# Patient Record
Sex: FEMALE | Race: WHITE | URBAN - NONMETROPOLITAN AREA
[De-identification: names, ages, dates, MRNs, and addresses within clinical notes are randomized per-mention and may not be internally consistent; named-entity substitution may affect disease eponyms.]

---

## 2022-07-11 ENCOUNTER — OFFICE VISIT (OUTPATIENT)
Dept: NEPHROLOGY | Age: 47
End: 2022-07-11

## 2022-07-11 VITALS
HEIGHT: 64 IN | OXYGEN SATURATION: 96 % | BODY MASS INDEX: 36.37 KG/M2 | DIASTOLIC BLOOD PRESSURE: 92 MMHG | HEART RATE: 96 BPM | SYSTOLIC BLOOD PRESSURE: 178 MMHG | WEIGHT: 213 LBS

## 2022-07-11 DIAGNOSIS — E11.21 DIABETIC NEPHROPATHY WITH PROTEINURIA (HCC): ICD-10-CM

## 2022-07-11 DIAGNOSIS — I10 HTN (HYPERTENSION), BENIGN: ICD-10-CM

## 2022-07-11 DIAGNOSIS — N18.2 CKD (CHRONIC KIDNEY DISEASE) STAGE 2, GFR 60-89 ML/MIN: Primary | ICD-10-CM

## 2022-07-11 PROBLEM — R53.1 WEAKNESS: Status: ACTIVE | Noted: 2019-06-26

## 2022-07-11 PROBLEM — E11.51 TYPE 2 DIABETES MELLITUS WITH PERIPHERAL ANGIOPATHY (HCC): Status: ACTIVE | Noted: 2018-01-24

## 2022-07-11 PROBLEM — F17.200 NICOTINE DEPENDENCE: Status: ACTIVE | Noted: 2019-08-06

## 2022-07-11 PROBLEM — M75.00 FROZEN SHOULDER SYNDROME: Status: ACTIVE | Noted: 2018-02-01

## 2022-07-11 PROBLEM — I82.409 DVT (DEEP VENOUS THROMBOSIS) (HCC): Status: ACTIVE | Noted: 2019-08-06

## 2022-07-11 PROBLEM — G57.82 NEUROPATHY OF LEFT SURAL NERVE: Status: ACTIVE | Noted: 2020-08-18

## 2022-07-11 PROBLEM — I73.9 CLAUDICATION IN PERIPHERAL VASCULAR DISEASE (HCC): Status: ACTIVE | Noted: 2020-08-18

## 2022-07-11 PROBLEM — I73.9 PAD (PERIPHERAL ARTERY DISEASE) (HCC): Status: ACTIVE | Noted: 2019-08-06

## 2022-07-11 PROBLEM — E88.09 HYPOALBUMINEMIA: Status: ACTIVE | Noted: 2019-06-26

## 2022-07-11 PROBLEM — M25.511 RIGHT SHOULDER PAIN: Status: ACTIVE | Noted: 2018-02-01

## 2022-07-11 PROCEDURE — 99205 OFFICE O/P NEW HI 60 MIN: CPT | Performed by: INTERNAL MEDICINE

## 2022-07-11 RX ORDER — POTASSIUM CHLORIDE 1.5 G/1.77G
20 POWDER, FOR SOLUTION ORAL DAILY
COMMUNITY

## 2022-07-11 RX ORDER — CILOSTAZOL 100 MG/1
100 TABLET ORAL 2 TIMES DAILY
COMMUNITY

## 2022-07-11 RX ORDER — FUROSEMIDE 20 MG/1
20 TABLET ORAL 2 TIMES DAILY
COMMUNITY

## 2022-07-11 RX ORDER — ASPIRIN 81 MG/1
81 TABLET ORAL DAILY
COMMUNITY

## 2022-07-11 RX ORDER — ORAL SEMAGLUTIDE 14 MG/1
TABLET ORAL
COMMUNITY
End: 2022-07-27

## 2022-07-11 RX ORDER — NICOTINE 21 MG/24HR
PATCH, TRANSDERMAL 24 HOURS TRANSDERMAL
COMMUNITY
Start: 2022-06-28

## 2022-07-11 RX ORDER — METFORMIN HYDROCHLORIDE 500 MG/1
1000 TABLET, EXTENDED RELEASE ORAL 2 TIMES DAILY
COMMUNITY
Start: 2022-04-05

## 2022-07-11 RX ORDER — ATORVASTATIN CALCIUM 40 MG/1
40 TABLET, FILM COATED ORAL DAILY
COMMUNITY

## 2022-07-11 RX ORDER — VALSARTAN 80 MG/1
80 TABLET ORAL DAILY
COMMUNITY
End: 2022-07-27

## 2022-07-11 ASSESSMENT — ENCOUNTER SYMPTOMS
ABDOMINAL PAIN: 0
EYE PAIN: 0
VOMITING: 0
DIARRHEA: 0
COUGH: 0
NAUSEA: 0
BACK PAIN: 0
EYES NEGATIVE: 1
SHORTNESS OF BREATH: 0

## 2022-07-11 NOTE — PROGRESS NOTES
[facility]         Outpatient Initial consult note         2022 3:32 PM    [address] [address]  Dept: [phone]  Loc: [phone]      Patient Name:   Brandi Martinez  YOB: 1975  Primary Care Physician:  Guerline Gayle MD     Chief Complaint : Evaluation of  proteinuria     History of presenting illness :Brandi Martinez is a 55 y.o.   female with Past Medical History as stated below was sent / referred by Guerline Gayle MD forevaluation of the above problem. Patient has a history of hypertension for 15 years. Patient has history of diabetes mellitus, with neuropathy and for 25 years. The patient denies history of renal stones anddenies NSAID use. Patient has a history of proteinuria. Patient Never had a kidney biopsy done. Patient does not use Herbal remedies/vitamin supplements. Patient denies frequency, hematuria, hesitancy. Patient has no family history of kidney disease.     Patient has been having swelling, bilateral lower extremities moderate severity all the way up to the thighs and also on the face, no associated symptoms no specific modifying factors as well she was started on Lasix by PCP without much improvement     Past History :     Past Medical History:   Diagnosis Date    Hyperlipidemia     Neuropathy     Type II or unspecified type diabetes mellitus without mention of complication, not stated as uncontrolled      Past Surgical History:   Procedure Laterality Date     SECTION       Social History     Socioeconomic History    Marital status:      Spouse name: Not on file    Number of children: Not on file    Years of education: Not on file    Highest education level: Not on file   Occupational History    Not on file   Tobacco Use    Smoking status: Current Every Day Smoker     Packs/day: 1.00     Years: 18.00     Pack years: 18.00    Smokeless tobacco: Never Used   Substance and Sexual Activity    Alcohol use: Yes     Comment: Socially/Seldom    Drug use: No    Sexual activity: Not on file   Other Topics Concern    Not on file   Social History Narrative    Not on file     Social Determinants of Health     Financial Resource Strain:     Difficulty of Paying Living Expenses: Not on file   Food Insecurity:     Worried About Running Out of Food in the Last Year: Not on file    Davin of Food in the Last Year: Not on file   Transportation Needs:     Lack of Transportation (Medical): Not on file    Lack of Transportation (Non-Medical): Not on file   Physical Activity:     Days of Exercise per Week: Not on file    Minutes of Exercise per Session: Not on file   Stress:     Feeling of Stress : Not on file   Social Connections:     Frequency of Communication with Friends and Family: Not on file    Frequency of Social Gatherings with Friends and Family: Not on file    Attends [employer] Services: Not on file    Active Member of [address] [place] [address] or Organizations: Not on file    Attends Club or Organization Meetings: Not on file    Marital Status: Not on file   Intimate Partner Violence:     Fear of Current or Ex-Partner: Not on file    Emotionally Abused: Not on file    Physically Abused: Not on file    Sexually Abused: Not on file   Housing Stability:     Unable to Pay for Housing in the Last Year: Not on file    Number of [place] in the Last Year: Not on file    Unstable Housing in the Last Year: Not on file     Family History   Problem Relation Age of Onset    Diabetes Mother     High Blood Pressure Mother      Medications & Allergies :      Prior to Admission medications    Medication Sig Start Date End Date Taking?  Authorizing Provider   metFORMIN (GLUCOPHAGE-XR) 500 MG extended release tablet 500 mg in the morning and at bedtime 4/5/22  Yes Historical Provider, MD   nicotine (Kate Mixon) 21 MG/24HR APPLY 1 PATCH TO THE SKIN ONCE A DAY 6/28/22  Yes Historical Provider, MD   furosemide (LASIX) 20 MG tablet Take 20 mg by mouth 2 times daily   Yes Historical Provider, MD   atorvastatin (LIPITOR) 40 MG tablet Take 40 mg by mouth daily   Yes Historical Provider, MD   potassium chloride (KLOR-CON) 20 MEQ packet Take 20 mEq by mouth daily   Yes Historical Provider, MD   aspirin 81 MG EC tablet Take 81 mg by mouth daily   Yes Historical Provider, MD   valsartan (DIOVAN) 80 MG tablet Take 80 mg by mouth daily   Yes Historical Provider, MD   cilostazol (PLETAL) 100 MG tablet Take 100 mg by mouth 2 times daily   Yes Historical Provider, MD   Citalopram Hydrobromide (CELEXA PO) Take 20 mg by mouth daily   Yes Historical Provider, MD   rivaroxaban (XARELTO) 20 MG TABS tablet Take 20 mg by mouth Daily with supper   Yes Historical Provider, MD   Semaglutide (RYBELSUS) 14 MG TABS Take by mouth   Yes Historical Provider, MD   gabapentin (NEURONTIN) 300 MG capsule Take 300 mg by mouth 3 times daily. Indications: Nerve Disease   Yes Historical Provider, MD     Allergies: Insulins and Lisinopril    Review of Systems Physical Exam   Review of Systems   Constitutional: Positive for fatigue. Negative for chills and fever. HENT: Negative. Eyes: Negative. Negative for pain. Respiratory: Negative for cough and shortness of breath. Cardiovascular: Positive for leg swelling. Negative for chest pain. Gastrointestinal: Negative for abdominal pain, diarrhea, nausea and vomiting. Genitourinary: Negative for dysuria, frequency and hematuria. Musculoskeletal: Negative for back pain and neck pain. Skin: Negative for rash and wound. Neurological: Negative for dizziness, light-headedness and headaches. Psychiatric/Behavioral: Negative for agitation and confusion. Physical Exam  Vitals reviewed. Constitutional:       General: She is not in acute distress. Appearance: She is obese. She is not diaphoretic. HENT:      Head: Normocephalic and atraumatic. Right Ear: External ear normal.      Left Ear: External ear normal.      Nose: Nose normal.      Mouth/Throat:      Mouth: Mucous membranes are moist.   Eyes:      General: No scleral icterus. Right eye: No discharge. Left eye: No discharge. Conjunctiva/sclera: Conjunctivae normal.   Neck:      Thyroid: No thyromegaly. Vascular: No JVD. Cardiovascular:      Rate and Rhythm: Normal rate and regular rhythm. Heart sounds: Normal heart sounds. No murmur heard. Pulmonary:      Effort: Pulmonary effort is normal. No respiratory distress. Breath sounds: Normal breath sounds. No stridor. Chest:      Chest wall: No tenderness. Abdominal:      General: Bowel sounds are normal. There is distension. Palpations: Abdomen is soft. Tenderness: There is no abdominal tenderness. Musculoskeletal:         General: Swelling present. No tenderness. Cervical back: Normal range of motion and neck supple. Left lower leg: Edema present. Skin:     General: Skin is warm and dry. Findings: No erythema or rash. Neurological:      General: No focal deficit present. Mental Status: She is alert and oriented to person, place, and time.    Psychiatric:         Mood and Affect: Mood normal.         Behavior: Behavior normal.          Vitals   Vitals:    07/11/22 1509   BP: (!) 178/92   Site: Left Upper Arm   Position: Sitting   Cuff Size: Small Adult   Pulse: 96   SpO2: 96%   Weight: 213 lb (96.6 kg)   Height: 5' 4\" (1.626 m)        Labs, Radiology and Tests :    Labs -    5/22           potassium 3.7           BUN 17           Creatinine 0.82           eGFR 86                       UPCR            UMCR [id number]                         Renal Ultrasound Scan -- will order       Other : old  lab data have been reviewed and noted that the patient A1c has been 12+ for almost 6 years    Echo - 11/21 - EF 60%  Assessment 1. Renal -patient has chronic kidney disease stage II mostly due to the presence of proteinuria this is due to mostly diabetic nephropathy  -Patient however does complain that the swelling has acutely gotten worse within the last 4 weeks and gained 20 pounds and she also noticed some facial swelling-concern for any glomerular pathology  -We will recheck labs, quantify protein again and send some serologic work-up  -If any abnormalities may need to do a renal biopsy as well  2. Electrolytes within normal limits  3. Essential hypertension running well continue current medications  4. Hx of diabetes mellitus for almost 25 years with now diabetic nephropathy on ARB  5. Leg swelling-patient drinks at least a gallon of water a day and takes Lasix as well advised to restrict fluid intake to 60 ounces per day and also rule out nephrotic syndrome. 6. Hx of peripheral vascular disease status post stent placement in January 22  7. Meds reviewed discussed with the patient  8. Follow-up in 2 weeks  Plan   No orders of the defined types were placed in this encounter. Leonela Lezama M.D  [facility].

## 2022-07-12 ENCOUNTER — TELEPHONE (OUTPATIENT)
Dept: NEPHROLOGY | Age: 47
End: 2022-07-12

## 2022-07-12 NOTE — TELEPHONE ENCOUNTER
Received call from [facility] at lab stating pt had a glucose of 576. They will fax the results. Left msg for pt to call in regards to her glucose.

## 2022-07-14 LAB
BILIRUBIN, URINE: NEGATIVE
BLOOD, URINE: POSITIVE
CLARITY: CLEAR
COLOR: YELLOW
CREATININE, URINE: 43.8
GLUCOSE URINE: >=500
KETONES, URINE: NEGATIVE
LEUKOCYTE ESTERASE, URINE: NEGATIVE
MICROALBUMIN/CREAT 24H UR: 291.1 MG/G{CREAT}
MICROALBUMIN/CREAT UR-RTO: 6646
NITRITE, URINE: NEGATIVE
PH UA: 6 (ref 4.5–8)
PROTEIN UA: POSITIVE
SPECIFIC GRAVITY, URINE: 1.02
UROBILINOGEN, URINE: ABNORMAL

## 2022-07-25 DIAGNOSIS — N18.2 CKD (CHRONIC KIDNEY DISEASE) STAGE 2, GFR 60-89 ML/MIN: ICD-10-CM

## 2022-07-27 ENCOUNTER — OFFICE VISIT (OUTPATIENT)
Dept: NEPHROLOGY | Age: 47
End: 2022-07-27

## 2022-07-27 VITALS
BODY MASS INDEX: 36.22 KG/M2 | DIASTOLIC BLOOD PRESSURE: 90 MMHG | HEART RATE: 97 BPM | WEIGHT: 211 LBS | SYSTOLIC BLOOD PRESSURE: 156 MMHG | OXYGEN SATURATION: 99 %

## 2022-07-27 DIAGNOSIS — N18.2 CKD (CHRONIC KIDNEY DISEASE) STAGE 2, GFR 60-89 ML/MIN: Primary | ICD-10-CM

## 2022-07-27 DIAGNOSIS — I10 HTN (HYPERTENSION), BENIGN: ICD-10-CM

## 2022-07-27 DIAGNOSIS — E11.21 DIABETIC NEPHROPATHY WITH PROTEINURIA (HCC): ICD-10-CM

## 2022-07-27 PROCEDURE — 99214 OFFICE O/P EST MOD 30 MIN: CPT | Performed by: INTERNAL MEDICINE

## 2022-07-27 RX ORDER — VALSARTAN 160 MG/1
160 TABLET ORAL DAILY
Qty: 90 TABLET | Refills: 3 | Status: SHIPPED | OUTPATIENT
Start: 2022-07-27

## 2022-07-27 NOTE — PROGRESS NOTES
[facility]        Outpatient Follow-Up note         2022 12:11 PM    Patient Name:   Bria Umana  YOB: 1975  Primary Care Physician:  Gladys Robertson MD   [address] [place] [place] [address]  Dept: [phone]  Loc: [phone]     Chief Complaint / Reason for follow-up : Follow Up of CKD and proteinuria     Interval History :  Patient seen and examined by me. Feels ok. Had a eye scrath by the dog     Past History :  Past Medical History:   Diagnosis Date    Hyperlipidemia     Neuropathy     Type II or unspecified type diabetes mellitus without mention of complication, not stated as uncontrolled      Past Surgical History:   Procedure Laterality Date     SECTION          Medications :     Outpatient Medications Marked as Taking for the 22 encounter (Office Visit) with Zoran Reyes MD   Medication Sig Dispense Refill    metFORMIN (GLUCOPHAGE-XR) 500 MG extended release tablet 1,000 mg in the morning and at bedtime      nicotine (NICODERM CQ) 21 MG/24HR APPLY 1 PATCH TO THE SKIN ONCE A DAY      furosemide (LASIX) 20 MG tablet Take 20 mg by mouth 2 times daily      atorvastatin (LIPITOR) 40 MG tablet Take 40 mg by mouth daily      potassium chloride (KLOR-CON) 20 MEQ packet Take 20 mEq by mouth daily      aspirin 81 MG EC tablet Take 81 mg by mouth daily      valsartan (DIOVAN) 80 MG tablet Take 80 mg by mouth daily      cilostazol (PLETAL) 100 MG tablet Take 100 mg by mouth 2 times daily      Citalopram Hydrobromide (CELEXA PO) Take 20 mg by mouth daily      rivaroxaban (XARELTO) 20 MG TABS tablet Take 20 mg by mouth Daily with supper      gabapentin (NEURONTIN) 300 MG capsule Take 300 mg by mouth 3 times daily.     Indications: Nerve Disease         Vitals     BP (!) 156/90   Pulse 97   Wt 211 lb (95.7 kg)   SpO2 99%   BMI 36.22 kg/m²  Wt

## 2023-03-22 ENCOUNTER — OFFICE VISIT (OUTPATIENT)
Dept: NEPHROLOGY | Age: 48
End: 2023-03-22
Payer: COMMERCIAL

## 2023-03-22 ENCOUNTER — TELEPHONE (OUTPATIENT)
Dept: NEPHROLOGY | Age: 48
End: 2023-03-22

## 2023-03-22 VITALS
SYSTOLIC BLOOD PRESSURE: 164 MMHG | BODY MASS INDEX: 39.65 KG/M2 | HEART RATE: 103 BPM | WEIGHT: 231 LBS | DIASTOLIC BLOOD PRESSURE: 90 MMHG | OXYGEN SATURATION: 100 %

## 2023-03-22 DIAGNOSIS — N18.2 CKD (CHRONIC KIDNEY DISEASE) STAGE 2, GFR 60-89 ML/MIN: Primary | ICD-10-CM

## 2023-03-22 DIAGNOSIS — E11.21 DIABETIC NEPHROPATHY WITH PROTEINURIA (HCC): Primary | ICD-10-CM

## 2023-03-22 DIAGNOSIS — I10 HTN (HYPERTENSION), BENIGN: ICD-10-CM

## 2023-03-22 DIAGNOSIS — E11.21 DIABETIC NEPHROPATHY WITH PROTEINURIA (HCC): ICD-10-CM

## 2023-03-22 PROCEDURE — 4004F PT TOBACCO SCREEN RCVD TLK: CPT | Performed by: INTERNAL MEDICINE

## 2023-03-22 PROCEDURE — G8484 FLU IMMUNIZE NO ADMIN: HCPCS | Performed by: INTERNAL MEDICINE

## 2023-03-22 PROCEDURE — 3077F SYST BP >= 140 MM HG: CPT | Performed by: INTERNAL MEDICINE

## 2023-03-22 PROCEDURE — 3080F DIAST BP >= 90 MM HG: CPT | Performed by: INTERNAL MEDICINE

## 2023-03-22 PROCEDURE — 2022F DILAT RTA XM EVC RTNOPTHY: CPT | Performed by: INTERNAL MEDICINE

## 2023-03-22 PROCEDURE — 99215 OFFICE O/P EST HI 40 MIN: CPT | Performed by: INTERNAL MEDICINE

## 2023-03-22 PROCEDURE — 3046F HEMOGLOBIN A1C LEVEL >9.0%: CPT | Performed by: INTERNAL MEDICINE

## 2023-03-22 PROCEDURE — G8427 DOCREV CUR MEDS BY ELIG CLIN: HCPCS | Performed by: INTERNAL MEDICINE

## 2023-03-22 PROCEDURE — G8417 CALC BMI ABV UP PARAM F/U: HCPCS | Performed by: INTERNAL MEDICINE

## 2023-03-22 RX ORDER — POTASSIUM CHLORIDE 1.5 G/1.77G
20 POWDER, FOR SOLUTION ORAL 2 TIMES DAILY
Qty: 60 EACH | Refills: 2 | Status: SHIPPED | OUTPATIENT
Start: 2023-03-22

## 2023-03-22 RX ORDER — LIRAGLUTIDE 6 MG/ML
0.6 INJECTION SUBCUTANEOUS DAILY
COMMUNITY

## 2023-03-22 RX ORDER — BUMETANIDE 2 MG/1
1 TABLET ORAL 2 TIMES DAILY
Qty: 60 TABLET | Refills: 1 | Status: SHIPPED | OUTPATIENT
Start: 2023-03-22

## 2023-03-22 NOTE — PROGRESS NOTES
1500 mm fluid restriction and increase potassium to 1 tablet twice a day  Hx of peripheral vascular disease status post stent placement in January 22  Meds reviewed discussed with the patient  Follow-up in 6 months      Tests and orders placed this Encounter     No orders of the defined types were placed in this encounter. Armani Jacob M.D  [facility].

## 2023-03-22 NOTE — TELEPHONE ENCOUNTER
Patient needing kidney biopsy. Patient is currently on xarelto and ASA. [facility] get [facility] for patient to hold both medication 5 days prior to procedure and 3 days after procedure. Efra Cantrell was given if biopsy is really needed. Biopsy has been ordered. Scheduling will call patient to schedule. Left message for patient to return my call to advise of above.

## 2023-03-23 NOTE — TELEPHONE ENCOUNTER
Called patient and advised of holding medication. Advised also that cardiologist was wanting her to remain on ASA, however radiology would not do biopsy. Cardio then stated okay to be off ASA if biopsy is necessary. This was explained to patient and she voiced understanding.  Will await IR to schedule biopsy

## 2023-04-07 ENCOUNTER — HOSPITAL ENCOUNTER (OUTPATIENT)
Dept: CT IMAGING | Age: 48
Discharge: HOME | End: 2023-04-07
Payer: COMMERCIAL

## 2023-04-07 VITALS
OXYGEN SATURATION: 98 % | TEMPERATURE: 96.8 F | DIASTOLIC BLOOD PRESSURE: 94 MMHG | SYSTOLIC BLOOD PRESSURE: 140 MMHG | HEART RATE: 100 BPM | RESPIRATION RATE: 18 BRPM

## 2023-04-07 DIAGNOSIS — E11.21 DIABETIC NEPHROPATHY WITH PROTEINURIA (HCC): ICD-10-CM

## 2023-04-07 LAB
APTT PPP: 28.1 SECONDS (ref 22–38)
CREAT SERPL-MCNC: 1 MG/DL (ref 0.4–1.2)
DEPRECATED RDW RBC AUTO: 39.5 FL (ref 35–45)
ERYTHROCYTE [DISTWIDTH] IN BLOOD BY AUTOMATED COUNT: 12.7 % (ref 11.5–14.5)
GFR SERPL CREATININE-BSD FRML MDRD: > 60 ML/MIN/1.73M2
HCT VFR BLD AUTO: 41.8 % (ref 37–47)
HGB BLD-MCNC: 13.4 GM/DL (ref 12–16)
INR PPP: 0.8 (ref 0.85–1.13)
MCH RBC QN AUTO: 27.6 PG (ref 26–33)
MCHC RBC AUTO-ENTMCNC: 32.1 GM/DL (ref 32.2–35.5)
MCV RBC AUTO: 86 FL (ref 81–99)
PLATELET # BLD AUTO: 343 THOU/MM3 (ref 130–400)
PMV BLD AUTO: 9.9 FL (ref 9.4–12.4)
RBC # BLD AUTO: 4.86 MILL/MM3 (ref 4.2–5.4)
WBC # BLD AUTO: 8.4 THOU/MM3 (ref 4.8–10.8)

## 2023-04-07 PROCEDURE — 2580000003 HC RX 258: Performed by: RADIOLOGY

## 2023-04-07 PROCEDURE — 77012 CT SCAN FOR NEEDLE BIOPSY: CPT

## 2023-04-07 PROCEDURE — 6360000002 HC RX W HCPCS: Performed by: RADIOLOGY

## 2023-04-07 PROCEDURE — 50200 RENAL BIOPSY PERQ: CPT

## 2023-04-07 RX ORDER — FENTANYL CITRATE 50 UG/ML
INJECTION, SOLUTION INTRAMUSCULAR; INTRAVENOUS PRN
Status: COMPLETED | OUTPATIENT
Start: 2023-04-07 | End: 2023-04-07

## 2023-04-07 RX ORDER — MIDAZOLAM HYDROCHLORIDE 1 MG/ML
INJECTION INTRAMUSCULAR; INTRAVENOUS PRN
Status: COMPLETED | OUTPATIENT
Start: 2023-04-07 | End: 2023-04-07

## 2023-04-07 RX ORDER — SODIUM CHLORIDE 450 MG/100ML
INJECTION, SOLUTION INTRAVENOUS CONTINUOUS
Status: DISCONTINUED | OUTPATIENT
Start: 2023-04-07 | End: 2023-04-08 | Stop reason: HOSPADM

## 2023-04-07 RX ADMIN — SODIUM CHLORIDE: 4.5 INJECTION, SOLUTION INTRAVENOUS at 09:16

## 2023-04-07 RX ADMIN — MIDAZOLAM 1 MG: 1 INJECTION INTRAMUSCULAR; INTRAVENOUS at 10:17

## 2023-04-07 RX ADMIN — FENTANYL CITRATE 50 MCG: 50 INJECTION, SOLUTION INTRAMUSCULAR; INTRAVENOUS at 10:17

## 2023-04-07 RX ADMIN — MIDAZOLAM 1 MG: 1 INJECTION INTRAMUSCULAR; INTRAVENOUS at 10:22

## 2023-04-07 RX ADMIN — FENTANYL CITRATE 50 MCG: 50 INJECTION, SOLUTION INTRAMUSCULAR; INTRAVENOUS at 10:22

## 2023-04-07 NOTE — DISCHARGE INSTRUCTIONS
POST BIOPSY DISCHARGE INSTRUCTION SHEET    DIET:  As tolerated    ACTIVITY:  Rest at home on sofa, bed or recliner today. Bathroom privileges only today. Limit any exertion (pushing or pulling) today. No lifting for 3 days. No driving today. Check biopsy site frequently today. Resume any blood thinners in 24 hours. Keep band aid clean & dry - replace as needed, may remove in 48 hours. RETURN TO NEAREST EMERGENCY ROOM IF YOU HAVE ANY OF THE FOLLOWING:  Sign of bleeding, swelling, drainage from biopsy site or severe pain (slight discomfort to be expected) around biopsy site. Repeated nausea/vomiting/abdominal pain. Elevated temperature above 101 degrees. Shortness of breath. Chest pain. Keep scheduled appointment with your physician. If sedation given, follow post sedation instruction sheet. SEDATION/ANALGESIA INFORMATION HOME GOING ADVICE    Review the following information with the patient prior to the procedure. Sedation/agalgesia is used during short medical procedures under controlled supervision. The medication will produce a strong relaxation. You will be able to hear, speak and follow instructions, but you memory and alertness will be decreased. You will be able to swallow and breathe on your own. During sedation/analgesia you blood pressure, hear and breathing will be watched closely. After the procedure, you may not remember what was said or done. Procedure: Renal Biopsy      Date: 4/7/23  You may have the following effects from the medication. Drowsiness, dizziness, sleepiness or confusion. Difficulty remembering or delayed reaction times. Loss of fine muscle control or difficulty with your balance especially while walking. Difficulty focusing or blurred vision. You may not be aware of slight changes in your behavior and/or your reaction time because of the medication used during the procedure. Therefore you should follow these instructions.   Have someone responsible

## 2023-04-07 NOTE — PROGRESS NOTES
1380- patient arrived to [facility] ambulatory with  for renal biopsy. Patient states her Xarelto and 81mg Aspirin has been stopped since 4/1. Patient has been NPO. Patient has  post procedure. Vitals stable. IV inserted and lab work obtained per order. Call light placed within reach. PT RIGHTS AND RESPONSIBILITIES OFFERED TO PT.     7172- Normal saline started per [facility]     1100- Patient back from CT post biopsy. Vitals stable. Band-aid clean, dry, intact. Denies pain. Patient offered lunch and drink. Denies further needs. 1115- Vitals stable. Band-aid clean, dry, intact. Denies pain. Patient resting. 1130- Vitals stable. Denies pain. Band-aid clean, dry, intact. 1145- patient sitting up. Denies pain. Vitals stable. Band-aid clean, dry, intact. 1200- patient up tor restroom. Denies pain. Vitals stable. Band-aid clean, dry, intact. 1230- Vitals stable. Patient sitting up. Denies pain. Band-aid clean, dry, intact. 1300- Vitals stable. Band-aid clean, dry, intact. Denies pain. 1400- Vitals stable. Band-aid clean, dry, intact. Denies pain. 1500- Vitals stable. Band-aid clean, dry, intact. Denies pain. IV removed. Patient able to get dressed without issue. 12- Discharge instructions reviewed with patient and . Verbalized understanding with no further questions. AVS provided. Discharged via wheelchair to lobby in stable condition.              __M__ Safety:       (Environmental)  Diana to environment  Ensure ID band is correct and in place/ allergy band as needed  Assess for fall risk  Initiate fall precautions as applicable (fall band, side rails, etc.)  Call light within reach  Bed in low position/ wheels locked    __M__ Pain:       Assess pain level and characteristics  Administer analgesics as ordered  Assess effectiveness of pain management and report to MD as needed    __M__ Knowledge Deficit:  Assess baseline knowledge  Provide teaching at level of

## 2023-04-07 NOTE — PROGRESS NOTES
46 Pt in specials radiology for CT guided renal biopsy. Discussed procedure with pt and pt verbalizes understanding. 200 Dr Clint Bailey to speak to pt.  1005 Pt attached to monitor and positioned prone on table. 80 Dr Clint Bailey to start procedure. 1029 Three core biopsy samples obtained. Pt tolerated well. 1031 Explained to pt waiting on call from lab. Pt verbalizes understanding. Offers no complaints. 1047 Call received from lab. Specimen adequate. 1053 Needle removed and post images taken. 1054 Triple antibiotic ointment applied to site with band-aid. Site without redness, swelling or hematoma. 1055 Pt positioned on scanner for comfort. Denies any pain. Transferred to [facility] per cart and [facility]

## 2023-04-19 ENCOUNTER — TELEPHONE (OUTPATIENT)
Dept: NEPHROLOGY | Age: 48
End: 2023-04-19

## 2023-04-26 ENCOUNTER — OFFICE VISIT (OUTPATIENT)
Dept: NEPHROLOGY | Age: 48
End: 2023-04-26
Payer: COMMERCIAL

## 2023-04-26 VITALS — SYSTOLIC BLOOD PRESSURE: 142 MMHG | OXYGEN SATURATION: 100 % | HEART RATE: 98 BPM | DIASTOLIC BLOOD PRESSURE: 84 MMHG

## 2023-04-26 DIAGNOSIS — I10 HTN (HYPERTENSION), BENIGN: ICD-10-CM

## 2023-04-26 DIAGNOSIS — E11.21 DIABETIC NEPHROPATHY WITH PROTEINURIA (HCC): ICD-10-CM

## 2023-04-26 DIAGNOSIS — N18.2 CKD (CHRONIC KIDNEY DISEASE) STAGE 2, GFR 60-89 ML/MIN: Primary | ICD-10-CM

## 2023-04-26 DIAGNOSIS — N05.1 FSGS (FOCAL SEGMENTAL GLOMERULOSCLEROSIS): ICD-10-CM

## 2023-04-26 PROCEDURE — 4004F PT TOBACCO SCREEN RCVD TLK: CPT | Performed by: INTERNAL MEDICINE

## 2023-04-26 PROCEDURE — 3078F DIAST BP <80 MM HG: CPT | Performed by: INTERNAL MEDICINE

## 2023-04-26 PROCEDURE — 3074F SYST BP LT 130 MM HG: CPT | Performed by: INTERNAL MEDICINE

## 2023-04-26 PROCEDURE — 99215 OFFICE O/P EST HI 40 MIN: CPT | Performed by: INTERNAL MEDICINE

## 2023-04-26 PROCEDURE — G8417 CALC BMI ABV UP PARAM F/U: HCPCS | Performed by: INTERNAL MEDICINE

## 2023-04-26 PROCEDURE — 2022F DILAT RTA XM EVC RTNOPTHY: CPT | Performed by: INTERNAL MEDICINE

## 2023-04-26 PROCEDURE — G8427 DOCREV CUR MEDS BY ELIG CLIN: HCPCS | Performed by: INTERNAL MEDICINE

## 2023-04-26 PROCEDURE — 3046F HEMOGLOBIN A1C LEVEL >9.0%: CPT | Performed by: INTERNAL MEDICINE

## 2023-04-26 RX ORDER — TACROLIMUS 1 MG/1
2 CAPSULE ORAL 2 TIMES DAILY
Qty: 60 CAPSULE | Refills: 1 | Status: SHIPPED | OUTPATIENT
Start: 2023-04-26

## 2023-04-26 NOTE — PROGRESS NOTES
and follow in the clinic    Electrolytes within normal limits  Essential hypertension running well continue current medications  Hx of diabetes mellitus for almost 25 years with now diabetic nephropathy on ARB  Leg swelling -May be multifactorial from peripheral vascular disease/any glomerular pathology . 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 Hx of peripheral vascular disease status post stent placement in January 22  Meds reviewed discussed with the patient  Follow-up in 1 months close monitoring of Prograf levels      Tests and orders placed this Encounter     No orders of the defined types were placed in this encounter. Linda Eddy M.D  [facility].

## 2023-05-03 ENCOUNTER — TELEPHONE (OUTPATIENT)
Dept: NEPHROLOGY | Age: 48
End: 2023-05-03

## 2023-05-03 DIAGNOSIS — N18.2 CKD (CHRONIC KIDNEY DISEASE) STAGE 2, GFR 60-89 ML/MIN: Primary | ICD-10-CM

## 2023-05-18 RX ORDER — TACROLIMUS 1 MG/1
4 CAPSULE ORAL 2 TIMES DAILY
Qty: 240 CAPSULE | Refills: 3 | Status: SHIPPED | OUTPATIENT
Start: 2023-05-18

## 2023-05-22 RX ORDER — TACROLIMUS 1 MG/1
CAPSULE ORAL
Qty: 60 CAPSULE | Refills: 1 | OUTPATIENT
Start: 2023-05-22

## 2023-05-24 ENCOUNTER — OFFICE VISIT (OUTPATIENT)
Dept: NEPHROLOGY | Age: 48
End: 2023-05-24
Payer: COMMERCIAL

## 2023-05-24 VITALS
HEART RATE: 101 BPM | DIASTOLIC BLOOD PRESSURE: 107 MMHG | WEIGHT: 197 LBS | BODY MASS INDEX: 33.81 KG/M2 | OXYGEN SATURATION: 98 % | SYSTOLIC BLOOD PRESSURE: 194 MMHG

## 2023-05-24 DIAGNOSIS — N05.1 FSGS (FOCAL SEGMENTAL GLOMERULOSCLEROSIS): ICD-10-CM

## 2023-05-24 DIAGNOSIS — E11.21 DIABETIC NEPHROPATHY WITH PROTEINURIA (HCC): ICD-10-CM

## 2023-05-24 DIAGNOSIS — N18.2 CKD (CHRONIC KIDNEY DISEASE) STAGE 2, GFR 60-89 ML/MIN: Primary | ICD-10-CM

## 2023-05-24 DIAGNOSIS — I10 HTN (HYPERTENSION), BENIGN: ICD-10-CM

## 2023-05-24 PROCEDURE — 99215 OFFICE O/P EST HI 40 MIN: CPT | Performed by: INTERNAL MEDICINE

## 2023-05-24 PROCEDURE — G8417 CALC BMI ABV UP PARAM F/U: HCPCS | Performed by: INTERNAL MEDICINE

## 2023-05-24 PROCEDURE — 3077F SYST BP >= 140 MM HG: CPT | Performed by: INTERNAL MEDICINE

## 2023-05-24 PROCEDURE — 3046F HEMOGLOBIN A1C LEVEL >9.0%: CPT | Performed by: INTERNAL MEDICINE

## 2023-05-24 PROCEDURE — G8428 CUR MEDS NOT DOCUMENT: HCPCS | Performed by: INTERNAL MEDICINE

## 2023-05-24 PROCEDURE — 3080F DIAST BP >= 90 MM HG: CPT | Performed by: INTERNAL MEDICINE

## 2023-05-24 PROCEDURE — 2022F DILAT RTA XM EVC RTNOPTHY: CPT | Performed by: INTERNAL MEDICINE

## 2023-05-24 PROCEDURE — 4004F PT TOBACCO SCREEN RCVD TLK: CPT | Performed by: INTERNAL MEDICINE

## 2023-05-24 RX ORDER — VALSARTAN 320 MG/1
320 TABLET ORAL DAILY
Qty: 90 TABLET | Refills: 1 | Status: SHIPPED | OUTPATIENT
Start: 2023-05-24

## 2023-05-24 RX ORDER — TACROLIMUS 1 MG/1
4 CAPSULE ORAL 2 TIMES DAILY
Qty: 240 CAPSULE | Refills: 1 | Status: SHIPPED | OUTPATIENT
Start: 2023-05-24

## 2023-05-24 NOTE — PROGRESS NOTES
[facility]        Outpatient Follow-Up note         2023 9:19 AM    Patient Name:   Ricardo Ventura  YOB: 1975  Primary Care Physician:  Marcel Hall MD   [address] [address]  Dept: [phone]  Loc: [phone]     Chief Complaint / Reason for follow-up : Follow Up of CKD and proteinuria     Interval History :  Patient seen and examined by me. Feels ok.   No hospitalizations, no medication changes  Patient is tolerating the prograf well however she been out of the medication and has not been taking it for the last week  Sharyon Magy was started by PCP     Past History :  Past Medical History:   Diagnosis Date    Hyperlipidemia     Neuropathy     Type II or unspecified type diabetes mellitus without mention of complication, not stated as uncontrolled      Past Surgical History:   Procedure Laterality Date     SECTION  2002    CHOLECYSTECTOMY      CT BIOPSY RENAL  2023    CT BIOPSY RENAL 2023 [facility] CT SCAN        Medications :     Outpatient Medications Marked as Taking for the 23 encounter (Office Visit) with Loyd Musa MD   Medication Sig Dispense Refill    dapagliflozin (FARXIGA) 10 MG tablet Take 1 tablet by mouth every morning      tacrolimus (PROGRAF) 1 MG capsule Take 4 capsules by mouth 2 times daily 240 capsule 3    Liraglutide (VICTOZA) 18 MG/3ML SOPN SC injection Inject 0.6 mg into the skin daily      bumetanide (BUMEX) 2 MG tablet Take 0.5 tablets by mouth 2 times daily 60 tablet 1    potassium chloride (KLOR-CON) 20 MEQ packet Take 20 mEq by mouth 2 times daily 60 each 2    metFORMIN (GLUCOPHAGE-XR) 500 MG extended release tablet 2 tablets in the morning and at bedtime      atorvastatin (LIPITOR) 40 MG tablet Take 1 tablet by mouth daily      aspirin 81 MG EC tablet Take 1 tablet by mouth daily      rivaroxaban

## 2023-06-27 ENCOUNTER — TELEPHONE (OUTPATIENT)
Dept: NEPHROLOGY | Age: 48
End: 2023-06-27

## 2023-06-28 ENCOUNTER — OFFICE VISIT (OUTPATIENT)
Dept: NEPHROLOGY | Age: 48
End: 2023-06-28
Payer: COMMERCIAL

## 2023-06-28 VITALS
BODY MASS INDEX: 35.87 KG/M2 | SYSTOLIC BLOOD PRESSURE: 178 MMHG | WEIGHT: 209 LBS | OXYGEN SATURATION: 98 % | DIASTOLIC BLOOD PRESSURE: 90 MMHG | HEART RATE: 90 BPM

## 2023-06-28 DIAGNOSIS — N18.2 CKD (CHRONIC KIDNEY DISEASE) STAGE 2, GFR 60-89 ML/MIN: Primary | ICD-10-CM

## 2023-06-28 DIAGNOSIS — N05.1 FSGS (FOCAL SEGMENTAL GLOMERULOSCLEROSIS): ICD-10-CM

## 2023-06-28 DIAGNOSIS — I10 HTN (HYPERTENSION), BENIGN: ICD-10-CM

## 2023-06-28 DIAGNOSIS — E11.21 DIABETIC NEPHROPATHY WITH PROTEINURIA (HCC): ICD-10-CM

## 2023-06-28 PROCEDURE — 3046F HEMOGLOBIN A1C LEVEL >9.0%: CPT | Performed by: INTERNAL MEDICINE

## 2023-06-28 PROCEDURE — 3077F SYST BP >= 140 MM HG: CPT | Performed by: INTERNAL MEDICINE

## 2023-06-28 PROCEDURE — G8417 CALC BMI ABV UP PARAM F/U: HCPCS | Performed by: INTERNAL MEDICINE

## 2023-06-28 PROCEDURE — 99214 OFFICE O/P EST MOD 30 MIN: CPT | Performed by: INTERNAL MEDICINE

## 2023-06-28 PROCEDURE — 3080F DIAST BP >= 90 MM HG: CPT | Performed by: INTERNAL MEDICINE

## 2023-06-28 PROCEDURE — 2022F DILAT RTA XM EVC RTNOPTHY: CPT | Performed by: INTERNAL MEDICINE

## 2023-06-28 PROCEDURE — 4004F PT TOBACCO SCREEN RCVD TLK: CPT | Performed by: INTERNAL MEDICINE

## 2023-06-28 PROCEDURE — G8427 DOCREV CUR MEDS BY ELIG CLIN: HCPCS | Performed by: INTERNAL MEDICINE

## 2023-06-28 RX ORDER — DOXAZOSIN MESYLATE 4 MG/1
4 TABLET ORAL NIGHTLY
Qty: 30 TABLET | Refills: 3 | Status: SHIPPED | OUTPATIENT
Start: 2023-06-28

## 2023-07-13 RX ORDER — BUMETANIDE 2 MG/1
TABLET ORAL
Qty: 30 TABLET | Refills: 1 | Status: SHIPPED | OUTPATIENT
Start: 2023-07-13

## 2023-07-31 RX ORDER — VALSARTAN 160 MG/1
TABLET ORAL
Qty: 90 TABLET | Refills: 3 | OUTPATIENT
Start: 2023-07-31

## 2023-09-06 ENCOUNTER — OFFICE VISIT (OUTPATIENT)
Dept: NEPHROLOGY | Age: 48
End: 2023-09-06
Payer: COMMERCIAL

## 2023-09-06 VITALS
WEIGHT: 210 LBS | OXYGEN SATURATION: 97 % | HEART RATE: 102 BPM | SYSTOLIC BLOOD PRESSURE: 138 MMHG | DIASTOLIC BLOOD PRESSURE: 82 MMHG | BODY MASS INDEX: 36.05 KG/M2

## 2023-09-06 DIAGNOSIS — N05.1 FSGS (FOCAL SEGMENTAL GLOMERULOSCLEROSIS): ICD-10-CM

## 2023-09-06 DIAGNOSIS — N18.2 CKD (CHRONIC KIDNEY DISEASE) STAGE 2, GFR 60-89 ML/MIN: Primary | ICD-10-CM

## 2023-09-06 DIAGNOSIS — I10 HTN (HYPERTENSION), BENIGN: ICD-10-CM

## 2023-09-06 DIAGNOSIS — E11.21 DIABETIC NEPHROPATHY WITH PROTEINURIA (HCC): ICD-10-CM

## 2023-09-06 PROCEDURE — 4004F PT TOBACCO SCREEN RCVD TLK: CPT | Performed by: INTERNAL MEDICINE

## 2023-09-06 PROCEDURE — 99214 OFFICE O/P EST MOD 30 MIN: CPT | Performed by: INTERNAL MEDICINE

## 2023-09-06 PROCEDURE — 3046F HEMOGLOBIN A1C LEVEL >9.0%: CPT | Performed by: INTERNAL MEDICINE

## 2023-09-06 PROCEDURE — G8417 CALC BMI ABV UP PARAM F/U: HCPCS | Performed by: INTERNAL MEDICINE

## 2023-09-06 PROCEDURE — G8427 DOCREV CUR MEDS BY ELIG CLIN: HCPCS | Performed by: INTERNAL MEDICINE

## 2023-09-06 PROCEDURE — 3075F SYST BP GE 130 - 139MM HG: CPT | Performed by: INTERNAL MEDICINE

## 2023-09-06 PROCEDURE — 3079F DIAST BP 80-89 MM HG: CPT | Performed by: INTERNAL MEDICINE

## 2023-09-06 PROCEDURE — 2022F DILAT RTA XM EVC RTNOPTHY: CPT | Performed by: INTERNAL MEDICINE

## 2023-09-06 RX ORDER — INSULIN GLARGINE 100 [IU]/ML
INJECTION, SOLUTION SUBCUTANEOUS
COMMUNITY
Start: 2023-08-17

## 2023-09-06 RX ORDER — POTASSIUM CHLORIDE 750 MG/1
20 TABLET, EXTENDED RELEASE ORAL 2 TIMES DAILY
Qty: 360 TABLET | Refills: 1 | Status: SHIPPED | OUTPATIENT
Start: 2023-09-06

## 2023-09-25 RX ORDER — BUMETANIDE 2 MG/1
TABLET ORAL
Qty: 30 TABLET | Refills: 3 | Status: SHIPPED | OUTPATIENT
Start: 2023-09-25

## 2023-11-03 RX ORDER — VALSARTAN 320 MG/1
320 TABLET ORAL DAILY
Qty: 90 TABLET | Refills: 1 | Status: SHIPPED | OUTPATIENT
Start: 2023-11-03

## 2023-11-06 RX ORDER — DOXAZOSIN MESYLATE 4 MG/1
4 TABLET ORAL
Qty: 30 TABLET | Refills: 3 | Status: SHIPPED | OUTPATIENT
Start: 2023-11-06

## 2023-12-05 LAB
ALBUMIN SERPL-MCNC: 1.5 G/DL
BUN / CREAT RATIO: 16
BUN BLDV-MCNC: 27 MG/DL
CALCIUM SERPL-MCNC: 7.7 MG/DL
CHLORIDE BLD-SCNC: 109 MMOL/L
CO2: 24 MMOL/L
CREAT SERPL-MCNC: 1.69 MG/DL
GFR SERPL CREATININE-BSD FRML MDRD: 37 ML/MIN/{1.73_M2}
GLUCOSE: 399
PHOSPHORUS: 4 MG/DL
PHOSPHORUS: 4 MG/DL
POTASSIUM SERPL-SCNC: 4.7 MMOL/L
SODIUM BLD-SCNC: 137 MMOL/L

## 2023-12-06 ENCOUNTER — OFFICE VISIT (OUTPATIENT)
Dept: NEPHROLOGY | Age: 48
End: 2023-12-06
Payer: COMMERCIAL

## 2023-12-06 VITALS — HEART RATE: 92 BPM | DIASTOLIC BLOOD PRESSURE: 90 MMHG | OXYGEN SATURATION: 99 % | SYSTOLIC BLOOD PRESSURE: 178 MMHG

## 2023-12-06 DIAGNOSIS — N17.8 ACUTE RENAL FAILURE WITH OTHER SPECIFIED PATHOLOGICAL LESION IN KIDNEY (HCC): ICD-10-CM

## 2023-12-06 DIAGNOSIS — I10 HTN (HYPERTENSION), BENIGN: Primary | ICD-10-CM

## 2023-12-06 DIAGNOSIS — N05.1 FSGS (FOCAL SEGMENTAL GLOMERULOSCLEROSIS): ICD-10-CM

## 2023-12-06 DIAGNOSIS — N18.2 CKD (CHRONIC KIDNEY DISEASE) STAGE 2, GFR 60-89 ML/MIN: ICD-10-CM

## 2023-12-06 DIAGNOSIS — E11.21 DIABETIC NEPHROPATHY WITH PROTEINURIA (HCC): ICD-10-CM

## 2023-12-06 PROCEDURE — 4004F PT TOBACCO SCREEN RCVD TLK: CPT | Performed by: INTERNAL MEDICINE

## 2023-12-06 PROCEDURE — 3080F DIAST BP >= 90 MM HG: CPT | Performed by: INTERNAL MEDICINE

## 2023-12-06 PROCEDURE — 99214 OFFICE O/P EST MOD 30 MIN: CPT | Performed by: INTERNAL MEDICINE

## 2023-12-06 PROCEDURE — G8427 DOCREV CUR MEDS BY ELIG CLIN: HCPCS | Performed by: INTERNAL MEDICINE

## 2023-12-06 PROCEDURE — 3077F SYST BP >= 140 MM HG: CPT | Performed by: INTERNAL MEDICINE

## 2023-12-06 PROCEDURE — G8417 CALC BMI ABV UP PARAM F/U: HCPCS | Performed by: INTERNAL MEDICINE

## 2023-12-06 PROCEDURE — 2022F DILAT RTA XM EVC RTNOPTHY: CPT | Performed by: INTERNAL MEDICINE

## 2023-12-06 PROCEDURE — G8484 FLU IMMUNIZE NO ADMIN: HCPCS | Performed by: INTERNAL MEDICINE

## 2023-12-06 PROCEDURE — 3046F HEMOGLOBIN A1C LEVEL >9.0%: CPT | Performed by: INTERNAL MEDICINE

## 2023-12-06 NOTE — PROGRESS NOTES
somewhat worsened we will recheck labs in another couple of weeks advised fluid restriction of 50 ounces  -If creatinine is still getting worse or not much benefit I will discontinue the Prograf    Electrolytes within normal limits  Essential hypertension running high, did not take the medications today  Hx of diabetes mellitus for almost 25 years with now diabetic nephropathy on ARB  Leg swelling -May be multifactorial from peripheral vascular disease/any glomerular pathology . 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 Non complaince albumin is only 1.5  Hx of peripheral vascular disease status post stent placement in January 22  Meds reviewed discussed with the patient  Follow-up in 3 months with Prograf levels      Tests and orders placed this Encounter     Orders Placed This Encounter   Procedures    Basic Metabolic Panel    Comprehensive Metabolic Panel    Urinalysis    Microalbumin / Creatinine Urine Ratio    Protein / creatinine ratio, urine           Alicia Guevara M.D  [facility].

## 2023-12-07 RX ORDER — TACROLIMUS 1 MG/1
4 CAPSULE ORAL 2 TIMES DAILY
Qty: 240 CAPSULE | Refills: 1 | Status: ON HOLD | OUTPATIENT
Start: 2023-12-07

## 2024-01-02 ENCOUNTER — TELEPHONE (OUTPATIENT)
Dept: NEPHROLOGY | Age: 49
End: 2024-01-02

## 2024-01-02 NOTE — TELEPHONE ENCOUNTER
Patient called stating that she is trying to get approved for disability and they are telling her that they are waiting for progress notes from 12/6/23 from our office.   I did not see anything on [place] desk or in  for a request for notes.   Printed notes and faxed to [phone]  Case # [id number]

## 2024-01-09 ENCOUNTER — TELEPHONE (OUTPATIENT)
Dept: NEPHROLOGY | Age: 49
End: 2024-01-09

## 2024-01-09 NOTE — TELEPHONE ENCOUNTER
Called patient and she was not fasting along with having shingles and being on medication and just got off steroid. Patient feels fine.

## 2024-01-09 NOTE — TELEPHONE ENCOUNTER
Shelia called from [facility] lab with a critical value, stating Glucose result was 656.  Results in EPIC.  Next appt 3/6/24.  Please advise.

## 2024-02-05 ENCOUNTER — TELEPHONE (OUTPATIENT)
Dept: NEPHROLOGY | Age: 49
End: 2024-02-05

## 2024-02-05 NOTE — TELEPHONE ENCOUNTER
Creat was 1.6 in Dec and if it is now 3.43 then I agree she would benefit with further evaluation and treatment of kidney dysfunction at [facility] if [facility] wants her transferred.

## 2024-02-05 NOTE — TELEPHONE ENCOUNTER
Tari Ybarra CNP from [facility] called regarding Debbie. She is currently hospitalized at [facility]. Their main present concern and reason for her hospitalization is her kidney function.   Her creatinine is 3.43 and her EGFR is 16.   Tari recommended that she be transferred to [facility] so her nephrologist can see her. Pt is reluctant to do this but asked Tari to reach out to Dr. Neville for his recommendation. Debbie is requesting to follow up out patient.   Tari says that they have tried treatment to help her kidney function but it is not responding to the treatment.   Paula contact number is [phone] and can be reached today until 3pm.  Please advise.

## 2024-02-05 NOTE — TELEPHONE ENCOUNTER
Spoke with Tari Ybarra again. Explained that Dr. Garza agrees with her recommendation that it is best to transfer to [facility]. She will initiate the transfer

## 2024-02-06 ENCOUNTER — APPOINTMENT (OUTPATIENT)
Dept: ULTRASOUND IMAGING | Age: 49
DRG: 383 | End: 2024-02-06
Attending: INTERNAL MEDICINE
Payer: COMMERCIAL

## 2024-02-06 ENCOUNTER — HOSPITAL ENCOUNTER (INPATIENT)
Age: 49
LOS: 8 days | Discharge: HOME | DRG: 383 | End: 2024-02-14
Attending: INTERNAL MEDICINE | Admitting: FAMILY MEDICINE
Payer: COMMERCIAL

## 2024-02-06 DIAGNOSIS — N18.4 CKD (CHRONIC KIDNEY DISEASE) STAGE 4, GFR 15-29 ML/MIN (HCC): ICD-10-CM

## 2024-02-06 DIAGNOSIS — L02.212 BACK ABSCESS: ICD-10-CM

## 2024-02-06 DIAGNOSIS — E11.51 TYPE 2 DIABETES MELLITUS WITH PERIPHERAL ANGIOPATHY (HCC): ICD-10-CM

## 2024-02-06 DIAGNOSIS — Z79.4 TYPE 2 DIABETES MELLITUS WITH HYPERGLYCEMIA, WITH LONG-TERM CURRENT USE OF INSULIN (HCC): ICD-10-CM

## 2024-02-06 DIAGNOSIS — E87.70 HYPERVOLEMIA, UNSPECIFIED HYPERVOLEMIA TYPE: ICD-10-CM

## 2024-02-06 DIAGNOSIS — E87.1 HYPONATREMIA: ICD-10-CM

## 2024-02-06 DIAGNOSIS — E11.65 TYPE 2 DIABETES MELLITUS WITH HYPERGLYCEMIA, WITH LONG-TERM CURRENT USE OF INSULIN (HCC): ICD-10-CM

## 2024-02-06 DIAGNOSIS — D64.9 ACUTE ANEMIA: ICD-10-CM

## 2024-02-06 DIAGNOSIS — N17.9 AKI (ACUTE KIDNEY INJURY) (HCC): Primary | ICD-10-CM

## 2024-02-06 LAB
ALBUMIN SERPL BCG-MCNC: 2.1 G/DL (ref 3.5–5.1)
ALP SERPL-CCNC: 86 U/L (ref 38–126)
ALT SERPL W/O P-5'-P-CCNC: 13 U/L (ref 11–66)
ANION GAP SERPL CALC-SCNC: 13 MEQ/L (ref 8–16)
AST SERPL-CCNC: 8 U/L (ref 5–40)
BACTERIA: ABNORMAL
BASOPHILS ABSOLUTE: 0 THOU/MM3 (ref 0–0.1)
BASOPHILS NFR BLD AUTO: 0.3 %
BILIRUB SERPL-MCNC: < 0.2 MG/DL (ref 0.3–1.2)
BILIRUB UR QL STRIP: NEGATIVE
BUN SERPL-MCNC: 56 MG/DL (ref 7–22)
CALCIUM SERPL-MCNC: 8.1 MG/DL (ref 8.5–10.5)
CASTS #/AREA URNS LPF: ABNORMAL /LPF
CASTS #/AREA URNS LPF: ABNORMAL /LPF
CHARACTER UR: CLEAR
CHARCOAL URNS QL MICRO: ABNORMAL
CHLORIDE SERPL-SCNC: 101 MEQ/L (ref 98–111)
CO2 SERPL-SCNC: 17 MEQ/L (ref 23–33)
COLOR UR: YELLOW
CREAT SERPL-MCNC: 3.4 MG/DL (ref 0.4–1.2)
CREAT UR-MCNC: 20.3 MG/DL
CRYSTALS URNS QL MICRO: ABNORMAL
DEPRECATED RDW RBC AUTO: 46.8 FL (ref 35–45)
EOSINOPHIL NFR BLD AUTO: 3.8 %
EOSINOPHILS ABSOLUTE: 0.4 THOU/MM3 (ref 0–0.4)
EPITHELIAL CELLS, UA: ABNORMAL /HPF
ERYTHROCYTE [DISTWIDTH] IN BLOOD BY AUTOMATED COUNT: 14.6 % (ref 11.5–14.5)
GFR SERPL CREATININE-BSD FRML MDRD: 16 ML/MIN/1.73M2
GLUCOSE BLD STRIP.AUTO-MCNC: 263 MG/DL (ref 70–108)
GLUCOSE BLD STRIP.AUTO-MCNC: 290 MG/DL (ref 70–108)
GLUCOSE BLD STRIP.AUTO-MCNC: 299 MG/DL (ref 70–108)
GLUCOSE BLD STRIP.AUTO-MCNC: 301 MG/DL (ref 70–108)
GLUCOSE SERPL-MCNC: 333 MG/DL (ref 70–108)
GLUCOSE UR QL STRIP.AUTO: 500 MG/DL
HCT VFR BLD AUTO: 26.9 % (ref 37–47)
HGB BLD-MCNC: 8.2 GM/DL (ref 12–16)
HGB UR QL STRIP.AUTO: ABNORMAL
IMM GRANULOCYTES # BLD AUTO: 0.26 THOU/MM3 (ref 0–0.07)
IMM GRANULOCYTES NFR BLD AUTO: 2.5 %
KETONES UR QL STRIP.AUTO: NEGATIVE
LEUKOCYTE ESTERASE UR QL STRIP.AUTO: NEGATIVE
LYMPHOCYTES ABSOLUTE: 1.2 THOU/MM3 (ref 1–4.8)
LYMPHOCYTES NFR BLD AUTO: 12 %
MCH RBC QN AUTO: 26.3 PG (ref 26–33)
MCHC RBC AUTO-ENTMCNC: 30.5 GM/DL (ref 32.2–35.5)
MCV RBC AUTO: 86.2 FL (ref 81–99)
MONOCYTES ABSOLUTE: 0.7 THOU/MM3 (ref 0.4–1.3)
MONOCYTES NFR BLD AUTO: 7.1 %
NEUTROPHILS NFR BLD AUTO: 74.3 %
NITRITE UR QL STRIP.AUTO: NEGATIVE
NRBC BLD AUTO-RTO: 0 /100 WBC
PH UR STRIP.AUTO: 6 [PH] (ref 5–9)
PLATELET # BLD AUTO: 292 THOU/MM3 (ref 130–400)
PMV BLD AUTO: 9.5 FL (ref 9.4–12.4)
POTASSIUM SERPL-SCNC: 5.3 MEQ/L (ref 3.5–5.2)
PROT SERPL-MCNC: 5.5 G/DL (ref 6.1–8)
PROT UR STRIP.AUTO-MCNC: 300 MG/DL
PROT UR-MCNC: 232.4 MG/DL
PROT/CREAT 24H UR: 11.45 MG/G{CREAT}
RBC # BLD AUTO: 3.12 MILL/MM3 (ref 4.2–5.4)
RBC #/AREA URNS HPF: ABNORMAL /HPF
RENAL EPI CELLS #/AREA URNS HPF: ABNORMAL /[HPF]
SEGMENTED NEUTROPHILS ABSOLUTE COUNT: 7.7 THOU/MM3 (ref 1.8–7.7)
SODIUM SERPL-SCNC: 131 MEQ/L (ref 135–145)
SPECIFIC GRAVITY UA: 1.01 (ref 1–1.03)
TROPONIN, HIGH SENSITIVITY: 51 NG/L (ref 0–12)
UROBILINOGEN, URINE: 0.2 EU/DL (ref 0–1)
WBC # BLD AUTO: 10.4 THOU/MM3 (ref 4.8–10.8)
WBC #/AREA URNS HPF: ABNORMAL /HPF
YEAST LIKE FUNGI URNS QL MICRO: ABNORMAL

## 2024-02-06 PROCEDURE — 6370000000 HC RX 637 (ALT 250 FOR IP): Performed by: PHYSICIAN ASSISTANT

## 2024-02-06 PROCEDURE — 82570 ASSAY OF URINE CREATININE: CPT

## 2024-02-06 PROCEDURE — P9047 ALBUMIN (HUMAN), 25%, 50ML: HCPCS | Performed by: INTERNAL MEDICINE

## 2024-02-06 PROCEDURE — 2580000003 HC RX 258: Performed by: INTERNAL MEDICINE

## 2024-02-06 PROCEDURE — 84156 ASSAY OF PROTEIN URINE: CPT

## 2024-02-06 PROCEDURE — 80053 COMPREHEN METABOLIC PANEL: CPT

## 2024-02-06 PROCEDURE — 84484 ASSAY OF TROPONIN QUANT: CPT

## 2024-02-06 PROCEDURE — 1200000000 HC SEMI PRIVATE

## 2024-02-06 PROCEDURE — 2500000003 HC RX 250 WO HCPCS: Performed by: PHYSICIAN ASSISTANT

## 2024-02-06 PROCEDURE — 99223 1ST HOSP IP/OBS HIGH 75: CPT | Performed by: PHYSICIAN ASSISTANT

## 2024-02-06 PROCEDURE — 76770 US EXAM ABDO BACK WALL COMP: CPT

## 2024-02-06 PROCEDURE — 81001 URINALYSIS AUTO W/SCOPE: CPT

## 2024-02-06 PROCEDURE — 6360000002 HC RX W HCPCS: Performed by: INTERNAL MEDICINE

## 2024-02-06 PROCEDURE — 82948 REAGENT STRIP/BLOOD GLUCOSE: CPT

## 2024-02-06 PROCEDURE — 6360000002 HC RX W HCPCS: Performed by: PHYSICIAN ASSISTANT

## 2024-02-06 PROCEDURE — 36415 COLL VENOUS BLD VENIPUNCTURE: CPT

## 2024-02-06 PROCEDURE — 1200000003 HC TELEMETRY R&B

## 2024-02-06 PROCEDURE — 85025 COMPLETE CBC W/AUTO DIFF WBC: CPT

## 2024-02-06 PROCEDURE — 99254 IP/OBS CNSLTJ NEW/EST MOD 60: CPT | Performed by: INTERNAL MEDICINE

## 2024-02-06 PROCEDURE — 2580000003 HC RX 258: Performed by: PHYSICIAN ASSISTANT

## 2024-02-06 RX ORDER — SODIUM CHLORIDE 0.9 % (FLUSH) 0.9 %
10 SYRINGE (ML) INJECTION EVERY 12 HOURS SCHEDULED
Status: DISCONTINUED | OUTPATIENT
Start: 2024-02-06 | End: 2024-02-14 | Stop reason: HOSPADM

## 2024-02-06 RX ORDER — SODIUM CHLORIDE 9 MG/ML
INJECTION, SOLUTION INTRAVENOUS PRN
Status: DISCONTINUED | OUTPATIENT
Start: 2024-02-06 | End: 2024-02-14 | Stop reason: HOSPADM

## 2024-02-06 RX ORDER — SODIUM CHLORIDE, SODIUM LACTATE, POTASSIUM CHLORIDE, AND CALCIUM CHLORIDE .6; .31; .03; .02 G/100ML; G/100ML; G/100ML; G/100ML
1000 INJECTION, SOLUTION INTRAVENOUS ONCE
Status: DISCONTINUED | OUTPATIENT
Start: 2024-02-06 | End: 2024-02-06

## 2024-02-06 RX ORDER — METOPROLOL TARTRATE 1 MG/ML
5 INJECTION, SOLUTION INTRAVENOUS EVERY 8 HOURS PRN
Status: DISCONTINUED | OUTPATIENT
Start: 2024-02-06 | End: 2024-02-14 | Stop reason: HOSPADM

## 2024-02-06 RX ORDER — ONDANSETRON 4 MG/1
4 TABLET, ORALLY DISINTEGRATING ORAL EVERY 8 HOURS PRN
Status: DISCONTINUED | OUTPATIENT
Start: 2024-02-06 | End: 2024-02-14 | Stop reason: HOSPADM

## 2024-02-06 RX ORDER — DOXAZOSIN MESYLATE 4 MG/1
4 TABLET ORAL NIGHTLY
Status: DISCONTINUED | OUTPATIENT
Start: 2024-02-06 | End: 2024-02-07

## 2024-02-06 RX ORDER — ATORVASTATIN CALCIUM 40 MG/1
40 TABLET, FILM COATED ORAL DAILY
Status: DISCONTINUED | OUTPATIENT
Start: 2024-02-06 | End: 2024-02-14 | Stop reason: HOSPADM

## 2024-02-06 RX ORDER — ASPIRIN 81 MG/1
81 TABLET ORAL DAILY
Status: DISCONTINUED | OUTPATIENT
Start: 2024-02-06 | End: 2024-02-09

## 2024-02-06 RX ORDER — LANOLIN ALCOHOL/MO/W.PET/CERES
3 CREAM (GRAM) TOPICAL NIGHTLY PRN
Status: DISCONTINUED | OUTPATIENT
Start: 2024-02-06 | End: 2024-02-14 | Stop reason: HOSPADM

## 2024-02-06 RX ORDER — ALBUMIN (HUMAN) 12.5 G/50ML
25 SOLUTION INTRAVENOUS EVERY 8 HOURS
Status: COMPLETED | OUTPATIENT
Start: 2024-02-06 | End: 2024-02-07

## 2024-02-06 RX ORDER — CYCLOBENZAPRINE HCL 10 MG
5 TABLET ORAL 3 TIMES DAILY PRN
Status: DISCONTINUED | OUTPATIENT
Start: 2024-02-06 | End: 2024-02-14 | Stop reason: HOSPADM

## 2024-02-06 RX ORDER — SODIUM CHLORIDE 0.9 % (FLUSH) 0.9 %
10 SYRINGE (ML) INJECTION PRN
Status: DISCONTINUED | OUTPATIENT
Start: 2024-02-06 | End: 2024-02-14 | Stop reason: HOSPADM

## 2024-02-06 RX ORDER — GABAPENTIN 300 MG/1
300 CAPSULE ORAL 2 TIMES DAILY
Status: DISCONTINUED | OUTPATIENT
Start: 2024-02-06 | End: 2024-02-14 | Stop reason: HOSPADM

## 2024-02-06 RX ORDER — ACETAMINOPHEN 325 MG/1
650 TABLET ORAL EVERY 6 HOURS PRN
Status: DISCONTINUED | OUTPATIENT
Start: 2024-02-06 | End: 2024-02-14 | Stop reason: HOSPADM

## 2024-02-06 RX ORDER — TACROLIMUS 1 MG/1
4 CAPSULE ORAL 2 TIMES DAILY
Status: DISCONTINUED | OUTPATIENT
Start: 2024-02-06 | End: 2024-02-14 | Stop reason: HOSPADM

## 2024-02-06 RX ORDER — BUMETANIDE 0.25 MG/ML
2 INJECTION INTRAMUSCULAR; INTRAVENOUS ONCE
Status: COMPLETED | OUTPATIENT
Start: 2024-02-06 | End: 2024-02-06

## 2024-02-06 RX ORDER — ONDANSETRON 2 MG/ML
4 INJECTION INTRAMUSCULAR; INTRAVENOUS EVERY 6 HOURS PRN
Status: DISCONTINUED | OUTPATIENT
Start: 2024-02-06 | End: 2024-02-14 | Stop reason: HOSPADM

## 2024-02-06 RX ORDER — INSULIN LISPRO 100 [IU]/ML
0-4 INJECTION, SOLUTION INTRAVENOUS; SUBCUTANEOUS NIGHTLY
Status: DISCONTINUED | OUTPATIENT
Start: 2024-02-06 | End: 2024-02-08

## 2024-02-06 RX ORDER — DEXTROSE MONOHYDRATE 100 MG/ML
INJECTION, SOLUTION INTRAVENOUS CONTINUOUS PRN
Status: DISCONTINUED | OUTPATIENT
Start: 2024-02-06 | End: 2024-02-14 | Stop reason: HOSPADM

## 2024-02-06 RX ORDER — INSULIN GLARGINE 100 [IU]/ML
20 INJECTION, SOLUTION SUBCUTANEOUS NIGHTLY
Status: DISCONTINUED | OUTPATIENT
Start: 2024-02-06 | End: 2024-02-08

## 2024-02-06 RX ORDER — INSULIN LISPRO 100 [IU]/ML
0-8 INJECTION, SOLUTION INTRAVENOUS; SUBCUTANEOUS
Status: DISCONTINUED | OUTPATIENT
Start: 2024-02-06 | End: 2024-02-08

## 2024-02-06 RX ORDER — HYDRALAZINE HYDROCHLORIDE 20 MG/ML
10 INJECTION INTRAMUSCULAR; INTRAVENOUS EVERY 6 HOURS PRN
Status: DISCONTINUED | OUTPATIENT
Start: 2024-02-06 | End: 2024-02-14 | Stop reason: HOSPADM

## 2024-02-06 RX ORDER — POLYETHYLENE GLYCOL 3350 17 G/17G
17 POWDER, FOR SOLUTION ORAL DAILY PRN
Status: DISCONTINUED | OUTPATIENT
Start: 2024-02-06 | End: 2024-02-14 | Stop reason: HOSPADM

## 2024-02-06 RX ORDER — IBUPROFEN 600 MG/1
1 TABLET ORAL PRN
Status: DISCONTINUED | OUTPATIENT
Start: 2024-02-06 | End: 2024-02-14 | Stop reason: HOSPADM

## 2024-02-06 RX ORDER — LINEZOLID 2 MG/ML
600 INJECTION, SOLUTION INTRAVENOUS EVERY 12 HOURS
Status: COMPLETED | OUTPATIENT
Start: 2024-02-06 | End: 2024-02-10

## 2024-02-06 RX ORDER — ACETAMINOPHEN 650 MG/1
650 SUPPOSITORY RECTAL EVERY 6 HOURS PRN
Status: DISCONTINUED | OUTPATIENT
Start: 2024-02-06 | End: 2024-02-14 | Stop reason: HOSPADM

## 2024-02-06 RX ADMIN — ASPIRIN 81 MG: 81 TABLET, COATED ORAL at 08:26

## 2024-02-06 RX ADMIN — DOXAZOSIN 4 MG: 4 TABLET ORAL at 19:50

## 2024-02-06 RX ADMIN — INSULIN LISPRO 4 UNITS: 100 INJECTION, SOLUTION INTRAVENOUS; SUBCUTANEOUS at 09:14

## 2024-02-06 RX ADMIN — BUMETANIDE 0.5 MG/HR: 0.25 INJECTION INTRAMUSCULAR; INTRAVENOUS at 12:34

## 2024-02-06 RX ADMIN — ALBUMIN (HUMAN) 25 G: 0.25 INJECTION, SOLUTION INTRAVENOUS at 20:02

## 2024-02-06 RX ADMIN — INSULIN LISPRO 4 UNITS: 100 INJECTION, SOLUTION INTRAVENOUS; SUBCUTANEOUS at 16:46

## 2024-02-06 RX ADMIN — INSULIN GLARGINE 20 UNITS: 100 INJECTION, SOLUTION SUBCUTANEOUS at 21:24

## 2024-02-06 RX ADMIN — HYDRALAZINE HYDROCHLORIDE 10 MG: 20 INJECTION INTRAMUSCULAR; INTRAVENOUS at 16:06

## 2024-02-06 RX ADMIN — ACETAMINOPHEN 650 MG: 325 TABLET ORAL at 15:55

## 2024-02-06 RX ADMIN — ACETAMINOPHEN 650 MG: 325 TABLET ORAL at 08:23

## 2024-02-06 RX ADMIN — LINEZOLID 600 MG: 600 INJECTION, SOLUTION INTRAVENOUS at 09:03

## 2024-02-06 RX ADMIN — METOPROLOL TARTRATE 5 MG: 5 INJECTION INTRAVENOUS at 19:50

## 2024-02-06 RX ADMIN — TACROLIMUS 4 MG: 1 CAPSULE ORAL at 08:23

## 2024-02-06 RX ADMIN — ALBUMIN (HUMAN) 25 G: 0.25 INJECTION, SOLUTION INTRAVENOUS at 11:35

## 2024-02-06 RX ADMIN — SODIUM CHLORIDE, PRESERVATIVE FREE 10 ML: 5 INJECTION INTRAVENOUS at 21:25

## 2024-02-06 RX ADMIN — ACETAMINOPHEN 650 MG: 325 TABLET ORAL at 22:47

## 2024-02-06 RX ADMIN — BUMETANIDE 2 MG: 0.25 INJECTION INTRAMUSCULAR; INTRAVENOUS at 11:26

## 2024-02-06 RX ADMIN — ATORVASTATIN CALCIUM 40 MG: 40 TABLET, FILM COATED ORAL at 08:23

## 2024-02-06 RX ADMIN — SODIUM CHLORIDE, PRESERVATIVE FREE 10 ML: 5 INJECTION INTRAVENOUS at 08:26

## 2024-02-06 RX ADMIN — CYCLOBENZAPRINE 5 MG: 10 TABLET, FILM COATED ORAL at 15:55

## 2024-02-06 RX ADMIN — INSULIN LISPRO 4 UNITS: 100 INJECTION, SOLUTION INTRAVENOUS; SUBCUTANEOUS at 21:24

## 2024-02-06 RX ADMIN — GABAPENTIN 300 MG: 300 CAPSULE ORAL at 08:23

## 2024-02-06 RX ADMIN — LINEZOLID 600 MG: 600 INJECTION, SOLUTION INTRAVENOUS at 21:19

## 2024-02-06 RX ADMIN — RIVAROXABAN 20 MG: 20 TABLET, FILM COATED ORAL at 16:46

## 2024-02-06 RX ADMIN — SODIUM CHLORIDE, PRESERVATIVE FREE 10 ML: 5 INJECTION INTRAVENOUS at 08:25

## 2024-02-06 RX ADMIN — HYDRALAZINE HYDROCHLORIDE 10 MG: 20 INJECTION INTRAMUSCULAR; INTRAVENOUS at 22:57

## 2024-02-06 RX ADMIN — INSULIN LISPRO 2 UNITS: 100 INJECTION, SOLUTION INTRAVENOUS; SUBCUTANEOUS at 12:40

## 2024-02-06 RX ADMIN — GABAPENTIN 300 MG: 300 CAPSULE ORAL at 19:50

## 2024-02-06 NOTE — CARE COORDINATION
Case Management Assessment  Initial Evaluation    Date/Time of Evaluation: 2/6/2024 12:48 PM  Assessment Completed by: Katarina Ivey RN    If patient is discharged prior to next notation, then this note serves as note for discharge by case management.    Patient Name: Debbie Locke                   YOB: 1975  Diagnosis: ROXANA (acute kidney injury) (HCC) [N17.9]                   Date / Time: 2/6/2024 12:14 AM  Location: [address] [place] [address]     Patient Admission Status: Inpatient   Readmission Risk Low 0-14, Mod 15-19), High > 20: Readmission Risk Score: 14.8    Current PCP: Kory Thomas MD  PCP verified by CM? Yes    Chart Reviewed: Yes      History Provided by: Patient  Patient Orientation: Alert and Oriented    Patient Cognition: Alert    Hospitalization in the last 30 days (Readmission):  No    If yes, Readmission Assessment in CM Navigator will be completed.    Advance Directives:      Code Status: Full Code   Patient's Primary Decision Maker is: Patient Declined (Legal Next of Kin Remains as Decision Maker)      Discharge Planning:    Patient lives with: Spouse/Significant Other Type of Home: House  Primary Care Giver: Self  Patient Support Systems include: Spouse/Significant Other, Children   Current Financial resources: Medicaid  Current community resources: None  Current services prior to admission: Durable Medical Equipment            Current DME: Glucometer            Type of Home Care services:  None    ADLS  Prior functional level: Independent in ADLs/IADLs  Current functional level: Independent in ADLs/IADLs    Family can provide assistance at DC: Yes  Would you like Case Management to discuss the discharge plan with any other family members/significant others, and if so, who? No  Plans to Return to Present Housing: Yes  Other Identified Issues/Barriers to RETURNING to current housing: none   Potential Assistance needed at discharge: N/A            Potential DME:    Patient expects to

## 2024-02-07 LAB
ANION GAP SERPL CALC-SCNC: 13 MEQ/L (ref 8–16)
BUN SERPL-MCNC: 54 MG/DL (ref 7–22)
CALCIUM SERPL-MCNC: 8.4 MG/DL (ref 8.5–10.5)
CHLORIDE SERPL-SCNC: 102 MEQ/L (ref 98–111)
CO2 SERPL-SCNC: 20 MEQ/L (ref 23–33)
CREAT SERPL-MCNC: 3.4 MG/DL (ref 0.4–1.2)
DEPRECATED RDW RBC AUTO: 45.2 FL (ref 35–45)
ERYTHROCYTE [DISTWIDTH] IN BLOOD BY AUTOMATED COUNT: 14.8 % (ref 11.5–14.5)
GFR SERPL CREATININE-BSD FRML MDRD: 16 ML/MIN/1.73M2
GLUCOSE BLD STRIP.AUTO-MCNC: 261 MG/DL (ref 70–108)
GLUCOSE BLD STRIP.AUTO-MCNC: 320 MG/DL (ref 70–108)
GLUCOSE BLD STRIP.AUTO-MCNC: 341 MG/DL (ref 70–108)
GLUCOSE BLD STRIP.AUTO-MCNC: 361 MG/DL (ref 70–108)
GLUCOSE SERPL-MCNC: 300 MG/DL (ref 70–108)
HCT VFR BLD AUTO: 24.6 % (ref 37–47)
HGB BLD-MCNC: 7.9 GM/DL (ref 12–16)
MCH RBC QN AUTO: 26.7 PG (ref 26–33)
MCHC RBC AUTO-ENTMCNC: 32.1 GM/DL (ref 32.2–35.5)
MCV RBC AUTO: 83.1 FL (ref 81–99)
PLATELET # BLD AUTO: 322 THOU/MM3 (ref 130–400)
PMV BLD AUTO: 9.2 FL (ref 9.4–12.4)
POTASSIUM SERPL-SCNC: 4.5 MEQ/L (ref 3.5–5.2)
RBC # BLD AUTO: 2.96 MILL/MM3 (ref 4.2–5.4)
SODIUM SERPL-SCNC: 135 MEQ/L (ref 135–145)
WBC # BLD AUTO: 9.3 THOU/MM3 (ref 4.8–10.8)

## 2024-02-07 PROCEDURE — 1200000003 HC TELEMETRY R&B

## 2024-02-07 PROCEDURE — 80048 BASIC METABOLIC PNL TOTAL CA: CPT

## 2024-02-07 PROCEDURE — 85027 COMPLETE CBC AUTOMATED: CPT

## 2024-02-07 PROCEDURE — 82948 REAGENT STRIP/BLOOD GLUCOSE: CPT

## 2024-02-07 PROCEDURE — 94761 N-INVAS EAR/PLS OXIMETRY MLT: CPT

## 2024-02-07 PROCEDURE — 6370000000 HC RX 637 (ALT 250 FOR IP): Performed by: PHYSICIAN ASSISTANT

## 2024-02-07 PROCEDURE — 99232 SBSQ HOSP IP/OBS MODERATE 35: CPT | Performed by: INTERNAL MEDICINE

## 2024-02-07 PROCEDURE — 2580000003 HC RX 258: Performed by: PHYSICIAN ASSISTANT

## 2024-02-07 PROCEDURE — 6360000002 HC RX W HCPCS: Performed by: INTERNAL MEDICINE

## 2024-02-07 PROCEDURE — 2580000003 HC RX 258: Performed by: INTERNAL MEDICINE

## 2024-02-07 PROCEDURE — 6360000002 HC RX W HCPCS: Performed by: PHYSICIAN ASSISTANT

## 2024-02-07 PROCEDURE — P9047 ALBUMIN (HUMAN), 25%, 50ML: HCPCS | Performed by: INTERNAL MEDICINE

## 2024-02-07 PROCEDURE — 36415 COLL VENOUS BLD VENIPUNCTURE: CPT

## 2024-02-07 PROCEDURE — 6370000000 HC RX 637 (ALT 250 FOR IP): Performed by: INTERNAL MEDICINE

## 2024-02-07 PROCEDURE — 99232 SBSQ HOSP IP/OBS MODERATE 35: CPT | Performed by: PHYSICIAN ASSISTANT

## 2024-02-07 PROCEDURE — 1200000000 HC SEMI PRIVATE

## 2024-02-07 RX ORDER — TRAMADOL HYDROCHLORIDE 50 MG/1
50 TABLET ORAL EVERY 8 HOURS PRN
Status: DISCONTINUED | OUTPATIENT
Start: 2024-02-07 | End: 2024-02-14 | Stop reason: HOSPADM

## 2024-02-07 RX ORDER — METOLAZONE 5 MG/1
5 TABLET ORAL ONCE
Status: COMPLETED | OUTPATIENT
Start: 2024-02-07 | End: 2024-02-07

## 2024-02-07 RX ORDER — DOXAZOSIN MESYLATE 4 MG/1
4 TABLET ORAL 2 TIMES DAILY
Status: DISCONTINUED | OUTPATIENT
Start: 2024-02-07 | End: 2024-02-14 | Stop reason: HOSPADM

## 2024-02-07 RX ADMIN — CYCLOBENZAPRINE 5 MG: 10 TABLET, FILM COATED ORAL at 17:01

## 2024-02-07 RX ADMIN — BUMETANIDE 1 MG/HR: 0.25 INJECTION INTRAMUSCULAR; INTRAVENOUS at 11:28

## 2024-02-07 RX ADMIN — GABAPENTIN 300 MG: 300 CAPSULE ORAL at 19:49

## 2024-02-07 RX ADMIN — ATORVASTATIN CALCIUM 40 MG: 40 TABLET, FILM COATED ORAL at 08:40

## 2024-02-07 RX ADMIN — INSULIN GLARGINE 20 UNITS: 100 INJECTION, SOLUTION SUBCUTANEOUS at 21:07

## 2024-02-07 RX ADMIN — BUMETANIDE 1 MG/HR: 0.25 INJECTION INTRAMUSCULAR; INTRAVENOUS at 23:54

## 2024-02-07 RX ADMIN — CYCLOBENZAPRINE 5 MG: 10 TABLET, FILM COATED ORAL at 08:40

## 2024-02-07 RX ADMIN — DOXAZOSIN 4 MG: 4 TABLET ORAL at 19:49

## 2024-02-07 RX ADMIN — TRAMADOL HYDROCHLORIDE 50 MG: 50 TABLET, COATED ORAL at 12:07

## 2024-02-07 RX ADMIN — METOLAZONE 5 MG: 5 TABLET ORAL at 09:45

## 2024-02-07 RX ADMIN — ALBUMIN (HUMAN) 25 G: 0.25 INJECTION, SOLUTION INTRAVENOUS at 04:35

## 2024-02-07 RX ADMIN — INSULIN LISPRO 4 UNITS: 100 INJECTION, SOLUTION INTRAVENOUS; SUBCUTANEOUS at 11:31

## 2024-02-07 RX ADMIN — ASPIRIN 81 MG: 81 TABLET, COATED ORAL at 08:40

## 2024-02-07 RX ADMIN — INSULIN LISPRO 4 UNITS: 100 INJECTION, SOLUTION INTRAVENOUS; SUBCUTANEOUS at 21:06

## 2024-02-07 RX ADMIN — LINEZOLID 600 MG: 600 INJECTION, SOLUTION INTRAVENOUS at 08:37

## 2024-02-07 RX ADMIN — CYCLOBENZAPRINE 5 MG: 10 TABLET, FILM COATED ORAL at 21:09

## 2024-02-07 RX ADMIN — INSULIN LISPRO 6 UNITS: 100 INJECTION, SOLUTION INTRAVENOUS; SUBCUTANEOUS at 08:40

## 2024-02-07 RX ADMIN — GABAPENTIN 300 MG: 300 CAPSULE ORAL at 08:40

## 2024-02-07 RX ADMIN — INSULIN LISPRO 8 UNITS: 100 INJECTION, SOLUTION INTRAVENOUS; SUBCUTANEOUS at 17:00

## 2024-02-07 RX ADMIN — HYDRALAZINE HYDROCHLORIDE 10 MG: 20 INJECTION INTRAMUSCULAR; INTRAVENOUS at 19:54

## 2024-02-07 RX ADMIN — RIVAROXABAN 20 MG: 20 TABLET, FILM COATED ORAL at 17:03

## 2024-02-07 RX ADMIN — LINEZOLID 600 MG: 600 INJECTION, SOLUTION INTRAVENOUS at 19:57

## 2024-02-07 RX ADMIN — ACETAMINOPHEN 650 MG: 325 TABLET ORAL at 12:06

## 2024-02-07 RX ADMIN — SODIUM CHLORIDE, PRESERVATIVE FREE 10 ML: 5 INJECTION INTRAVENOUS at 19:50

## 2024-02-08 LAB
ANION GAP SERPL CALC-SCNC: 14 MEQ/L (ref 8–16)
BASOPHILS ABSOLUTE: 0 THOU/MM3 (ref 0–0.1)
BASOPHILS NFR BLD AUTO: 0.3 %
BUN SERPL-MCNC: 53 MG/DL (ref 7–22)
CALCIUM SERPL-MCNC: 9.1 MG/DL (ref 8.5–10.5)
CHLORIDE SERPL-SCNC: 100 MEQ/L (ref 98–111)
CO2 SERPL-SCNC: 20 MEQ/L (ref 23–33)
CREAT SERPL-MCNC: 3.5 MG/DL (ref 0.4–1.2)
DEPRECATED RDW RBC AUTO: 44.6 FL (ref 35–45)
EOSINOPHIL NFR BLD AUTO: 3.7 %
EOSINOPHILS ABSOLUTE: 0.4 THOU/MM3 (ref 0–0.4)
ERYTHROCYTE [DISTWIDTH] IN BLOOD BY AUTOMATED COUNT: 14.6 % (ref 11.5–14.5)
GFR SERPL CREATININE-BSD FRML MDRD: 15 ML/MIN/1.73M2
GLUCOSE BLD STRIP.AUTO-MCNC: 303 MG/DL (ref 70–108)
GLUCOSE BLD STRIP.AUTO-MCNC: 319 MG/DL (ref 70–108)
GLUCOSE BLD STRIP.AUTO-MCNC: 338 MG/DL (ref 70–108)
GLUCOSE BLD STRIP.AUTO-MCNC: 359 MG/DL (ref 70–108)
GLUCOSE BLD STRIP.AUTO-MCNC: 359 MG/DL (ref 70–108)
GLUCOSE SERPL-MCNC: 332 MG/DL (ref 70–108)
HCT VFR BLD AUTO: 28 % (ref 37–47)
HGB BLD-MCNC: 8.7 GM/DL (ref 12–16)
IMM GRANULOCYTES # BLD AUTO: 0.27 THOU/MM3 (ref 0–0.07)
IMM GRANULOCYTES NFR BLD AUTO: 2.8 %
LYMPHOCYTES ABSOLUTE: 1 THOU/MM3 (ref 1–4.8)
LYMPHOCYTES NFR BLD AUTO: 10.8 %
MCH RBC QN AUTO: 26 PG (ref 26–33)
MCHC RBC AUTO-ENTMCNC: 31.1 GM/DL (ref 32.2–35.5)
MCV RBC AUTO: 83.6 FL (ref 81–99)
MONOCYTES ABSOLUTE: 0.5 THOU/MM3 (ref 0.4–1.3)
MONOCYTES NFR BLD AUTO: 5.2 %
NEUTROPHILS NFR BLD AUTO: 77.2 %
NRBC BLD AUTO-RTO: 0 /100 WBC
PLATELET # BLD AUTO: 375 THOU/MM3 (ref 130–400)
PMV BLD AUTO: 8.9 FL (ref 9.4–12.4)
POTASSIUM SERPL-SCNC: 4.3 MEQ/L (ref 3.5–5.2)
RBC # BLD AUTO: 3.35 MILL/MM3 (ref 4.2–5.4)
SEGMENTED NEUTROPHILS ABSOLUTE COUNT: 7.4 THOU/MM3 (ref 1.8–7.7)
SODIUM SERPL-SCNC: 134 MEQ/L (ref 135–145)
WBC # BLD AUTO: 9.6 THOU/MM3 (ref 4.8–10.8)

## 2024-02-08 PROCEDURE — 1200000000 HC SEMI PRIVATE

## 2024-02-08 PROCEDURE — 6360000002 HC RX W HCPCS: Performed by: PHYSICIAN ASSISTANT

## 2024-02-08 PROCEDURE — 6370000000 HC RX 637 (ALT 250 FOR IP): Performed by: PHYSICIAN ASSISTANT

## 2024-02-08 PROCEDURE — 99233 SBSQ HOSP IP/OBS HIGH 50: CPT | Performed by: PHYSICIAN ASSISTANT

## 2024-02-08 PROCEDURE — 36415 COLL VENOUS BLD VENIPUNCTURE: CPT

## 2024-02-08 PROCEDURE — 2580000003 HC RX 258: Performed by: PHYSICIAN ASSISTANT

## 2024-02-08 PROCEDURE — 82948 REAGENT STRIP/BLOOD GLUCOSE: CPT

## 2024-02-08 PROCEDURE — 80048 BASIC METABOLIC PNL TOTAL CA: CPT

## 2024-02-08 PROCEDURE — 2580000003 HC RX 258: Performed by: INTERNAL MEDICINE

## 2024-02-08 PROCEDURE — 6360000002 HC RX W HCPCS: Performed by: INTERNAL MEDICINE

## 2024-02-08 PROCEDURE — 6370000000 HC RX 637 (ALT 250 FOR IP)

## 2024-02-08 PROCEDURE — 85025 COMPLETE CBC W/AUTO DIFF WBC: CPT

## 2024-02-08 PROCEDURE — 6370000000 HC RX 637 (ALT 250 FOR IP): Performed by: INTERNAL MEDICINE

## 2024-02-08 PROCEDURE — 99232 SBSQ HOSP IP/OBS MODERATE 35: CPT | Performed by: INTERNAL MEDICINE

## 2024-02-08 RX ORDER — METOLAZONE 5 MG/1
5 TABLET ORAL ONCE
Status: COMPLETED | OUTPATIENT
Start: 2024-02-08 | End: 2024-02-08

## 2024-02-08 RX ORDER — INSULIN LISPRO 100 [IU]/ML
0-4 INJECTION, SOLUTION INTRAVENOUS; SUBCUTANEOUS NIGHTLY
Status: DISCONTINUED | OUTPATIENT
Start: 2024-02-08 | End: 2024-02-08 | Stop reason: SDUPTHER

## 2024-02-08 RX ORDER — INSULIN LISPRO 100 [IU]/ML
8 INJECTION, SOLUTION INTRAVENOUS; SUBCUTANEOUS ONCE
Status: COMPLETED | OUTPATIENT
Start: 2024-02-08 | End: 2024-02-08

## 2024-02-08 RX ORDER — INSULIN LISPRO 100 [IU]/ML
0-16 INJECTION, SOLUTION INTRAVENOUS; SUBCUTANEOUS
Status: DISCONTINUED | OUTPATIENT
Start: 2024-02-08 | End: 2024-02-10

## 2024-02-08 RX ORDER — INSULIN GLARGINE 100 [IU]/ML
30 INJECTION, SOLUTION SUBCUTANEOUS NIGHTLY
Status: DISCONTINUED | OUTPATIENT
Start: 2024-02-08 | End: 2024-02-09

## 2024-02-08 RX ORDER — INSULIN LISPRO 100 [IU]/ML
0-16 INJECTION, SOLUTION INTRAVENOUS; SUBCUTANEOUS
Status: DISCONTINUED | OUTPATIENT
Start: 2024-02-08 | End: 2024-02-08

## 2024-02-08 RX ORDER — AMLODIPINE BESYLATE 5 MG/1
5 TABLET ORAL DAILY
Status: DISCONTINUED | OUTPATIENT
Start: 2024-02-08 | End: 2024-02-09

## 2024-02-08 RX ORDER — INSULIN GLARGINE 100 [IU]/ML
25 INJECTION, SOLUTION SUBCUTANEOUS NIGHTLY
Status: DISCONTINUED | OUTPATIENT
Start: 2024-02-08 | End: 2024-02-08

## 2024-02-08 RX ORDER — INSULIN LISPRO 100 [IU]/ML
0-16 INJECTION, SOLUTION INTRAVENOUS; SUBCUTANEOUS EVERY 24 HOURS
Status: DISCONTINUED | OUTPATIENT
Start: 2024-02-09 | End: 2024-02-10

## 2024-02-08 RX ORDER — INSULIN LISPRO 100 [IU]/ML
10 INJECTION, SOLUTION INTRAVENOUS; SUBCUTANEOUS
Status: DISCONTINUED | OUTPATIENT
Start: 2024-02-08 | End: 2024-02-09

## 2024-02-08 RX ADMIN — AMLODIPINE BESYLATE 5 MG: 5 TABLET ORAL at 11:30

## 2024-02-08 RX ADMIN — INSULIN LISPRO 16 UNITS: 100 INJECTION, SOLUTION INTRAVENOUS; SUBCUTANEOUS at 21:38

## 2024-02-08 RX ADMIN — CYCLOBENZAPRINE 5 MG: 10 TABLET, FILM COATED ORAL at 21:45

## 2024-02-08 RX ADMIN — DOXAZOSIN 4 MG: 4 TABLET ORAL at 21:37

## 2024-02-08 RX ADMIN — GABAPENTIN 300 MG: 300 CAPSULE ORAL at 08:46

## 2024-02-08 RX ADMIN — DOXAZOSIN 4 MG: 4 TABLET ORAL at 08:45

## 2024-02-08 RX ADMIN — INSULIN LISPRO 8 UNITS: 100 INJECTION, SOLUTION INTRAVENOUS; SUBCUTANEOUS at 10:50

## 2024-02-08 RX ADMIN — METOLAZONE 5 MG: 5 TABLET ORAL at 10:50

## 2024-02-08 RX ADMIN — CYCLOBENZAPRINE 5 MG: 10 TABLET, FILM COATED ORAL at 08:10

## 2024-02-08 RX ADMIN — CYCLOBENZAPRINE 5 MG: 10 TABLET, FILM COATED ORAL at 12:24

## 2024-02-08 RX ADMIN — SODIUM CHLORIDE, PRESERVATIVE FREE 10 ML: 5 INJECTION INTRAVENOUS at 21:37

## 2024-02-08 RX ADMIN — INSULIN LISPRO 8 UNITS: 100 INJECTION, SOLUTION INTRAVENOUS; SUBCUTANEOUS at 08:10

## 2024-02-08 RX ADMIN — Medication 3 MG: at 21:45

## 2024-02-08 RX ADMIN — INSULIN LISPRO 12 UNITS: 100 INJECTION, SOLUTION INTRAVENOUS; SUBCUTANEOUS at 12:24

## 2024-02-08 RX ADMIN — INSULIN LISPRO 10 UNITS: 100 INJECTION, SOLUTION INTRAVENOUS; SUBCUTANEOUS at 16:18

## 2024-02-08 RX ADMIN — HYDRALAZINE HYDROCHLORIDE 10 MG: 20 INJECTION INTRAMUSCULAR; INTRAVENOUS at 21:46

## 2024-02-08 RX ADMIN — INSULIN LISPRO 10 UNITS: 100 INJECTION, SOLUTION INTRAVENOUS; SUBCUTANEOUS at 12:24

## 2024-02-08 RX ADMIN — BUMETANIDE 1 MG/HR: 0.25 INJECTION INTRAMUSCULAR; INTRAVENOUS at 12:17

## 2024-02-08 RX ADMIN — INSULIN GLARGINE 30 UNITS: 100 INJECTION, SOLUTION SUBCUTANEOUS at 21:37

## 2024-02-08 RX ADMIN — TRAMADOL HYDROCHLORIDE 50 MG: 50 TABLET, COATED ORAL at 21:46

## 2024-02-08 RX ADMIN — LINEZOLID 600 MG: 600 INJECTION, SOLUTION INTRAVENOUS at 16:19

## 2024-02-08 RX ADMIN — TRAMADOL HYDROCHLORIDE 50 MG: 50 TABLET, COATED ORAL at 03:28

## 2024-02-08 RX ADMIN — ATORVASTATIN CALCIUM 40 MG: 40 TABLET, FILM COATED ORAL at 08:46

## 2024-02-08 RX ADMIN — LINEZOLID 600 MG: 600 INJECTION, SOLUTION INTRAVENOUS at 08:40

## 2024-02-08 RX ADMIN — INSULIN LISPRO 12 UNITS: 100 INJECTION, SOLUTION INTRAVENOUS; SUBCUTANEOUS at 16:18

## 2024-02-08 RX ADMIN — GABAPENTIN 300 MG: 300 CAPSULE ORAL at 21:37

## 2024-02-08 NOTE — CARE COORDINATION
2/8/24, 2:00 PM EST    DISCHARGE ON GOING EVALUATION    Debbie Locke       [facility] day: 2  Location: -15/015-A Reason for admit: ROXANA (acute kidney injury) (HCC) [N17.9]   Procedure:   2/6 US Renal: No renal calculi or hydronephrosis.      Barriers to Discharge: Hospitalist, Nephrology, ID and GS following. Endocrinology consulted today for DM management. Glucose consistently in 300s. Creat 3.5. ASA on hold in case tunneled HD cath needed. Daily dressing changes to back. ID and GS collaborating on surgical plan (I&D vs Excision). Bumex gtt at 1mg/hr. Insulin increased. IV zyvox q12h.     PCP: Kory Thomas MD  Readmission Risk Score: 17.7%  Patient Goals/Plan/Treatment Preferences: Deepthi plans to return home w/ her . Denies needs for DME or HH services at this time. Monitor for OR plans.

## 2024-02-08 NOTE — FLOWSHEET NOTE
Head to toe assessment  Patient is alert and oriented to person, time, place and, situation. Speech is clear and appropriate. Facial movements are appropriate. Pupils are equal and round, consensually reactive to light. Pupil restriction from 3mm to 2mm. Upper extremities are warm, pink and, dry without tingling or numbness. Bilateral hand grasp is moderate. Arm drift negative. Skin turgor and capillary refill is less than 3 seconds. Apical is strong and regular. Respiratory rate is 18 with spontaneous pattern, depth is moderate with symmetrical chest rise. Breath sounds are clear throughout. Abdomen is swollen, tender and warm. Bowel sounds are active. Lower extremities are red, warm and, dry. Tingling and numbness present bilaterally in he feet. Edema +4, pedal push and pull is moderate and unable to assess pulse. Posterior is pink dry and warm. Dressing to right mid back dry and intact. Moves in bed without difficulty. Nurse aware of all abnormal findings.

## 2024-02-09 ENCOUNTER — ANESTHESIA EVENT (OUTPATIENT)
Dept: OPERATING ROOM | Age: 49
End: 2024-02-09
Payer: COMMERCIAL

## 2024-02-09 ENCOUNTER — ANESTHESIA (OUTPATIENT)
Dept: OPERATING ROOM | Age: 49
End: 2024-02-09
Payer: COMMERCIAL

## 2024-02-09 LAB
ANION GAP SERPL CALC-SCNC: 15 MEQ/L (ref 8–16)
BASOPHILS ABSOLUTE: 0 THOU/MM3 (ref 0–0.1)
BASOPHILS NFR BLD AUTO: 0.3 %
BUN SERPL-MCNC: 57 MG/DL (ref 7–22)
CALCIUM SERPL-MCNC: 9.1 MG/DL (ref 8.5–10.5)
CHLORIDE SERPL-SCNC: 99 MEQ/L (ref 98–111)
CO2 SERPL-SCNC: 22 MEQ/L (ref 23–33)
CREAT SERPL-MCNC: 3.7 MG/DL (ref 0.4–1.2)
DEPRECATED RDW RBC AUTO: 44.4 FL (ref 35–45)
EOSINOPHIL NFR BLD AUTO: 4.1 %
EOSINOPHILS ABSOLUTE: 0.4 THOU/MM3 (ref 0–0.4)
ERYTHROCYTE [DISTWIDTH] IN BLOOD BY AUTOMATED COUNT: 14.5 % (ref 11.5–14.5)
GFR SERPL CREATININE-BSD FRML MDRD: 14 ML/MIN/1.73M2
GLUCOSE BLD STRIP.AUTO-MCNC: 164 MG/DL (ref 70–108)
GLUCOSE BLD STRIP.AUTO-MCNC: 180 MG/DL (ref 70–108)
GLUCOSE BLD STRIP.AUTO-MCNC: 245 MG/DL (ref 70–108)
GLUCOSE BLD STRIP.AUTO-MCNC: 311 MG/DL (ref 70–108)
GLUCOSE BLD STRIP.AUTO-MCNC: 382 MG/DL (ref 70–108)
GLUCOSE SERPL-MCNC: 208 MG/DL (ref 70–108)
HCT VFR BLD AUTO: 29.4 % (ref 37–47)
HGB BLD-MCNC: 9.2 GM/DL (ref 12–16)
IMM GRANULOCYTES # BLD AUTO: 0.26 THOU/MM3 (ref 0–0.07)
IMM GRANULOCYTES NFR BLD AUTO: 2.6 %
LYMPHOCYTES ABSOLUTE: 1.3 THOU/MM3 (ref 1–4.8)
LYMPHOCYTES NFR BLD AUTO: 13.3 %
MCH RBC QN AUTO: 26.3 PG (ref 26–33)
MCHC RBC AUTO-ENTMCNC: 31.3 GM/DL (ref 32.2–35.5)
MCV RBC AUTO: 84 FL (ref 81–99)
MONOCYTES ABSOLUTE: 0.6 THOU/MM3 (ref 0.4–1.3)
MONOCYTES NFR BLD AUTO: 6.4 %
NEUTROPHILS NFR BLD AUTO: 73.3 %
NRBC BLD AUTO-RTO: 0 /100 WBC
PLATELET # BLD AUTO: 403 THOU/MM3 (ref 130–400)
PMV BLD AUTO: 8.9 FL (ref 9.4–12.4)
POTASSIUM SERPL-SCNC: 4.5 MEQ/L (ref 3.5–5.2)
RBC # BLD AUTO: 3.5 MILL/MM3 (ref 4.2–5.4)
SEGMENTED NEUTROPHILS ABSOLUTE COUNT: 7.4 THOU/MM3 (ref 1.8–7.7)
SODIUM SERPL-SCNC: 136 MEQ/L (ref 135–145)
WBC # BLD AUTO: 10.1 THOU/MM3 (ref 4.8–10.8)

## 2024-02-09 PROCEDURE — 6360000002 HC RX W HCPCS

## 2024-02-09 PROCEDURE — 6370000000 HC RX 637 (ALT 250 FOR IP)

## 2024-02-09 PROCEDURE — 2580000003 HC RX 258: Performed by: SURGERY

## 2024-02-09 PROCEDURE — 2580000003 HC RX 258: Performed by: PHYSICIAN ASSISTANT

## 2024-02-09 PROCEDURE — 80048 BASIC METABOLIC PNL TOTAL CA: CPT

## 2024-02-09 PROCEDURE — 2500000003 HC RX 250 WO HCPCS

## 2024-02-09 PROCEDURE — 6360000002 HC RX W HCPCS: Performed by: ANESTHESIOLOGY

## 2024-02-09 PROCEDURE — 6370000000 HC RX 637 (ALT 250 FOR IP): Performed by: SURGERY

## 2024-02-09 PROCEDURE — 88304 TISSUE EXAM BY PATHOLOGIST: CPT

## 2024-02-09 PROCEDURE — 7100000001 HC PACU RECOVERY - ADDTL 15 MIN: Performed by: SURGERY

## 2024-02-09 PROCEDURE — 6370000000 HC RX 637 (ALT 250 FOR IP): Performed by: PHYSICIAN ASSISTANT

## 2024-02-09 PROCEDURE — 6360000002 HC RX W HCPCS: Performed by: SURGERY

## 2024-02-09 PROCEDURE — 82948 REAGENT STRIP/BLOOD GLUCOSE: CPT

## 2024-02-09 PROCEDURE — 7100000000 HC PACU RECOVERY - FIRST 15 MIN: Performed by: SURGERY

## 2024-02-09 PROCEDURE — 2500000003 HC RX 250 WO HCPCS: Performed by: NURSE ANESTHETIST, CERTIFIED REGISTERED

## 2024-02-09 PROCEDURE — 2580000003 HC RX 258: Performed by: INTERNAL MEDICINE

## 2024-02-09 PROCEDURE — 99232 SBSQ HOSP IP/OBS MODERATE 35: CPT | Performed by: PHYSICIAN ASSISTANT

## 2024-02-09 PROCEDURE — 2709999900 HC NON-CHARGEABLE SUPPLY: Performed by: SURGERY

## 2024-02-09 PROCEDURE — 1200000000 HC SEMI PRIVATE

## 2024-02-09 PROCEDURE — 36415 COLL VENOUS BLD VENIPUNCTURE: CPT

## 2024-02-09 PROCEDURE — 99232 SBSQ HOSP IP/OBS MODERATE 35: CPT | Performed by: INTERNAL MEDICINE

## 2024-02-09 PROCEDURE — 0JB70ZZ EXCISION OF BACK SUBCUTANEOUS TISSUE AND FASCIA, OPEN APPROACH: ICD-10-PCS | Performed by: SURGERY

## 2024-02-09 PROCEDURE — 85025 COMPLETE CBC W/AUTO DIFF WBC: CPT

## 2024-02-09 PROCEDURE — 6360000002 HC RX W HCPCS: Performed by: INTERNAL MEDICINE

## 2024-02-09 PROCEDURE — 3600000012 HC SURGERY LEVEL 2 ADDTL 15MIN: Performed by: SURGERY

## 2024-02-09 PROCEDURE — 3700000000 HC ANESTHESIA ATTENDED CARE: Performed by: SURGERY

## 2024-02-09 PROCEDURE — 3600000002 HC SURGERY LEVEL 2 BASE: Performed by: SURGERY

## 2024-02-09 PROCEDURE — 6360000002 HC RX W HCPCS: Performed by: PHYSICIAN ASSISTANT

## 2024-02-09 PROCEDURE — 3700000001 HC ADD 15 MINUTES (ANESTHESIA): Performed by: SURGERY

## 2024-02-09 PROCEDURE — 6360000002 HC RX W HCPCS: Performed by: NURSE ANESTHETIST, CERTIFIED REGISTERED

## 2024-02-09 RX ORDER — PROPOFOL 10 MG/ML
INJECTION, EMULSION INTRAVENOUS PRN
Status: DISCONTINUED | OUTPATIENT
Start: 2024-02-09 | End: 2024-02-09 | Stop reason: SDUPTHER

## 2024-02-09 RX ORDER — DEXAMETHASONE SODIUM PHOSPHATE 10 MG/ML
INJECTION, EMULSION INTRAMUSCULAR; INTRAVENOUS PRN
Status: DISCONTINUED | OUTPATIENT
Start: 2024-02-09 | End: 2024-02-09 | Stop reason: SDUPTHER

## 2024-02-09 RX ORDER — ASPIRIN 81 MG/1
81 TABLET ORAL DAILY
Status: DISCONTINUED | OUTPATIENT
Start: 2024-02-10 | End: 2024-02-14 | Stop reason: HOSPADM

## 2024-02-09 RX ORDER — INSULIN GLARGINE 100 [IU]/ML
45 INJECTION, SOLUTION SUBCUTANEOUS NIGHTLY
Status: DISCONTINUED | OUTPATIENT
Start: 2024-02-09 | End: 2024-02-09

## 2024-02-09 RX ORDER — OXYCODONE HYDROCHLORIDE AND ACETAMINOPHEN 5; 325 MG/1; MG/1
1 TABLET ORAL EVERY 4 HOURS PRN
Status: DISCONTINUED | OUTPATIENT
Start: 2024-02-09 | End: 2024-02-14 | Stop reason: HOSPADM

## 2024-02-09 RX ORDER — INSULIN LISPRO 100 [IU]/ML
15 INJECTION, SOLUTION INTRAVENOUS; SUBCUTANEOUS
Status: DISCONTINUED | OUTPATIENT
Start: 2024-02-09 | End: 2024-02-10

## 2024-02-09 RX ORDER — ONDANSETRON 2 MG/ML
INJECTION INTRAMUSCULAR; INTRAVENOUS PRN
Status: DISCONTINUED | OUTPATIENT
Start: 2024-02-09 | End: 2024-02-09 | Stop reason: SDUPTHER

## 2024-02-09 RX ORDER — FENTANYL CITRATE 50 UG/ML
50 INJECTION, SOLUTION INTRAMUSCULAR; INTRAVENOUS EVERY 5 MIN PRN
Status: CANCELLED | OUTPATIENT
Start: 2024-02-09

## 2024-02-09 RX ORDER — FENTANYL CITRATE 50 UG/ML
INJECTION, SOLUTION INTRAMUSCULAR; INTRAVENOUS PRN
Status: DISCONTINUED | OUTPATIENT
Start: 2024-02-09 | End: 2024-02-09 | Stop reason: SDUPTHER

## 2024-02-09 RX ORDER — INSULIN GLARGINE 100 [IU]/ML
40 INJECTION, SOLUTION SUBCUTANEOUS NIGHTLY
Status: DISCONTINUED | OUTPATIENT
Start: 2024-02-09 | End: 2024-02-11

## 2024-02-09 RX ORDER — SODIUM CHLORIDE 9 MG/ML
INJECTION, SOLUTION INTRAVENOUS PRN
Status: CANCELLED | OUTPATIENT
Start: 2024-02-09

## 2024-02-09 RX ORDER — LIDOCAINE HCL/PF 100 MG/5ML
SYRINGE (ML) INJECTION PRN
Status: DISCONTINUED | OUTPATIENT
Start: 2024-02-09 | End: 2024-02-09 | Stop reason: SDUPTHER

## 2024-02-09 RX ORDER — METOLAZONE 5 MG/1
10 TABLET ORAL ONCE
Status: COMPLETED | OUTPATIENT
Start: 2024-02-09 | End: 2024-02-09

## 2024-02-09 RX ORDER — SODIUM CHLORIDE 0.9 % (FLUSH) 0.9 %
5-40 SYRINGE (ML) INJECTION EVERY 12 HOURS SCHEDULED
Status: CANCELLED | OUTPATIENT
Start: 2024-02-09

## 2024-02-09 RX ORDER — MIDAZOLAM HYDROCHLORIDE 1 MG/ML
INJECTION INTRAMUSCULAR; INTRAVENOUS PRN
Status: DISCONTINUED | OUTPATIENT
Start: 2024-02-09 | End: 2024-02-09 | Stop reason: SDUPTHER

## 2024-02-09 RX ORDER — SODIUM CHLORIDE 0.9 % (FLUSH) 0.9 %
5-40 SYRINGE (ML) INJECTION PRN
Status: CANCELLED | OUTPATIENT
Start: 2024-02-09

## 2024-02-09 RX ORDER — ROCURONIUM BROMIDE 10 MG/ML
INJECTION, SOLUTION INTRAVENOUS PRN
Status: DISCONTINUED | OUTPATIENT
Start: 2024-02-09 | End: 2024-02-09 | Stop reason: SDUPTHER

## 2024-02-09 RX ADMIN — FENTANYL CITRATE 50 MCG: 50 INJECTION, SOLUTION INTRAMUSCULAR; INTRAVENOUS at 17:19

## 2024-02-09 RX ADMIN — Medication 3 MG: at 21:00

## 2024-02-09 RX ADMIN — FENTANYL CITRATE 50 MCG: 50 INJECTION, SOLUTION INTRAMUSCULAR; INTRAVENOUS at 16:59

## 2024-02-09 RX ADMIN — CYCLOBENZAPRINE 5 MG: 10 TABLET, FILM COATED ORAL at 21:00

## 2024-02-09 RX ADMIN — INSULIN GLARGINE 40 UNITS: 100 INJECTION, SOLUTION SUBCUTANEOUS at 21:02

## 2024-02-09 RX ADMIN — INSULIN LISPRO 16 UNITS: 100 INJECTION, SOLUTION INTRAVENOUS; SUBCUTANEOUS at 21:00

## 2024-02-09 RX ADMIN — INSULIN LISPRO 16 UNITS: 100 INJECTION, SOLUTION INTRAVENOUS; SUBCUTANEOUS at 03:29

## 2024-02-09 RX ADMIN — BUMETANIDE 1 MG/HR: 0.25 INJECTION INTRAMUSCULAR; INTRAVENOUS at 01:05

## 2024-02-09 RX ADMIN — SUGAMMADEX 200 MG: 100 INJECTION, SOLUTION INTRAVENOUS at 17:38

## 2024-02-09 RX ADMIN — BUMETANIDE 1 MG/HR: 0.25 INJECTION INTRAMUSCULAR; INTRAVENOUS at 21:55

## 2024-02-09 RX ADMIN — GABAPENTIN 300 MG: 300 CAPSULE ORAL at 21:00

## 2024-02-09 RX ADMIN — Medication 60 MG: at 16:59

## 2024-02-09 RX ADMIN — HYDROMORPHONE HYDROCHLORIDE 0.5 MG: 1 INJECTION, SOLUTION INTRAMUSCULAR; INTRAVENOUS; SUBCUTANEOUS at 17:55

## 2024-02-09 RX ADMIN — TRAMADOL HYDROCHLORIDE 50 MG: 50 TABLET, COATED ORAL at 21:50

## 2024-02-09 RX ADMIN — BUMETANIDE 1 MG/HR: 0.25 INJECTION INTRAMUSCULAR; INTRAVENOUS at 15:29

## 2024-02-09 RX ADMIN — DOXAZOSIN 4 MG: 4 TABLET ORAL at 20:59

## 2024-02-09 RX ADMIN — METOLAZONE 10 MG: 5 TABLET ORAL at 21:50

## 2024-02-09 RX ADMIN — SODIUM CHLORIDE, PRESERVATIVE FREE 10 ML: 5 INJECTION INTRAVENOUS at 21:52

## 2024-02-09 RX ADMIN — PROPOFOL 100 MG: 10 INJECTION, EMULSION INTRAVENOUS at 16:59

## 2024-02-09 RX ADMIN — ROCURONIUM BROMIDE 50 MG: 10 INJECTION INTRAVENOUS at 16:59

## 2024-02-09 RX ADMIN — ONDANSETRON 4 MG: 2 INJECTION INTRAMUSCULAR; INTRAVENOUS at 17:38

## 2024-02-09 RX ADMIN — LINEZOLID 600 MG: 600 INJECTION, SOLUTION INTRAVENOUS at 05:50

## 2024-02-09 RX ADMIN — DEXAMETHASONE SODIUM PHOSPHATE 10 MG: 10 INJECTION, EMULSION INTRAMUSCULAR; INTRAVENOUS at 16:59

## 2024-02-09 RX ADMIN — TRAMADOL HYDROCHLORIDE 50 MG: 50 TABLET, COATED ORAL at 05:48

## 2024-02-09 RX ADMIN — LINEZOLID 600 MG: 600 INJECTION, SOLUTION INTRAVENOUS at 21:55

## 2024-02-09 RX ADMIN — CYCLOBENZAPRINE 5 MG: 10 TABLET, FILM COATED ORAL at 05:48

## 2024-02-09 RX ADMIN — SODIUM CHLORIDE: 9 INJECTION, SOLUTION INTRAVENOUS at 16:52

## 2024-02-09 RX ADMIN — INSULIN LISPRO 8 UNITS: 100 INJECTION, SOLUTION INTRAVENOUS; SUBCUTANEOUS at 08:57

## 2024-02-09 RX ADMIN — MIDAZOLAM 2 MG: 1 INJECTION INTRAMUSCULAR; INTRAVENOUS at 16:52

## 2024-02-09 RX ADMIN — Medication 10 ML: at 03:20

## 2024-02-09 RX ADMIN — HYDROMORPHONE HYDROCHLORIDE 0.5 MG: 1 INJECTION, SOLUTION INTRAMUSCULAR; INTRAVENOUS; SUBCUTANEOUS at 18:00

## 2024-02-09 ASSESSMENT — LIFESTYLE VARIABLES: SMOKING_STATUS: 1

## 2024-02-09 NOTE — ANESTHESIA PRE PROCEDURE
I73.9   • Right shoulder pain M25.511   • Type 2 diabetes mellitus with peripheral angiopathy (HCC) E11.51   • Weakness R53.1   • ROXANA (acute kidney injury) ([facility]) N17.9   • CKD (chronic kidney disease) stage 4, GFR 15-29 ml/min ([facility]) N18.4   • Hypervolemia E87.70       Past Medical History:        Diagnosis Date   • Chronic kidney disease    • Hyperlipidemia    • Neuropathy    • Type II or unspecified type diabetes mellitus without mention of complication, not stated as uncontrolled 2000       Past Surgical History:        Procedure Laterality Date   •  SECTION  2002   • CHOLECYSTECTOMY     • CT BIOPSY RENAL  2023    CT BIOPSY RENAL 2023 [facility] CT SCAN       Social History:    Social History     Tobacco Use   • Smoking status: Every Day     Current packs/day: 1.00     Average packs/day: 1 pack/day for 18.0 years (18.0 ttl pk-yrs)     Types: Cigarettes   • Smokeless tobacco: Never   Substance Use Topics   • Alcohol use: Yes     Comment: Socially/Seldom                                Ready to quit: Not Answered  Counseling given: Not Answered      Vital Signs (Current):   Vitals:    24 0319 24 0548 24 0845 24 1559   BP: 124/71  (!) 107/58 126/75   Pulse: (!) 107  95 (!) 107   Resp: 18 18 16 18   Temp: 98.4 °F (36.9 °C)  97.6 °F (36.4 °C) 97.7 °F (36.5 °C)   TempSrc: Oral  Oral Oral   SpO2: 98%  96% 99%   Weight:       Height:                                                  BP Readings from Last 3 Encounters:   24 126/75   23 (!) 178/90   23 138/82       NPO Status:                                                                                 BMI:   Wt Readings from Last 3 Encounters:   24 113.4 kg (249 lb 14.4 oz)   23 95.3 kg (210 lb)   23 94.8 kg (209 lb)     Body mass index is 42.9 kg/m².    CBC:   Lab Results   Component Value Date/Time    WBC 10.1 2024 06:00 AM    RBC 3.50 2024 06:00 AM    HGB 9.2 2024

## 2024-02-09 NOTE — CONSULTS
[place] [id number]                                  CONSULTATION    PATIENT NAME: SIS PENN                    :        1975  MED REC NO:   [id number]                           ROOM:       0015  ACCOUNT NO:   [id number]                           ADMIT DATE: 2024  PROVIDER:     Kyree Mercado MD    CONSULT DATE:  2024    CHIEF COMPLAINT:  Mid back abscess.    HISTORY OF PRESENT ILLNESS:  The patient is a 48-year-old female, obese  with diabetes who has apparently been diagnosed with shingles recently  and developed some blisters along her posterior back.  She denies being  bitten by anything, but nonetheless developed an abscess and was  admitted to the hospital.  It should be noted that the patient is on  Xarelto and has multiple medical issues including chronic renal disease,  diabetes as mentioned, hypertension and some recent elevated troponins.   The patient was seen by Dr. Gonzales, and surgical consultation has been  requested with Surgery for consideration of debridement.    PAST MEDICAL HISTORY:  As mentioned, positive for chronic renal disease,  hyperlipidemia, neuropathy, type 2 diabetes.    PAST SURGICAL HISTORY:  Includes a  section in the past.  She  has had a cholecystectomy and biopsies of the kidney.    MEDICATIONS AT HOME:  Include Prograf, Cardura, Diovan, Bumex, _____,  Glucophage, Lipitor, Xarelto, Neurontin.    ALLERGIES:  INSULINS AND LISINOPRIL.    SOCIAL HISTORY:  She is a nonsmoker, nondrinker.    FAMILY HISTORY:  Positive for diabetes and hypertension.    REVIEW OF SYSTEMS:  A 10-point review of systems is otherwise negative.    PHYSICAL EXAMINATION:  GENERAL:  The patient is a 48-year-old female, appears her age.  She is  resting in bed.  She is obese.  HEAD, EARS, EYES, NOSE AND THROAT:  Show no scleral icterus.  CARDIOVASCULAR:  S1, S2.  RESPIRATIONS:  
[facility]    [address] [place], [place] [id number]  [phone]     Hospital Consult  2024 4:52 PM    Pt Name:    Debbie Locke  MRN:     [id number]   [id number]  YOB: 1975  Admit Date:    2024 12:14 AM  Primary Care Physician:  Kory Thomas MD        Reason for Consult:  ROXANA/CKD, volume overload    History:   The patient is a 48 y.o. male seen in renal consult for ROXANA/CKD and volume overload. She follows with Dr. 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 She has been on Tacrolimus b/c it was felt the FSGS may have been primary instead of secondary.  She was transferred here for worsening renal function and volume overload. Recently had an abscess on her back and shingles. Sounds like she was getting IV antibiotics. Had labs drawn showing worsening renal function so shew as transferred here for further management.    Past Medical History:  Past Medical History:   Diagnosis Date    Chronic kidney disease     Hyperlipidemia     Neuropathy     Type II or unspecified type diabetes mellitus without mention of complication, not stated as uncontrolled 2000       Past Surgical History:  Past Surgical History:   Procedure Laterality Date     SECTION  2002    CHOLECYSTECTOMY      CT BIOPSY RENAL  2023    CT BIOPSY RENAL 2023 [facility] CT SCAN       Family History:  Family History   Problem Relation Age of Onset    Diabetes Mother     High Blood Pressure Mother        Social History:  Social History     Socioeconomic History    Marital status:      Spouse name: Not on file    Number of children: Not on file    Years of education: Not on file    Highest education level: Not on file   Occupational History    Not on file   Tobacco Use    Smoking status: Every Day     Current packs/day: 1.00     Average packs/day: 1 pack/day for 
EXAMINATION:  GENERAL:  She is obese, not in distress.  VITAL SIGNS:  Temperature 97.6, respirations 16, pulse 90, blood  pressure 184/86.  HEENT:  Slightly pale conjunctivae.  Anicteric sclerae.  She has dental  caries.  CHEST:  Bilateral air entry.  Diminished breath sounds on the lung  bases.  CARDIOVASCULAR SYSTEM:  Regular.  ABDOMEN:  Obese.  Edematous abdominal wall.  BACK:  Examination of her posterior lumbar area shows a soft tissue  abscess, 8 x 10 cm, indurated, with purulent drainage.  EXTREMITIES:  Bilateral leg edema, pitting.  CNS:  She is awake, oriented to person, place and time.    DIAGNOSTICS:  Sodium 131, potassium 5.3, chloride 101, bicarb 17, BUN  56, creatinine 3.4.  WBC 10.4, hemoglobin 8.2, hematuria 26.9, and  platelets of 292.    IMPRESSION:  She is a 48-year-old female patient, admitted with  worsening renal failure.  She has soft tissue infection with MSSA.  She  is currently on Zyvox due to her underlying chronic kidney disease.    RECOMMENDATIONS:  The area may need to be drainage incision and drainage  under local anesthesia by general surgeon.  We will consult on-call  surgeon who has informed Dr. Mercado his on-call will place a call  for incision and drainage once she is stable.        GONZALO WEBB M.D.    D: 02/06/2024 18:40:05       T: 02/06/2024 18:44:08     [email or web address]  Job#: [id number]     Doc#: [id number]    CC:  
lb 14.4 oz)   SpO2 96%   BMI 42.90 kg/m²   General appearance: No apparent distress, appears stated age and cooperative.  HEENT: Normal cephalic, atraumatic without obvious deformity. Pupils equal, round, and reactive to light.  Extra ocular muscles intact. Conjunctivae/corneas clear.  Neck: Supple, with full range of motion. Pt does have JVD. Trachea midline.  Respiratory:  Normal respiratory effort. Clear to auscultation  Cardiovascular: Regular rate and rhythm with normal S1/S2 without murmurs, rubs or gallops.  Abdomen: Soft, non-tender, non-distended with normal bowel sounds.  Musculoskeletal: Significant 3+ pitting edema over bilateral lower extremities that extends superiorly to the sacrum  Skin: Skin color, texture, turgor normal. Stasis dermatitis noted over bilateral lower extremities. Pt also has a superficial lumbar skin wound that is dressed.   Neurologic:  Significantly diminished lower extremity sensation bilaterally. Cranial nerves: II-XII intact, grossly non-focal.  Psychiatric: Alert and oriented, thought content appropriate, normal insight  Capillary Refill: Brisk,< 3 seconds   Peripheral Pulses: Unable to appreciate palpable pulses bilaterally due to significant swelling.     Labs:     Recent Labs     02/06/24  0146 02/07/24  0556 02/08/24  0944   WBC 10.4 9.3 9.6   HGB 8.2* 7.9* 8.7*   HCT 26.9* 24.6* 28.0*    322 375     Recent Labs     02/06/24  0146 02/07/24  0556 02/08/24  0604   * 135 134*   K 5.3* 4.5 4.3    102 100   CO2 17* 20* 20*   BUN 56* 54* 53*   CREATININE 3.4* 3.4* 3.5*   CALCIUM 8.1* 8.4* 9.1     Recent Labs     02/06/24  0146   AST 8   ALT 13   BILITOT <0.2*   ALKPHOS 86     No results for input(s): \"INR\" in the last 72 hours.  No results for input(s): \"CKTOTAL\", \"TROPONINI\" in the last 72 hours.    Urinalysis:    Lab Results   Component Value Date/Time    NITRU NEGATIVE 02/06/2024 05:10 PM    WBCUA 2-4 02/06/2024 05:10 PM    BACTERIA NONE SEEN 02/06/2024

## 2024-02-09 NOTE — BRIEF OP NOTE
Brief Postoperative Note      Patient: Debbie Locke  YOB: 1975  MRN: [id number]    Date of Procedure: 2/9/2024    Pre-Op Diagnosis Codes:     * Back abscess [L02.212]    Post-Op Diagnosis: same       Procedure(s):  Excision of Chronic Abscess on Back  8 x 5 1.5 cm    Surgeon(s):  Kyree Mercado MD    Assistant:      Anesthesia: General    Estimated Blood Loss (mL): 75 ml    Complications: none    Specimens:   ID Type Source Tests Collected by Time Destination   A : abcess back Tissue Back SURGICAL PATHOLOGY Kyree Mercado MD 2/9/2024 1733        Implants:        Drains:     Findings: see op note  This procedure was not performed to treat primary cutaneous melanoma through wide local excision      Electronically signed by Kyree Mercado MD on 2/9/2024 at 5:47 PM

## 2024-02-09 NOTE — CARE COORDINATION
2/9/24, 1:17 PM EST    DISCHARGE ON GOING EVALUATION    Debbie Locke       [facility] day: 3  Location: -15/015-A Reason for admit: ROXANA (acute kidney injury) (HCC) [N17.9]   Procedure:   2/6 US Renal: No renal calculi or hydronephrosis.   2/9 Plan for OR with Dr. Mercado     Barriers to Discharge: Hospitalist, Nephrology, ID, GS and Endocrinology following. IV bumex gtt @1mg/hr. IV zyvox q12h. DM regimen adjusted again. Creat 3.7. ASA on hold in case tunneled HD cath needed. Daily dressing changes to back. Plan for Exc Debridement of back abscess this evening.     PCP: Kory Thomas MD  Readmission Risk Score: 16.8%  Patient Goals/Plan/Treatment Preferences: Deepthi plans to return home w/ her . Denies needs for DME or HH services at this time. Monitoring for potential HD needs/post op needs. OR scheduled for this afternoon.

## 2024-02-10 LAB
ANION GAP SERPL CALC-SCNC: 16 MEQ/L (ref 8–16)
BASOPHILS ABSOLUTE: 0 THOU/MM3 (ref 0–0.1)
BASOPHILS NFR BLD AUTO: 0.2 %
BUN SERPL-MCNC: 62 MG/DL (ref 7–22)
CALCIUM SERPL-MCNC: 8.4 MG/DL (ref 8.5–10.5)
CHLORIDE SERPL-SCNC: 96 MEQ/L (ref 98–111)
CO2 SERPL-SCNC: 21 MEQ/L (ref 23–33)
CREAT SERPL-MCNC: 3.8 MG/DL (ref 0.4–1.2)
DEPRECATED RDW RBC AUTO: 44.4 FL (ref 35–45)
EOSINOPHIL NFR BLD AUTO: 0 %
EOSINOPHILS ABSOLUTE: 0 THOU/MM3 (ref 0–0.4)
ERYTHROCYTE [DISTWIDTH] IN BLOOD BY AUTOMATED COUNT: 14.5 % (ref 11.5–14.5)
GFR SERPL CREATININE-BSD FRML MDRD: 14 ML/MIN/1.73M2
GLUCOSE BLD STRIP.AUTO-MCNC: 282 MG/DL (ref 70–108)
GLUCOSE BLD STRIP.AUTO-MCNC: 310 MG/DL (ref 70–108)
GLUCOSE BLD STRIP.AUTO-MCNC: 329 MG/DL (ref 70–108)
GLUCOSE BLD STRIP.AUTO-MCNC: 342 MG/DL (ref 70–108)
GLUCOSE BLD STRIP.AUTO-MCNC: 361 MG/DL (ref 70–108)
GLUCOSE BLD STRIP.AUTO-MCNC: 388 MG/DL (ref 70–108)
GLUCOSE BLD STRIP.AUTO-MCNC: 425 MG/DL (ref 70–108)
GLUCOSE BLD STRIP.AUTO-MCNC: 461 MG/DL (ref 70–108)
GLUCOSE BLD STRIP.AUTO-MCNC: 509 MG/DL (ref 70–108)
GLUCOSE SERPL-MCNC: 411 MG/DL (ref 70–108)
HCT VFR BLD AUTO: 28.8 % (ref 37–47)
HGB BLD-MCNC: 8.9 GM/DL (ref 12–16)
IMM GRANULOCYTES # BLD AUTO: 0.13 THOU/MM3 (ref 0–0.07)
IMM GRANULOCYTES NFR BLD AUTO: 1.1 %
LYMPHOCYTES ABSOLUTE: 0.6 THOU/MM3 (ref 1–4.8)
LYMPHOCYTES NFR BLD AUTO: 5.1 %
MCH RBC QN AUTO: 25.9 PG (ref 26–33)
MCHC RBC AUTO-ENTMCNC: 30.9 GM/DL (ref 32.2–35.5)
MCV RBC AUTO: 84 FL (ref 81–99)
MONOCYTES ABSOLUTE: 0.2 THOU/MM3 (ref 0.4–1.3)
MONOCYTES NFR BLD AUTO: 1.4 %
NEUTROPHILS NFR BLD AUTO: 92.2 %
NRBC BLD AUTO-RTO: 0 /100 WBC
PLATELET # BLD AUTO: 401 THOU/MM3 (ref 130–400)
PMV BLD AUTO: 9.2 FL (ref 9.4–12.4)
POTASSIUM SERPL-SCNC: 4.8 MEQ/L (ref 3.5–5.2)
RBC # BLD AUTO: 3.43 MILL/MM3 (ref 4.2–5.4)
SEGMENTED NEUTROPHILS ABSOLUTE COUNT: 11.2 THOU/MM3 (ref 1.8–7.7)
SODIUM SERPL-SCNC: 133 MEQ/L (ref 135–145)
WBC # BLD AUTO: 12.1 THOU/MM3 (ref 4.8–10.8)

## 2024-02-10 PROCEDURE — 6370000000 HC RX 637 (ALT 250 FOR IP): Performed by: SURGERY

## 2024-02-10 PROCEDURE — 99232 SBSQ HOSP IP/OBS MODERATE 35: CPT | Performed by: INTERNAL MEDICINE

## 2024-02-10 PROCEDURE — 1200000000 HC SEMI PRIVATE

## 2024-02-10 PROCEDURE — 85025 COMPLETE CBC W/AUTO DIFF WBC: CPT

## 2024-02-10 PROCEDURE — 2580000003 HC RX 258: Performed by: SURGERY

## 2024-02-10 PROCEDURE — 6370000000 HC RX 637 (ALT 250 FOR IP): Performed by: PHYSICIAN ASSISTANT

## 2024-02-10 PROCEDURE — 82948 REAGENT STRIP/BLOOD GLUCOSE: CPT

## 2024-02-10 PROCEDURE — 6370000000 HC RX 637 (ALT 250 FOR IP)

## 2024-02-10 PROCEDURE — 6370000000 HC RX 637 (ALT 250 FOR IP): Performed by: INTERNAL MEDICINE

## 2024-02-10 PROCEDURE — 80048 BASIC METABOLIC PNL TOTAL CA: CPT

## 2024-02-10 PROCEDURE — 6360000002 HC RX W HCPCS: Performed by: SURGERY

## 2024-02-10 PROCEDURE — 36415 COLL VENOUS BLD VENIPUNCTURE: CPT

## 2024-02-10 RX ORDER — INSULIN LISPRO 100 [IU]/ML
12 INJECTION, SOLUTION INTRAVENOUS; SUBCUTANEOUS ONCE
Status: COMPLETED | OUTPATIENT
Start: 2024-02-10 | End: 2024-02-10

## 2024-02-10 RX ORDER — INSULIN LISPRO 100 [IU]/ML
18 INJECTION, SOLUTION INTRAVENOUS; SUBCUTANEOUS
Status: DISCONTINUED | OUTPATIENT
Start: 2024-02-11 | End: 2024-02-12

## 2024-02-10 RX ORDER — INSULIN LISPRO 100 [IU]/ML
0-30 INJECTION, SOLUTION INTRAVENOUS; SUBCUTANEOUS
Status: DISCONTINUED | OUTPATIENT
Start: 2024-02-11 | End: 2024-02-13

## 2024-02-10 RX ORDER — INSULIN LISPRO 100 [IU]/ML
0-30 INJECTION, SOLUTION INTRAVENOUS; SUBCUTANEOUS EVERY 24 HOURS
Status: DISCONTINUED | OUTPATIENT
Start: 2024-02-11 | End: 2024-02-13

## 2024-02-10 RX ORDER — INSULIN GLARGINE 100 [IU]/ML
8 INJECTION, SOLUTION SUBCUTANEOUS ONCE
Status: COMPLETED | OUTPATIENT
Start: 2024-02-10 | End: 2024-02-11

## 2024-02-10 RX ORDER — INSULIN LISPRO 100 [IU]/ML
10 INJECTION, SOLUTION INTRAVENOUS; SUBCUTANEOUS ONCE
Status: COMPLETED | OUTPATIENT
Start: 2024-02-10 | End: 2024-02-10

## 2024-02-10 RX ORDER — METOLAZONE 5 MG/1
10 TABLET ORAL ONCE
Status: COMPLETED | OUTPATIENT
Start: 2024-02-10 | End: 2024-02-10

## 2024-02-10 RX ADMIN — DOXAZOSIN 4 MG: 4 TABLET ORAL at 08:42

## 2024-02-10 RX ADMIN — GABAPENTIN 300 MG: 300 CAPSULE ORAL at 20:05

## 2024-02-10 RX ADMIN — CYCLOBENZAPRINE 5 MG: 10 TABLET, FILM COATED ORAL at 20:08

## 2024-02-10 RX ADMIN — INSULIN LISPRO 15 UNITS: 100 INJECTION, SOLUTION INTRAVENOUS; SUBCUTANEOUS at 08:43

## 2024-02-10 RX ADMIN — DOXAZOSIN 4 MG: 4 TABLET ORAL at 20:05

## 2024-02-10 RX ADMIN — METOLAZONE 10 MG: 5 TABLET ORAL at 12:39

## 2024-02-10 RX ADMIN — INSULIN LISPRO 15 UNITS: 100 INJECTION, SOLUTION INTRAVENOUS; SUBCUTANEOUS at 11:46

## 2024-02-10 RX ADMIN — INSULIN LISPRO 12 UNITS: 100 INJECTION, SOLUTION INTRAVENOUS; SUBCUTANEOUS at 16:35

## 2024-02-10 RX ADMIN — INSULIN LISPRO 8 UNITS: 100 INJECTION, SOLUTION INTRAVENOUS; SUBCUTANEOUS at 03:18

## 2024-02-10 RX ADMIN — BUMETANIDE 1 MG/HR: 0.25 INJECTION INTRAMUSCULAR; INTRAVENOUS at 22:39

## 2024-02-10 RX ADMIN — INSULIN LISPRO 15 UNITS: 100 INJECTION, SOLUTION INTRAVENOUS; SUBCUTANEOUS at 16:36

## 2024-02-10 RX ADMIN — LINEZOLID 600 MG: 600 INJECTION, SOLUTION INTRAVENOUS at 05:59

## 2024-02-10 RX ADMIN — CYCLOBENZAPRINE 5 MG: 10 TABLET, FILM COATED ORAL at 12:40

## 2024-02-10 RX ADMIN — INSULIN LISPRO 16 UNITS: 100 INJECTION, SOLUTION INTRAVENOUS; SUBCUTANEOUS at 03:19

## 2024-02-10 RX ADMIN — SODIUM CHLORIDE, PRESERVATIVE FREE 10 ML: 5 INJECTION INTRAVENOUS at 20:04

## 2024-02-10 RX ADMIN — INSULIN LISPRO 16 UNITS: 100 INJECTION, SOLUTION INTRAVENOUS; SUBCUTANEOUS at 20:08

## 2024-02-10 RX ADMIN — INSULIN LISPRO 12 UNITS: 100 INJECTION, SOLUTION INTRAVENOUS; SUBCUTANEOUS at 05:06

## 2024-02-10 RX ADMIN — INSULIN LISPRO 16 UNITS: 100 INJECTION, SOLUTION INTRAVENOUS; SUBCUTANEOUS at 08:43

## 2024-02-10 RX ADMIN — GABAPENTIN 300 MG: 300 CAPSULE ORAL at 08:43

## 2024-02-10 RX ADMIN — LINEZOLID 600 MG: 600 INJECTION, SOLUTION INTRAVENOUS at 17:33

## 2024-02-10 RX ADMIN — ATORVASTATIN CALCIUM 40 MG: 40 TABLET, FILM COATED ORAL at 08:42

## 2024-02-10 RX ADMIN — INSULIN LISPRO 10 UNITS: 100 INJECTION, SOLUTION INTRAVENOUS; SUBCUTANEOUS at 10:28

## 2024-02-10 RX ADMIN — INSULIN GLARGINE 40 UNITS: 100 INJECTION, SOLUTION SUBCUTANEOUS at 20:09

## 2024-02-10 RX ADMIN — INSULIN LISPRO 16 UNITS: 100 INJECTION, SOLUTION INTRAVENOUS; SUBCUTANEOUS at 11:46

## 2024-02-10 NOTE — OP NOTE
[place] [id number]                                OPERATIVE REPORT    PATIENT NAME: SIS PENN                    :        1975  MED REC NO:   [id number]                           ROOM:       0015  ACCOUNT NO:   [id number]                           ADMIT DATE: 2024  PROVIDER:     Kyree Mercado MD    DATE OF PROCEDURE:  2024    PREOPERATIVE DIAGNOSIS:  Abscess of the back.    POSTOPERATIVE DIAGNOSIS:  Abscess of the back.    OPERATION:  Excisional debridement of skin and subcutaneous tissue to  muscle using the electrocautery (Bovie), 8 cm x 5 cm x 1.5 cm depth.    SURGEON:  Kyree Mercado MD    ANESTHESIA:  General.    COMPLICATIONS:  None.    ESTIMATED BLOOD LOSS:  75 mL.    INDICATIONS FOR PROCEDURE:  The patient is a 48-year-old female who has  an abscess of the back.    FINDINGS:  The patient had significant infected tissue.  It went down to  a depth of 1.5 cm.  An 8 x 5 cm x 1.5 cm depth amount of tissue was  removed, the skin and subcutaneous tissue.  The excisional debridement  was done with electrocautery (Bovie).    DESCRIPTION OF THE PROCEDURE:  The patient was brought to the operating  suite, placed supine on the operating room table.  After adequate  inhalational anesthesia was administered, the patient was placed in the  right lateral decubitus position.  An elliptical incision was made  around the abscess.  It was taken down through the subcutaneous tissue.   The patient did a lot of bleeding.  Hemostasis was obtained with  electrocautery.  We then irrigated with Irrisept and then packed it with  a Dakin's soaked Kerlix.  Dry dressing was applied.  The patient  tolerated the procedure well.        KYREE MERCADO MD    D: 2024 18:05:46       T: 2024 18:09:17     [email or web address]  Job#: [id number]     Doc#: [id number]    CC:

## 2024-02-11 LAB
ANION GAP SERPL CALC-SCNC: 13 MEQ/L (ref 8–16)
BASOPHILS ABSOLUTE: 0 THOU/MM3 (ref 0–0.1)
BASOPHILS NFR BLD AUTO: 0.3 %
BUN SERPL-MCNC: 68 MG/DL (ref 7–22)
CALCIUM SERPL-MCNC: 8.7 MG/DL (ref 8.5–10.5)
CHLORIDE SERPL-SCNC: 96 MEQ/L (ref 98–111)
CO2 SERPL-SCNC: 25 MEQ/L (ref 23–33)
CREAT SERPL-MCNC: 4.1 MG/DL (ref 0.4–1.2)
DEPRECATED RDW RBC AUTO: 43.8 FL (ref 35–45)
EOSINOPHIL NFR BLD AUTO: 1.7 %
EOSINOPHILS ABSOLUTE: 0.2 THOU/MM3 (ref 0–0.4)
ERYTHROCYTE [DISTWIDTH] IN BLOOD BY AUTOMATED COUNT: 14.2 % (ref 11.5–14.5)
GFR SERPL CREATININE-BSD FRML MDRD: 13 ML/MIN/1.73M2
GLUCOSE BLD STRIP.AUTO-MCNC: 121 MG/DL (ref 70–108)
GLUCOSE BLD STRIP.AUTO-MCNC: 157 MG/DL (ref 70–108)
GLUCOSE BLD STRIP.AUTO-MCNC: 169 MG/DL (ref 70–108)
GLUCOSE BLD STRIP.AUTO-MCNC: 274 MG/DL (ref 70–108)
GLUCOSE BLD STRIP.AUTO-MCNC: 321 MG/DL (ref 70–108)
GLUCOSE BLD STRIP.AUTO-MCNC: 357 MG/DL (ref 70–108)
GLUCOSE SERPL-MCNC: 261 MG/DL (ref 70–108)
HCT VFR BLD AUTO: 26.9 % (ref 37–47)
HGB BLD-MCNC: 8.3 GM/DL (ref 12–16)
IMM GRANULOCYTES # BLD AUTO: 0.1 THOU/MM3 (ref 0–0.07)
IMM GRANULOCYTES NFR BLD AUTO: 0.9 %
LYMPHOCYTES ABSOLUTE: 1.9 THOU/MM3 (ref 1–4.8)
LYMPHOCYTES NFR BLD AUTO: 15.9 %
MCH RBC QN AUTO: 25.7 PG (ref 26–33)
MCHC RBC AUTO-ENTMCNC: 30.9 GM/DL (ref 32.2–35.5)
MCV RBC AUTO: 83.3 FL (ref 81–99)
MONOCYTES ABSOLUTE: 0.6 THOU/MM3 (ref 0.4–1.3)
MONOCYTES NFR BLD AUTO: 4.9 %
NEUTROPHILS NFR BLD AUTO: 76.3 %
NRBC BLD AUTO-RTO: 0 /100 WBC
PLATELET # BLD AUTO: 383 THOU/MM3 (ref 130–400)
PMV BLD AUTO: 8.8 FL (ref 9.4–12.4)
POTASSIUM SERPL-SCNC: 3.6 MEQ/L (ref 3.5–5.2)
RBC # BLD AUTO: 3.23 MILL/MM3 (ref 4.2–5.4)
SEGMENTED NEUTROPHILS ABSOLUTE COUNT: 8.9 THOU/MM3 (ref 1.8–7.7)
SODIUM SERPL-SCNC: 134 MEQ/L (ref 135–145)
WBC # BLD AUTO: 11.7 THOU/MM3 (ref 4.8–10.8)

## 2024-02-11 PROCEDURE — 80048 BASIC METABOLIC PNL TOTAL CA: CPT

## 2024-02-11 PROCEDURE — 2580000003 HC RX 258: Performed by: SURGERY

## 2024-02-11 PROCEDURE — 6370000000 HC RX 637 (ALT 250 FOR IP): Performed by: INTERNAL MEDICINE

## 2024-02-11 PROCEDURE — 6370000000 HC RX 637 (ALT 250 FOR IP): Performed by: SURGERY

## 2024-02-11 PROCEDURE — 99232 SBSQ HOSP IP/OBS MODERATE 35: CPT | Performed by: INTERNAL MEDICINE

## 2024-02-11 PROCEDURE — 36415 COLL VENOUS BLD VENIPUNCTURE: CPT

## 2024-02-11 PROCEDURE — 82948 REAGENT STRIP/BLOOD GLUCOSE: CPT

## 2024-02-11 PROCEDURE — 1200000000 HC SEMI PRIVATE

## 2024-02-11 PROCEDURE — 85025 COMPLETE CBC W/AUTO DIFF WBC: CPT

## 2024-02-11 PROCEDURE — 6360000002 HC RX W HCPCS: Performed by: SURGERY

## 2024-02-11 RX ORDER — POTASSIUM CHLORIDE 20 MEQ/1
20 TABLET, EXTENDED RELEASE ORAL ONCE
Status: COMPLETED | OUTPATIENT
Start: 2024-02-11 | End: 2024-02-11

## 2024-02-11 RX ORDER — INSULIN GLARGINE 100 [IU]/ML
48 INJECTION, SOLUTION SUBCUTANEOUS NIGHTLY
Status: DISCONTINUED | OUTPATIENT
Start: 2024-02-11 | End: 2024-02-12

## 2024-02-11 RX ORDER — METOLAZONE 5 MG/1
10 TABLET ORAL ONCE
Status: COMPLETED | OUTPATIENT
Start: 2024-02-11 | End: 2024-02-11

## 2024-02-11 RX ADMIN — INSULIN GLARGINE 48 UNITS: 100 INJECTION, SOLUTION SUBCUTANEOUS at 23:50

## 2024-02-11 RX ADMIN — GABAPENTIN 300 MG: 300 CAPSULE ORAL at 08:15

## 2024-02-11 RX ADMIN — POTASSIUM CHLORIDE 20 MEQ: 1500 TABLET, EXTENDED RELEASE ORAL at 11:53

## 2024-02-11 RX ADMIN — GABAPENTIN 300 MG: 300 CAPSULE ORAL at 23:50

## 2024-02-11 RX ADMIN — INSULIN LISPRO 10 UNITS: 100 INJECTION, SOLUTION INTRAVENOUS; SUBCUTANEOUS at 08:15

## 2024-02-11 RX ADMIN — INSULIN LISPRO 18 UNITS: 100 INJECTION, SOLUTION INTRAVENOUS; SUBCUTANEOUS at 08:16

## 2024-02-11 RX ADMIN — METOLAZONE 10 MG: 5 TABLET ORAL at 11:53

## 2024-02-11 RX ADMIN — DOXAZOSIN 4 MG: 4 TABLET ORAL at 08:15

## 2024-02-11 RX ADMIN — CYCLOBENZAPRINE 5 MG: 10 TABLET, FILM COATED ORAL at 08:51

## 2024-02-11 RX ADMIN — DOXAZOSIN 4 MG: 4 TABLET ORAL at 23:50

## 2024-02-11 RX ADMIN — INSULIN GLARGINE 8 UNITS: 100 INJECTION, SOLUTION SUBCUTANEOUS at 00:07

## 2024-02-11 RX ADMIN — ATORVASTATIN CALCIUM 40 MG: 40 TABLET, FILM COATED ORAL at 08:15

## 2024-02-11 RX ADMIN — INSULIN LISPRO 18 UNITS: 100 INJECTION, SOLUTION INTRAVENOUS; SUBCUTANEOUS at 11:53

## 2024-02-11 RX ADMIN — TRAMADOL HYDROCHLORIDE 50 MG: 50 TABLET, COATED ORAL at 12:02

## 2024-02-11 RX ADMIN — TRAMADOL HYDROCHLORIDE 50 MG: 50 TABLET, COATED ORAL at 01:29

## 2024-02-11 RX ADMIN — INSULIN LISPRO 18 UNITS: 100 INJECTION, SOLUTION INTRAVENOUS; SUBCUTANEOUS at 15:49

## 2024-02-11 RX ADMIN — BUMETANIDE 1 MG/HR: 0.25 INJECTION INTRAMUSCULAR; INTRAVENOUS at 11:58

## 2024-02-11 RX ADMIN — INSULIN LISPRO 20 UNITS: 100 INJECTION, SOLUTION INTRAVENOUS; SUBCUTANEOUS at 01:33

## 2024-02-12 PROBLEM — Z79.4 TYPE 2 DIABETES MELLITUS WITH DIABETIC POLYNEUROPATHY, WITH LONG-TERM CURRENT USE OF INSULIN (HCC): Status: ACTIVE | Noted: 2024-02-12

## 2024-02-12 PROBLEM — E11.65 TYPE 2 DIABETES MELLITUS WITH HYPERGLYCEMIA, WITH LONG-TERM CURRENT USE OF INSULIN (HCC): Status: ACTIVE | Noted: 2024-02-12

## 2024-02-12 PROBLEM — L02.91 ABSCESS: Status: ACTIVE | Noted: 2024-02-12

## 2024-02-12 PROBLEM — E11.42 TYPE 2 DIABETES MELLITUS WITH DIABETIC POLYNEUROPATHY, WITH LONG-TERM CURRENT USE OF INSULIN (HCC): Status: ACTIVE | Noted: 2024-02-12

## 2024-02-12 PROBLEM — L02.212 BACK ABSCESS: Status: ACTIVE | Noted: 2024-02-12

## 2024-02-12 PROBLEM — R60.1 ANASARCA: Status: ACTIVE | Noted: 2024-02-12

## 2024-02-12 PROBLEM — E87.79 OTHER FLUID OVERLOAD: Status: ACTIVE | Noted: 2024-02-06

## 2024-02-12 PROBLEM — N18.9 ACUTE KIDNEY INJURY SUPERIMPOSED ON CHRONIC KIDNEY DISEASE (HCC): Status: ACTIVE | Noted: 2024-02-06

## 2024-02-12 PROBLEM — E87.1 HYPONATREMIA: Status: ACTIVE | Noted: 2024-02-12

## 2024-02-12 LAB
ANION GAP SERPL CALC-SCNC: 18 MEQ/L (ref 8–16)
BASOPHILS ABSOLUTE: 0 THOU/MM3 (ref 0–0.1)
BASOPHILS NFR BLD AUTO: 0.3 %
BUN SERPL-MCNC: 76 MG/DL (ref 7–22)
CALCIUM SERPL-MCNC: 8.6 MG/DL (ref 8.5–10.5)
CHLORIDE SERPL-SCNC: 97 MEQ/L (ref 98–111)
CO2 SERPL-SCNC: 23 MEQ/L (ref 23–33)
CREAT SERPL-MCNC: 4 MG/DL (ref 0.4–1.2)
DEPRECATED RDW RBC AUTO: 43.8 FL (ref 35–45)
EOSINOPHIL NFR BLD AUTO: 3.9 %
EOSINOPHILS ABSOLUTE: 0.4 THOU/MM3 (ref 0–0.4)
ERYTHROCYTE [DISTWIDTH] IN BLOOD BY AUTOMATED COUNT: 14.3 % (ref 11.5–14.5)
GFR SERPL CREATININE-BSD FRML MDRD: 13 ML/MIN/1.73M2
GLUCOSE BLD STRIP.AUTO-MCNC: 130 MG/DL (ref 70–108)
GLUCOSE BLD STRIP.AUTO-MCNC: 168 MG/DL (ref 70–108)
GLUCOSE BLD STRIP.AUTO-MCNC: 202 MG/DL (ref 70–108)
GLUCOSE BLD STRIP.AUTO-MCNC: 296 MG/DL (ref 70–108)
GLUCOSE SERPL-MCNC: 190 MG/DL (ref 70–108)
HCT VFR BLD AUTO: 27.5 % (ref 37–47)
HGB BLD-MCNC: 8.7 GM/DL (ref 12–16)
IMM GRANULOCYTES # BLD AUTO: 0.07 THOU/MM3 (ref 0–0.07)
IMM GRANULOCYTES NFR BLD AUTO: 0.7 %
LYMPHOCYTES ABSOLUTE: 1.6 THOU/MM3 (ref 1–4.8)
LYMPHOCYTES NFR BLD AUTO: 16.7 %
MCH RBC QN AUTO: 26.5 PG (ref 26–33)
MCHC RBC AUTO-ENTMCNC: 31.6 GM/DL (ref 32.2–35.5)
MCV RBC AUTO: 83.8 FL (ref 81–99)
MONOCYTES ABSOLUTE: 0.5 THOU/MM3 (ref 0.4–1.3)
MONOCYTES NFR BLD AUTO: 5.6 %
NEUTROPHILS NFR BLD AUTO: 72.8 %
NRBC BLD AUTO-RTO: 0 /100 WBC
PLATELET # BLD AUTO: 358 THOU/MM3 (ref 130–400)
PMV BLD AUTO: 8.5 FL (ref 9.4–12.4)
POTASSIUM SERPL-SCNC: 3.8 MEQ/L (ref 3.5–5.2)
RBC # BLD AUTO: 3.28 MILL/MM3 (ref 4.2–5.4)
SEGMENTED NEUTROPHILS ABSOLUTE COUNT: 7 THOU/MM3 (ref 1.8–7.7)
SODIUM SERPL-SCNC: 138 MEQ/L (ref 135–145)
WBC # BLD AUTO: 9.6 THOU/MM3 (ref 4.8–10.8)

## 2024-02-12 PROCEDURE — 6370000000 HC RX 637 (ALT 250 FOR IP)

## 2024-02-12 PROCEDURE — 99233 SBSQ HOSP IP/OBS HIGH 50: CPT | Performed by: INTERNAL MEDICINE

## 2024-02-12 PROCEDURE — 6360000002 HC RX W HCPCS: Performed by: SURGERY

## 2024-02-12 PROCEDURE — 6370000000 HC RX 637 (ALT 250 FOR IP): Performed by: SURGERY

## 2024-02-12 PROCEDURE — 85025 COMPLETE CBC W/AUTO DIFF WBC: CPT

## 2024-02-12 PROCEDURE — 1200000000 HC SEMI PRIVATE

## 2024-02-12 PROCEDURE — 6370000000 HC RX 637 (ALT 250 FOR IP): Performed by: INTERNAL MEDICINE

## 2024-02-12 PROCEDURE — 2580000003 HC RX 258: Performed by: SURGERY

## 2024-02-12 PROCEDURE — 99233 SBSQ HOSP IP/OBS HIGH 50: CPT | Performed by: STUDENT IN AN ORGANIZED HEALTH CARE EDUCATION/TRAINING PROGRAM

## 2024-02-12 PROCEDURE — 99232 SBSQ HOSP IP/OBS MODERATE 35: CPT | Performed by: INTERNAL MEDICINE

## 2024-02-12 PROCEDURE — 80048 BASIC METABOLIC PNL TOTAL CA: CPT

## 2024-02-12 PROCEDURE — 82948 REAGENT STRIP/BLOOD GLUCOSE: CPT

## 2024-02-12 PROCEDURE — 36415 COLL VENOUS BLD VENIPUNCTURE: CPT

## 2024-02-12 PROCEDURE — 93010 ELECTROCARDIOGRAM REPORT: CPT | Performed by: INTERNAL MEDICINE

## 2024-02-12 PROCEDURE — 93005 ELECTROCARDIOGRAM TRACING: CPT | Performed by: STUDENT IN AN ORGANIZED HEALTH CARE EDUCATION/TRAINING PROGRAM

## 2024-02-12 RX ORDER — BUMETANIDE 1 MG/1
1 TABLET ORAL 2 TIMES DAILY
Status: DISCONTINUED | OUTPATIENT
Start: 2024-02-12 | End: 2024-02-13

## 2024-02-12 RX ORDER — VALSARTAN 320 MG/1
320 TABLET ORAL DAILY
Status: DISCONTINUED | OUTPATIENT
Start: 2024-02-12 | End: 2024-02-14 | Stop reason: HOSPADM

## 2024-02-12 RX ORDER — METOLAZONE 5 MG/1
5 TABLET ORAL ONCE
Status: COMPLETED | OUTPATIENT
Start: 2024-02-12 | End: 2024-02-12

## 2024-02-12 RX ORDER — INSULIN LISPRO 100 [IU]/ML
20 INJECTION, SOLUTION INTRAVENOUS; SUBCUTANEOUS
Status: DISCONTINUED | OUTPATIENT
Start: 2024-02-12 | End: 2024-02-12

## 2024-02-12 RX ORDER — INSULIN GLARGINE 100 [IU]/ML
50 INJECTION, SOLUTION SUBCUTANEOUS NIGHTLY
Status: DISCONTINUED | OUTPATIENT
Start: 2024-02-12 | End: 2024-02-14 | Stop reason: HOSPADM

## 2024-02-12 RX ORDER — INSULIN LISPRO 100 [IU]/ML
16 INJECTION, SOLUTION INTRAVENOUS; SUBCUTANEOUS
Status: DISCONTINUED | OUTPATIENT
Start: 2024-02-12 | End: 2024-02-14 | Stop reason: HOSPADM

## 2024-02-12 RX ADMIN — INSULIN LISPRO 10 UNITS: 100 INJECTION, SOLUTION INTRAVENOUS; SUBCUTANEOUS at 02:26

## 2024-02-12 RX ADMIN — INSULIN LISPRO 16 UNITS: 100 INJECTION, SOLUTION INTRAVENOUS; SUBCUTANEOUS at 13:02

## 2024-02-12 RX ADMIN — ATORVASTATIN CALCIUM 40 MG: 40 TABLET, FILM COATED ORAL at 08:09

## 2024-02-12 RX ADMIN — BUMETANIDE 1 MG/HR: 0.25 INJECTION INTRAMUSCULAR; INTRAVENOUS at 14:53

## 2024-02-12 RX ADMIN — DOXAZOSIN 4 MG: 4 TABLET ORAL at 08:09

## 2024-02-12 RX ADMIN — SODIUM CHLORIDE, PRESERVATIVE FREE 10 ML: 5 INJECTION INTRAVENOUS at 00:10

## 2024-02-12 RX ADMIN — HYDROMORPHONE HYDROCHLORIDE 0.5 MG: 1 INJECTION, SOLUTION INTRAMUSCULAR; INTRAVENOUS; SUBCUTANEOUS at 03:38

## 2024-02-12 RX ADMIN — OXYCODONE HYDROCHLORIDE AND ACETAMINOPHEN 1 TABLET: 5; 325 TABLET ORAL at 10:31

## 2024-02-12 RX ADMIN — INSULIN LISPRO 18 UNITS: 100 INJECTION, SOLUTION INTRAVENOUS; SUBCUTANEOUS at 08:09

## 2024-02-12 RX ADMIN — GABAPENTIN 300 MG: 300 CAPSULE ORAL at 21:03

## 2024-02-12 RX ADMIN — METOLAZONE 5 MG: 5 TABLET ORAL at 16:55

## 2024-02-12 RX ADMIN — OXYCODONE HYDROCHLORIDE AND ACETAMINOPHEN 1 TABLET: 5; 325 TABLET ORAL at 21:02

## 2024-02-12 RX ADMIN — ASPIRIN 81 MG: 81 TABLET, COATED ORAL at 08:09

## 2024-02-12 RX ADMIN — INSULIN GLARGINE 50 UNITS: 100 INJECTION, SOLUTION SUBCUTANEOUS at 21:09

## 2024-02-12 RX ADMIN — DOXAZOSIN 4 MG: 4 TABLET ORAL at 21:03

## 2024-02-12 RX ADMIN — INSULIN LISPRO 5 UNITS: 100 INJECTION, SOLUTION INTRAVENOUS; SUBCUTANEOUS at 13:01

## 2024-02-12 RX ADMIN — HYDROMORPHONE HYDROCHLORIDE 0.5 MG: 1 INJECTION, SOLUTION INTRAMUSCULAR; INTRAVENOUS; SUBCUTANEOUS at 23:36

## 2024-02-12 RX ADMIN — GABAPENTIN 300 MG: 300 CAPSULE ORAL at 08:09

## 2024-02-12 RX ADMIN — INSULIN LISPRO 16 UNITS: 100 INJECTION, SOLUTION INTRAVENOUS; SUBCUTANEOUS at 16:55

## 2024-02-12 NOTE — CARE COORDINATION
2/12/24, 1:46 PM EST    DISCHARGE ON GOING EVALUATION    Debbie Locke       [facility] day: 6  Location: -15/015-A Reason for admit: ROXANA (acute kidney injury) (HCC) [N17.9]   Procedure:   2/6 US Renal: No renal calculi or hydronephrosis.    2/9 OR with Dr. Mercado: Excision of Chronic Abscess on Back  8 x 5 1.5 cm     Barriers to Discharge: Hospitalist, Nephrology, ID, GS and Endocrinology following. POD#3. Packing wound with Dakins BID.  Remains on IV bumex gtt @1mg/hr. IV Dilaudid prn. Percocet prn. HR 100s-120s. Creat 4.0 (4.1) Anion gap 18.0 (13.0) Glucose improved. DM management. Asa was held for potential HD cath placement-no longer held on MAR. Primary RN gave this AM. Nephrology to reassess.     PCP: Kory Thomas MD  Readmission Risk Score: 17.5%  Patient Goals/Plan/Treatment Preferences: Deepthi plans to return home w/ her . Denies needs for DME or HH services at this time. Monitoring for potential HD needs/post op needs.

## 2024-02-13 PROBLEM — Z79.4 TYPE 2 DIABETES MELLITUS WITH DIABETIC POLYNEUROPATHY, WITH LONG-TERM CURRENT USE OF INSULIN (HCC): Status: ACTIVE | Noted: 2024-02-13

## 2024-02-13 PROBLEM — L02.91 ABSCESS: Status: ACTIVE | Noted: 2024-02-13

## 2024-02-13 PROBLEM — E11.42 TYPE 2 DIABETES MELLITUS WITH DIABETIC POLYNEUROPATHY, WITH LONG-TERM CURRENT USE OF INSULIN (HCC): Status: ACTIVE | Noted: 2024-02-13

## 2024-02-13 LAB
ANION GAP SERPL CALC-SCNC: 17 MEQ/L (ref 8–16)
BUN SERPL-MCNC: 81 MG/DL (ref 7–22)
CALCIUM SERPL-MCNC: 8.9 MG/DL (ref 8.5–10.5)
CHLORIDE SERPL-SCNC: 96 MEQ/L (ref 98–111)
CO2 SERPL-SCNC: 25 MEQ/L (ref 23–33)
CREAT SERPL-MCNC: 4.3 MG/DL (ref 0.4–1.2)
FERRITIN SERPL IA-MCNC: 95 NG/ML (ref 10–291)
FOLATE SERPL-MCNC: 6.6 NG/ML (ref 4.8–24.2)
GFR SERPL CREATININE-BSD FRML MDRD: 12 ML/MIN/1.73M2
GLUCOSE BLD STRIP.AUTO-MCNC: 137 MG/DL (ref 70–108)
GLUCOSE BLD STRIP.AUTO-MCNC: 148 MG/DL (ref 70–108)
GLUCOSE BLD STRIP.AUTO-MCNC: 167 MG/DL (ref 70–108)
GLUCOSE BLD STRIP.AUTO-MCNC: 220 MG/DL (ref 70–108)
GLUCOSE BLD STRIP.AUTO-MCNC: 221 MG/DL (ref 70–108)
GLUCOSE SERPL-MCNC: 154 MG/DL (ref 70–108)
HGB RETIC QN AUTO: 31.1 PG (ref 28.2–35.7)
IMM RETICS NFR: 13.5 % (ref 3–15.9)
IRON SATN MFR SERPL: 17 % (ref 20–50)
IRON SERPL-MCNC: 39 UG/DL (ref 50–170)
POTASSIUM SERPL-SCNC: 3.6 MEQ/L (ref 3.5–5.2)
POTASSIUM SERPL-SCNC: 3.6 MEQ/L (ref 3.5–5.2)
RETICS # AUTO: 25 THOU/MM3 (ref 20–115)
RETICS/RBC NFR AUTO: 0.8 % (ref 0.5–2)
SODIUM SERPL-SCNC: 138 MEQ/L (ref 135–145)
TIBC SERPL-MCNC: 226 UG/DL (ref 171–450)
VIT B12 SERPL-MCNC: 772 PG/ML (ref 211–911)

## 2024-02-13 PROCEDURE — 6370000000 HC RX 637 (ALT 250 FOR IP): Performed by: SURGERY

## 2024-02-13 PROCEDURE — 82728 ASSAY OF FERRITIN: CPT

## 2024-02-13 PROCEDURE — 6360000002 HC RX W HCPCS: Performed by: INTERNAL MEDICINE

## 2024-02-13 PROCEDURE — 6370000000 HC RX 637 (ALT 250 FOR IP): Performed by: INTERNAL MEDICINE

## 2024-02-13 PROCEDURE — 80048 BASIC METABOLIC PNL TOTAL CA: CPT

## 2024-02-13 PROCEDURE — 6370000000 HC RX 637 (ALT 250 FOR IP)

## 2024-02-13 PROCEDURE — 83540 ASSAY OF IRON: CPT

## 2024-02-13 PROCEDURE — 2580000003 HC RX 258: Performed by: SURGERY

## 2024-02-13 PROCEDURE — 82948 REAGENT STRIP/BLOOD GLUCOSE: CPT

## 2024-02-13 PROCEDURE — 6360000002 HC RX W HCPCS: Performed by: SURGERY

## 2024-02-13 PROCEDURE — 85046 RETICYTE/HGB CONCENTRATE: CPT

## 2024-02-13 PROCEDURE — 99232 SBSQ HOSP IP/OBS MODERATE 35: CPT | Performed by: STUDENT IN AN ORGANIZED HEALTH CARE EDUCATION/TRAINING PROGRAM

## 2024-02-13 PROCEDURE — 1200000000 HC SEMI PRIVATE

## 2024-02-13 PROCEDURE — 82607 VITAMIN B-12: CPT

## 2024-02-13 PROCEDURE — 83550 IRON BINDING TEST: CPT

## 2024-02-13 PROCEDURE — 99232 SBSQ HOSP IP/OBS MODERATE 35: CPT | Performed by: INTERNAL MEDICINE

## 2024-02-13 PROCEDURE — 82746 ASSAY OF FOLIC ACID SERUM: CPT

## 2024-02-13 PROCEDURE — 36415 COLL VENOUS BLD VENIPUNCTURE: CPT

## 2024-02-13 PROCEDURE — 6370000000 HC RX 637 (ALT 250 FOR IP): Performed by: STUDENT IN AN ORGANIZED HEALTH CARE EDUCATION/TRAINING PROGRAM

## 2024-02-13 PROCEDURE — 99233 SBSQ HOSP IP/OBS HIGH 50: CPT | Performed by: INTERNAL MEDICINE

## 2024-02-13 RX ORDER — INSULIN LISPRO 100 [IU]/ML
0-24 INJECTION, SOLUTION INTRAVENOUS; SUBCUTANEOUS EVERY 24 HOURS
Status: DISCONTINUED | OUTPATIENT
Start: 2024-02-14 | End: 2024-02-14 | Stop reason: HOSPADM

## 2024-02-13 RX ORDER — INSULIN LISPRO 100 [IU]/ML
0-24 INJECTION, SOLUTION INTRAVENOUS; SUBCUTANEOUS
Status: DISCONTINUED | OUTPATIENT
Start: 2024-02-14 | End: 2024-02-14 | Stop reason: HOSPADM

## 2024-02-13 RX ORDER — BUMETANIDE 1 MG/1
1 TABLET ORAL 2 TIMES DAILY
Status: DISCONTINUED | OUTPATIENT
Start: 2024-02-14 | End: 2024-02-14 | Stop reason: HOSPADM

## 2024-02-13 RX ADMIN — BUMETANIDE 1 MG/HR: 0.25 INJECTION INTRAMUSCULAR; INTRAVENOUS at 02:52

## 2024-02-13 RX ADMIN — INSULIN LISPRO 16 UNITS: 100 INJECTION, SOLUTION INTRAVENOUS; SUBCUTANEOUS at 17:48

## 2024-02-13 RX ADMIN — DOXAZOSIN 4 MG: 4 TABLET ORAL at 09:35

## 2024-02-13 RX ADMIN — INSULIN LISPRO 16 UNITS: 100 INJECTION, SOLUTION INTRAVENOUS; SUBCUTANEOUS at 11:46

## 2024-02-13 RX ADMIN — ATORVASTATIN CALCIUM 40 MG: 40 TABLET, FILM COATED ORAL at 09:35

## 2024-02-13 RX ADMIN — INSULIN GLARGINE 50 UNITS: 100 INJECTION, SOLUTION SUBCUTANEOUS at 20:13

## 2024-02-13 RX ADMIN — HYDROMORPHONE HYDROCHLORIDE 0.5 MG: 1 INJECTION, SOLUTION INTRAMUSCULAR; INTRAVENOUS; SUBCUTANEOUS at 20:25

## 2024-02-13 RX ADMIN — INSULIN LISPRO 5 UNITS: 100 INJECTION, SOLUTION INTRAVENOUS; SUBCUTANEOUS at 02:56

## 2024-02-13 RX ADMIN — GABAPENTIN 300 MG: 300 CAPSULE ORAL at 20:14

## 2024-02-13 RX ADMIN — DOXAZOSIN 4 MG: 4 TABLET ORAL at 20:14

## 2024-02-13 RX ADMIN — GABAPENTIN 300 MG: 300 CAPSULE ORAL at 09:36

## 2024-02-13 RX ADMIN — SODIUM CHLORIDE, PRESERVATIVE FREE 10 ML: 5 INJECTION INTRAVENOUS at 20:15

## 2024-02-13 RX ADMIN — METOPROLOL TARTRATE 12.5 MG: 25 TABLET, FILM COATED ORAL at 17:48

## 2024-02-13 RX ADMIN — OXYCODONE HYDROCHLORIDE AND ACETAMINOPHEN 1 TABLET: 5; 325 TABLET ORAL at 02:41

## 2024-02-13 RX ADMIN — CYCLOBENZAPRINE 5 MG: 10 TABLET, FILM COATED ORAL at 21:55

## 2024-02-13 RX ADMIN — OXYCODONE HYDROCHLORIDE AND ACETAMINOPHEN 1 TABLET: 5; 325 TABLET ORAL at 13:55

## 2024-02-13 RX ADMIN — EPOETIN ALFA-EPBX 6000 UNITS: 3000 INJECTION, SOLUTION INTRAVENOUS; SUBCUTANEOUS at 15:45

## 2024-02-13 RX ADMIN — INSULIN LISPRO 5 UNITS: 100 INJECTION, SOLUTION INTRAVENOUS; SUBCUTANEOUS at 20:17

## 2024-02-13 RX ADMIN — INSULIN LISPRO 16 UNITS: 100 INJECTION, SOLUTION INTRAVENOUS; SUBCUTANEOUS at 08:34

## 2024-02-13 RX ADMIN — OXYCODONE HYDROCHLORIDE AND ACETAMINOPHEN 1 TABLET: 5; 325 TABLET ORAL at 08:34

## 2024-02-13 NOTE — PLAN OF CARE
Problem: Chronic Conditions and Co-morbidities  Goal: Patient's chronic conditions and co-morbidity symptoms are monitored and maintained or improved  Outcome: Progressing  Flowsheets (Taken 2/12/2024 2042)  Care Plan - Patient's Chronic Conditions and Co-Morbidity Symptoms are Monitored and Maintained or Improved: Monitor and assess patient's chronic conditions and comorbid symptoms for stability, deterioration, or improvement     Problem: Pain  Goal: Verbalizes/displays adequate comfort level or baseline comfort level  Outcome: Progressing     Problem: Skin/Tissue Integrity - Adult  Goal: Incisions, wounds, or drain sites healing without S/S of infection  Outcome: Progressing     
  Problem: Discharge Planning  Goal: Discharge to home or other facility with appropriate resources  2/10/2024 0220 by Mariya Putnam RN  Outcome: Progressing  2/9/2024 1951 by Emmanuel Bertrand RN  Outcome: Progressing     Problem: Safety - Adult  Goal: Free from fall injury  2/10/2024 0220 by Mariya Putnam RN  Outcome: Progressing  2/9/2024 1951 by Emmanuel Bertrand RN  Outcome: Progressing     Problem: ABCDS Injury Assessment  Goal: Absence of physical injury  2/10/2024 0220 by Mariya Putnam RN  Outcome: Progressing  2/9/2024 1951 by Emmanuel Bertrand RN  Outcome: Progressing     Problem: Skin/Tissue Integrity  Goal: Absence of new skin breakdown  Description: 1.  Monitor for areas of redness and/or skin breakdown  2.  Assess vascular access sites hourly  3.  Every 4-6 hours minimum:  Change oxygen saturation probe site  4.  Every 4-6 hours:  If on nasal continuous positive airway pressure, respiratory therapy assess nares and determine need for appliance change or resting period.  2/10/2024 0220 by Mariya Putnam RN  Outcome: Progressing  2/9/2024 1951 by Emmanuel Bertrand RN  Outcome: Progressing     Problem: Chronic Conditions and Co-morbidities  Goal: Patient's chronic conditions and co-morbidity symptoms are monitored and maintained or improved  2/10/2024 0220 by Mariya Putnam RN  Outcome: Progressing  2/9/2024 1951 by Emmanuel Bertrand RN  Outcome: Progressing     Problem: Pain  Goal: Verbalizes/displays adequate comfort level or baseline comfort level  2/10/2024 0220 by Mariya Putnam RN  Outcome: Progressing  2/9/2024 1951 by Emmanuel Bertrand RN  Outcome: Progressing     Problem: Skin/Tissue Integrity - Adult  Goal: Incisions, wounds, or drain sites healing without S/S of infection  2/10/2024 0220 by Mariya Putnam RN  Outcome: Progressing  2/9/2024 1951 by Emmanuel Bertrand RN  Outcome: Progressing     
  Problem: Discharge Planning  Goal: Discharge to home or other facility with appropriate resources  2/10/2024 1406 by Elayne Maya RN  Outcome: Progressing  Flowsheets (Taken 2/10/2024 1406)  Discharge to home or other facility with appropriate resources:   Identify barriers to discharge with patient and caregiver   Arrange for needed discharge resources and transportation as appropriate   Identify discharge learning needs (meds, wound care, etc)  Note: Patient plans to discharge home with family support once medically stable.   2/10/2024 0225 by Mariya Putnam RN  Outcome: Progressing  2/10/2024 0220 by Mariya Putnam RN  Outcome: Progressing  Flowsheets (Taken 2/9/2024 2045)  Discharge to home or other facility with appropriate resources: Identify barriers to discharge with patient and caregiver     Problem: Safety - Adult  Goal: Free from fall injury  2/10/2024 1406 by Elayne Maya RN  Outcome: Progressing  Flowsheets (Taken 2/7/2024 2253 by Khurram Lane, RN)  Free From Fall Injury:   Instruct family/caregiver on patient safety   Based on caregiver fall risk screen, instruct family/caregiver to ask for assistance with transferring infant if caregiver noted to have fall risk factors  Note: No falls noted this shift. Continue falling star program. Bed alarm on, bed in low position. Call light and personal belongings in reach.  Patient uses call light appropriately.   2/10/2024 0225 by Mariya Putnam RN  Outcome: Progressing  2/10/2024 0220 by Mariya Putnam RN  Outcome: Progressing     Problem: ABCDS Injury Assessment  Goal: Absence of physical injury  2/10/2024 1406 by Elayne Maya RN  Outcome: Progressing  Flowsheets (Taken 2/7/2024 2253 by Khurram Lane, RN)  Absence of Physical Injury: Implement safety measures based on patient assessment  2/10/2024 0225 by Mariya Putnam RN  Outcome: Progressing  2/10/2024 0220 by Mariya Putnam RN  Outcome: Progressing     Problem: Skin/Tissue 
  Problem: Discharge Planning  Goal: Discharge to home or other facility with appropriate resources  2/13/2024 1212 by Joanne Walsh, RN  Outcome: Progressing  Flowsheets (Taken 2/13/2024 1212)  Discharge to home or other facility with appropriate resources:   Identify barriers to discharge with patient and caregiver   Identify discharge learning needs (meds, wound care, etc)   Arrange for needed discharge resources and transportation as appropriate   Arrange for interpreters to assist at discharge as needed   Refer to discharge planning if patient needs post-hospital services based on physician order or complex needs related to functional status, cognitive ability or social support system     Problem: Safety - Adult  Goal: Free from fall injury  2/13/2024 1212 by Joanne Walsh RN  Outcome: Progressing  Flowsheets (Taken 2/13/2024 1212)  Free From Fall Injury:   Instruct family/caregiver on patient safety   Based on caregiver fall risk screen, instruct family/caregiver to ask for assistance with transferring infant if caregiver noted to have fall risk factors     Problem: ABCDS Injury Assessment  Goal: Absence of physical injury  2/13/2024 1212 by Joanne Walsh RN  Outcome: Progressing  Flowsheets (Taken 2/13/2024 1212)  Absence of Physical Injury: Implement safety measures based on patient assessment     Problem: Skin/Tissue Integrity  Goal: Absence of new skin breakdown  Description: 1.  Monitor for areas of redness and/or skin breakdown  2.  Assess vascular access sites hourly  3.  Every 4-6 hours minimum:  Change oxygen saturation probe site  4.  Every 4-6 hours:  If on nasal continuous positive airway pressure, respiratory therapy assess nares and determine need for appliance change or resting period.  2/13/2024 1212 by Joanne Walsh, RN  Outcome: Progressing     Problem: Chronic Conditions and Co-morbidities  Goal: Patient's chronic conditions and co-morbidity symptoms are monitored and maintained or 
  Problem: Discharge Planning  Goal: Discharge to home or other facility with appropriate resources  Outcome: Progressing     Problem: Safety - Adult  Goal: Free from fall injury  Outcome: Progressing     Problem: ABCDS Injury Assessment  Goal: Absence of physical injury  Outcome: Progressing     Problem: Skin/Tissue Integrity  Goal: Absence of new skin breakdown  Description: 1.  Monitor for areas of redness and/or skin breakdown  2.  Assess vascular access sites hourly  3.  Every 4-6 hours minimum:  Change oxygen saturation probe site  4.  Every 4-6 hours:  If on nasal continuous positive airway pressure, respiratory therapy assess nares and determine need for appliance change or resting period.  Outcome: Progressing     Problem: Chronic Conditions and Co-morbidities  Goal: Patient's chronic conditions and co-morbidity symptoms are monitored and maintained or improved  Outcome: Progressing     Problem: Pain  Goal: Verbalizes/displays adequate comfort level or baseline comfort level  Outcome: Progressing     
  Problem: Discharge Planning  Goal: Discharge to home or other facility with appropriate resources  Outcome: Progressing     Problem: Safety - Adult  Goal: Free from fall injury  Outcome: Progressing     Problem: ABCDS Injury Assessment  Goal: Absence of physical injury  Outcome: Progressing     Problem: Skin/Tissue Integrity  Goal: Absence of new skin breakdown  Description: 1.  Monitor for areas of redness and/or skin breakdown  2.  Assess vascular access sites hourly  3.  Every 4-6 hours minimum:  Change oxygen saturation probe site  4.  Every 4-6 hours:  If on nasal continuous positive airway pressure, respiratory therapy assess nares and determine need for appliance change or resting period.  Outcome: Progressing     Problem: Chronic Conditions and Co-morbidities  Goal: Patient's chronic conditions and co-morbidity symptoms are monitored and maintained or improved  Outcome: Progressing     Problem: Pain  Goal: Verbalizes/displays adequate comfort level or baseline comfort level  Outcome: Progressing     Problem: Skin/Tissue Integrity - Adult  Goal: Incisions, wounds, or drain sites healing without S/S of infection  Outcome: Progressing   Care plan reviewed with patient.  Patient verbalizes understanding of the plan of care and contributes to goal setting.   Patient educated on use of daily CHG bath to prevent surgical site infection, s/s of infection, use of incentive spirometer and early ambulation. Patient verbalized understanding. CHG bath complete this shift.    
  Problem: Discharge Planning  Goal: Discharge to home or other facility with appropriate resources  Outcome: Progressing  Flowsheets (Taken 2/6/2024 0121)  Discharge to home or other facility with appropriate resources:   Identify barriers to discharge with patient and caregiver   Arrange for needed discharge resources and transportation as appropriate   Identify discharge learning needs (meds, wound care, etc)   Refer to discharge planning if patient needs post-hospital services based on physician order or complex needs related to functional status, cognitive ability or social support system     Problem: Safety - Adult  Goal: Free from fall injury  Outcome: Progressing  Flowsheets (Taken 2/6/2024 0121)  Free From Fall Injury: Instruct family/caregiver on patient safety     Problem: ABCDS Injury Assessment  Goal: Absence of physical injury  Outcome: Progressing  Flowsheets (Taken 2/6/2024 0121)  Absence of Physical Injury: Implement safety measures based on patient assessment     Problem: Skin/Tissue Integrity  Goal: Absence of new skin breakdown  Description: 1.  Monitor for areas of redness and/or skin breakdown  2.  Assess vascular access sites hourly  3.  Every 4-6 hours minimum:  Change oxygen saturation probe site  4.  Every 4-6 hours:  If on nasal continuous positive airway pressure, respiratory therapy assess nares and determine need for appliance change or resting period.  Outcome: Progressing  Note: No new skinbreak down noted     Problem: Chronic Conditions and Co-morbidities  Goal: Patient's chronic conditions and co-morbidity symptoms are monitored and maintained or improved  Outcome: Progressing  Flowsheets (Taken 2/6/2024 0121)  Care Plan - Patient's Chronic Conditions and Co-Morbidity Symptoms are Monitored and Maintained or Improved:   Monitor and assess patient's chronic conditions and comorbid symptoms for stability, deterioration, or improvement   Collaborate with multidisciplinary team to 
  Problem: Discharge Planning  Goal: Discharge to home or other facility with appropriate resources  Outcome: Progressing  Flowsheets (Taken 2/7/2024 2253)  Discharge to home or other facility with appropriate resources:   Identify barriers to discharge with patient and caregiver   Arrange for needed discharge resources and transportation as appropriate   Identify discharge learning needs (meds, wound care, etc)   Arrange for interpreters to assist at discharge as needed   Refer to discharge planning if patient needs post-hospital services based on physician order or complex needs related to functional status, cognitive ability or social support system     Problem: Safety - Adult  Goal: Free from fall injury  Outcome: Progressing  Flowsheets (Taken 2/7/2024 2253)  Free From Fall Injury:   Instruct family/caregiver on patient safety   Based on caregiver fall risk screen, instruct family/caregiver to ask for assistance with transferring infant if caregiver noted to have fall risk factors     Problem: ABCDS Injury Assessment  Goal: Absence of physical injury  Outcome: Progressing  Flowsheets (Taken 2/7/2024 2253)  Absence of Physical Injury: Implement safety measures based on patient assessment     Problem: Skin/Tissue Integrity  Goal: Absence of new skin breakdown  Description: 1.  Monitor for areas of redness and/or skin breakdown  2.  Assess vascular access sites hourly  3.  Every 4-6 hours minimum:  Change oxygen saturation probe site  4.  Every 4-6 hours:  If on nasal continuous positive airway pressure, respiratory therapy assess nares and determine need for appliance change or resting period.  Outcome: Progressing     Problem: Chronic Conditions and Co-morbidities  Goal: Patient's chronic conditions and co-morbidity symptoms are monitored and maintained or improved  Outcome: Progressing  Flowsheets (Taken 2/7/2024 2253)  Care Plan - Patient's Chronic Conditions and Co-Morbidity Symptoms are Monitored and 
Tari REIS RN  Outcome: Progressing  Flowsheets (Taken 2/7/2024 2253 by Khurram Lane RN)  Absence of Physical Injury: Implement safety measures based on patient assessment  2/10/2024 1406 by Elayne Maya RN  Outcome: Progressing  Flowsheets (Taken 2/7/2024 2253 by Khurram Lane RN)  Absence of Physical Injury: Implement safety measures based on patient assessment     Problem: Skin/Tissue Integrity  Goal: Absence of new skin breakdown  Description: 1.  Monitor for areas of redness and/or skin breakdown  2.  Assess vascular access sites hourly  3.  Every 4-6 hours minimum:  Change oxygen saturation probe site  4.  Every 4-6 hours:  If on nasal continuous positive airway pressure, respiratory therapy assess nares and determine need for appliance change or resting period.  2/10/2024 2141 by Tari Ovalles RN  Outcome: Progressing  2/10/2024 1406 by Elayne Maya RN  Outcome: Progressing  Note: No new skin break down noted at this time. Encouraged patient to reposition self in bed.      Problem: Chronic Conditions and Co-morbidities  Goal: Patient's chronic conditions and co-morbidity symptoms are monitored and maintained or improved  2/10/2024 2141 by Tari Ovalles RN  Outcome: Progressing  Flowsheets (Taken 2/9/2024 2045 by Mariya Putnam RN)  Care Plan - Patient's Chronic Conditions and Co-Morbidity Symptoms are Monitored and Maintained or Improved: Monitor and assess patient's chronic conditions and comorbid symptoms for stability, deterioration, or improvement  2/10/2024 1406 by Elayne Maya RN  Outcome: Progressing  Flowsheets (Taken 2/9/2024 2045 by Mariya Putnam RN)  Care Plan - Patient's Chronic Conditions and Co-Morbidity Symptoms are Monitored and Maintained or Improved: Monitor and assess patient's chronic conditions and comorbid symptoms for stability, deterioration, or improvement  Note: History of Diabetes. Chem AC/HS with Insulin coverage per sliding and standing scale.    
patient's chronic conditions and comorbid symptoms for stability, deterioration, or improvement  2/9/2024 1951 by Emmanuel Bertrand RN  Outcome: Progressing     Problem: Pain  Goal: Verbalizes/displays adequate comfort level or baseline comfort level  2/10/2024 0225 by Mariya Putnam RN  Outcome: Progressing  2/10/2024 0220 by Mariya Putnam RN  Outcome: Progressing  2/9/2024 1951 by Emmanuel Bertrand RN  Outcome: Progressing     Problem: Skin/Tissue Integrity - Adult  Goal: Incisions, wounds, or drain sites healing without S/S of infection  2/10/2024 0225 by Mariya Putnam RN  Outcome: Progressing  2/10/2024 0220 by Mariya Putnam RN  Outcome: Progressing  2/9/2024 1951 by Emmanuel Bertrand RN  Outcome: Progressing

## 2024-02-13 NOTE — CARE COORDINATION
2/13/24, 2:15 PM EST    DISCHARGE ON GOING EVALUATION    Debbie BHATT [facility] day: 7  Location: -15/015-A Reason for admit: ROXANA (acute kidney injury) (HCC) [N17.9]   Procedure: 2/9 Excision of Chronic Abscess on Back  8 x 5 1.5 cm    Barriers to Discharge: creat increased to 4.3. Sinus tachy, 118. Bumex gtt on hold per nephrology. Dressing changes with packing.   PCP: Kory Thomas MD  Readmission Risk Score: 17.6%  Patient Goals/Plan/Treatment Preferences: Home with .

## 2024-02-14 VITALS
RESPIRATION RATE: 16 BRPM | WEIGHT: 248.24 LBS | TEMPERATURE: 97.3 F | DIASTOLIC BLOOD PRESSURE: 66 MMHG | BODY MASS INDEX: 42.38 KG/M2 | OXYGEN SATURATION: 94 % | SYSTOLIC BLOOD PRESSURE: 136 MMHG | HEIGHT: 64 IN | HEART RATE: 78 BPM

## 2024-02-14 LAB
ANION GAP SERPL CALC-SCNC: 18 MEQ/L (ref 8–16)
BUN SERPL-MCNC: 79 MG/DL (ref 7–22)
CALCIUM SERPL-MCNC: 8.6 MG/DL (ref 8.5–10.5)
CHLORIDE SERPL-SCNC: 97 MEQ/L (ref 98–111)
CO2 SERPL-SCNC: 24 MEQ/L (ref 23–33)
CREAT SERPL-MCNC: 4.2 MG/DL (ref 0.4–1.2)
EKG ATRIAL RATE: 112 BPM
EKG P AXIS: 38 DEGREES
EKG P-R INTERVAL: 158 MS
EKG Q-T INTERVAL: 368 MS
EKG QRS DURATION: 96 MS
EKG QTC CALCULATION (BAZETT): 502 MS
EKG R AXIS: 50 DEGREES
EKG T AXIS: 61 DEGREES
EKG VENTRICULAR RATE: 112 BPM
GFR SERPL CREATININE-BSD FRML MDRD: 12 ML/MIN/1.73M2
GLUCOSE BLD STRIP.AUTO-MCNC: 107 MG/DL (ref 70–108)
GLUCOSE BLD STRIP.AUTO-MCNC: 151 MG/DL (ref 70–108)
GLUCOSE BLD STRIP.AUTO-MCNC: 162 MG/DL (ref 70–108)
GLUCOSE SERPL-MCNC: 141 MG/DL (ref 70–108)
POTASSIUM SERPL-SCNC: 3.5 MEQ/L (ref 3.5–5.2)
SODIUM SERPL-SCNC: 139 MEQ/L (ref 135–145)

## 2024-02-14 PROCEDURE — 80048 BASIC METABOLIC PNL TOTAL CA: CPT

## 2024-02-14 PROCEDURE — 36415 COLL VENOUS BLD VENIPUNCTURE: CPT

## 2024-02-14 PROCEDURE — 6370000000 HC RX 637 (ALT 250 FOR IP): Performed by: STUDENT IN AN ORGANIZED HEALTH CARE EDUCATION/TRAINING PROGRAM

## 2024-02-14 PROCEDURE — 97166 OT EVAL MOD COMPLEX 45 MIN: CPT

## 2024-02-14 PROCEDURE — 2580000003 HC RX 258: Performed by: SURGERY

## 2024-02-14 PROCEDURE — 6370000000 HC RX 637 (ALT 250 FOR IP): Performed by: SURGERY

## 2024-02-14 PROCEDURE — 97535 SELF CARE MNGMENT TRAINING: CPT

## 2024-02-14 PROCEDURE — 6370000000 HC RX 637 (ALT 250 FOR IP)

## 2024-02-14 PROCEDURE — 82948 REAGENT STRIP/BLOOD GLUCOSE: CPT

## 2024-02-14 PROCEDURE — 99232 SBSQ HOSP IP/OBS MODERATE 35: CPT | Performed by: INTERNAL MEDICINE

## 2024-02-14 RX ORDER — OXYCODONE HYDROCHLORIDE AND ACETAMINOPHEN 5; 325 MG/1; MG/1
1 TABLET ORAL EVERY 6 HOURS PRN
Qty: 20 TABLET | Refills: 0 | Status: SHIPPED | OUTPATIENT
Start: 2024-02-14 | End: 2024-02-19

## 2024-02-14 RX ORDER — IBUPROFEN 600 MG/1
1 TABLET ORAL PRN
Qty: 1 KIT | Refills: 0 | Status: SHIPPED | OUTPATIENT
Start: 2024-02-14

## 2024-02-14 RX ORDER — INSULIN LISPRO 100 [IU]/ML
16 INJECTION, SOLUTION INTRAVENOUS; SUBCUTANEOUS
Qty: 3 ADJUSTABLE DOSE PRE-FILLED PEN SYRINGE | Refills: 0 | Status: SHIPPED | OUTPATIENT
Start: 2024-02-14

## 2024-02-14 RX ORDER — GABAPENTIN 300 MG/1
300 CAPSULE ORAL 2 TIMES DAILY
Qty: 20 CAPSULE | Refills: 0 | Status: SHIPPED
Start: 2024-02-14 | End: 2024-02-24

## 2024-02-14 RX ORDER — BUMETANIDE 2 MG/1
2 TABLET ORAL 2 TIMES DAILY
Qty: 60 TABLET | Refills: 0 | Status: SHIPPED | OUTPATIENT
Start: 2024-02-16

## 2024-02-14 RX ORDER — ACETAMINOPHEN 325 MG/1
650 TABLET ORAL EVERY 6 HOURS PRN
Qty: 120 TABLET | Refills: 3 | COMMUNITY
Start: 2024-02-14

## 2024-02-14 RX ORDER — INSULIN GLARGINE 100 [IU]/ML
50 INJECTION, SOLUTION SUBCUTANEOUS NIGHTLY
Qty: 5 ADJUSTABLE DOSE PRE-FILLED PEN SYRINGE | Refills: 0 | Status: SHIPPED | OUTPATIENT
Start: 2024-02-14

## 2024-02-14 RX ORDER — METOLAZONE 10 MG/1
10 TABLET ORAL DAILY PRN
Qty: 30 TABLET | Refills: 0 | Status: SHIPPED | OUTPATIENT
Start: 2024-02-14

## 2024-02-14 RX ORDER — POTASSIUM CHLORIDE 750 MG/1
20 TABLET, EXTENDED RELEASE ORAL 2 TIMES DAILY
Qty: 360 TABLET | Refills: 1 | Status: SHIPPED
Start: 2024-02-16

## 2024-02-14 RX ORDER — DOXAZOSIN MESYLATE 4 MG/1
4 TABLET ORAL 2 TIMES DAILY
Qty: 60 TABLET | Refills: 0 | Status: SHIPPED | OUTPATIENT
Start: 2024-02-14

## 2024-02-14 RX ORDER — TACROLIMUS 1 MG/1
4 CAPSULE ORAL 2 TIMES DAILY
Qty: 240 CAPSULE | Refills: 1
Start: 2024-02-14

## 2024-02-14 RX ORDER — INSULIN LISPRO 100 [IU]/ML
INJECTION, SOLUTION INTRAVENOUS; SUBCUTANEOUS
Qty: 3 ADJUSTABLE DOSE PRE-FILLED PEN SYRINGE | Refills: 0 | Status: SHIPPED | OUTPATIENT
Start: 2024-02-14

## 2024-02-14 RX ORDER — CYCLOBENZAPRINE HCL 5 MG
5 TABLET ORAL 3 TIMES DAILY PRN
Qty: 90 TABLET | Refills: 0 | Status: SHIPPED | OUTPATIENT
Start: 2024-02-14 | End: 2024-03-15

## 2024-02-14 RX ADMIN — OXYCODONE HYDROCHLORIDE AND ACETAMINOPHEN 1 TABLET: 5; 325 TABLET ORAL at 08:13

## 2024-02-14 RX ADMIN — ASPIRIN 81 MG: 81 TABLET, COATED ORAL at 08:13

## 2024-02-14 RX ADMIN — METOPROLOL TARTRATE 12.5 MG: 25 TABLET, FILM COATED ORAL at 08:13

## 2024-02-14 RX ADMIN — ATORVASTATIN CALCIUM 40 MG: 40 TABLET, FILM COATED ORAL at 08:13

## 2024-02-14 RX ADMIN — INSULIN LISPRO 16 UNITS: 100 INJECTION, SOLUTION INTRAVENOUS; SUBCUTANEOUS at 08:13

## 2024-02-14 RX ADMIN — CYCLOBENZAPRINE 5 MG: 10 TABLET, FILM COATED ORAL at 08:13

## 2024-02-14 RX ADMIN — GABAPENTIN 300 MG: 300 CAPSULE ORAL at 08:13

## 2024-02-14 RX ADMIN — DOXAZOSIN 4 MG: 4 TABLET ORAL at 08:13

## 2024-02-14 RX ADMIN — SODIUM CHLORIDE, PRESERVATIVE FREE 10 ML: 5 INJECTION INTRAVENOUS at 08:14

## 2024-02-14 NOTE — PROGRESS NOTES
Progress note: Infectious diseases    Patient - Debbie Locke,  Age - 48 y.o.    - 1975      Room Number - 6K-15/015-A   MRN -  [id number]   [facility] # - [id number]  Date of Admission -  2024 12:14 AM    SUBJECTIVE:   Patient reports feeling well. Her dressing was changed by nurse earlier today with no significant abnormalities apart from mild serosanguineous drainage. She denies any fever, chills, back pain, chest pain, SOB, nausea, vomiting, headache, dizziness, or any other symptoms.  OBJECTIVE   VITALS    height is 1.626 m (5' 4\") and weight is 112.6 kg (248 lb 3.8 oz). Her oral temperature is 97.3 °F (36.3 °C). Her blood pressure is 136/66 and her pulse is 78. Her respiration is 16 and oxygen saturation is 94%.       Wt Readings from Last 3 Encounters:   24 112.6 kg (248 lb 3.8 oz)   23 95.3 kg (210 lb)   23 94.8 kg (209 lb)       I/O (24 Hours)    Intake/Output Summary (Last 24 hours) at 2024 0848  Last data filed at 2024 0333  Gross per 24 hour   Intake 680 ml   Output 2300 ml   Net -1620 ml       General Appearance  Awake, alert, oriented,  not  In acute distress  HEENT - normocephalic, atraumatic, pink conjunctiva,  anicteric sclera  Neck - Supple, no mass  Lungs -  Bilateral good air entry, no rhonchi, no wheeze  Cardiovascular - Heart sounds are normal.  Regular rate and rhythm without murmur, gallop or rub.  Abdomen - soft, not distended, nontender,   Neurologic - AAO x3. CN II-XII intact with no obvious FNDs noted.  Skin - No bruising or bleeding. R mid back/lumbar surgical incision area with dressing in place, no bleeding or significant drainage. Area not tender to palpation.  Extremities - 1-2+ pitting edema in bilateral LE's. No cyanosis, clubbing     MEDICATIONS:      epoetin fina-epbx  6,000 Units IntraVENous Once per day on     [Held by provider] bumetanide  1 
                                                   Hospitalist Progress Note      Patient:  Debbie Locke    Unit/Bed:6K-15/015-A  YOB: 1975  MRN: [id number]   Acct: [id number]   PCP: Kory Thomas MD  Date of Admission: 2/6/2024    Assessment/Plan:  Released female with uncontrolled diabetes related to noncompliance.  Admitted for skin abscess of the back s/p I&D 2/9/2024.  Noted to have anasarca with ROXANA on CKD, on diuretic drip.  Remains afebrile and hemodynamically stable.  ID, Gen Surg, Nephrology and Endocrinology teams following.  Anticipate discharge in ~1-2 days after transition to p.o diuretics.       ROXANA on CKD stage IV/ Anasarca/ Nephrotic syndrome: Hx FSGS.  Was previously on tacrolimus-discontinued due to worsening renal function.  Improving.  Net negative fluid balance ~28 L.  On Bumex drip. Will follow-up with nephrology for further plan to transition from Bumex drip.   Volume overload more related to renal dysfunction.  TTE 2/1/2024 at [facility] with EF 57%, moderate LV concentric hypertrophy, trace MR and TR.   Of note, home tacrolimus is held.     Uncontrolled IDDMII with associated complications of nephrotic syndrome/ peripheral neuropathy/intermittent visual disturbance: A1c 14.4 on 1/31/2024. Dx 01/2000. On Lantus 20 nightly, liraglutide 1.8 mg daily, and metformin 500 mg twice daily at home.  Currently on Lantus 50 units subcut nightly, Humalog 16 units subcutaneous 3 times daily with meals, and high-dose corrective SSI.  Endocrinology team following.  Monitor BG with inpatient goal 140-180.  Per Endo recs will discontinue metformin on discharge.  Will need CGMS.  Follow-up outpatient.      Primary hypertension: Fluctuating, mostly controlled.  On Cardura 4 mg twice daily, Diovan 320 mg daily, Bumex 1 mg twice daily.  Home Diovan and Bumex held at this time due to AK. Asymptomatic. Monitor.     Sinus tachycardia: EKG confirmed. Unclear etiology.  Associated 
                                                   Hospitalist Progress Note      Patient:  Debbie Locke    Unit/Bed:6K-15/015-A  YOB: 1975  MRN: [id number]   Acct: [id number]   PCP: Kory Thomas MD  Date of Admission: 2/6/2024    Assessment/Plan:  Released female with uncontrolled diabetes related to noncompliance.  Admitted for skin abscess of the back s/p I&D 2/9/2024.  Noted to have anasarca with ROXANA on CKD, on diuretic drip.  Remains afebrile and hemodynamically stable.  ID, Gen Surg, Nephrology and Endocrinology teams following.  Anticipate discharge in ~2 to 3 days after transition to p.o diuretics.       ROXANA on CKD stage IV/ Anasarca/ Nephrotic syndrome: Hx FSGS.  Was previously on tacrolimus-discontinued due to worsening renal function.  Improving.  Net negative fluid balance ~22 L.  On Bumex drip.  Additional dose of metolazone today per Dr. Neville.  Will follow-up with nephrology for further plan to transition from Bumex drip.   Volume overload more related to renal dysfunction.  TTE 2/1/2024 at [facility] with EF 57%, moderate LV concentric hypertrophy, trace MR and TR.     Uncontrolled IDDMII with associated complications of nephrotic syndrome/ peripheral neuropathy/intermittent visual disturbance: A1c 14.4 on 1/31/2024. Dx 01/2000. On Lantus 20 nightly, liraglutide 1.8 mg daily, and metformin 500 mg twice daily at home.  Currently on Lantus 15 units subcut nightly, Humalog 16 units subcutaneous 3 times daily with meals, and high-dose corrective SSI.  Endocrinology team following.  Monitor BG with inpatient goal 140-180.  Per Endo recs will discontinue metformin on discharge.  Will need CGMS.  Follow-up outpatient.      Primary hypertension: Fluctuating, mostly controlled.  On Cardura 4 mg twice daily, Diovan 320 mg daily, Bumex 1 mg twice daily.  Home Diovan and Bumex held at this time due to ROXANA and anasarca with IV diuretic therapy.  Asymptomatic. Monitor.     Sinus 
              Hospitalist Progress Note    Patient:  Debbie Locke      Unit/Bed:6K-15/015-A    YOB: 1975    MRN: [id number]       Acct: [id number]     PCP: Kory Thomas MD    Date of Admission: 2/6/2024    Assessment/Plan:    ROXANA on CKD stage IV with associated Anasarca from Nephrotic Syndrome  -Nephrology  following  -Renal diet  -Started on Bumex gtt by nephrology, continue at 1 mg/h.  Metolazone 10 mg  -Renal US per Nephrology  -Avoid Nephrotoxic agents as able  -Tramadol ordered for reported generalized pain              -Tacrolimus d/c'd 2/2 worsening renal function  -ASA on hold in case dailysis catheter needs placed-will reconsider this on Monday     Type II diabetes with neuropathy and hyperglycemia  Endocrinology consulted, managing insulin regimen, appreciate assistance  2/10 patient with abrupt increase in blood sugars overnight up to 509 at 2 AM.  Working on improving with frequent POCT checks and additional as needed insulin doses  Currently on 40 units Lantus nightly, high-dose SSI 4 times daily before meals and nightly and at 2 AM  Accu-cheks AC and HS and at 2 AM  Carb controlled diet  Hypoglycemia protocol  Continue Gabapentin     MRSA skin infection:              -Infectious disease consulted                -Status post lumbar I&D on 2/9 with Dr. Mercado              -Continues on Zyvox              -Wound Ostomy following     Elevated troponin:              -Likely 2/2 ROXANA on CKD              -Low suspicion for ACS     Primary hypertension:              -Continue home Cardura, dose increased by Nephrology, avoid ACEi/ARB's              -PRN hydralazine and Lopressor ordered 2/6/24.  Continue with Norvasc-BPs improved       Hx of DVT:  -Hold Xarleto for possible surgery and need for HD  -SCD's    Expected discharge date: Possibly middle next week    Disposition:    [x] Home       [] TCU       [] Rehab       [] Psych       [] SNF       [] Long Term Care Facility       [] 
          [facility]ious [facility]         Progress Note      Debbie Locke  MEDICAL RECORD NUMBER:  [id number]  AGE: 48 y.o.   GENDER: female  : 1975  EPISODE DATE:  2024    Subjective:     Reports no new complaints. Remains afebrile w/ down-trending white count. Evaluated by GenSurg; plan is to continue monitoring for now and may take for incision and drainage versus excision of the area on 2024 if there is no improvement. Denies nausea, vomiting, fever, chills, SOB, chest pain.         PAST MEDICAL HISTORY        Diagnosis Date    Chronic kidney disease     Hyperlipidemia     Neuropathy     Type II or unspecified type diabetes mellitus without mention of complication, not stated as uncontrolled 2000       PAST SURGICAL HISTORY    Past Surgical History:   Procedure Laterality Date     SECTION  2002    CHOLECYSTECTOMY      CT BIOPSY RENAL  2023    CT BIOPSY RENAL 2023 [facility] CT SCAN       FAMILY HISTORY    Family History   Problem Relation Age of Onset    Diabetes Mother     High Blood Pressure Mother        SOCIAL HISTORY    Social History     Tobacco Use    Smoking status: Every Day     Current packs/day: 1.00     Average packs/day: 1 pack/day for 18.0 years (18.0 ttl pk-yrs)     Types: Cigarettes    Smokeless tobacco: Never   Substance Use Topics    Alcohol use: Yes     Comment: Socially/Seldom    Drug use: No       ALLERGIES    Allergies   Allergen Reactions    Insulins Other (See Comments)     Pt stated that she cant take insulin it makes her feet curl inward   Put as alg. now    Lisinopril      Other reaction(s): Cough       MEDICATIONS    No current facility-administered medications on file prior to encounter.     Current Outpatient Medications on File Prior to Encounter   Medication Sig Dispense Refill    tacrolimus (PROGRAF) 1 MG capsule TAKE 4 CAPSULES BY MOUTH 2 TIMES DAILY. 240 capsule 1    doxazosin (CARDURA) 4 MG tablet TAKE 1 TABLET BY MOUTH EVERY 
          [facility] Infectious Disease         Progress Note      Debbie Locke  MEDICAL RECORD NUMBER:  [id number]  AGE: 48 y.o.   GENDER: female  : 1975  EPISODE DATE:  2024    Subjective:   No new issues      Diagnosis Date    Chronic kidney disease     Hyperlipidemia     Neuropathy     Type II or unspecified type diabetes mellitus without mention of complication, not stated as uncontrolled 2000       PAST SURGICAL HISTORY    Past Surgical History:   Procedure Laterality Date    BACK SURGERY N/A 2024    Excision of Chronic Abscess on Back performed by Kyree Mercado MD at [facility] OR     SECTION  2002    CHOLECYSTECTOMY      CT BIOPSY RENAL  2023    CT BIOPSY RENAL 2023 [facility] CT SCAN       FAMILY HISTORY    Family History   Problem Relation Age of Onset    Diabetes Mother     High Blood Pressure Mother        SOCIAL HISTORY    Social History     Tobacco Use    Smoking status: Every Day     Current packs/day: 1.00     Average packs/day: 1 pack/day for 18.0 years (18.0 ttl pk-yrs)     Types: Cigarettes    Smokeless tobacco: Never   Substance Use Topics    Alcohol use: Yes     Comment: Socially/Seldom    Drug use: No       ALLERGIES    Allergies   Allergen Reactions    Insulins Other (See Comments)     Pt stated that she cant take insulin it makes her feet curl inward   Put as alg. now    Lisinopril      Other reaction(s): Cough       MEDICATIONS    No current facility-administered medications on file prior to encounter.     Current Outpatient Medications on File Prior to Encounter   Medication Sig Dispense Refill    tacrolimus (PROGRAF) 1 MG capsule TAKE 4 CAPSULES BY MOUTH 2 TIMES DAILY. 240 capsule 1    doxazosin (CARDURA) 4 MG tablet TAKE 1 TABLET BY MOUTH EVERY DAY AT NIGHT 30 tablet 3    valsartan (DIOVAN) 320 MG tablet TAKE 1 TABLET BY MOUTH EVERY DAY 90 tablet 1    bumetanide (BUMEX) 2 MG tablet TAKE 1/2 TABLET TWICE A DAY BY MOUTH 30 tablet 3    LANTUS 
          [facility] Infectious Disease         Progress Note      Debbie Locke  MEDICAL RECORD NUMBER:  [id number]  AGE: 48 y.o.   GENDER: female  : 1975  EPISODE DATE:  2024    Subjective:   No new complaints      Diagnosis Date    Chronic kidney disease     Hyperlipidemia     Neuropathy     Type II or unspecified type diabetes mellitus without mention of complication, not stated as uncontrolled 2000       PAST SURGICAL HISTORY    Past Surgical History:   Procedure Laterality Date    BACK SURGERY N/A 2024    Excision of Chronic Abscess on Back performed by Kyree Mercado MD at [facility] OR     SECTION  2002    CHOLECYSTECTOMY      CT BIOPSY RENAL  2023    CT BIOPSY RENAL 2023 [facility] CT SCAN       FAMILY HISTORY    Family History   Problem Relation Age of Onset    Diabetes Mother     High Blood Pressure Mother        SOCIAL HISTORY    Social History     Tobacco Use    Smoking status: Every Day     Current packs/day: 1.00     Average packs/day: 1 pack/day for 18.0 years (18.0 ttl pk-yrs)     Types: Cigarettes    Smokeless tobacco: Never   Substance Use Topics    Alcohol use: Yes     Comment: Socially/Seldom    Drug use: No       ALLERGIES    Allergies   Allergen Reactions    Insulins Other (See Comments)     Pt stated that she cant take insulin it makes her feet curl inward   Put as alg. now    Lisinopril      Other reaction(s): Cough       MEDICATIONS    No current facility-administered medications on file prior to encounter.     Current Outpatient Medications on File Prior to Encounter   Medication Sig Dispense Refill    tacrolimus (PROGRAF) 1 MG capsule TAKE 4 CAPSULES BY MOUTH 2 TIMES DAILY. 240 capsule 1    doxazosin (CARDURA) 4 MG tablet TAKE 1 TABLET BY MOUTH EVERY DAY AT NIGHT 30 tablet 3    valsartan (DIOVAN) 320 MG tablet TAKE 1 TABLET BY MOUTH EVERY DAY 90 tablet 1    bumetanide (BUMEX) 2 MG tablet TAKE 1/2 TABLET TWICE A DAY BY MOUTH 30 tablet 3    LANTUS 
          [facility] Infectious Disease         Progress Note      Debbie Locke  MEDICAL RECORD NUMBER:  [id number]  AGE: 48 y.o.   GENDER: female  : 1975  EPISODE DATE:  2024    Subjective:     Reports no new complaints. Remains afebrile w/ down-trending white count. Evaluated by GenSurg;  she is scheduled for surgery today for I and D  PAST MEDICAL HISTORY        Diagnosis Date    Chronic kidney disease     Hyperlipidemia     Neuropathy     Type II or unspecified type diabetes mellitus without mention of complication, not stated as uncontrolled 2000       PAST SURGICAL HISTORY    Past Surgical History:   Procedure Laterality Date     SECTION  2002    CHOLECYSTECTOMY      CT BIOPSY RENAL  2023    CT BIOPSY RENAL 2023 [facility] CT SCAN       FAMILY HISTORY    Family History   Problem Relation Age of Onset    Diabetes Mother     High Blood Pressure Mother        SOCIAL HISTORY    Social History     Tobacco Use    Smoking status: Every Day     Current packs/day: 1.00     Average packs/day: 1 pack/day for 18.0 years (18.0 ttl pk-yrs)     Types: Cigarettes    Smokeless tobacco: Never   Substance Use Topics    Alcohol use: Yes     Comment: Socially/Seldom    Drug use: No       ALLERGIES    Allergies   Allergen Reactions    Insulins Other (See Comments)     Pt stated that she cant take insulin it makes her feet curl inward   Put as alg. now    Lisinopril      Other reaction(s): Cough       MEDICATIONS    No current facility-administered medications on file prior to encounter.     Current Outpatient Medications on File Prior to Encounter   Medication Sig Dispense Refill    tacrolimus (PROGRAF) 1 MG capsule TAKE 4 CAPSULES BY MOUTH 2 TIMES DAILY. 240 capsule 1    doxazosin (CARDURA) 4 MG tablet TAKE 1 TABLET BY MOUTH EVERY DAY AT NIGHT 30 tablet 3    valsartan (DIOVAN) 320 MG tablet TAKE 1 TABLET BY MOUTH EVERY DAY 90 tablet 1    bumetanide (BUMEX) 2 MG tablet TAKE 1/2 TABLET TWICE A 
          [facility]ious [facility]         Progress Note      Debbie Locke  MEDICAL RECORD NUMBER:  [id number]  AGE: 48 y.o.   GENDER: female  : 1975  EPISODE DATE:  2024    Subjective:     Patient doing better today but still tired. Reports no new complaints. Remains afebrile w/ down-trending white count. Evaluated by GenSurg; plan is to continue monitoring for now and may take for incision and drainage 2024 if there is no improvement.        PAST MEDICAL HISTORY        Diagnosis Date    Chronic kidney disease     Hyperlipidemia     Neuropathy     Type II or unspecified type diabetes mellitus without mention of complication, not stated as uncontrolled 2000       PAST SURGICAL HISTORY    Past Surgical History:   Procedure Laterality Date     SECTION  2002    CHOLECYSTECTOMY      CT BIOPSY RENAL  2023    CT BIOPSY RENAL 2023 [facility] CT SCAN       FAMILY HISTORY    Family History   Problem Relation Age of Onset    Diabetes Mother     High Blood Pressure Mother        SOCIAL HISTORY    Social History     Tobacco Use    Smoking status: Every Day     Current packs/day: 1.00     Average packs/day: 1 pack/day for 18.0 years (18.0 ttl pk-yrs)     Types: Cigarettes    Smokeless tobacco: Never   Substance Use Topics    Alcohol use: Yes     Comment: Socially/Seldom    Drug use: No       ALLERGIES    Allergies   Allergen Reactions    Insulins Other (See Comments)     Pt stated that she cant take insulin it makes her feet curl inward   Put as alg. now    Lisinopril      Other reaction(s): Cough       MEDICATIONS    No current facility-administered medications on file prior to encounter.     Current Outpatient Medications on File Prior to Encounter   Medication Sig Dispense Refill    tacrolimus (PROGRAF) 1 MG capsule TAKE 4 CAPSULES BY MOUTH 2 TIMES DAILY. 240 capsule 1    doxazosin (CARDURA) 4 MG tablet TAKE 1 TABLET BY MOUTH EVERY DAY AT NIGHT 30 tablet 3    valsartan (DIOVAN) 
          [facility]         Progress Note      Debbie Locke  MEDICAL RECORD NUMBER:  [id number]  AGE: 48 y.o.   GENDER: female  : 1975  EPISODE DATE:  2024    Subjective:   She had I and D. Operative finding noted. She has no pain      Diagnosis Date    Chronic kidney disease     Hyperlipidemia     Neuropathy     Type II or unspecified type diabetes mellitus without mention of complication, not stated as uncontrolled 2000       PAST SURGICAL HISTORY    Past Surgical History:   Procedure Laterality Date     SECTION  2002    CHOLECYSTECTOMY      CT BIOPSY RENAL  2023    CT BIOPSY RENAL 2023 [facility] CT SCAN       FAMILY HISTORY    Family History   Problem Relation Age of Onset    Diabetes Mother     High Blood Pressure Mother        SOCIAL HISTORY    Social History     Tobacco Use    Smoking status: Every Day     Current packs/day: 1.00     Average packs/day: 1 pack/day for 18.0 years (18.0 ttl pk-yrs)     Types: Cigarettes    Smokeless tobacco: Never   Substance Use Topics    Alcohol use: Yes     Comment: Socially/Seldom    Drug use: No       ALLERGIES    Allergies   Allergen Reactions    Insulins Other (See Comments)     Pt stated that she cant take insulin it makes her feet curl inward   Put as alg. now    Lisinopril      Other reaction(s): Cough       MEDICATIONS    No current facility-administered medications on file prior to encounter.     Current Outpatient Medications on File Prior to Encounter   Medication Sig Dispense Refill    tacrolimus (PROGRAF) 1 MG capsule TAKE 4 CAPSULES BY MOUTH 2 TIMES DAILY. 240 capsule 1    doxazosin (CARDURA) 4 MG tablet TAKE 1 TABLET BY MOUTH EVERY DAY AT NIGHT 30 tablet 3    valsartan (DIOVAN) 320 MG tablet TAKE 1 TABLET BY MOUTH EVERY DAY 90 tablet 1    bumetanide (BUMEX) 2 MG tablet TAKE 1/2 TABLET TWICE A DAY BY MOUTH 30 tablet 3    LANTUS SOLOSTAR 100 UNIT/ML injection pen INJECT 20 (TWENTY) UNITS UNDER THE SKIN 
      Pharmacy Renal Adjustment    Pharmacy renally adjusted the following medication(s) per P&T approved policy: Gabapentin    Recent Labs     02/06/24  0146   BUN 56*   CREATININE 3.4*     Estimated Creatinine Clearance: 26 mL/min (A) (based on SCr of 3.4 mg/dL (HH)).    Assessment:  Unavailable at this time.    Plan:   Decrease Gabapentin from 300 mg TID to 300 mg BID orally.    Please call pharmacy with any questions.    Theresa Espinoza RPh 2/6/2024 4:02 AM     
    Hospitalist Progress Note    Patient:  Debbie Locke      Unit/Bed:6-15/015-A    YOB: 1975    MRN: [id number]       Acct: [id number]     PCP: Kory Thomas MD    Date of Admission: 2/6/2024    Assessment/Plan:    ROXANA on CKD stage IV:  -Nephrology consulted, following  -Renal diet  -Started on Bumex gtt by nephrology, dose increased 2/7/24  -Renal US per Nephrology  -Avoid Nephrotoxic agents as able  -Tramadol ordered for reported generalized pain     Type II diabetes with neuropathy:  Continue basal insulin and sliding scale insulin  Accu-cheks AC and HS  Carb controlled diet  Hypoglycemia protocol  Continue Gabapentin     MRSA skin infection:   -Infectious disease consulted   -Started on Zyvox by admitting provider, continue   -Wound Ostomy following   -Per outside chart review, patient MRSA screen negative, will defer abx regimen to ID    Elevated troponin:   -Likely 2/2 ROXANA on CKD   -Low suspicion for ACS     Primary hypertension:   -Continue home meds, avoid ACEi/ARB's     Hx of DVT:  -Continue Xarleto    Chief Complaint: ROXANA on CKD       Subjective: 48 y.o. female admitted to the hospitalist service for ROXANA on CKD. Patient denies chest pain. She denies nausea or vomiting. Dose of Bumex increased. Patient endorses peeing okay. She denies dysuria.    Medications:    Infusion Medications    sodium chloride      dextrose      bumetanide (BUMEX) 12.5 mg in sodium chloride 0.9 % 125 mL infusion 0.5 mg/hr (02/06/24 1234)     Scheduled Medications    metOLazone  5 mg Oral Once    sodium chloride flush  10 mL IntraVENous 2 times per day    aspirin  81 mg Oral Daily    atorvastatin  40 mg Oral Daily    doxazosin  4 mg Oral Nightly    gabapentin  300 mg Oral BID    insulin glargine  20 Units SubCUTAneous Nightly    rivaroxaban  20 mg Oral Dinner    [Held by provider] tacrolimus  4 mg Oral BID    insulin lispro  0-8 Units SubCUTAneous TID     insulin lispro  0-4 Units SubCUTAneous Nightly    
    Hospitalist Progress Note    Patient:  Debbie Locke      Unit/Bed:6K-15/015-A    YOB: 1975    MRN: [id number]       Acct: [id number]     PCP: Kory Thomas MD    Date of Admission: 2/6/2024    Assessment/Plan:    ROXANA on CKD stage IV with associated Anasarca from Nephrotic Syndrome  -Nephrology consulted, following  -Renal diet  -Started on Bumex gtt by nephrology, dose increased 2/7/24  -Renal US per Nephrology  -Avoid Nephrotoxic agents as able  -Tramadol ordered for reported generalized pain   -Tacrolimus d/c'd 2/2 worsening renal function  -ASA on hold in case dailysis catheter needs placed     Type II diabetes with neuropathy:  Endocrinology consulted, managing insulin regimen, appreciate assistance  Accu-cheks AC and HS  Carb controlled diet  Hypoglycemia protocol  Continue Gabapentin     MRSA skin infection:   -Infectious disease consulted    -General surgery consulted, patient to go to OR today for excision   -Hold anti-platelets/anti-coagulants for now   -Continues on Zyvox   -Wound Ostomy following    Elevated troponin:   -Likely 2/2 ROXANA on CKD   -Low suspicion for ACS     Primary hypertension:   -Continue home Cardura, dose increased by Nephrology, avoid ACEi/ARB's   -PRN hydralazine and Lopressor ordered 2/6/24   -BP remains somewhat elevated, ~150's on latest BP checks, will add 5 mg Norvasc and monitor for improvement     Hx of DVT:  -Hold Xarleto for possible surgery  -SCD's    Chief Complaint: ROXANA on CKD       Subjective: 48 y.o. female admitted to the hospitalist service for ROXANA on CKD. Patient to go to the OR today with Dr. Mercado. She denies nausea or vomiting. She denies SOB. She denies chest pain.    Medications:    Infusion Medications    sodium chloride      dextrose      bumetanide (BUMEX) 12.5 mg in sodium chloride 0.9 % 125 mL infusion 1 mg/hr (02/09/24 0105)     Scheduled Medications    insulin lispro  15 Units SubCUTAneous TID WC    insulin glargine  40 
  Type and Reason for Visit:    Initial, RD Nutrition Re-Screen/LOS     Nutrition Recommendations/Plan:   Consider MVI.    Continue current diet.   Encouraged diet compliance carb controlled, cardiac, renal.      Nutrition Assessment:      Pt. at low nutrition risk per RD evaluation.  Patient seen earlier this morning prior to discharge, reports appetite has been good prior to admit and now.  Reports trying to adhere to diabetic diet, reports recently had heart attack, follows with Dr. Neville for CKD so tries to adhere to diet recommendations.  She denied any diet questions.  2/14/24: BUN 79, Creatinine 4.2, Glucose 141, Chemstick 162-151.  BM 2/12/24      Malnutrition Assessment:  No Malnutrition      Wounds:     (right medial wound on back, incision left lower back open dehisced)     Current Nutrition Therapies:    ADULT DIET; Regular; 3 carb choices (45 gm/meal); No Added Salt (3-4 gm); Low Potassium (Less than 3000 mg/day); Low Phosphorus (Less than 1000 mg); 60 to 80 gm; 1500 ml     Anthropometric Measures:  Height:    162.6 cm (5' 4\")  Current Body Weight:   112.6 kg (248 lb 3.8 oz) (2/11/24 +1 generalized edema)  Admission Body Weight:    122.4 kg (269 lb 12.8 oz) (2/6/24 +1 generalized edema)  Usual Body Weight:      (fluctuates with edema, per office visits in EMR: 3/6/23: 220#, 1/31/24: 242#6.4oz)  Ideal Body Weight:     120 lbs   BMI:   42.6  BMI Categories:    Obese Class 3 (BMI 40.0 or greater)     Nutrition Diagnosis:   Increased nutrient needs  related to  increase demand for energy/nutrients  as evidenced by   wounds      Nutrition Interventions:   Food and/or Nutrient Delivery:    Continue Current Diet  Nutrition Education/Counseling:    Education not indicated (Encouraged diet compliance with carb controlled, cardiac, renal diet recommendations.)  Coordination of Nutrition Care:   Continue to monitor while inpatient     Nutrition Monitoring and Evaluation:   Will monitor nutritional needs during 
 Transfer from [facility]   has a back wound that has been responding to Cleocin IV, sees Dr Howard co abd bloating 20# wt gain, periph edema... CR 2 and went up to 3.43 GFR 16 Hx HF DM DVT HTN HLD periph neuropathy.. Hgb 7.9 asymptomatic, NA was 130 now 134 blood sugars running 300-400 albumin 1.4 on Xarelto chest xray nothing acute, EKG NSR 98.1 71 16 142/54 96%   Dr Moore adm     
1751 Arouses to name on arrival to PACU with O2 3L NC , pt placed on Lt side   1755 awake and oriented c/o @ 10 burning pain to mid back , medicated with Dilaudid 0.5 mg IV   1800 pain unchanged , medicated with Dilaudid 0.5 mg IV   1805 resting resp easy   1810 attempted to call report   1825 report called ,pt resting resp easy   1836 pt awakens easily to name  , states pain a # 10 but drifts back to sleep during conversation   1838 meets criteria for discharge , [facility]      
[facility]    Patient:  Debbie Locke  YOB: 1975    MRN: [id number]       Chief Complaint:  \"worsening renal function\"    Date of Service: Pt seen/examined in consultation on diabetes evaluation and management    History Of Present Illness:      48 y.o. female who we are asked to see/evaluate by Andrew Gagnon MD for medical management of the aforementioned reason. Pt was initially admitted 2024 due to worsening renal function. She also had issues with an infection on her back. She was found to have significant swelling over her bilateral lower extremities. She has also been complaining of full body swelling. She denies fevers, chills, or diaphoresis but endorses worsening fatigue. 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 She does endorse that she has not been keeping good control of her diabetes at home. She has previously tried dapagliflozin which was last filled 23 for a 30 day prescription. She was previously on trulicity 3 mg weekly but this was discontinued ~22. She was on liraglutide, 20 units of insulin, metformin prior to admission. She also endorses recent episode of shingles.     Subjective: No acute events overnight. Bgs are improving this AM. Pt has mild sinus tachycardia. Unclear whether it is due to pain or an alternative etiology. No fevers, chills, nausea, or vomiting this AM. Still diuresing nicely on bumex gtt    Past Medical History:   Diagnosis Date    Chronic kidney disease     Hyperlipidemia     Neuropathy     Type II or unspecified type diabetes mellitus without mention of complication, not stated as uncontrolled 2000       Past Surgical History:   Procedure Laterality Date    BACK SURGERY N/A 2024    Excision of Chronic Abscess on Back performed by Kyree Mercado MD at [facility] OR     SECTION  2002    CHOLECYSTECTOMY      CT BIOPSY RENAL  2023    CT BIOPSY RENAL 2023 [facility] CT 
Discharge teaching and instructions for diagnosis/procedure of ROXANA completed with patient using teachback method. AVS reviewed. Patient voiced understanding regarding prescriptions, follow up appointments, and care of self at home. Discharged ambulatory to  home with support per family   
Dr. Kidd called to discuss patients blood sugar of 361. Went over the coverage that was given to the patient and that patient and RN discussed her food options. Patient had family bring in franklin willis for dinner. RN expressed how food choices are important for diabetics especially while healing. Dr. Kidd gave instructions to recheck blood sugar at 2200 and call him personally back with results. At 2200 blood sugar 310. Called Dr. Kidd. Since patients blood sugar is decreasing he will provide a few new orders and monitor over night to see how blood sugar responds.  Tari Ovalles, NASH   
Dressing to right flank changed per order. 4x4 gauze x 4 soaked in Dakins solution and covered with Optifoam 7x7 sacral drsg. Optifoam drsg used at Abd pad and medipore tape was not holding previous drsg in place. Pt reported a lot of movement with drsg and pain resulting from the drsg not being stable.  Pt tolerated well but reported significant discomfort upon completion of drsg change.  
[facility]   PROGRESS NOTE      Patient: Debbie Locke  Room #: 6K-15/015-A            YOB: 1975  Age: 48 y.o.  Gender: female            Admit Date & Time: 2/6/2024 12:14 AM    Assessment:    The patient declined a visit at this time due to wishing to sleep.    Interventions:  The patient was provided information about Spiritual Care being available.     Outcomes:  The  politely wished the patient a positive day.     Plan:  1.Spiritual care will continue to follow the patient according to [facility] spiritual care SOP.       Electronically signed by Chaplain Rai, on 2/12/2024 at 6:44 PM.  Spiritual Care Department  [facility]  [phone]     02/12/24 1844   Encounter Summary   Encounter Overview/Reason  Initial Encounter   Service Provided For: Patient   Referral/Consult From: [facility] [place]   Support System Unknown   Last Encounter  02/12/24   Complexity of Encounter Low   Begin Time 1815   End Time  1820   Total Time Calculated 5 min   Spiritual/Emotional needs   Type Spiritual Support   Assessment/Intervention/Outcome   Assessment Unable to assess   Intervention Empowerment   Outcome Refused/Declined       
[facility]   Nephrology progress note  2/10/2024, 11:37 AM      Pt Name:    Debbie Locke  MRN:     [id number]     YOB: 1975  Admit Date:    2/6/2024 12:14 AM    Chief Complaint: Nephrology following for ROXANA/CKD, volume overload.    Subjective:  Patient was seen and examined this morning.  She is diuresing well. Weights coming down. Has less flank edema but still very swollen. Sugars are high.    Objective:  24HR INTAKE/OUTPUT:    Intake/Output Summary (Last 24 hours) at 2/10/2024 1137  Last data filed at 2/10/2024 1029  Gross per 24 hour   Intake 1910 ml   Output 4060 ml   Net -2150 ml         I/O last 3 completed shifts:  In: 1960 [P.O.:1250; I.V.:710]  Out: 4910 [Urine:4900; Blood:10]  I/O this shift:  In: 240 [P.O.:240]  Out: 750 [Urine:750]   Admission weight: 122.4 kg (269 lb 12.8 oz)  Wt Readings from Last 3 Encounters:   02/10/24 110.1 kg (242 lb 11.2 oz)   09/06/23 95.3 kg (210 lb)   06/28/23 94.8 kg (209 lb)        Vitals :   Vitals:    02/10/24 0254 02/10/24 0600 02/10/24 0835 02/10/24 1127   BP: (!) 144/81  137/66 133/70   Pulse: 96  94 98   Resp: 18  18 18   Temp: 97.9 °F (36.6 °C)  98.4 °F (36.9 °C) 97.7 °F (36.5 °C)   TempSrc: Oral  Oral Oral   SpO2: 94%  97% 98%   Weight:  110.1 kg (242 lb 11.2 oz)     Height:           Physical examination  General Appearance: alert and cooperative with exam, appears comfortable, no distress  Mouth/Throat: Oral mucosa moist  Neck: No JVD  Lungs:diminished  Heart:  S1, S2 heard  GI: soft, non-tender, no guarding  Extremities: anasarca    Medications:  Infusion:    sodium chloride      dextrose      bumetanide (BUMEX) 12.5 mg in sodium chloride 0.9 % 125 mL infusion 1 mg/hr (02/09/24 8258)     Meds:    metOLazone  10 mg Oral Once    insulin lispro  15 Units SubCUTAneous TID WC    insulin glargine  40 Units SubCUTAneous Nightly    aspirin  81 mg Oral Daily    insulin lispro  0-16 Units SubCUTAneous Q24H    insulin lispro  0-16 Units 
[facility]   Nephrology progress note  2/11/2024, 10:55 AM      Pt Name:    Debbie Locke  MRN:     [id number]     YOB: 1975  Admit Date:    2/6/2024 12:14 AM    Chief Complaint: Nephrology following for ROXANA/CKD, volume overload.    Subjective:  Patient was seen and examined this morning.  Remains quite swollen.     Objective:  24HR INTAKE/OUTPUT:    Intake/Output Summary (Last 24 hours) at 2/11/2024 1055  Last data filed at 2/11/2024 0816  Gross per 24 hour   Intake 860 ml   Output 5050 ml   Net -4190 ml         I/O last 3 completed shifts:  In: 2070 [P.O.:2060; I.V.:10]  Out: 5850 [Urine:5850]  I/O this shift:  In: -   Out: 3250 [Urine:3250]   Admission weight: 122.4 kg (269 lb 12.8 oz)  Wt Readings from Last 3 Encounters:   02/11/24 112.6 kg (248 lb 3.8 oz)   09/06/23 95.3 kg (210 lb)   06/28/23 94.8 kg (209 lb)        Vitals :   Vitals:    02/11/24 0159 02/11/24 0335 02/11/24 0537 02/11/24 0813   BP:  130/66  139/77   Pulse:  (!) 110  (!) 104   Resp: 18 18  16   Temp:  98.1 °F (36.7 °C)  98 °F (36.7 °C)   TempSrc:  Oral  Oral   SpO2:  96%  96%   Weight:   112.6 kg (248 lb 3.8 oz)    Height:           Physical examination  General Appearance: alert and cooperative with exam, appears comfortable, no distress  Mouth/Throat: Oral mucosa moist  Neck: No JVD  Lungs:diminished  Heart:  S1, S2 heard  GI: soft, non-tender, no guarding  Extremities: anasarca    Medications:  Infusion:    sodium chloride      dextrose      bumetanide (BUMEX) 12.5 mg in sodium chloride 0.9 % 125 mL infusion 1 mg/hr (02/10/24 2239)     Meds:    metOLazone  10 mg Oral Once    potassium chloride  20 mEq Oral Once    insulin lispro  18 Units SubCUTAneous TID     insulin lispro  0-30 Units SubCUTAneous Q24H    insulin lispro  0-30 Units SubCUTAneous 4x Daily AC & HS    insulin glargine  40 Units SubCUTAneous Nightly    aspirin  81 mg Oral Daily    doxazosin  4 mg Oral BID    sodium chloride flush  10 mL 
[facility]   Nephrology progress note  2/12/2024, 2:06 PM      Pt Name:    Debbie Locke  MRN:     [id number]     YOB: 1975  Admit Date:    2/6/2024 12:14 AM    Chief Complaint: Nephrology following for ROXANA/CKD/fluid overload.    Subjective:  Patient seen and examined  No chest pain or shortness of breath  Making excellent urine output, almost -23 L    Objective:  24HR INTAKE/OUTPUT:    Intake/Output Summary (Last 24 hours) at 2/12/2024 1406  Last data filed at 2/12/2024 0911  Gross per 24 hour   Intake 600 ml   Output 3550 ml   Net -2950 ml      Admission weight: 122.4 kg (269 lb 12.8 oz)  Wt Readings from Last 3 Encounters:   02/11/24 112.6 kg (248 lb 3.8 oz)   09/06/23 95.3 kg (210 lb)   06/28/23 94.8 kg (209 lb)        Vitals :   Vitals:    02/12/24 0338 02/12/24 0408 02/12/24 0800 02/12/24 1229   BP: 131/60  (!) 152/83 (!) 126/57   Pulse: (!) 120  (!) 121 (!) 106   Resp: 20 16 17 18   Temp: 97.9 °F (36.6 °C)  97.9 °F (36.6 °C) 98.1 °F (36.7 °C)   TempSrc: Oral  Oral Oral   SpO2: 93%  97% 94%   Weight:       Height:           Physical examination  General Appearance:  Well developed. No distress  Mouth/Throat:  Oral mucosa moist  Neck:  Supple, no JVD  Lungs:  Breath sounds: clear  Heart::  S1,S2 heard  Abdomen:  Soft, non - tender  Musculoskeletal:  Edema -noted but apparently better    Medications:  Infusion:    sodium chloride      dextrose      bumetanide (BUMEX) 12.5 mg in sodium chloride 0.9 % 125 mL infusion 1 mg/hr (02/11/24 1158)     Meds:    insulin glargine  50 Units SubCUTAneous Nightly    insulin lispro  16 Units SubCUTAneous TID WC    insulin lispro  0-30 Units SubCUTAneous Q24H    insulin lispro  0-30 Units SubCUTAneous 4x Daily AC & HS    aspirin  81 mg Oral Daily    doxazosin  4 mg Oral BID    sodium chloride flush  10 mL IntraVENous 2 times per day    atorvastatin  40 mg Oral Daily    gabapentin  300 mg Oral BID    [Held by provider] rivaroxaban  20 mg Oral 
[facility]   Nephrology progress note  2/13/2024, 9:33 AM      Pt Name:    Debbie Locke  MRN:     [id number]     YOB: 1975  Admit Date:    2/6/2024 12:14 AM    Chief Complaint: Nephrology following for ROXANA/CKD/fluid overload    Subjective:  Patient was seen and examined this morning  Patient reports continued back pain. No chest pain, shortness of breath, shakes, fever, chills, nausea, vomiting, dysuria.    Objective:  24HR INTAKE/OUTPUT:    Intake/Output Summary (Last 24 hours) at 2/13/2024 0933  Last data filed at 2/13/2024 0845  Gross per 24 hour   Intake 840 ml   Output 5500 ml   Net -4660 ml         I/O last 3 completed shifts:  In: 840 [P.O.:840]  Out: 8600 [Urine:8600]  I/O this shift:  In: 240 [P.O.:240]  Out: 1400 [Urine:1400]   Admission weight: 122.4 kg (269 lb 12.8 oz)  Wt Readings from Last 3 Encounters:   02/11/24 112.6 kg (248 lb 3.8 oz)   09/06/23 95.3 kg (210 lb)   06/28/23 94.8 kg (209 lb)        Vitals :   Vitals:    02/13/24 0239 02/13/24 0311 02/13/24 0816 02/13/24 0904   BP: 131/72  134/64    Pulse: (!) 115  (!) 115    Resp: 16 16 17 16   Temp: 97.3 °F (36.3 °C)  98.4 °F (36.9 °C)    TempSrc: Oral  Oral    SpO2: 97%  94%    Weight:       Height:           Physical examination  General Appearance: alert and cooperative with exam, appears comfortable, no distress  Mouth/Throat: Oral mucosa moist  Neck: No JVD  Lungs: Air entry B/L, no rales, no use of accessory muscles  Heart:  S1, S2 heard  GI: soft, non-tender, no guarding  Extremities: LE edema    Medications:  Infusion:    sodium chloride      dextrose      bumetanide (BUMEX) 12.5 mg in sodium chloride 0.9 % 125 mL infusion 1 mg/hr (02/13/24 0252)     Meds:    insulin glargine  50 Units SubCUTAneous Nightly    insulin lispro  16 Units SubCUTAneous TID WC    [Held by provider] bumetanide  1 mg Oral BID    [Held by provider] valsartan  320 mg Oral Daily    insulin lispro  0-30 Units SubCUTAneous Q24H    
[facility]   Nephrology progress note  2/7/2024, 10:21 AM      Pt Name:    Debbie Locke  MRN:     [id number]     YOB: 1975  Admit Date:    2/6/2024 12:14 AM    Chief Complaint: Nephrology following for ROXANA/CKD, volume overload.    Subjective:  Patient was seen and examined this morning.  No improvement in swelling urine output overnight 2.1 liters with bumex drip.   Bp higher.    Objective:  24HR INTAKE/OUTPUT:    Intake/Output Summary (Last 24 hours) at 2/7/2024 1021  Last data filed at 2/7/2024 0925  Gross per 24 hour   Intake 840 ml   Output 3900 ml   Net -3060 ml         I/O last 3 completed shifts:  In: 700 [P.O.:700]  Out: 2800 [Urine:2800]  I/O this shift:  In: 240 [P.O.:240]  Out: 1100 [Urine:1100]   Admission weight: 122.4 kg (269 lb 12.8 oz)  Wt Readings from Last 3 Encounters:   02/07/24 121.7 kg (268 lb 4.8 oz)   09/06/23 95.3 kg (210 lb)   06/28/23 94.8 kg (209 lb)        Vitals :   Vitals:    02/07/24 0402 02/07/24 0631 02/07/24 0806 02/07/24 0830   BP: (!) 148/75   (!) 170/81   Pulse: 97   92   Resp: 18   20   Temp: 98.5 °F (36.9 °C)   97.7 °F (36.5 °C)   TempSrc: Oral   Oral   SpO2: 96%  98% 94%   Weight:  121.7 kg (268 lb 4.8 oz)     Height:           Physical examination  General Appearance: alert and cooperative with exam, appears comfortable, no distress  Mouth/Throat: Oral mucosa moist  Neck: No JVD  Lungs:diminished  Heart:  S1, S2 heard  GI: soft, non-tender, no guarding  Extremities: anasarca    Medications:  Infusion:    sodium chloride      dextrose      bumetanide (BUMEX) 12.5 mg in sodium chloride 0.9 % 125 mL infusion 1 mg/hr (02/07/24 0933)     Meds:    doxazosin  4 mg Oral BID    sodium chloride flush  10 mL IntraVENous 2 times per day    aspirin  81 mg Oral Daily    atorvastatin  40 mg Oral Daily    gabapentin  300 mg Oral BID    insulin glargine  20 Units SubCUTAneous Nightly    rivaroxaban  20 mg Oral Dinner    [Held by provider] tacrolimus  4 
[facility]  Progress Note       Debbie Locke  AGE: 48 y.o.   GENDER: female  : 1975  UNIT: 6K-15/015-A  TODAY'S DATE:  2024  ADMISSION DATE: 2024 12:14 AM  Subjective:     Reason for C Evaluation and Assessment: back wound      Debbie Locke is a 48 y.o. female referred by:   [x] Physician/PA/APRN  [x] Nursing  [] Other:     Wound Identification:  Wound Type: partial thickness wound to back  Modifying factors:immunosuppression    Objective:     Salvador Risk Score: Salvador Scale Score: 19    Assessment:     Encounter: Present to pt room for consult of infected back wound. Pt sitting on side of bed. Primary nurse states pt had area with shingles to it the since then has been treated and opened up. Patient states she has been putting some cream on it but unsure of name of cream. Wound assessment and photo below. Cleansed wound with normal saline and gauze. Pat dry with clean gauze. Applied opticell with silver to wound bed and covered with bordered foam dressing. Staff to change every other day. Pt to be treated with ATB for infection. Continue with dressing recommendations. Bed in low, call light in reach.    24    Wound type: infected back wound secondary to shingles  Wound size: 2.5cm x 6cm   Undermining or Tunneling: none  Wound assessment/color: red, firm, painful  Drainage amount: small  Drainage description: serous, yellow  Odor: none  Margins: attached  Rika wound: indurated, red  Exposed structure: none      Plan:     Treatment Recommendations:   Back wound: Cleanse wound with normal saline or wound cleanser and gauze. Pat dry with clean gauze. Apply opticell ag to wound bed. Cover with bordered foam dressing. Change dressing every other day and as needed.    Specialty Bed Required :   [x] Low Air Loss   [x] Pressure Redistribution  [] Fluid Immersion- Dolphin  [] Bariatric  [] RotoProne   [] Other:     Discharge Plan:  Placement for patient upon discharge: 
Pt in bed. Resting comfortably. No s/s of distress.  
Report given to Alida CAO.  
Report received from Birdie CAO.  
Surg POD#1Patient is doing well patient states she has no back pain in fact had more back pain prior to surgeryDressing is dry with Dakin's solution packing in place currentlyWill remove dressing tomorrowDiscussed with  Wound care either possible wound VAC placement or Dakin's packing  
Surg POD#2  packing changed  wound good will cont Dakins packing BID Dr Gonzales to assess  
Surg POD#3 dressing just changed per Dr Gonzales  picture reviewed with him wound clean will be OK for d/c when OK with Med  
Surg POD#4 Patient having less pain now than preopDressing was removed packing removed and replaced wound looks cleanDiscussed with patient  may be able to pack the wound at homePatient otherwise stableShe did state renal states she may need dialysis  
SurgPatient seen in consult to follow 48-year-old female who had possibly shingles with some sort of blister infection of the back that is turned into an abscess asked to see for possible debridement discussed with patient and family will likely need excision of this area versus just simple I&D this would leave a opening in the back that would have to be treated either with a possible wound VAC or packing we will further discuss with Dr. Gonzales  
[facility]   Dr. Kyree Mercado MD  Daily Progress Note  Pt Name: Debbie Locke  Medical Record Number: [id number]  Date of Birth 1975   Today's Date: 2/8/2024    HD#2    CHIEF COMPLAINTAbscess of the mid back    SUBJECTIVE  Patient feels OK Still having drainage from the abscess of the back    OBJECTIVE  CURRENT VITALS BP (!) 140/70   Pulse 100   Temp 97 °F (36.1 °C) (Oral)   Resp 18   Ht 1.626 m (5' 4\")   Wt 113.4 kg (249 lb 14.4 oz)   SpO2 93%   BMI 42.90 kg/m²   LUNGS: Lungs clear   ABDOMEN: soft  WOUNDS: Abscess possibly less cellulitic but still red and draining  24 HR INTAKE/OUTPUT :   Intake/Output Summary (Last 24 hours) at 2/8/2024 1942  Last data filed at 2/8/2024 1620  Gross per 24 hour   Intake 940 ml   Output 6800 ml   Net -5860 ml     DRAIN/TUBE OUTPUT :      LABS  CBC :   Lab Results   Component Value Date/Time    WBC 9.6 02/08/2024 09:44 AM    HGB 8.7 02/08/2024 09:44 AM    HCT 28.0 02/08/2024 09:44 AM     02/08/2024 09:44 AM     BMP:   Lab Results   Component Value Date/Time     02/08/2024 06:04 AM    K 4.3 02/08/2024 06:04 AM    K 5.3 02/06/2024 01:46 AM     02/08/2024 06:04 AM    CO2 20 02/08/2024 06:04 AM    BUN 53 02/08/2024 06:04 AM    CREATININE 3.5 02/08/2024 06:04 AM    PHOS 4.0 12/05/2023 12:50 PM       ASSESSMENT  1. Patient seen today with daughter discussed options of excisional debridement of this area versus continuing antibiotics my feeling is that it is going to be a prolonged process if it is not removed I then did discuss with infectious disease Dr. Gonzales he concurs and family concurs we should proceed with excisional debridement we will plan excisional debridement tomorrow we did discuss that she this will create an opening that either is going to be packed or possible wound VAC they voiced understanding like to proceed      PLAN  1. OR tomorrow      Kyree Mercado MD  Electronically signed 2/8/2024 at 7:42 PM     
[facility]  INPATIENT OCCUPATIONAL THERAPY  [facility] RENAL TELEMETRY 6K  EVALUATION    Time:   Time In: 1045  Time Out: 1103  Timed Code Treatment Minutes: 9 Minutes  Minutes: 18          Date: 2024  Patient Name: Debbie Locke,   Gender: female      MRN: [id number]  : 1975  (48 y.o.)  Referring Practitioner: Andrew Gagnon MD  Diagnosis: ROXANA superimposed on CKD  Additional Pertinent Hx: per chart review; 49 yo F with diabetes and CKD presented on 2024 per instruction of her PCP to get evaluated for concerning skin infection on her back.  Found to have ROXANA on CKD during evaluation.  In the setting of anasarca.  Admitted for further management.  Status post excisional debridement of the abscess of the back on 2024 per Dr. Mercado.    Restrictions/Precautions:  Restrictions/Precautions: General Precautions, Fall Risk, Surgical Protocols    Subjective  Chart Reviewed: Yes, Orders, Progress Notes, History and Physical  Patient assessed for rehabilitation services?: Yes  Family / Caregiver Present: No    Subjective: RN approved session, patient seated EOB upon OT arrival and agreeable to eval. patient A & O x 4. patient states she has been taking herself to the BR and has no OT concerns when returning home.    Pain: 0/10:     Vitals: Vitals not assessed per clinical judgement, see nursing flowsheet    Social/Functional History:  Lives With: Spouse  Type of Home: House  Home Layout: Two level, Performs ADL's on one level, Able to Live on Main level with bedroom/bathroom  Home Access: Stairs to enter with rails  Entrance Stairs - Number of Steps: 1  Home Equipment: None   Bathroom Shower/Tub: Tub/Shower unit  Bathroom Toilet: Standard  Bathroom Accessibility: Accessible  IADL Comments: owns a few dogs    Receives Help From: Family  ADL Assistance: Independent  Homemaking Assistance: Independent  Ambulation Assistance: Independent  Transfer Assistance: Independent    Active : 
Writer karstenved hospitalist Fina Vera regarding  [email or web address]. Writer was informed to contact Endocrinology [employer] sent to Dr. Kidd @ 1090--- patient BG was 509 on second poke. Gave 24U Humalog according to SSI. Anything else we should be doing? Thank you!  Read 4:21 AM     Dr. Kidd called back to hospital at 0539, writer informed him of the time of BG check and time Insulin was given. Dr. Kidd said he wanted to wait to see what BMP came back due to patient getting 24U Humalog at 0318 and then one time order of 12U at 0506.    
    Medications:    Infusion Medications    sodium chloride      dextrose      bumetanide (BUMEX) 12.5 mg in sodium chloride 0.9 % 125 mL infusion 1 mg/hr (02/07/24 2627)     Scheduled Medications    doxazosin  4 mg Oral BID    sodium chloride flush  10 mL IntraVENous 2 times per day    [Held by provider] aspirin  81 mg Oral Daily    atorvastatin  40 mg Oral Daily    gabapentin  300 mg Oral BID    insulin glargine  20 Units SubCUTAneous Nightly    [Held by provider] rivaroxaban  20 mg Oral Dinner    [Held by provider] tacrolimus  4 mg Oral BID    insulin lispro  0-8 Units SubCUTAneous TID     insulin lispro  0-4 Units SubCUTAneous Nightly    linezolid  600 mg IntraVENous Q12H     PRN Meds: traMADol, sodium chloride flush, sodium chloride, ondansetron **OR** ondansetron, polyethylene glycol, acetaminophen **OR** acetaminophen, melatonin, glucose, dextrose bolus **OR** dextrose bolus, glucagon (rDNA), dextrose, cyclobenzaprine, hydrALAZINE, metoprolol      Intake/Output Summary (Last 24 hours) at 2/8/2024 0923  Last data filed at 2/8/2024 0751  Gross per 24 hour   Intake 920 ml   Output 7250 ml   Net -6330 ml         Diet:  ADULT DIET; Regular; 3 carb choices (45 gm/meal); No Added Salt (3-4 gm); Low Potassium (Less than 3000 mg/day); Low Phosphorus (Less than 1000 mg); 60 to 80 gm; 1500 ml    Exam:  BP (!) 153/78   Pulse 99   Temp 98.7 °F (37.1 °C) (Oral)   Resp 18   Ht 1.626 m (5' 4\")   Wt 113.4 kg (249 lb 14.4 oz)   SpO2 98%   BMI 42.90 kg/m²     Physical Exam  Constitutional:       Interventions: She is not intubated.  Pulmonary:      Effort: She is not intubated.   Neurological:      Mental Status: She is alert.   Psychiatric:         Speech: She is communicative.        Labs:   Recent Labs     02/06/24  0146 02/07/24  0556   WBC 10.4 9.3   HGB 8.2* 7.9*   HCT 26.9* 24.6*    322       Recent Labs     02/06/24  0146 02/07/24  0556 02/08/24  0604   * 135 134*   K 5.3* 4.5 4.3    102 100 
[Held by provider] aspirin  81 mg Oral Daily    atorvastatin  40 mg Oral Daily    gabapentin  300 mg Oral BID    [Held by provider] rivaroxaban  20 mg Oral Dinner    [Held by provider] tacrolimus  4 mg Oral BID    linezolid  600 mg IntraVENous Q12H     Meds prn: magic (miracle) mouthwash, traMADol, sodium chloride flush, sodium chloride, ondansetron **OR** ondansetron, polyethylene glycol, acetaminophen **OR** acetaminophen, melatonin, glucose, dextrose bolus **OR** dextrose bolus, glucagon (rDNA), dextrose, cyclobenzaprine, hydrALAZINE, metoprolol     Lab Data :  CBC:   Recent Labs     02/07/24  0556 02/08/24  0944 02/09/24  0600   WBC 9.3 9.6 10.1   HGB 7.9* 8.7* 9.2*   HCT 24.6* 28.0* 29.4*    375 403*     CMP:  Recent Labs     02/07/24  0556 02/08/24  0604 02/09/24  0600    134* 136   K 4.5 4.3 4.5    100 99   CO2 20* 20* 22*   BUN 54* 53* 57*   CREATININE 3.4* 3.5* 3.7*   GLUCOSE 300* 332* 208*   CALCIUM 8.4* 9.1 9.1     Hepatic:   No results for input(s): \"LABALBU\", \"AST\", \"ALT\", \"ALB\", \"BILITOT\", \"ALKPHOS\" in the last 72 hours.        Assessment and Plan:  ROXANA on CKD vs progressive CKD due to advanced diabetic nephropathy  Suspect progression of CKD due to uncontrolled blood sugars  Previous renal biopsy reviewed  Stopped tacrolimus due to worsening renal function  Severe nephrotic syndrome noted  A1Cs have been 13-14 range  Creat in Sept was 1.1, 1.7 in December, 2.3 in January and now > 3.5  Continue with aggressive diuresis with bumex drip @ 1 mg/hr and may even need dialysis she still remains grossly fluid overloaded  Give metolazone 10 mg  Continue aggressive diuresis over the weekend and by mon possible HD if we are not getting anywhere with her volume overload    Anasarca from nephrotic syndrome  Hyponatremia, resolved  Hyperkalemia, resolved  Metabolic acidosis, improving  HTN: running low due to diuretics, hold amlodipine.  No ACE/ARB given worsening CKD  DM, uncontrolled  Abscess, 
debridement.  Anemia: Possibly secondary to renal dysfunction: ASA given.  Iron studies reasonable.  Will benefit from some IV iron.  At this time secondary to infection  Meds reviewed and discussed with patient.      D/W patient and RN    Maury Jackson MD    Case and plan discussed with Dr. Neville  [facility]    This report has been created using voice recognition software. It may contain minor errors which are inherent in voice recognition technology    
patient     Monica Domínguez DO  [facility]    This report has been created using voice recognition software. It may contain minor errors which are inherent in voice recognition technology   
BON on 2/11/2024 at 11:02 AM     
24h period but still remained uncontrolled w/ a fasting BG of 359 @0610 on 02/08/24. She is currently on a 3 carb diet and is eating >76% of her meals.   Plan:  -Gabapentin per primary  -Hold ALL Non-insulin diabetic agents  -Insulin Regimen:    +Insulin glargine 40 units nightly    +Insulin lispro 15 units with meals     +Modified HD SSI  -POCT Glucose checks covered by SSI    +qAC/HS; if NPO, POCT glucose every 4 hours.     +2:00 AM timed  -Hypoglycemia protocol  -Diabetes education, diet, and exercise  -On ARB  -Recommend yearly optometry appointments for retinopathy screening  -Recommend yearly mAlb:sCr for nephropathy screening however it seems pt's renal function is significantly impaired already from diabetes.  -Recommend yearly foot exams    #MSSA Lumbar Skin Infection w/ c/f Abscess: ID & General Surgery following. I&D Scheduled for 02/09/24 @1600.   #ROXANA on CKD IV d/t Advanced Diabetic Nephropathy: Nephrology onboard. On bumex gtt.  #HTN  #Borderline Microcytic Anemia  #PAD: LHC 12/05/23 w/ 100% occ of distal R SFA to m R popliteal artery.  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 On tacrolimus prior to admission. Held on admit.    Thank you for the consultation.    Electronically signed by Erik Ware MD on 2/9/2024 at 7:50 AM    
Diagnosis Date Noted    Essential hypertension [I10] 01/06/2015     Priority: Medium    Hyponatremia [E87.1] 02/12/2024    Anasarca [R60.1] 02/12/2024    Back abscess [L02.212] 02/12/2024    Type 2 diabetes mellitus with hyperglycemia, with long-term current use of insulin (HCC) [E11.65, Z79.4] 02/12/2024    Acute kidney injury superimposed on chronic kidney disease (HCC) [N17.9, N18.9] 02/06/2024    CKD (chronic kidney disease) stage 4, GFR 15-29 ml/min (HCC) [N18.4] 02/06/2024    Other fluid overload [E87.79] 02/06/2024       PLAN:    #Uncontrolled IRDMT2 A1c 14.4% on  01/31/24 c/b nephropathy and neuropathy: Active  Prescribed insulin lantus 20 units nightly / metformin 500 mg BID / liraglutide 1.8 mg daily prior to admission.   DM Nephropathy Screening: mAlb:Cr 6646 mg/g on  07/14/22  DM Retinopathy Screening: No documentation of optometry visit. Pt states she has seen one but cannot remember who or what the result was.   DM Neuropathy Screening: Bilateral LE sensation diminished to pinprick. Pt is on gabapentin  Last foot exam:  unknown  02/07/24: pt required a total of 42 units of insulin w/i a 24h period but still remained uncontrolled w/ a fasting BG of 359 @0610 on 02/08/24. She is currently on a 3 carb diet and is eating >76% of her meals.   Plan:  -Gabapentin per primary  -Hold ALL Non-insulin diabetic agents  -Anticipate breaking of glucotoxicity and possible hypoglycemia; will adjust regimen accordingly as below  -Insulin Regimen:    +Insulin glargine 50 units nightly    +Insulin lispro 16 units with meals     +Modified HD SSI  -POCT Glucose checks covered by SSI    +qAC/HS; if NPO, POCT glucose every 4 hours.     +2:00 AM timed  -Hypoglycemia protocol  -Diabetes education, diet, and exercise  -On ARB but held d/t worsening renal function for now.  -ASIF Hose Placed 02/12/24  -Refer to diabetes clinic on discharge  -Refer to endocrinology clinic on discharge  -Recommend yearly optometry appointments for

## 2024-02-14 NOTE — CARE COORDINATION
2/14/24, 9:09 AM EST    DISCHARGE ON GOING EVALUATION    Debbie BHATT [facility] day: 8  [facility] Reason for admit: ROXANA (acute kidney injury) (HCC) [N17.9]   Procedure: 2/9 excision of back abscess  Barriers to Discharge: creat 4.2. Per nephrology, can d/c in next 24-48 hrs if creat remains stable.   PCP: Kory Thomas MD  Readmission Risk Score: 18.5%  Patient Goals/Plan/Treatment Preferences: Home with , who will perform dressing changes. Deny need for HH.     2/14/24, 11:48 AM EST    Patient goals/plan/ treatment preferences discussed by  and .  Patient goals/plan/ treatment preferences reviewed with patient/ family.  Patient/ family verbalize understanding of discharge plan and are in agreement with goal/plan/treatment preferences.  Understanding was demonstrated using the teach back method.  AVS provided by RN at time of discharge, which includes all necessary medical information pertaining to the patients current course of illness, treatment, post-discharge goals of care, and treatment preferences.     Services At/After Discharge: None

## 2024-02-14 NOTE — DISCHARGE SUMMARY
Calcium 8.9 8.5 - 10.5 mg/dL   Basic Metabolic Panel    Collection Time: 02/13/24  6:30 AM   Result Value Ref Range    Potassium 3.6 3.5 - 5.2 meq/L   IRON SATURATION    Collection Time: 02/13/24  6:30 AM   Result Value Ref Range    Iron % Saturation 17 (L) 20 - 50 %   Iron    Collection Time: 02/13/24  6:30 AM   Result Value Ref Range    Iron 39 (L) 50 - 170 ug/dL   Ferritin    Collection Time: 02/13/24  6:30 AM   Result Value Ref Range    Ferritin 95 10 - 291 ng/mL   Vitamin B12 & Folate    Collection Time: 02/13/24  6:30 AM   Result Value Ref Range    Vitamin B-12 772 211 - 911 pg/mL    Folate 6.6 4.8 - 24.2 ng/mL   Reticulocytes    Collection Time: 02/13/24  6:30 AM   Result Value Ref Range    Retic Ct Abs 0.8 0.5 - 2.0 %    Absolute Retic # 25.0 20.0 - 115.0 thou/mm3    Immature Retic Fract 13.5 3.0 - 15.9 %    Retic Hemoglobin 31.1 28.2 - 35.7 pg   Iron Binding Capacity    Collection Time: 02/13/24  6:30 AM   Result Value Ref Range    TIBC 226 171 - 450 ug/dL   Anion Gap    Collection Time: 02/13/24  6:30 AM   Result Value Ref Range    Anion Gap 17.0 (H) 8.0 - 16.0 meq/L   Glomerular Filtration Rate, Estimated    Collection Time: 02/13/24  6:30 AM   Result Value Ref Range    Est, Glom Filt Rate 12 (A) >60 ml/min/1.73m2   POCT glucose    Collection Time: 02/13/24  6:48 AM   Result Value Ref Range    POC Glucose 167 (H) 70 - 108 mg/dl   POCT glucose    Collection Time: 02/13/24 11:01 AM   Result Value Ref Range    POC Glucose 137 (H) 70 - 108 mg/dl   POCT glucose    Collection Time: 02/13/24  3:44 PM   Result Value Ref Range    POC Glucose 148 (H) 70 - 108 mg/dl   POCT glucose    Collection Time: 02/13/24  8:11 PM   Result Value Ref Range    POC Glucose 221 (H) 70 - 108 mg/dl   POCT glucose    Collection Time: 02/14/24  2:25 AM   Result Value Ref Range    POC Glucose 162 (H) 70 - 108 mg/dl   Basic Metabolic Panel    Collection Time: 02/14/24  5:55 AM   Result Value Ref Range    Sodium 139 135 - 145 meq/L

## 2024-02-21 PROBLEM — Z79.01 CHRONIC ANTICOAGULATION: Status: ACTIVE | Noted: 2024-02-21

## 2024-02-21 PROBLEM — D64.9 NORMOCYTIC ANEMIA: Status: ACTIVE | Noted: 2024-02-21

## 2024-02-21 PROBLEM — E87.20 METABOLIC ACIDOSIS: Status: ACTIVE | Noted: 2024-02-21

## 2024-02-21 PROBLEM — Z86.718 HISTORY OF DVT (DEEP VEIN THROMBOSIS): Status: ACTIVE | Noted: 2024-02-21

## 2024-02-21 PROBLEM — E11.8 TYPE 2 DIABETES MELLITUS WITH COMPLICATION, WITH LONG-TERM CURRENT USE OF INSULIN (HCC): Status: ACTIVE | Noted: 2024-02-13

## 2024-02-21 PROBLEM — D72.829 LEUKOCYTOSIS: Status: ACTIVE | Noted: 2024-02-21

## 2024-02-21 PROBLEM — E66.01 MORBID OBESITY (HCC): Status: ACTIVE | Noted: 2024-02-21

## 2024-02-21 PROBLEM — E78.5 HYPERLIPIDEMIA: Status: ACTIVE | Noted: 2024-02-21

## 2024-02-21 PROBLEM — R00.0 SINUS TACHYCARDIA: Status: ACTIVE | Noted: 2024-02-21

## 2024-03-06 ENCOUNTER — OFFICE VISIT (OUTPATIENT)
Dept: NEPHROLOGY | Age: 49
End: 2024-03-06
Payer: COMMERCIAL

## 2024-03-06 VITALS
OXYGEN SATURATION: 97 % | SYSTOLIC BLOOD PRESSURE: 129 MMHG | WEIGHT: 225 LBS | HEART RATE: 90 BPM | DIASTOLIC BLOOD PRESSURE: 82 MMHG | BODY MASS INDEX: 38.62 KG/M2

## 2024-03-06 DIAGNOSIS — N18.4 STAGE 4 CHRONIC KIDNEY DISEASE (HCC): ICD-10-CM

## 2024-03-06 DIAGNOSIS — E11.21 DIABETIC NEPHROPATHY WITH PROTEINURIA (HCC): ICD-10-CM

## 2024-03-06 DIAGNOSIS — I10 HTN (HYPERTENSION), BENIGN: Primary | ICD-10-CM

## 2024-03-06 DIAGNOSIS — N05.1 FSGS (FOCAL SEGMENTAL GLOMERULOSCLEROSIS): ICD-10-CM

## 2024-03-06 PROCEDURE — 3079F DIAST BP 80-89 MM HG: CPT | Performed by: INTERNAL MEDICINE

## 2024-03-06 PROCEDURE — 3046F HEMOGLOBIN A1C LEVEL >9.0%: CPT | Performed by: INTERNAL MEDICINE

## 2024-03-06 PROCEDURE — 99213 OFFICE O/P EST LOW 20 MIN: CPT | Performed by: INTERNAL MEDICINE

## 2024-03-06 PROCEDURE — 4004F PT TOBACCO SCREEN RCVD TLK: CPT | Performed by: INTERNAL MEDICINE

## 2024-03-06 PROCEDURE — G8417 CALC BMI ABV UP PARAM F/U: HCPCS | Performed by: INTERNAL MEDICINE

## 2024-03-06 PROCEDURE — G8427 DOCREV CUR MEDS BY ELIG CLIN: HCPCS | Performed by: INTERNAL MEDICINE

## 2024-03-06 PROCEDURE — 2022F DILAT RTA XM EVC RTNOPTHY: CPT | Performed by: INTERNAL MEDICINE

## 2024-03-06 PROCEDURE — 3074F SYST BP LT 130 MM HG: CPT | Performed by: INTERNAL MEDICINE

## 2024-03-06 PROCEDURE — G8484 FLU IMMUNIZE NO ADMIN: HCPCS | Performed by: INTERNAL MEDICINE

## 2024-03-06 PROCEDURE — 1111F DSCHRG MED/CURRENT MED MERGE: CPT | Performed by: INTERNAL MEDICINE

## 2024-03-06 NOTE — PROGRESS NOTES
[facility]        Outpatient Follow-Up note         3/6/2024 1:24 PM    Patient Name:   Debbie Locke  YOB: 1975  Primary Care Physician:  Kory Thomas MD   [facility]  [facility]  [address] [facility] [address]  Dept: [phone]  Loc: [phone]     Chief Complaint / Reason for follow-up : Follow Up of CKD and proteinuria     Interval History :  Patient seen and examined by me.   Feels ok. No cp or SOB  No hospitalizations, no medication changes  Patient was recently in the hospital for acute kidney injury and severe fluid overload and has received IV Bumex drip for a prolonged duration of time  Currently she states she is taking Bumex 2 mg twice a day and only as needed metolazone     Past History :  Past Medical History:   Diagnosis Date    Chronic kidney disease     Hyperlipidemia     Neuropathy     Type II or unspecified type diabetes mellitus without mention of complication, not stated as uncontrolled 2000     Past Surgical History:   Procedure Laterality Date    BACK SURGERY N/A 2024    Excision of Chronic Abscess on Back performed by Kyree Mercado MD at [facility] OR     SECTION  2002    CHOLECYSTECTOMY      CT BIOPSY RENAL  2023    CT BIOPSY RENAL 2023 [facility] CT SCAN        Medications :     Outpatient Medications Marked as Taking for the 3/6/24 encounter (Office Visit) with Torin Neville MD   Medication Sig Dispense Refill    acetaminophen (TYLENOL) 325 MG tablet Take 2 tablets by mouth every 6 hours as needed for Pain 120 tablet 3    apixaban (ELIQUIS) 2.5 MG TABS tablet Take 1 tablet by mouth 2 times daily 60 tablet 0    gabapentin (NEURONTIN) 300 MG capsule Take 1 capsule by mouth in the morning and at bedtime for 10 days. Indications: Nerve Disease 20 capsule 0    insulin lispro, 1 Unit Dial, (HUMALOG KWIKPEN) 100 UNIT/ML SOPN Inject 16 Units

## 2024-03-14 RX ORDER — POTASSIUM CHLORIDE 750 MG/1
20 TABLET, EXTENDED RELEASE ORAL 2 TIMES DAILY
Qty: 360 TABLET | Refills: 1 | OUTPATIENT
Start: 2024-03-14

## 2024-03-28 RX ORDER — POTASSIUM CHLORIDE 750 MG/1
20 TABLET, EXTENDED RELEASE ORAL 2 TIMES DAILY
Qty: 360 TABLET | Refills: 1 | OUTPATIENT
Start: 2024-03-28

## 2024-04-23 ENCOUNTER — TELEPHONE (OUTPATIENT)
Dept: NEPHROLOGY | Age: 49
End: 2024-04-23

## 2024-04-23 NOTE — TELEPHONE ENCOUNTER
Called patient she is feeling fine. She is aware of her sugar running very high. Patient states she is unable to tolerate short or long acting insulin. She states she is making a appointment with Dr Thomas to discuss sugars and medication options.

## 2024-05-16 NOTE — CARE COORDINATION
2/7/24, 1:15 PM EST    DISCHARGE ON GOING EVALUATION    Debbie Locke       [facility] day: 1  Location: -15/015-A Reason for admit: ROXANA (acute kidney injury) (HCC) [N17.9]   Procedure:   2/6 US Renal: No renal calculi or hydronephrosis.     Barriers to Discharge: Hospitalist, Nephrology and ID following. GS to see for sfot tissue infection/need I&D. Wound care q48h per wound ostomy. Bumex gtt increased to 1mg/hr. IV hydralazine prn. IV lopressor prn. IV zyvox q12h. Tramadol.     Vitals:    02/07/24 0631 02/07/24 0806 02/07/24 0830 02/07/24 1121   BP:   (!) 170/81 (!) 154/82   Pulse:   92 95   Resp:   20 18   Temp:   97.7 °F (36.5 °C) 97.9 °F (36.6 °C)   TempSrc:   Oral Oral   SpO2:  98% 94% 94%   Weight: 121.7 kg (268 lb 4.8 oz)      Height:           PCP: Kory Thomas MD  Readmission Risk Score: 14.9%  Patient Goals/Plan/Treatment Preferences:  Deepthi plans to return home w/ her . Denies needs for DME or HH services.                  (4) rarely moist

## 2024-05-22 ENCOUNTER — OFFICE VISIT (OUTPATIENT)
Age: 49
End: 2024-05-22
Payer: COMMERCIAL

## 2024-05-22 VITALS
HEART RATE: 105 BPM | BODY MASS INDEX: 40.55 KG/M2 | HEIGHT: 64 IN | SYSTOLIC BLOOD PRESSURE: 150 MMHG | DIASTOLIC BLOOD PRESSURE: 94 MMHG | WEIGHT: 237.5 LBS

## 2024-05-22 DIAGNOSIS — E78.2 MIXED HYPERLIPIDEMIA: ICD-10-CM

## 2024-05-22 DIAGNOSIS — Z79.4 TYPE 2 DIABETES MELLITUS WITH HYPERGLYCEMIA, WITH LONG-TERM CURRENT USE OF INSULIN (HCC): Primary | ICD-10-CM

## 2024-05-22 DIAGNOSIS — E11.65 TYPE 2 DIABETES MELLITUS WITH HYPERGLYCEMIA, WITH LONG-TERM CURRENT USE OF INSULIN (HCC): Primary | ICD-10-CM

## 2024-05-22 PROCEDURE — 3077F SYST BP >= 140 MM HG: CPT | Performed by: INTERNAL MEDICINE

## 2024-05-22 PROCEDURE — 3080F DIAST BP >= 90 MM HG: CPT | Performed by: INTERNAL MEDICINE

## 2024-05-22 PROCEDURE — 3046F HEMOGLOBIN A1C LEVEL >9.0%: CPT | Performed by: INTERNAL MEDICINE

## 2024-05-22 PROCEDURE — 99215 OFFICE O/P EST HI 40 MIN: CPT | Performed by: INTERNAL MEDICINE

## 2024-05-22 PROCEDURE — G8427 DOCREV CUR MEDS BY ELIG CLIN: HCPCS | Performed by: INTERNAL MEDICINE

## 2024-05-22 PROCEDURE — G8417 CALC BMI ABV UP PARAM F/U: HCPCS | Performed by: INTERNAL MEDICINE

## 2024-05-22 PROCEDURE — 4004F PT TOBACCO SCREEN RCVD TLK: CPT | Performed by: INTERNAL MEDICINE

## 2024-05-22 PROCEDURE — 2022F DILAT RTA XM EVC RTNOPTHY: CPT | Performed by: INTERNAL MEDICINE

## 2024-05-22 RX ORDER — LEVOFLOXACIN 500 MG/1
500 TABLET, FILM COATED ORAL DAILY
COMMUNITY
Start: 2024-04-03

## 2024-05-22 RX ORDER — ALBUTEROL SULFATE 90 UG/1
AEROSOL, METERED RESPIRATORY (INHALATION)
COMMUNITY

## 2024-05-22 RX ORDER — CALCIUM CITRATE/VITAMIN D3 200MG-6.25
TABLET ORAL
COMMUNITY
Start: 2024-03-12

## 2024-05-22 RX ORDER — PROCHLORPERAZINE 25 MG/1
SUPPOSITORY RECTAL
COMMUNITY
Start: 2024-03-13

## 2024-05-22 RX ORDER — PROCHLORPERAZINE 25 MG/1
SUPPOSITORY RECTAL
COMMUNITY
Start: 2024-03-14

## 2024-05-22 RX ORDER — BUMETANIDE 2 MG/1
2 TABLET ORAL 2 TIMES DAILY
COMMUNITY
Start: 2024-05-01

## 2024-05-22 NOTE — PROGRESS NOTES
Debbie Locke is a 48 y.o. , female who comes for Initial visit for Diabetes Mellitus Type 2 diabetes mellitus.  PCP: Kory Thomas MD    HPI   This patient was diagnosed with diabetes mellitus 22 years ago.   Current therapy includes LANTUS 50/50    Nutritional plan: Carbohydrate counting 60/60/60  Exercise Plan None  The patient is checking blood sugar 3-4 times a day but she did not bring her readings.   Hypoglycemia is uncommon. The patient does recognize hypoglycemia. There  is a remote  history of severe hypoglycemia. Most recent A1c is 13 %.    Diabetic symptoms: Visual blurriness, Tingling and numbness of the feet, and Burning sensations   She is not current on eye exam, and She reports no foot ulcers or infections.   Patient isbeing treated for hypercholesterolemia.  Patient is being treated for  hypertension.  The main concern expressed by the patient today is that she has been reacting to the Humalog and she stopped taking it.  Specifically she gets muscle cramps when she takes the medication.  This is not a new phenomenon but she cannot tolerate the medication anymore.  The patient has chronic kidney disease and she is being followed by nephrology.    Past Medical History:   Diagnosis Date    Chronic kidney disease     Hyperlipidemia     Neuropathy     Type II or unspecified type diabetes mellitus without mention of complication, not stated as uncontrolled 2000      Past Surgical History:   Procedure Laterality Date    BACK SURGERY N/A 2024    Excision of Chronic Abscess on Back performed by Kyree Mercado MD at [facility] OR     SECTION  2002    CHOLECYSTECTOMY      CT BIOPSY RENAL  2023    CT BIOPSY RENAL 2023 [facility] CT SCAN       Family History   Problem Relation Age of Onset    Diabetes Mother     High Blood Pressure Mother      Social History     Tobacco Use    Smoking status: Every Day     Current packs/day: 1.00     Average packs/day: 1 pack/day for 18.0

## 2024-06-05 ENCOUNTER — OFFICE VISIT (OUTPATIENT)
Dept: NEPHROLOGY | Age: 49
End: 2024-06-05
Payer: COMMERCIAL

## 2024-06-05 VITALS
SYSTOLIC BLOOD PRESSURE: 198 MMHG | BODY MASS INDEX: 40.34 KG/M2 | OXYGEN SATURATION: 96 % | HEART RATE: 116 BPM | WEIGHT: 235 LBS | DIASTOLIC BLOOD PRESSURE: 108 MMHG

## 2024-06-05 DIAGNOSIS — I10 HTN (HYPERTENSION), BENIGN: Primary | ICD-10-CM

## 2024-06-05 DIAGNOSIS — N05.1 FSGS (FOCAL SEGMENTAL GLOMERULOSCLEROSIS): ICD-10-CM

## 2024-06-05 DIAGNOSIS — N18.4 STAGE 4 CHRONIC KIDNEY DISEASE (HCC): ICD-10-CM

## 2024-06-05 DIAGNOSIS — E11.21 DIABETIC NEPHROPATHY WITH PROTEINURIA (HCC): ICD-10-CM

## 2024-06-05 PROCEDURE — 4004F PT TOBACCO SCREEN RCVD TLK: CPT | Performed by: INTERNAL MEDICINE

## 2024-06-05 PROCEDURE — G8427 DOCREV CUR MEDS BY ELIG CLIN: HCPCS | Performed by: INTERNAL MEDICINE

## 2024-06-05 PROCEDURE — 3080F DIAST BP >= 90 MM HG: CPT | Performed by: INTERNAL MEDICINE

## 2024-06-05 PROCEDURE — G8417 CALC BMI ABV UP PARAM F/U: HCPCS | Performed by: INTERNAL MEDICINE

## 2024-06-05 PROCEDURE — 99213 OFFICE O/P EST LOW 20 MIN: CPT | Performed by: INTERNAL MEDICINE

## 2024-06-05 PROCEDURE — 3077F SYST BP >= 140 MM HG: CPT | Performed by: INTERNAL MEDICINE

## 2024-06-05 PROCEDURE — 2022F DILAT RTA XM EVC RTNOPTHY: CPT | Performed by: INTERNAL MEDICINE

## 2024-06-05 PROCEDURE — 3046F HEMOGLOBIN A1C LEVEL >9.0%: CPT | Performed by: INTERNAL MEDICINE

## 2024-06-05 NOTE — PROGRESS NOTES
[facility]        Outpatient Follow-Up note         2024 11:45 AM    Patient Name:   Debbie Locke  YOB: 1975  Primary Care Physician:  Kory Thomas MD   [facility]  [facility]  [address] [facility] [address]  Dept: [phone]  Loc: [phone]     Chief Complaint / Reason for follow-up : Follow Up of CKD and proteinuria     Interval History :  Patient seen and examined by me.   Feels ok. No cp or SOB  No hospitalizations, no medication changes  BS are high seeing endo as well.     Past History :  Past Medical History:   Diagnosis Date    Chronic kidney disease     Hyperlipidemia     Neuropathy     Type II or unspecified type diabetes mellitus without mention of complication, not stated as uncontrolled 2000     Past Surgical History:   Procedure Laterality Date    BACK SURGERY N/A 2024    Excision of Chronic Abscess on Back performed by Kyree Mercado MD at [facility] OR     SECTION  2002    CHOLECYSTECTOMY      CT BIOPSY RENAL  2023    CT BIOPSY RENAL 2023 [facility] CT SCAN        Medications :     Outpatient Medications Marked as Taking for the 24 encounter (Office Visit) with Torin Neville MD   Medication Sig Dispense Refill    Continuous Glucose Transmitter (DEXCOM G6 TRANSMITTER) MISC USE TO CHECK BLOOD SUGARS DAILY AS DIRECTED . .      Continuous Glucose Sensor (DEXCOM G6 SENSOR) MISC DEXCOM/USE TO CHECK SUGARS DAILY AS DIRECTED . .      bumetanide (BUMEX) 2 MG tablet Take 1 tablet by mouth 2 times daily      albuterol sulfate HFA (PROVENTIL;VENTOLIN;PROAIR) 108 (90 Base) MCG/ACT inhaler INHALE 2 PUFFS EVERY 6 HOURS AS NEEDED FOR WHEEZE OR FOR SHORTNESS OF BREATH      acetaminophen (TYLENOL) 325 MG tablet Take 2 tablets by mouth every 6 hours as needed for Pain 120 tablet 3    apixaban (ELIQUIS) 2.5 MG TABS tablet Take 1 tablet by mouth 2 times

## 2024-07-13 ENCOUNTER — APPOINTMENT (OUTPATIENT)
Dept: GENERAL RADIOLOGY | Age: 49
DRG: 469 | End: 2024-07-13
Payer: COMMERCIAL

## 2024-07-13 ENCOUNTER — HOSPITAL ENCOUNTER (INPATIENT)
Age: 49
LOS: 6 days | Discharge: HOME | DRG: 469 | End: 2024-07-19
Attending: EMERGENCY MEDICINE | Admitting: STUDENT IN AN ORGANIZED HEALTH CARE EDUCATION/TRAINING PROGRAM
Payer: COMMERCIAL

## 2024-07-13 DIAGNOSIS — E87.70 HYPERVOLEMIA, UNSPECIFIED HYPERVOLEMIA TYPE: ICD-10-CM

## 2024-07-13 DIAGNOSIS — R60.1 ANASARCA: Primary | ICD-10-CM

## 2024-07-13 DIAGNOSIS — R06.01 ORTHOPNEA: ICD-10-CM

## 2024-07-13 LAB
ANION GAP SERPL CALC-SCNC: 15 MEQ/L (ref 8–16)
APTT PPP: 31.9 SECONDS (ref 22–38)
BACTERIA URNS QL MICRO: ABNORMAL /HPF
BASOPHILS ABSOLUTE: 0 THOU/MM3 (ref 0–0.1)
BASOPHILS NFR BLD AUTO: 0.3 %
BILIRUB UR QL STRIP.AUTO: NEGATIVE
BUN SERPL-MCNC: 64 MG/DL (ref 7–22)
CALCIUM SERPL-MCNC: 7.4 MG/DL (ref 8.5–10.5)
CASTS #/AREA URNS LPF: ABNORMAL /LPF
CASTS 2: ABNORMAL /LPF
CHARACTER UR: ABNORMAL
CHLORIDE SERPL-SCNC: 102 MEQ/L (ref 98–111)
CO2 SERPL-SCNC: 17 MEQ/L (ref 23–33)
COLOR, UA: ABNORMAL
CREAT SERPL-MCNC: 5.1 MG/DL (ref 0.4–1.2)
CRYSTALS URNS MICRO: ABNORMAL
DEPRECATED RDW RBC AUTO: 48.6 FL (ref 35–45)
EOSINOPHIL NFR BLD AUTO: 3.3 %
EOSINOPHILS ABSOLUTE: 0.3 THOU/MM3 (ref 0–0.4)
EPITHELIAL CELLS, UA: ABNORMAL /HPF
ERYTHROCYTE [DISTWIDTH] IN BLOOD BY AUTOMATED COUNT: 15.8 % (ref 11.5–14.5)
GFR SERPL CREATININE-BSD FRML MDRD: 10 ML/MIN/1.73M2
GLUCOSE SERPL-MCNC: 387 MG/DL (ref 70–108)
GLUCOSE UR QL STRIP.AUTO: >= 1000 MG/DL
HCT VFR BLD AUTO: 25.6 % (ref 37–47)
HEPARIN UNFRACTIONATED: 0.32 U/ML (ref 0.3–0.7)
HGB BLD-MCNC: 7.9 GM/DL (ref 12–16)
HGB UR QL STRIP.AUTO: ABNORMAL
IMM GRANULOCYTES # BLD AUTO: 0.07 THOU/MM3 (ref 0–0.07)
IMM GRANULOCYTES NFR BLD AUTO: 0.8 %
INR PPP: 1 (ref 0.85–1.13)
KETONES UR QL STRIP.AUTO: NEGATIVE
LYMPHOCYTES ABSOLUTE: 1.2 THOU/MM3 (ref 1–4.8)
LYMPHOCYTES NFR BLD AUTO: 13.2 %
MCH RBC QN AUTO: 26.2 PG (ref 26–33)
MCHC RBC AUTO-ENTMCNC: 30.9 GM/DL (ref 32.2–35.5)
MCV RBC AUTO: 85 FL (ref 81–99)
MISCELLANEOUS 2: ABNORMAL
MONOCYTES ABSOLUTE: 0.4 THOU/MM3 (ref 0.4–1.3)
MONOCYTES NFR BLD AUTO: 4.9 %
NEUTROPHILS ABSOLUTE: 7.1 THOU/MM3 (ref 1.8–7.7)
NEUTROPHILS NFR BLD AUTO: 77.5 %
NITRITE UR QL STRIP: NEGATIVE
NRBC BLD AUTO-RTO: 0 /100 WBC
NT-PROBNP SERPL IA-MCNC: 8693 PG/ML (ref 0–124)
OSMOLALITY SERPL CALC.SUM OF ELEC: 302.6 MOSMOL/KG (ref 275–300)
PH UR STRIP.AUTO: 5.5 [PH] (ref 5–9)
PLATELET # BLD AUTO: 225 THOU/MM3 (ref 130–400)
PMV BLD AUTO: 9.4 FL (ref 9.4–12.4)
POTASSIUM SERPL-SCNC: 4.2 MEQ/L (ref 3.5–5.2)
PROT UR STRIP.AUTO-MCNC: >= 300 MG/DL
RBC # BLD AUTO: 3.01 MILL/MM3 (ref 4.2–5.4)
RBC URINE: ABNORMAL /HPF
RENAL EPI CELLS #/AREA URNS HPF: ABNORMAL /[HPF]
SODIUM SERPL-SCNC: 134 MEQ/L (ref 135–145)
SP GR UR REFRACT.AUTO: 1.02 (ref 1–1.03)
TROPONIN, HIGH SENSITIVITY: 100 NG/L (ref 0–12)
TROPONIN, HIGH SENSITIVITY: 95 NG/L (ref 0–12)
UROBILINOGEN, URINE: 0.2 EU/DL (ref 0–1)
WBC # BLD AUTO: 9.1 THOU/MM3 (ref 4.8–10.8)
WBC #/AREA URNS HPF: ABNORMAL /HPF
WBC #/AREA URNS HPF: NEGATIVE /[HPF]
YEAST LIKE FUNGI URNS QL MICRO: ABNORMAL

## 2024-07-13 PROCEDURE — 6370000000 HC RX 637 (ALT 250 FOR IP)

## 2024-07-13 PROCEDURE — 99223 1ST HOSP IP/OBS HIGH 75: CPT

## 2024-07-13 PROCEDURE — 36415 COLL VENOUS BLD VENIPUNCTURE: CPT

## 2024-07-13 PROCEDURE — 1200000003 HC TELEMETRY R&B

## 2024-07-13 PROCEDURE — 83880 ASSAY OF NATRIURETIC PEPTIDE: CPT

## 2024-07-13 PROCEDURE — 6360000002 HC RX W HCPCS

## 2024-07-13 PROCEDURE — 85025 COMPLETE CBC W/AUTO DIFF WBC: CPT

## 2024-07-13 PROCEDURE — 85730 THROMBOPLASTIN TIME PARTIAL: CPT

## 2024-07-13 PROCEDURE — 85520 HEPARIN ASSAY: CPT

## 2024-07-13 PROCEDURE — 84484 ASSAY OF TROPONIN QUANT: CPT

## 2024-07-13 PROCEDURE — 71045 X-RAY EXAM CHEST 1 VIEW: CPT

## 2024-07-13 PROCEDURE — 6360000002 HC RX W HCPCS: Performed by: EMERGENCY MEDICINE

## 2024-07-13 PROCEDURE — 81001 URINALYSIS AUTO W/SCOPE: CPT

## 2024-07-13 PROCEDURE — 80048 BASIC METABOLIC PNL TOTAL CA: CPT

## 2024-07-13 PROCEDURE — 96374 THER/PROPH/DIAG INJ IV PUSH: CPT

## 2024-07-13 PROCEDURE — 99285 EMERGENCY DEPT VISIT HI MDM: CPT

## 2024-07-13 PROCEDURE — 1200000000 HC SEMI PRIVATE

## 2024-07-13 PROCEDURE — 93005 ELECTROCARDIOGRAM TRACING: CPT | Performed by: EMERGENCY MEDICINE

## 2024-07-13 PROCEDURE — 85610 PROTHROMBIN TIME: CPT

## 2024-07-13 RX ORDER — SODIUM CHLORIDE 9 MG/ML
INJECTION, SOLUTION INTRAVENOUS PRN
Status: DISCONTINUED | OUTPATIENT
Start: 2024-07-13 | End: 2024-07-19 | Stop reason: HOSPADM

## 2024-07-13 RX ORDER — ATORVASTATIN CALCIUM 40 MG/1
40 TABLET, FILM COATED ORAL DAILY
Status: DISCONTINUED | OUTPATIENT
Start: 2024-07-14 | End: 2024-07-19 | Stop reason: HOSPADM

## 2024-07-13 RX ORDER — HEPARIN SODIUM 1000 [USP'U]/ML
2000 INJECTION, SOLUTION INTRAVENOUS; SUBCUTANEOUS PRN
Status: DISCONTINUED | OUTPATIENT
Start: 2024-07-13 | End: 2024-07-18

## 2024-07-13 RX ORDER — ONDANSETRON 4 MG/1
4 TABLET, ORALLY DISINTEGRATING ORAL EVERY 8 HOURS PRN
Status: DISCONTINUED | OUTPATIENT
Start: 2024-07-13 | End: 2024-07-19 | Stop reason: HOSPADM

## 2024-07-13 RX ORDER — HEPARIN SODIUM 1000 [USP'U]/ML
4000 INJECTION, SOLUTION INTRAVENOUS; SUBCUTANEOUS PRN
Status: DISCONTINUED | OUTPATIENT
Start: 2024-07-13 | End: 2024-07-18

## 2024-07-13 RX ORDER — SODIUM CHLORIDE 0.9 % (FLUSH) 0.9 %
5-40 SYRINGE (ML) INJECTION EVERY 12 HOURS SCHEDULED
Status: DISCONTINUED | OUTPATIENT
Start: 2024-07-13 | End: 2024-07-19 | Stop reason: HOSPADM

## 2024-07-13 RX ORDER — BUMETANIDE 0.25 MG/ML
2 INJECTION INTRAMUSCULAR; INTRAVENOUS ONCE
Status: COMPLETED | OUTPATIENT
Start: 2024-07-13 | End: 2024-07-13

## 2024-07-13 RX ORDER — ALBUTEROL SULFATE 90 UG/1
1 AEROSOL, METERED RESPIRATORY (INHALATION) EVERY 4 HOURS PRN
Status: DISCONTINUED | OUTPATIENT
Start: 2024-07-13 | End: 2024-07-19 | Stop reason: HOSPADM

## 2024-07-13 RX ORDER — SODIUM CHLORIDE 0.9 % (FLUSH) 0.9 %
5-40 SYRINGE (ML) INJECTION PRN
Status: DISCONTINUED | OUTPATIENT
Start: 2024-07-13 | End: 2024-07-19 | Stop reason: HOSPADM

## 2024-07-13 RX ORDER — ASPIRIN 81 MG/1
81 TABLET ORAL DAILY
Status: DISCONTINUED | OUTPATIENT
Start: 2024-07-13 | End: 2024-07-19 | Stop reason: HOSPADM

## 2024-07-13 RX ORDER — POLYETHYLENE GLYCOL 3350 17 G/17G
17 POWDER, FOR SOLUTION ORAL DAILY PRN
Status: DISCONTINUED | OUTPATIENT
Start: 2024-07-13 | End: 2024-07-19 | Stop reason: HOSPADM

## 2024-07-13 RX ORDER — INSULIN GLARGINE 100 [IU]/ML
50 INJECTION, SOLUTION SUBCUTANEOUS 2 TIMES DAILY
Status: DISCONTINUED | OUTPATIENT
Start: 2024-07-13 | End: 2024-07-19 | Stop reason: HOSPADM

## 2024-07-13 RX ORDER — ONDANSETRON 2 MG/ML
4 INJECTION INTRAMUSCULAR; INTRAVENOUS EVERY 6 HOURS PRN
Status: DISCONTINUED | OUTPATIENT
Start: 2024-07-13 | End: 2024-07-19 | Stop reason: HOSPADM

## 2024-07-13 RX ORDER — GABAPENTIN 300 MG/1
300 CAPSULE ORAL 2 TIMES DAILY
Status: DISCONTINUED | OUTPATIENT
Start: 2024-07-13 | End: 2024-07-19 | Stop reason: HOSPADM

## 2024-07-13 RX ORDER — ACETAMINOPHEN 650 MG/1
650 SUPPOSITORY RECTAL EVERY 6 HOURS PRN
Status: DISCONTINUED | OUTPATIENT
Start: 2024-07-13 | End: 2024-07-19 | Stop reason: HOSPADM

## 2024-07-13 RX ORDER — DOXAZOSIN MESYLATE 4 MG/1
4 TABLET ORAL 2 TIMES DAILY
Status: DISCONTINUED | OUTPATIENT
Start: 2024-07-13 | End: 2024-07-19 | Stop reason: HOSPADM

## 2024-07-13 RX ORDER — ACETAMINOPHEN 325 MG/1
650 TABLET ORAL EVERY 6 HOURS PRN
Status: DISCONTINUED | OUTPATIENT
Start: 2024-07-13 | End: 2024-07-19 | Stop reason: HOSPADM

## 2024-07-13 RX ORDER — HEPARIN SODIUM 10000 [USP'U]/100ML
5-30 INJECTION, SOLUTION INTRAVENOUS CONTINUOUS
Status: DISCONTINUED | OUTPATIENT
Start: 2024-07-13 | End: 2024-07-18

## 2024-07-13 RX ADMIN — METOPROLOL TARTRATE 12.5 MG: 25 TABLET, FILM COATED ORAL at 21:11

## 2024-07-13 RX ADMIN — INSULIN GLARGINE 50 UNITS: 100 INJECTION, SOLUTION SUBCUTANEOUS at 21:11

## 2024-07-13 RX ADMIN — ACETAMINOPHEN 650 MG: 325 TABLET ORAL at 23:30

## 2024-07-13 RX ADMIN — BUMETANIDE 2 MG: 0.25 INJECTION INTRAMUSCULAR; INTRAVENOUS at 17:38

## 2024-07-13 RX ADMIN — DOXAZOSIN MESYLATE 4 MG: 4 TABLET ORAL at 21:11

## 2024-07-13 RX ADMIN — GABAPENTIN 300 MG: 300 CAPSULE ORAL at 21:11

## 2024-07-13 RX ADMIN — HEPARIN SODIUM 8 UNITS/KG/HR: 10000 INJECTION, SOLUTION INTRAVENOUS at 20:50

## 2024-07-13 RX ADMIN — BUMETANIDE 2 MG: 0.25 INJECTION INTRAMUSCULAR; INTRAVENOUS at 21:11

## 2024-07-13 ASSESSMENT — PAIN SCALES - GENERAL
PAINLEVEL_OUTOF10: 3
PAINLEVEL_OUTOF10: 5

## 2024-07-13 ASSESSMENT — PAIN DESCRIPTION - LOCATION: LOCATION: LEG

## 2024-07-13 ASSESSMENT — PAIN DESCRIPTION - DESCRIPTORS: DESCRIPTORS: ACHING;SORE

## 2024-07-13 ASSESSMENT — PAIN DESCRIPTION - ORIENTATION: ORIENTATION: LEFT;RIGHT

## 2024-07-13 ASSESSMENT — PAIN - FUNCTIONAL ASSESSMENT: PAIN_FUNCTIONAL_ASSESSMENT: 0-10

## 2024-07-13 NOTE — ED PROVIDER NOTES
Chronic Abscess on Back performed by Kyree Mercado MD at [facility] OR     SECTION  2002    CHOLECYSTECTOMY      CT BIOPSY RENAL  2023    CT BIOPSY RENAL 2023 [facility] CT SCAN         CURRENT MEDICATIONS       Current Discharge Medication List        CONTINUE these medications which have NOT CHANGED    Details   TRUE METRIX BLOOD GLUCOSE TEST strip TEST 4 TIMES DAILY      Continuous Glucose Transmitter (DEXCOM G6 TRANSMITTER) MISC USE TO CHECK BLOOD SUGARS DAILY AS DIRECTED . .      Continuous Glucose Sensor (DEXCOM G6 SENSOR) MISC DEXCOM/USE TO CHECK SUGARS DAILY AS DIRECTED . .      bumetanide (BUMEX) 2 MG tablet Take 1 tablet by mouth 2 times daily      albuterol sulfate HFA (PROVENTIL;VENTOLIN;PROAIR) 108 (90 Base) MCG/ACT inhaler INHALE 2 PUFFS EVERY 6 HOURS AS NEEDED FOR WHEEZE OR FOR SHORTNESS OF BREATH      acetaminophen (TYLENOL) 325 MG tablet Take 2 tablets by mouth every 6 hours as needed for Pain  Qty: 120 tablet, Refills: 3      apixaban (ELIQUIS) 2.5 MG TABS tablet Take 1 tablet by mouth 2 times daily  Qty: 60 tablet, Refills: 0      gabapentin (NEURONTIN) 300 MG capsule Take 1 capsule by mouth in the morning and at bedtime for 10 days. Indications: Nerve Disease  Qty: 20 capsule, Refills: 0      Glucagon, rDNA, (GLUCAGON EMERGENCY) 1 MG KIT Inject 1 mg into the skin as needed (Blood glucose LESS THAN 70 mg/dL and patient NOT ALERT or NPO and does not have IV access.)  Qty: 1 kit, Refills: 0      glucose 4 g chewable tablet Take 4 tablets by mouth as needed for Low blood sugar  Qty: 60 tablet, Refills: 0      !! insulin lispro, 1 Unit Dial, (HUMALOG KWIKPEN) 100 UNIT/ML SOPN Inject 16 Units into the skin 3 times daily (before meals)  Qty: 3 Adjustable Dose Pre-filled Pen Syringe, Refills: 0      !! insulin lispro, 1 Unit Dial, (HUMALOG KWIKPEN) 100 UNIT/ML SOPN Inject SC q ac and bedtime - high dose sliding scale Glucose: Dose: No Insulin; 181-230 4 Units; 231-280 8 Units;

## 2024-07-13 NOTE — H&P
History & Physical    Patient:  Debbie Locke  YOB: 1975  Date of Service: 7/13/2024  MRN: [id number]   Acct:  [id number]   Primary Care Physician: Kory Thomas MD    Chief Complaint: Swelling, weight gain    Assessment and plan:  ROXANA on CKD IV: Follows with Dr. Neville.  More recent baseline appears to be around 4-4.2.  Creatinine on admission 5.1, GFR 10.  Patient was given 2 mg of IV Bumex in ER.  Nephrology was consulted.  Patient states that Dr. Neville had talked about having a dialysis catheter placed in the next coming months.  I spoke with Dr. Garza.  Will hold anticoagulation temporarily until it is determined if patient will need a dialysis catheter placed.  Recommendation to give another 2 mg of IV Bumex tonight.  Avoid nephrotoxic agents, renally dose medications.  Repeat BMP in the morning.  Hypervolemia/elevated BNP: Suspect hypervolemia secondary to renal dysfunction versus cardiac etiology.  However BNP on admission is 8693, was 4564 in May.  Echo completed on 2/1/2024 showed an EF of 50%, moderate left ventricular concentric hypertrophy.  Patient does report orthopnea.  Will check limited echo at this time.  Nephrology managing diuretics. Fluid restriction in place.  Elevated troponin: HST 95 on admission.  EKG showing sinus rhythm without acute ST elevation or depression.  Patient denies chest pain.  Suspect elevated troponin secondary to renal dysfunction.  Patient underwent stress test in February at [facility] which was normal. Will repeat troponin at this time.  Monitor on telemetry.  Echo as above.                                History of recurrent DVT: Previously on Xarelto, was transitioned to Eliquis low-dose at last admission due to renal function.  Currently will hold anticoagulation after speaking with nephrology, in case patient needs dialysis catheter placed.  Will start IV heparin with history.  Uncontrolled IDDMII with peripheral neuropathy:

## 2024-07-13 NOTE — ED TRIAGE NOTES
Pt arrives to ED from home with c/o fluid retention. Pt states she has been following with DR Byrne and David. States she has had issues with fluid overload in the past, states she is not on dialysis at this time.   Pt states she noticed the fluid in her abdomen starting about 2 weeks ago, pt states she has not updated nephrology on the new swelling. Pt states she has developed a slight cough as well.   Is still producing urine.   Pt denies any pain anywhere else

## 2024-07-13 NOTE — ED NOTES
Pt medicated per mar  Patient resting in bed. Respirations easy and unlabored. No distress noted. Call light within reach.

## 2024-07-13 NOTE — ED NOTES
ED to inpatient nurses report      Chief Complaint:  Chief Complaint   Patient presents with    Hypervolemia      Present to ED from: home    MOA:     LOC: alert and orientated to name, place, date  Mobility: Requires assistance * 1  Oxygen Baseline: 100    Current needs required: RA     Code Status:   Prior    What abnormal results were found and what did you give/do to treat them? Fluid retention   Any procedures or intervention occur? See chart    Mental Status:  Level of Consciousness: Alert (0)    Psych Assessment:        Vitals:  Patient Vitals for the past 24 hrs:   BP Temp Temp src Pulse Resp SpO2 Weight   07/13/24 1740 -- -- -- 88 27 98 % --   07/13/24 1738 133/81 -- -- 87 18 97 % --   07/13/24 1624 -- -- -- -- -- -- 115.7 kg (255 lb)   07/13/24 1620 (!) 149/82 97.7 °F (36.5 °C) Oral 98 16 98 % --        LDAs:   Peripheral IV 07/13/24 Left Antecubital (Active)   Site Assessment Clean, dry & intact 07/13/24 1625       Ambulatory Status:  Presents to emergency department  because of falls (Syncope, seizure, or loss of consciousness): No, Age > 70: No, Altered Mental Status, Intoxication with alcohol or substance confusion (Disorientation, impaired judgment, poor safety awaremess, or inability to follow instructions): No, Impaired Mobility: Ambulates or transfers with assistive devices or assistance; Unable to ambulate or transer.: No, Nursing Judgement: No    Diagnosis:  DISPOSITION Admitted 07/13/2024 06:17:25 PM   Final diagnoses:   Anasarca   Hypervolemia, unspecified hypervolemia type   Orthopnea        Consults:  IP CONSULT TO NEPHROLOGY     Pain Score:  Pain Assessment  Pain Assessment: 0-10  Pain Level: 3    C-SSRS:   Risk of Suicide: No Risk    Sepsis Screening:       [place] Fall Risk:  [place] 1 Fall Risk  Presents to emergency department  because of falls (Syncope, seizure, or loss of consciousness): No  Age > 70: No  Altered Mental Status, Intoxication with alcohol or substance confusion

## 2024-07-14 LAB
ANION GAP SERPL CALC-SCNC: 13 MEQ/L (ref 8–16)
APTT PPP: 30.2 SECONDS (ref 22–38)
APTT PPP: 31.5 SECONDS (ref 22–38)
APTT PPP: 34.7 SECONDS (ref 22–38)
APTT PPP: 48.3 SECONDS (ref 22–38)
BASOPHILS ABSOLUTE: 0 THOU/MM3 (ref 0–0.1)
BASOPHILS NFR BLD AUTO: 0.4 %
BUN SERPL-MCNC: 64 MG/DL (ref 7–22)
CALCIUM SERPL-MCNC: 7.3 MG/DL (ref 8.5–10.5)
CHLORIDE SERPL-SCNC: 104 MEQ/L (ref 98–111)
CO2 SERPL-SCNC: 18 MEQ/L (ref 23–33)
CREAT SERPL-MCNC: 5.5 MG/DL (ref 0.4–1.2)
DEPRECATED RDW RBC AUTO: 48.7 FL (ref 35–45)
EKG ATRIAL RATE: 100 BPM
EKG P AXIS: 50 DEGREES
EKG P-R INTERVAL: 174 MS
EKG Q-T INTERVAL: 382 MS
EKG QRS DURATION: 84 MS
EKG QTC CALCULATION (BAZETT): 492 MS
EKG R AXIS: 96 DEGREES
EKG T AXIS: 110 DEGREES
EKG VENTRICULAR RATE: 100 BPM
EOSINOPHIL NFR BLD AUTO: 4.2 %
EOSINOPHILS ABSOLUTE: 0.3 THOU/MM3 (ref 0–0.4)
ERYTHROCYTE [DISTWIDTH] IN BLOOD BY AUTOMATED COUNT: 15.8 % (ref 11.5–14.5)
GFR SERPL CREATININE-BSD FRML MDRD: 9 ML/MIN/1.73M2
GLUCOSE SERPL-MCNC: 294 MG/DL (ref 70–108)
HCT VFR BLD AUTO: 25.2 % (ref 37–47)
HGB BLD-MCNC: 7.6 GM/DL (ref 12–16)
IMM GRANULOCYTES # BLD AUTO: 0.05 THOU/MM3 (ref 0–0.07)
IMM GRANULOCYTES NFR BLD AUTO: 0.6 %
LYMPHOCYTES ABSOLUTE: 1.4 THOU/MM3 (ref 1–4.8)
LYMPHOCYTES NFR BLD AUTO: 17.1 %
MCH RBC QN AUTO: 25.9 PG (ref 26–33)
MCHC RBC AUTO-ENTMCNC: 30.2 GM/DL (ref 32.2–35.5)
MCV RBC AUTO: 86 FL (ref 81–99)
MONOCYTES ABSOLUTE: 0.4 THOU/MM3 (ref 0.4–1.3)
MONOCYTES NFR BLD AUTO: 4.3 %
NEUTROPHILS ABSOLUTE: 6.1 THOU/MM3 (ref 1.8–7.7)
NEUTROPHILS NFR BLD AUTO: 73.4 %
NRBC BLD AUTO-RTO: 0 /100 WBC
PLATELET # BLD AUTO: 234 THOU/MM3 (ref 130–400)
PMV BLD AUTO: 9.8 FL (ref 9.4–12.4)
POTASSIUM SERPL-SCNC: 4.5 MEQ/L (ref 3.5–5.2)
RBC # BLD AUTO: 2.93 MILL/MM3 (ref 4.2–5.4)
SODIUM SERPL-SCNC: 135 MEQ/L (ref 135–145)
WBC # BLD AUTO: 8.3 THOU/MM3 (ref 4.8–10.8)

## 2024-07-14 PROCEDURE — 6370000000 HC RX 637 (ALT 250 FOR IP)

## 2024-07-14 PROCEDURE — 85730 THROMBOPLASTIN TIME PARTIAL: CPT

## 2024-07-14 PROCEDURE — 36415 COLL VENOUS BLD VENIPUNCTURE: CPT

## 2024-07-14 PROCEDURE — 2580000003 HC RX 258

## 2024-07-14 PROCEDURE — 1200000000 HC SEMI PRIVATE

## 2024-07-14 PROCEDURE — 93010 ELECTROCARDIOGRAM REPORT: CPT | Performed by: INTERNAL MEDICINE

## 2024-07-14 PROCEDURE — 99222 1ST HOSP IP/OBS MODERATE 55: CPT | Performed by: INTERNAL MEDICINE

## 2024-07-14 PROCEDURE — 6360000002 HC RX W HCPCS: Performed by: INTERNAL MEDICINE

## 2024-07-14 PROCEDURE — 6360000002 HC RX W HCPCS

## 2024-07-14 PROCEDURE — 1200000003 HC TELEMETRY R&B

## 2024-07-14 PROCEDURE — 6370000000 HC RX 637 (ALT 250 FOR IP): Performed by: PHYSICIAN ASSISTANT

## 2024-07-14 PROCEDURE — 85025 COMPLETE CBC W/AUTO DIFF WBC: CPT

## 2024-07-14 PROCEDURE — 80048 BASIC METABOLIC PNL TOTAL CA: CPT

## 2024-07-14 RX ORDER — BUMETANIDE 0.25 MG/ML
1 INJECTION INTRAMUSCULAR; INTRAVENOUS 2 TIMES DAILY
Status: DISCONTINUED | OUTPATIENT
Start: 2024-07-14 | End: 2024-07-17

## 2024-07-14 RX ORDER — FERROUS SULFATE 325(65) MG
325 TABLET ORAL 2 TIMES DAILY WITH MEALS
Status: DISCONTINUED | OUTPATIENT
Start: 2024-07-14 | End: 2024-07-15

## 2024-07-14 RX ADMIN — INSULIN GLARGINE 50 UNITS: 100 INJECTION, SOLUTION SUBCUTANEOUS at 20:41

## 2024-07-14 RX ADMIN — INSULIN GLARGINE 50 UNITS: 100 INJECTION, SOLUTION SUBCUTANEOUS at 08:04

## 2024-07-14 RX ADMIN — GABAPENTIN 300 MG: 300 CAPSULE ORAL at 20:37

## 2024-07-14 RX ADMIN — GABAPENTIN 300 MG: 300 CAPSULE ORAL at 08:03

## 2024-07-14 RX ADMIN — BUMETANIDE 1 MG: 0.25 INJECTION INTRAMUSCULAR; INTRAVENOUS at 17:07

## 2024-07-14 RX ADMIN — HEPARIN SODIUM 4000 UNITS: 1000 INJECTION INTRAVENOUS; SUBCUTANEOUS at 13:05

## 2024-07-14 RX ADMIN — METOPROLOL TARTRATE 12.5 MG: 25 TABLET, FILM COATED ORAL at 08:03

## 2024-07-14 RX ADMIN — SODIUM CHLORIDE, PRESERVATIVE FREE 10 ML: 5 INJECTION INTRAVENOUS at 20:42

## 2024-07-14 RX ADMIN — DOXAZOSIN MESYLATE 4 MG: 4 TABLET ORAL at 08:03

## 2024-07-14 RX ADMIN — HEPARIN SODIUM 16 UNITS/KG/HR: 10000 INJECTION, SOLUTION INTRAVENOUS at 13:05

## 2024-07-14 RX ADMIN — DOXAZOSIN MESYLATE 4 MG: 4 TABLET ORAL at 20:37

## 2024-07-14 RX ADMIN — ATORVASTATIN CALCIUM 40 MG: 40 TABLET, FILM COATED ORAL at 20:37

## 2024-07-14 RX ADMIN — FERROUS SULFATE TAB 325 MG (65 MG ELEMENTAL FE) 325 MG: 325 (65 FE) TAB at 17:07

## 2024-07-14 RX ADMIN — METOPROLOL TARTRATE 12.5 MG: 25 TABLET, FILM COATED ORAL at 20:37

## 2024-07-14 RX ADMIN — HEPARIN SODIUM 4000 UNITS: 1000 INJECTION INTRAVENOUS; SUBCUTANEOUS at 05:30

## 2024-07-14 RX ADMIN — HEPARIN SODIUM 4000 UNITS: 1000 INJECTION INTRAVENOUS; SUBCUTANEOUS at 21:47

## 2024-07-14 ASSESSMENT — PAIN SCALES - WONG BAKER
WONGBAKER_NUMERICALRESPONSE: NO HURT

## 2024-07-14 ASSESSMENT — PAIN SCALES - GENERAL: PAINLEVEL_OUTOF10: 0

## 2024-07-14 NOTE — CONSULTS
[facility]    [address] [place] [id number]  [phone]     Hospital Consult  2024 11:57 AM    Pt Name:    Debbie Locke  MRN:     [id number]   [id number]  YOB: 1975  Admit Date:    2024  4:15 PM  Primary Care Physician:  Kory Thomas MD    CSN Number:   [id number]    Reason for Consult:  ROXANA, CKD  Requesting provider:  Hospitalist    History:   The patient is a 48 y.o. pleasant female with history of progressive CKD 5, history of diabetes with diabetic nephropathy, renal cyst who presented to the hospital with complaints of worsening lower extremity swelling associated with weight gain and some shortness of breath with exertion and activity.  No alleviating factors.  Patient reports that over the last several days she has gained several pounds.  She reports that over the last several weeks she feels she has gained over 20 pounds.  She reports some shortness of breath with exertion.  Reports abdominal distention.  Also reports some orthopnea.  No chest pain.  No dizziness or lightheadedness.  No urinary complaints.  She was admitted.  Chest x-ray revealed small pleural effusion.  Case discussed with primary team last night.  Patient given 2 mg of IV Bumex in the emergency room and then again overnight.  Currently on room air.  Reports feeling little better but still has some lower extremity swelling.  Currently on room air.  No distress    Past Medical History:  Past Medical History:   Diagnosis Date    Chronic kidney disease     Hyperlipidemia     Neuropathy     Type II or unspecified type diabetes mellitus without mention of complication, not stated as uncontrolled 2000       Past Surgical History:  Past Surgical History:   Procedure Laterality Date    BACK SURGERY N/A 2024    Excision of Chronic Abscess on Back performed by Kyree Mercado MD at [facility] OR     SECTION  2002    CHOLECYSTECTOMY      CT BIOPSY RENAL

## 2024-07-14 NOTE — PLAN OF CARE
Problem: Discharge Planning  Goal: Discharge to home or other facility with appropriate resources  Outcome: Progressing  Flowsheets (Taken 7/14/2024 0739)  Discharge to home or other facility with appropriate resources: Identify barriers to discharge with patient and caregiver     Problem: Pain  Goal: Verbalizes/displays adequate comfort level or baseline comfort level  Outcome: Progressing  Flowsheets (Taken 7/14/2024 0739)  Verbalizes/displays adequate comfort level or baseline comfort level: Encourage patient to monitor pain and request assistance     Problem: Safety - Adult  Goal: Free from fall injury  Outcome: Progressing

## 2024-07-15 ENCOUNTER — APPOINTMENT (OUTPATIENT)
Age: 49
DRG: 469 | End: 2024-07-15
Payer: COMMERCIAL

## 2024-07-15 ENCOUNTER — APPOINTMENT (OUTPATIENT)
Dept: INTERVENTIONAL RADIOLOGY/VASCULAR | Age: 49
DRG: 469 | End: 2024-07-15
Payer: COMMERCIAL

## 2024-07-15 LAB
ANION GAP SERPL CALC-SCNC: 14 MEQ/L (ref 8–16)
APTT PPP: 30.6 SECONDS (ref 22–38)
APTT PPP: 58.9 SECONDS (ref 22–38)
BUN SERPL-MCNC: 71 MG/DL (ref 7–22)
CALCIUM SERPL-MCNC: 7.3 MG/DL (ref 8.5–10.5)
CHLORIDE SERPL-SCNC: 102 MEQ/L (ref 98–111)
CO2 SERPL-SCNC: 19 MEQ/L (ref 23–33)
CREAT SERPL-MCNC: 5.8 MG/DL (ref 0.4–1.2)
DEPRECATED RDW RBC AUTO: 50 FL (ref 35–45)
ECHO AO ASC DIAM: 3.4 CM
ECHO AO ASCENDING AORTA INDEX: 1.55 CM/M2
ECHO AV CUSP MM: 1.9 CM
ECHO AV PEAK GRADIENT: 9 MMHG
ECHO AV PEAK VELOCITY: 1.5 M/S
ECHO AV VELOCITY RATIO: 0.53
ECHO BSA: 2.29 M2
ECHO EST RA PRESSURE: 15 MMHG
ECHO LA AREA 2C: 21.3 CM2
ECHO LA AREA 4C: 22.6 CM2
ECHO LA DIAMETER INDEX: 1.69 CM/M2
ECHO LA DIAMETER: 3.7 CM
ECHO LA MAJOR AXIS: 5.8 CM
ECHO LA MINOR AXIS: 5.2 CM
ECHO LA VOL BP: 74 ML (ref 22–52)
ECHO LA VOL MOD A2C: 72 ML (ref 22–52)
ECHO LA VOL MOD A4C: 68 ML (ref 22–52)
ECHO LA VOL/BSA BIPLANE: 34 ML/M2 (ref 16–34)
ECHO LA VOLUME INDEX MOD A2C: 33 ML/M2 (ref 16–34)
ECHO LA VOLUME INDEX MOD A4C: 31 ML/M2 (ref 16–34)
ECHO LV FRACTIONAL SHORTENING: 30 % (ref 28–44)
ECHO LV INTERNAL DIMENSION DIASTOLE INDEX: 2.15 CM/M2
ECHO LV INTERNAL DIMENSION DIASTOLIC: 4.7 CM (ref 3.9–5.3)
ECHO LV INTERNAL DIMENSION SYSTOLIC INDEX: 1.51 CM/M2
ECHO LV INTERNAL DIMENSION SYSTOLIC: 3.3 CM
ECHO LV ISOVOLUMETRIC RELAXATION TIME (IVRT): 67 MS
ECHO LV IVSD: 1.2 CM (ref 0.6–0.9)
ECHO LV MASS 2D: 224.8 G (ref 67–162)
ECHO LV MASS INDEX 2D: 102.6 G/M2 (ref 43–95)
ECHO LV POSTERIOR WALL DIASTOLIC: 1.3 CM (ref 0.6–0.9)
ECHO LV RELATIVE WALL THICKNESS RATIO: 0.55
ECHO LVOT PEAK GRADIENT: 3 MMHG
ECHO LVOT PEAK VELOCITY: 0.8 M/S
ECHO MV E DECELERATION TIME (DT): 155 MS
ECHO MV E VELOCITY: 1.38 M/S
ECHO PV MAX VELOCITY: 0.8 M/S
ECHO PV PEAK GRADIENT: 3 MMHG
ECHO RIGHT VENTRICULAR SYSTOLIC PRESSURE (RVSP): 52 MMHG
ECHO RV INTERNAL DIMENSION: 3 CM
ECHO TV E WAVE: 1.2 M/S
ECHO TV REGURGITANT MAX VELOCITY: 3.04 M/S
ECHO TV REGURGITANT PEAK GRADIENT: 37 MMHG
ERYTHROCYTE [DISTWIDTH] IN BLOOD BY AUTOMATED COUNT: 15.9 % (ref 11.5–14.5)
FERRITIN SERPL IA-MCNC: 58 NG/ML (ref 10–291)
GFR SERPL CREATININE-BSD FRML MDRD: 8 ML/MIN/1.73M2
GLUCOSE BLD STRIP.AUTO-MCNC: 244 MG/DL (ref 70–108)
GLUCOSE SERPL-MCNC: 230 MG/DL (ref 70–108)
HBV CORE IGM SERPL QL IA: NEGATIVE
HBV SURFACE AB SER QL IA: NEGATIVE
HBV SURFACE AG SERPL QL IA: NEGATIVE
HCT VFR BLD AUTO: 23.6 % (ref 37–47)
HGB BLD-MCNC: 7.1 GM/DL (ref 12–16)
IRON SATN MFR SERPL: 12 % (ref 20–50)
IRON SERPL-MCNC: 27 UG/DL (ref 50–170)
MCH RBC QN AUTO: 26 PG (ref 26–33)
MCHC RBC AUTO-ENTMCNC: 30.1 GM/DL (ref 32.2–35.5)
MCV RBC AUTO: 86.4 FL (ref 81–99)
PHOSPHATE SERPL-MCNC: 7.4 MG/DL (ref 2.4–4.7)
PLATELET # BLD AUTO: 237 THOU/MM3 (ref 130–400)
PMV BLD AUTO: 10.2 FL (ref 9.4–12.4)
POTASSIUM SERPL-SCNC: 4.6 MEQ/L (ref 3.5–5.2)
PTH-INTACT SERPL-MCNC: 149.9 PG/ML (ref 15–65)
RBC # BLD AUTO: 2.73 MILL/MM3 (ref 4.2–5.4)
SODIUM SERPL-SCNC: 135 MEQ/L (ref 135–145)
TIBC SERPL-MCNC: 227 UG/DL (ref 171–450)
WBC # BLD AUTO: 9.4 THOU/MM3 (ref 4.8–10.8)

## 2024-07-15 PROCEDURE — 6360000002 HC RX W HCPCS

## 2024-07-15 PROCEDURE — 85730 THROMBOPLASTIN TIME PARTIAL: CPT

## 2024-07-15 PROCEDURE — 80048 BASIC METABOLIC PNL TOTAL CA: CPT

## 2024-07-15 PROCEDURE — 83550 IRON BINDING TEST: CPT

## 2024-07-15 PROCEDURE — B5181ZA FLUOROSCOPY OF SUPERIOR VENA CAVA USING LOW OSMOLAR CONTRAST, GUIDANCE: ICD-10-PCS | Performed by: RADIOLOGY

## 2024-07-15 PROCEDURE — 6360000002 HC RX W HCPCS: Performed by: INTERNAL MEDICINE

## 2024-07-15 PROCEDURE — 93306 TTE W/DOPPLER COMPLETE: CPT

## 2024-07-15 PROCEDURE — 02H633Z INSERTION OF INFUSION DEVICE INTO RIGHT ATRIUM, PERCUTANEOUS APPROACH: ICD-10-PCS | Performed by: RADIOLOGY

## 2024-07-15 PROCEDURE — 6370000000 HC RX 637 (ALT 250 FOR IP)

## 2024-07-15 PROCEDURE — 2580000003 HC RX 258

## 2024-07-15 PROCEDURE — 6370000000 HC RX 637 (ALT 250 FOR IP): Performed by: INTERNAL MEDICINE

## 2024-07-15 PROCEDURE — 85027 COMPLETE CBC AUTOMATED: CPT

## 2024-07-15 PROCEDURE — 86705 HEP B CORE ANTIBODY IGM: CPT

## 2024-07-15 PROCEDURE — 36556 INSERT NON-TUNNEL CV CATH: CPT

## 2024-07-15 PROCEDURE — 76937 US GUIDE VASCULAR ACCESS: CPT

## 2024-07-15 PROCEDURE — 83970 ASSAY OF PARATHORMONE: CPT

## 2024-07-15 PROCEDURE — 6370000000 HC RX 637 (ALT 250 FOR IP): Performed by: PHYSICIAN ASSISTANT

## 2024-07-15 PROCEDURE — 83540 ASSAY OF IRON: CPT

## 2024-07-15 PROCEDURE — 86706 HEP B SURFACE ANTIBODY: CPT

## 2024-07-15 PROCEDURE — 1200000003 HC TELEMETRY R&B

## 2024-07-15 PROCEDURE — 2580000003 HC RX 258: Performed by: INTERNAL MEDICINE

## 2024-07-15 PROCEDURE — 93306 TTE W/DOPPLER COMPLETE: CPT | Performed by: NUCLEAR MEDICINE

## 2024-07-15 PROCEDURE — 87340 HEPATITIS B SURFACE AG IA: CPT

## 2024-07-15 PROCEDURE — 77001 FLUOROGUIDE FOR VEIN DEVICE: CPT

## 2024-07-15 PROCEDURE — 2709999900 IR FLUORO GUIDED CVA DEVICE PLMT/REPLACE/REMOVAL

## 2024-07-15 PROCEDURE — 90935 HEMODIALYSIS ONE EVALUATION: CPT

## 2024-07-15 PROCEDURE — 82948 REAGENT STRIP/BLOOD GLUCOSE: CPT

## 2024-07-15 PROCEDURE — 99233 SBSQ HOSP IP/OBS HIGH 50: CPT | Performed by: INTERNAL MEDICINE

## 2024-07-15 PROCEDURE — 84100 ASSAY OF PHOSPHORUS: CPT

## 2024-07-15 PROCEDURE — 5A1D70Z PERFORMANCE OF URINARY FILTRATION, INTERMITTENT, LESS THAN 6 HOURS PER DAY: ICD-10-PCS | Performed by: INTERNAL MEDICINE

## 2024-07-15 PROCEDURE — 36415 COLL VENOUS BLD VENIPUNCTURE: CPT

## 2024-07-15 PROCEDURE — 1200000000 HC SEMI PRIVATE

## 2024-07-15 PROCEDURE — 82728 ASSAY OF FERRITIN: CPT

## 2024-07-15 RX ORDER — GLUCAGON 1 MG/ML
1 KIT INJECTION PRN
Status: DISCONTINUED | OUTPATIENT
Start: 2024-07-15 | End: 2024-07-19 | Stop reason: HOSPADM

## 2024-07-15 RX ORDER — DEXTROSE MONOHYDRATE 100 MG/ML
INJECTION, SOLUTION INTRAVENOUS CONTINUOUS PRN
Status: DISCONTINUED | OUTPATIENT
Start: 2024-07-15 | End: 2024-07-19 | Stop reason: HOSPADM

## 2024-07-15 RX ORDER — SEVELAMER CARBONATE 800 MG/1
800 TABLET, FILM COATED ORAL
Status: DISCONTINUED | OUTPATIENT
Start: 2024-07-15 | End: 2024-07-19 | Stop reason: HOSPADM

## 2024-07-15 RX ADMIN — METOPROLOL TARTRATE 12.5 MG: 25 TABLET, FILM COATED ORAL at 21:28

## 2024-07-15 RX ADMIN — METOPROLOL TARTRATE 12.5 MG: 25 TABLET, FILM COATED ORAL at 09:13

## 2024-07-15 RX ADMIN — SEVELAMER CARBONATE 800 MG: 800 TABLET, FILM COATED ORAL at 17:40

## 2024-07-15 RX ADMIN — ATORVASTATIN CALCIUM 40 MG: 40 TABLET, FILM COATED ORAL at 21:27

## 2024-07-15 RX ADMIN — BUMETANIDE 1 MG: 0.25 INJECTION INTRAMUSCULAR; INTRAVENOUS at 09:15

## 2024-07-15 RX ADMIN — SODIUM CHLORIDE, PRESERVATIVE FREE 10 ML: 5 INJECTION INTRAVENOUS at 21:30

## 2024-07-15 RX ADMIN — FERROUS SULFATE TAB 325 MG (65 MG ELEMENTAL FE) 325 MG: 325 (65 FE) TAB at 09:13

## 2024-07-15 RX ADMIN — GABAPENTIN 300 MG: 300 CAPSULE ORAL at 21:28

## 2024-07-15 RX ADMIN — DOXAZOSIN MESYLATE 4 MG: 4 TABLET ORAL at 09:15

## 2024-07-15 RX ADMIN — SEVELAMER CARBONATE 800 MG: 800 TABLET, FILM COATED ORAL at 12:50

## 2024-07-15 RX ADMIN — INSULIN GLARGINE 50 UNITS: 100 INJECTION, SOLUTION SUBCUTANEOUS at 21:27

## 2024-07-15 RX ADMIN — BUMETANIDE 1 MG: 0.25 INJECTION INTRAMUSCULAR; INTRAVENOUS at 17:34

## 2024-07-15 RX ADMIN — GABAPENTIN 300 MG: 300 CAPSULE ORAL at 09:15

## 2024-07-15 RX ADMIN — INSULIN GLARGINE 50 UNITS: 100 INJECTION, SOLUTION SUBCUTANEOUS at 09:15

## 2024-07-15 RX ADMIN — DOXAZOSIN MESYLATE 4 MG: 4 TABLET ORAL at 21:28

## 2024-07-15 RX ADMIN — SODIUM CHLORIDE 125 MG: 9 INJECTION, SOLUTION INTRAVENOUS at 16:09

## 2024-07-15 RX ADMIN — EPOETIN ALFA-EPBX 6000 UNITS: 4000 INJECTION, SOLUTION INTRAVENOUS; SUBCUTANEOUS at 17:34

## 2024-07-15 RX ADMIN — HEPARIN SODIUM 20 UNITS/KG/HR: 10000 INJECTION, SOLUTION INTRAVENOUS at 05:43

## 2024-07-15 NOTE — OP NOTE
Department of Radiology  Post Procedure Progress Note      Pre-Procedure Diagnosis:  Renal Failure    Procedure Performed: Dialysis Catheter insertion     Anesthesia: local     Findings: successful    Immediate Complications:  None    Estimated Blood Loss: minimal    SEE DICTATED PROCEDURE NOTE FOR COMPLETE DETAILS.      Bridger Harvey MD   7/15/2024 8:29 AM

## 2024-07-15 NOTE — H&P
Formulation and discussion of sedation / procedure plans, risks, benefits, side effects and alternatives with patient and/or responsible adult completed.    History and Physical reviewed and unchanged.    Electronically signed by Bridger Harvey MD on 7/15/24 at 7:53 AM EDT

## 2024-07-15 NOTE — FLOWSHEET NOTE
07/15/24 1605   Vital Signs   BP (!) 142/82   Temp 97.5 °F (36.4 °C)   Pulse 92   Respirations 20   SpO2 94 %   Weight - Scale 116.9 kg (257 lb 11.5 oz)   Weight Method Bed scale   Percent Weight Change -1.27   Pain Assessment   Pain Assessment None - Denies Pain   Post-Hemodialysis Assessment   Post-Treatment Procedures Blood returned;Catheter Capped, clamped with Saline x2 ports   Machine Disinfection Process Acid/Vinegar Clean;Heat Disinfect;Exterior Machine Disinfection   Rinseback Volume (ml) 400 ml   Blood Volume Processed (Liters) 36.3 L   Dialyzer Clearance Lightly streaked   Duration of Treatment (minutes) 150 minutes   Hemodialysis Output (ml) 1500 ml   Tolerated Treatment Good     completed 2.5 hr HD TX and removed 1.5 Liter of fluid, as ordered. . pt tolerated HD TX well. Dialysis TX ended all blood returned with 400 mls rinse back. CVC dressing is clean, dry and intact. report given to primary nurse. record completed and printed to be scanned into pt's EMR.

## 2024-07-16 PROBLEM — N18.5 CKD (CHRONIC KIDNEY DISEASE), STAGE V (HCC): Status: ACTIVE | Noted: 2024-07-16

## 2024-07-16 LAB
ANION GAP SERPL CALC-SCNC: 13 MEQ/L (ref 8–16)
APTT PPP: 29.1 SECONDS (ref 22–38)
APTT PPP: 30.1 SECONDS (ref 22–38)
APTT PPP: 30.2 SECONDS (ref 22–38)
BUN SERPL-MCNC: 63 MG/DL (ref 7–22)
CALCIUM SERPL-MCNC: 7.6 MG/DL (ref 8.5–10.5)
CHLORIDE SERPL-SCNC: 104 MEQ/L (ref 98–111)
CO2 SERPL-SCNC: 20 MEQ/L (ref 23–33)
CREAT SERPL-MCNC: 4.8 MG/DL (ref 0.4–1.2)
DEPRECATED RDW RBC AUTO: 48.8 FL (ref 35–45)
ERYTHROCYTE [DISTWIDTH] IN BLOOD BY AUTOMATED COUNT: 15.9 % (ref 11.5–14.5)
GFR SERPL CREATININE-BSD FRML MDRD: 11 ML/MIN/1.73M2
GLUCOSE BLD STRIP.AUTO-MCNC: 162 MG/DL (ref 70–108)
GLUCOSE BLD STRIP.AUTO-MCNC: 221 MG/DL (ref 70–108)
GLUCOSE BLD STRIP.AUTO-MCNC: 311 MG/DL (ref 70–108)
GLUCOSE SERPL-MCNC: 147 MG/DL (ref 70–108)
HCT VFR BLD AUTO: 22.9 % (ref 37–47)
HEPARIN UNFRACTIONATED: 0.27 U/ML (ref 0.3–0.7)
HGB BLD-MCNC: 7.3 GM/DL (ref 12–16)
MCH RBC QN AUTO: 27.1 PG (ref 26–33)
MCHC RBC AUTO-ENTMCNC: 31.9 GM/DL (ref 32.2–35.5)
MCV RBC AUTO: 85.1 FL (ref 81–99)
PLATELET # BLD AUTO: 234 THOU/MM3 (ref 130–400)
PMV BLD AUTO: 9.6 FL (ref 9.4–12.4)
POTASSIUM SERPL-SCNC: 4.4 MEQ/L (ref 3.5–5.2)
RBC # BLD AUTO: 2.69 MILL/MM3 (ref 4.2–5.4)
SODIUM SERPL-SCNC: 137 MEQ/L (ref 135–145)
WBC # BLD AUTO: 8.3 THOU/MM3 (ref 4.8–10.8)

## 2024-07-16 PROCEDURE — 36415 COLL VENOUS BLD VENIPUNCTURE: CPT

## 2024-07-16 PROCEDURE — 85027 COMPLETE CBC AUTOMATED: CPT

## 2024-07-16 PROCEDURE — 6360000002 HC RX W HCPCS

## 2024-07-16 PROCEDURE — 82948 REAGENT STRIP/BLOOD GLUCOSE: CPT

## 2024-07-16 PROCEDURE — 6370000000 HC RX 637 (ALT 250 FOR IP): Performed by: INTERNAL MEDICINE

## 2024-07-16 PROCEDURE — 85730 THROMBOPLASTIN TIME PARTIAL: CPT

## 2024-07-16 PROCEDURE — 90935 HEMODIALYSIS ONE EVALUATION: CPT | Performed by: INTERNAL MEDICINE

## 2024-07-16 PROCEDURE — 90935 HEMODIALYSIS ONE EVALUATION: CPT

## 2024-07-16 PROCEDURE — 99232 SBSQ HOSP IP/OBS MODERATE 35: CPT | Performed by: PHYSICIAN ASSISTANT

## 2024-07-16 PROCEDURE — 6370000000 HC RX 637 (ALT 250 FOR IP)

## 2024-07-16 PROCEDURE — 80048 BASIC METABOLIC PNL TOTAL CA: CPT

## 2024-07-16 PROCEDURE — 6360000002 HC RX W HCPCS: Performed by: INTERNAL MEDICINE

## 2024-07-16 PROCEDURE — 85520 HEPARIN ASSAY: CPT

## 2024-07-16 PROCEDURE — 1200000003 HC TELEMETRY R&B

## 2024-07-16 PROCEDURE — 1200000000 HC SEMI PRIVATE

## 2024-07-16 RX ORDER — MIDAZOLAM HYDROCHLORIDE 1 MG/ML
1 INJECTION INTRAMUSCULAR; INTRAVENOUS ONCE
Status: CANCELLED | OUTPATIENT
Start: 2024-07-16 | End: 2024-07-16

## 2024-07-16 RX ORDER — SODIUM CHLORIDE 450 MG/100ML
INJECTION, SOLUTION INTRAVENOUS CONTINUOUS
Status: CANCELLED | OUTPATIENT
Start: 2024-07-16

## 2024-07-16 RX ADMIN — GABAPENTIN 300 MG: 300 CAPSULE ORAL at 20:49

## 2024-07-16 RX ADMIN — BUMETANIDE 1 MG: 0.25 INJECTION INTRAMUSCULAR; INTRAVENOUS at 16:34

## 2024-07-16 RX ADMIN — SEVELAMER CARBONATE 800 MG: 800 TABLET, FILM COATED ORAL at 13:55

## 2024-07-16 RX ADMIN — INSULIN GLARGINE 50 UNITS: 100 INJECTION, SOLUTION SUBCUTANEOUS at 20:50

## 2024-07-16 RX ADMIN — METOPROLOL TARTRATE 12.5 MG: 25 TABLET, FILM COATED ORAL at 20:49

## 2024-07-16 RX ADMIN — ATORVASTATIN CALCIUM 40 MG: 40 TABLET, FILM COATED ORAL at 20:50

## 2024-07-16 RX ADMIN — HEPARIN SODIUM 22 UNITS/KG/HR: 10000 INJECTION, SOLUTION INTRAVENOUS at 17:09

## 2024-07-16 RX ADMIN — DOXAZOSIN MESYLATE 4 MG: 4 TABLET ORAL at 20:50

## 2024-07-16 RX ADMIN — SEVELAMER CARBONATE 800 MG: 800 TABLET, FILM COATED ORAL at 16:34

## 2024-07-16 NOTE — PLAN OF CARE
Problem: Discharge Planning  Goal: Discharge to home or other facility with appropriate resources  7/16/2024 1616 by Vignesh Mchugh, RN  Outcome: Progressing  7/16/2024 1615 by Vignesh Mchugh RN  Outcome: Progressing  Flowsheets (Taken 7/16/2024 1247)  Discharge to home or other facility with appropriate resources: Identify barriers to discharge with patient and caregiver     Problem: Pain  Goal: Verbalizes/displays adequate comfort level or baseline comfort level  7/16/2024 1616 by Vignesh Mchugh, RN  Outcome: Progressing  7/16/2024 1615 by Vignesh Mchugh, RN  Outcome: Progressing     Problem: Safety - Adult  Goal: Free from fall injury  7/16/2024 1616 by Vignesh Mchugh RN  Outcome: Progressing  7/16/2024 1615 by Vignesh Mchugh, RN  Outcome: Progressing

## 2024-07-16 NOTE — FLOWSHEET NOTE
07/16/24 0730 07/16/24 1057   Vital Signs   BP (!) 171/75 (!) 154/76   Temp 98.7 °F (37.1 °C) 98 °F (36.7 °C)   Pulse 100 98   Respirations 19 19   SpO2 98 % 98 %   Weight - Scale 127.7 kg (281 lb 8.4 oz) 126.2 kg (278 lb 3.5 oz)   Weight Method Bed scale Bed scale   Percent Weight Change 9.24 -1.17   Post-Hemodialysis Assessment   Post-Treatment Procedures  --  Blood returned;Catheter Capped, clamped with Saline x2 ports   Machine Disinfection Process  --  Acid/Vinegar Clean;Heat Disinfect;Exterior Machine Disinfection   Blood Volume Processed (Liters)  --  50.9 L   Dialyzer Clearance  --  Lightly streaked   Duration of Treatment (minutes)  --  180 minutes   Hemodialysis Intake (ml)  --  400 ml   Hemodialysis Output (ml)  --  1900 ml   NET Removed (ml)  --  1500   Tolerated Treatment  --  Good     Stable 3 hour TX completed. Removed 1.5 L of fluid. Tolerated fluid removal well. Bilateral cath ports flushed with normal saline, clamped, and secured with tegos. CVC drsg clean, dry, and intact. Report called to primary RN. TX record printed for scanning into EMR.

## 2024-07-16 NOTE — PLAN OF CARE
Problem: Discharge Planning  Goal: Discharge to home or other facility with appropriate resources  Outcome: Progressing  Flowsheets (Taken 7/16/2024 1247)  Discharge to home or other facility with appropriate resources: Identify barriers to discharge with patient and caregiver     Problem: Pain  Goal: Verbalizes/displays adequate comfort level or baseline comfort level  Outcome: Progressing     Problem: Safety - Adult  Goal: Free from fall injury  Outcome: Progressing

## 2024-07-16 NOTE — PLAN OF CARE
Problem: Discharge Planning  Goal: Discharge to home or other facility with appropriate resources  Recent Flowsheet Documentation  Taken 7/16/2024 1247 by Vignesh Mchugh RN  Discharge to home or other facility with appropriate resources: Identify barriers to discharge with patient and caregiver

## 2024-07-16 NOTE — CARE COORDINATION
Case Management Assessment Initial Evaluation    Date/Time of Evaluation: 2024 3:25 PM  Assessment Completed by: Cindi Alvarez RN    If patient is discharged prior to next notation, then this note serves as note for discharge by case management.    Patient Name: Debbie Locke                   YOB: 1975  Diagnosis: Orthopnea [R06.01]  Hypervolemia [E87.70]  Anasarca [R60.1]  Hypervolemia, unspecified hypervolemia type [E87.70]                   Date / Time: 2024  4:15 PM  Location: [address] [place] [address]     Patient Admission Status: Inpatient   Readmission Risk Low 0-14, Mod 15-19), High > 20: Readmission Risk Score: 17.7    Current PCP: Kory Thomas MD    Additional Case Management Notes: Admit from ER with fluid retention. Nephrology consulted. HD completed yesterday and today. Planning to exchange to tunneled HD cath Thursday after ASA and Eliquis wash out.     Procedures: 7/15 Temp Dialysis Catheter    Imagin/13 CXR: 1.   There is a small left pleural effusion.   2. Mild dependent bibasilar opacities are present which may present mild   atelectasis or pneumonia.     Patient Goals/Plan/Treatment Preferences: She is planning to return home with her  at discharge. She is independent and drives.   Referral made to [facility] after speaking with patient. This is preferred clinic due to location.        24 1400   Service Assessment   Patient Orientation Alert and Oriented   Cognition Alert   History Provided By Patient   Primary Caregiver Self   Accompanied By/Relationship alone   Support Systems Spouse/Significant Other;Children;Family Members   Patient's Healthcare Decision Maker is: Legal Next of Kin   PCP Verified by CM Yes   Last Visit to PCP Within last 3 months   Prior Functional Level Independent in ADLs/IADLs   Current Functional Level Independent in ADLs/IADLs   Can patient return to prior living arrangement Yes   Ability to make needs known: Good   Family

## 2024-07-17 PROBLEM — N18.6 ESRD (END STAGE RENAL DISEASE) (HCC): Status: ACTIVE | Noted: 2024-07-17

## 2024-07-17 LAB
ANION GAP SERPL CALC-SCNC: 9 MEQ/L (ref 8–16)
BUN SERPL-MCNC: 41 MG/DL (ref 7–22)
CALCIUM SERPL-MCNC: 7.2 MG/DL (ref 8.5–10.5)
CHLORIDE SERPL-SCNC: 104 MEQ/L (ref 98–111)
CO2 SERPL-SCNC: 25 MEQ/L (ref 23–33)
CREAT SERPL-MCNC: 4.2 MG/DL (ref 0.4–1.2)
DEPRECATED RDW RBC AUTO: 50.6 FL (ref 35–45)
ERYTHROCYTE [DISTWIDTH] IN BLOOD BY AUTOMATED COUNT: 15.9 % (ref 11.5–14.5)
GFR SERPL CREATININE-BSD FRML MDRD: 12 ML/MIN/1.73M2
GLUCOSE BLD STRIP.AUTO-MCNC: 171 MG/DL (ref 70–108)
GLUCOSE BLD STRIP.AUTO-MCNC: 186 MG/DL (ref 70–108)
GLUCOSE BLD STRIP.AUTO-MCNC: 188 MG/DL (ref 70–108)
GLUCOSE SERPL-MCNC: 221 MG/DL (ref 70–108)
HCT VFR BLD AUTO: 22.8 % (ref 37–47)
HCT VFR BLD AUTO: 23.8 % (ref 37–47)
HEPARIN UNFRACTIONATED: 0.3 U/ML (ref 0.3–0.7)
HEPARIN UNFRACTIONATED: 0.38 U/ML (ref 0.3–0.7)
HEPARIN UNFRACTIONATED: 0.42 U/ML (ref 0.3–0.7)
HGB BLD-MCNC: 6.8 GM/DL (ref 12–16)
HGB BLD-MCNC: 7.1 GM/DL (ref 12–16)
MCH RBC QN AUTO: 26.4 PG (ref 26–33)
MCHC RBC AUTO-ENTMCNC: 29.8 GM/DL (ref 32.2–35.5)
MCV RBC AUTO: 88.4 FL (ref 81–99)
PATHOLOGIST REVIEW: ABNORMAL
PLATELET # BLD AUTO: 211 THOU/MM3 (ref 130–400)
PMV BLD AUTO: 9.8 FL (ref 9.4–12.4)
POTASSIUM SERPL-SCNC: 4.6 MEQ/L (ref 3.5–5.2)
RBC # BLD AUTO: 2.58 MILL/MM3 (ref 4.2–5.4)
SCAN OF BLOOD SMEAR: NORMAL
SODIUM SERPL-SCNC: 138 MEQ/L (ref 135–145)
WBC # BLD AUTO: 9.4 THOU/MM3 (ref 4.8–10.8)

## 2024-07-17 PROCEDURE — 2580000003 HC RX 258: Performed by: INTERNAL MEDICINE

## 2024-07-17 PROCEDURE — 6370000000 HC RX 637 (ALT 250 FOR IP): Performed by: INTERNAL MEDICINE

## 2024-07-17 PROCEDURE — 85014 HEMATOCRIT: CPT

## 2024-07-17 PROCEDURE — 85018 HEMOGLOBIN: CPT

## 2024-07-17 PROCEDURE — 1200000003 HC TELEMETRY R&B

## 2024-07-17 PROCEDURE — 6360000002 HC RX W HCPCS

## 2024-07-17 PROCEDURE — 80048 BASIC METABOLIC PNL TOTAL CA: CPT

## 2024-07-17 PROCEDURE — 6360000002 HC RX W HCPCS: Performed by: INTERNAL MEDICINE

## 2024-07-17 PROCEDURE — 6370000000 HC RX 637 (ALT 250 FOR IP)

## 2024-07-17 PROCEDURE — 85027 COMPLETE CBC AUTOMATED: CPT

## 2024-07-17 PROCEDURE — 85520 HEPARIN ASSAY: CPT

## 2024-07-17 PROCEDURE — 99232 SBSQ HOSP IP/OBS MODERATE 35: CPT | Performed by: NURSE PRACTITIONER

## 2024-07-17 PROCEDURE — 90935 HEMODIALYSIS ONE EVALUATION: CPT

## 2024-07-17 PROCEDURE — 1200000000 HC SEMI PRIVATE

## 2024-07-17 PROCEDURE — 99232 SBSQ HOSP IP/OBS MODERATE 35: CPT | Performed by: INTERNAL MEDICINE

## 2024-07-17 PROCEDURE — 36415 COLL VENOUS BLD VENIPUNCTURE: CPT

## 2024-07-17 PROCEDURE — 82948 REAGENT STRIP/BLOOD GLUCOSE: CPT

## 2024-07-17 RX ORDER — BUMETANIDE 1 MG/1
1 TABLET ORAL 2 TIMES DAILY
Status: DISCONTINUED | OUTPATIENT
Start: 2024-07-17 | End: 2024-07-19 | Stop reason: HOSPADM

## 2024-07-17 RX ADMIN — INSULIN GLARGINE 50 UNITS: 100 INJECTION, SOLUTION SUBCUTANEOUS at 09:26

## 2024-07-17 RX ADMIN — BUMETANIDE 1 MG: 0.25 INJECTION INTRAMUSCULAR; INTRAVENOUS at 09:25

## 2024-07-17 RX ADMIN — DOXAZOSIN MESYLATE 4 MG: 4 TABLET ORAL at 20:32

## 2024-07-17 RX ADMIN — GABAPENTIN 300 MG: 300 CAPSULE ORAL at 09:25

## 2024-07-17 RX ADMIN — DOXAZOSIN MESYLATE 4 MG: 4 TABLET ORAL at 09:51

## 2024-07-17 RX ADMIN — HEPARIN SODIUM 24 UNITS/KG/HR: 10000 INJECTION, SOLUTION INTRAVENOUS at 22:28

## 2024-07-17 RX ADMIN — BUMETANIDE 1 MG: 1 TABLET ORAL at 20:32

## 2024-07-17 RX ADMIN — HEPARIN SODIUM 2000 UNITS: 1000 INJECTION INTRAVENOUS; SUBCUTANEOUS at 00:50

## 2024-07-17 RX ADMIN — GABAPENTIN 300 MG: 300 CAPSULE ORAL at 20:32

## 2024-07-17 RX ADMIN — SODIUM CHLORIDE 125 MG: 9 INJECTION, SOLUTION INTRAVENOUS at 12:35

## 2024-07-17 RX ADMIN — METOPROLOL TARTRATE 12.5 MG: 25 TABLET, FILM COATED ORAL at 20:33

## 2024-07-17 RX ADMIN — EPOETIN ALFA-EPBX 10000 UNITS: 10000 INJECTION, SOLUTION INTRAVENOUS; SUBCUTANEOUS at 20:34

## 2024-07-17 RX ADMIN — SEVELAMER CARBONATE 800 MG: 800 TABLET, FILM COATED ORAL at 13:50

## 2024-07-17 RX ADMIN — HEPARIN SODIUM 24 UNITS/KG/HR: 10000 INJECTION, SOLUTION INTRAVENOUS at 00:52

## 2024-07-17 RX ADMIN — INSULIN GLARGINE 50 UNITS: 100 INJECTION, SOLUTION SUBCUTANEOUS at 20:34

## 2024-07-17 RX ADMIN — SEVELAMER CARBONATE 800 MG: 800 TABLET, FILM COATED ORAL at 09:25

## 2024-07-17 RX ADMIN — HEPARIN SODIUM 24 UNITS/KG/HR: 10000 INJECTION, SOLUTION INTRAVENOUS at 10:44

## 2024-07-17 RX ADMIN — METOPROLOL TARTRATE 12.5 MG: 25 TABLET, FILM COATED ORAL at 09:26

## 2024-07-17 RX ADMIN — ATORVASTATIN CALCIUM 40 MG: 40 TABLET, FILM COATED ORAL at 20:33

## 2024-07-17 NOTE — CARE COORDINATION
Hemodialysis chair time for Alexs[facility] in [facility]  MWF at 540 am.     Start date indicated as Wednesday 7/24. CM requested intake to push start date up to Monday 7/22. Awaiting response.

## 2024-07-17 NOTE — PLAN OF CARE
Problem: Discharge Planning  Goal: Discharge to home or other facility with appropriate resources  Outcome: Progressing     Problem: Pain  Goal: Verbalizes/displays adequate comfort level or baseline comfort level  Outcome: Progressing  Flowsheets (Taken 7/17/2024 0915)  Verbalizes/displays adequate comfort level or baseline comfort level: Encourage patient to monitor pain and request assistance     Problem: Safety - Adult  Goal: Free from fall injury  Outcome: Progressing

## 2024-07-17 NOTE — FLOWSHEET NOTE
07/17/24 1915   Vital Signs   BP (!) 162/93   Temp 97.5 °F (36.4 °C)   Pulse 96   Respirations 16   SpO2 94 %   Weight - Scale 126.2 kg (278 lb 3.5 oz)   Weight Method Bed scale   Percent Weight Change -1.48   Pain Assessment   Pain Assessment None - Denies Pain   Post-Hemodialysis Assessment   Post-Treatment Procedures Blood returned;Catheter Capped, clamped with Saline x2 ports   Machine Disinfection Process Acid/Vinegar Clean;Heat Disinfect;Exterior Machine Disinfection   Rinseback Volume (ml) 400 ml   Blood Volume Processed (Liters) 51.1 L   Dialyzer Clearance Moderately streaked   Duration of Treatment (minutes) 180 minutes   Hemodialysis Output (ml) 1900 ml   Tolerated Treatment Fair     completed 3 hr HD TX and removed 1.9 Liters of fluid. pt did have cramping on both sides of abdomen. Dr. Garza notified. CVC dressing changed and is clean, dry and intact. report given to primary nurse. record completed and printed to be scanned into pt's EMR.

## 2024-07-18 ENCOUNTER — APPOINTMENT (OUTPATIENT)
Dept: INTERVENTIONAL RADIOLOGY/VASCULAR | Age: 49
DRG: 469 | End: 2024-07-18
Payer: COMMERCIAL

## 2024-07-18 LAB
ANION GAP SERPL CALC-SCNC: 13 MEQ/L (ref 8–16)
APTT PPP: 80.5 SECONDS (ref 22–38)
BUN SERPL-MCNC: 26 MG/DL (ref 7–22)
CALCIUM SERPL-MCNC: 7.7 MG/DL (ref 8.5–10.5)
CHLORIDE SERPL-SCNC: 101 MEQ/L (ref 98–111)
CO2 SERPL-SCNC: 24 MEQ/L (ref 23–33)
CREAT SERPL-MCNC: 3.4 MG/DL (ref 0.4–1.2)
DEPRECATED RDW RBC AUTO: 50.5 FL (ref 35–45)
ERYTHROCYTE [DISTWIDTH] IN BLOOD BY AUTOMATED COUNT: 15.8 % (ref 11.5–14.5)
GFR SERPL CREATININE-BSD FRML MDRD: 16 ML/MIN/1.73M2
GLUCOSE BLD STRIP.AUTO-MCNC: 144 MG/DL (ref 70–108)
GLUCOSE BLD STRIP.AUTO-MCNC: 154 MG/DL (ref 70–108)
GLUCOSE BLD STRIP.AUTO-MCNC: 203 MG/DL (ref 70–108)
GLUCOSE BLD STRIP.AUTO-MCNC: 227 MG/DL (ref 70–108)
GLUCOSE SERPL-MCNC: 233 MG/DL (ref 70–108)
HCT VFR BLD AUTO: 24.4 % (ref 37–47)
HEPARIN UNFRACTIONATED: 0.36 U/ML (ref 0.3–0.7)
HEPARIN UNFRACTIONATED: 0.43 U/ML (ref 0.3–0.7)
HEPARIN UNFRACTIONATED: < 0.04 U/ML (ref 0.3–0.7)
HGB BLD-MCNC: 7.2 GM/DL (ref 12–16)
INR PPP: 0.99 (ref 0.85–1.13)
MCH RBC QN AUTO: 26.3 PG (ref 26–33)
MCHC RBC AUTO-ENTMCNC: 29.5 GM/DL (ref 32.2–35.5)
MCV RBC AUTO: 89.1 FL (ref 81–99)
PLATELET # BLD AUTO: 219 THOU/MM3 (ref 130–400)
PMV BLD AUTO: 9.6 FL (ref 9.4–12.4)
POTASSIUM SERPL-SCNC: 4.3 MEQ/L (ref 3.5–5.2)
RBC # BLD AUTO: 2.74 MILL/MM3 (ref 4.2–5.4)
SODIUM SERPL-SCNC: 138 MEQ/L (ref 135–145)
WBC # BLD AUTO: 9.6 THOU/MM3 (ref 4.8–10.8)

## 2024-07-18 PROCEDURE — 99232 SBSQ HOSP IP/OBS MODERATE 35: CPT | Performed by: NURSE PRACTITIONER

## 2024-07-18 PROCEDURE — 99232 SBSQ HOSP IP/OBS MODERATE 35: CPT | Performed by: INTERNAL MEDICINE

## 2024-07-18 PROCEDURE — 05PYX3Z REMOVAL OF INFUSION DEVICE FROM UPPER VEIN, EXTERNAL APPROACH: ICD-10-PCS | Performed by: INTERNAL MEDICINE

## 2024-07-18 PROCEDURE — 36558 INSERT TUNNELED CV CATH: CPT

## 2024-07-18 PROCEDURE — 6370000000 HC RX 637 (ALT 250 FOR IP): Performed by: INTERNAL MEDICINE

## 2024-07-18 PROCEDURE — 6360000002 HC RX W HCPCS: Performed by: RADIOLOGY

## 2024-07-18 PROCEDURE — B5181ZA FLUOROSCOPY OF SUPERIOR VENA CAVA USING LOW OSMOLAR CONTRAST, GUIDANCE: ICD-10-PCS | Performed by: INTERNAL MEDICINE

## 2024-07-18 PROCEDURE — 94640 AIRWAY INHALATION TREATMENT: CPT

## 2024-07-18 PROCEDURE — 80048 BASIC METABOLIC PNL TOTAL CA: CPT

## 2024-07-18 PROCEDURE — 82948 REAGENT STRIP/BLOOD GLUCOSE: CPT

## 2024-07-18 PROCEDURE — 6370000000 HC RX 637 (ALT 250 FOR IP)

## 2024-07-18 PROCEDURE — 85730 THROMBOPLASTIN TIME PARTIAL: CPT

## 2024-07-18 PROCEDURE — 85027 COMPLETE CBC AUTOMATED: CPT

## 2024-07-18 PROCEDURE — 6360000002 HC RX W HCPCS

## 2024-07-18 PROCEDURE — 77001 FLUOROGUIDE FOR VEIN DEVICE: CPT

## 2024-07-18 PROCEDURE — 2580000003 HC RX 258

## 2024-07-18 PROCEDURE — 36415 COLL VENOUS BLD VENIPUNCTURE: CPT

## 2024-07-18 PROCEDURE — 0JH63XZ INSERTION OF TUNNELED VASCULAR ACCESS DEVICE INTO CHEST SUBCUTANEOUS TISSUE AND FASCIA, PERCUTANEOUS APPROACH: ICD-10-PCS | Performed by: INTERNAL MEDICINE

## 2024-07-18 PROCEDURE — 2500000003 HC RX 250 WO HCPCS: Performed by: RADIOLOGY

## 2024-07-18 PROCEDURE — 85610 PROTHROMBIN TIME: CPT

## 2024-07-18 PROCEDURE — 85520 HEPARIN ASSAY: CPT

## 2024-07-18 PROCEDURE — 1200000000 HC SEMI PRIVATE

## 2024-07-18 PROCEDURE — C1769 GUIDE WIRE: HCPCS

## 2024-07-18 PROCEDURE — 02H633Z INSERTION OF INFUSION DEVICE INTO RIGHT ATRIUM, PERCUTANEOUS APPROACH: ICD-10-PCS | Performed by: INTERNAL MEDICINE

## 2024-07-18 RX ORDER — PROCHLORPERAZINE EDISYLATE 5 MG/ML
10 INJECTION INTRAMUSCULAR; INTRAVENOUS EVERY 6 HOURS PRN
Status: DISCONTINUED | OUTPATIENT
Start: 2024-07-18 | End: 2024-07-19 | Stop reason: HOSPADM

## 2024-07-18 RX ORDER — LIDOCAINE HYDROCHLORIDE 20 MG/ML
INJECTION, SOLUTION EPIDURAL; INFILTRATION; INTRACAUDAL; PERINEURAL PRN
Status: DISCONTINUED | OUTPATIENT
Start: 2024-07-18 | End: 2024-07-19 | Stop reason: HOSPADM

## 2024-07-18 RX ORDER — MIDAZOLAM HYDROCHLORIDE 1 MG/ML
1 INJECTION INTRAMUSCULAR; INTRAVENOUS ONCE
Status: COMPLETED | OUTPATIENT
Start: 2024-07-18 | End: 2024-07-18

## 2024-07-18 RX ORDER — MIDAZOLAM HYDROCHLORIDE 1 MG/ML
INJECTION INTRAMUSCULAR; INTRAVENOUS PRN
Status: DISCONTINUED | OUTPATIENT
Start: 2024-07-18 | End: 2024-07-19 | Stop reason: HOSPADM

## 2024-07-18 RX ORDER — SODIUM CHLORIDE 450 MG/100ML
INJECTION, SOLUTION INTRAVENOUS CONTINUOUS
Status: DISCONTINUED | OUTPATIENT
Start: 2024-07-18 | End: 2024-07-19 | Stop reason: HOSPADM

## 2024-07-18 RX ORDER — ALBUTEROL SULFATE 90 UG/1
2 AEROSOL, METERED RESPIRATORY (INHALATION)
Status: DISCONTINUED | OUTPATIENT
Start: 2024-07-18 | End: 2024-07-19 | Stop reason: HOSPADM

## 2024-07-18 RX ADMIN — METOPROLOL TARTRATE 12.5 MG: 25 TABLET, FILM COATED ORAL at 08:35

## 2024-07-18 RX ADMIN — ACETAMINOPHEN 650 MG: 325 TABLET ORAL at 18:55

## 2024-07-18 RX ADMIN — INSULIN GLARGINE 50 UNITS: 100 INJECTION, SOLUTION SUBCUTANEOUS at 08:34

## 2024-07-18 RX ADMIN — DOXAZOSIN MESYLATE 4 MG: 4 TABLET ORAL at 08:35

## 2024-07-18 RX ADMIN — BUMETANIDE 1 MG: 1 TABLET ORAL at 08:35

## 2024-07-18 RX ADMIN — LIDOCAINE HYDROCHLORIDE 5 ML: 20 INJECTION, SOLUTION EPIDURAL; INFILTRATION; INTRACAUDAL; PERINEURAL at 16:38

## 2024-07-18 RX ADMIN — SEVELAMER CARBONATE 800 MG: 800 TABLET, FILM COATED ORAL at 08:35

## 2024-07-18 RX ADMIN — ALBUTEROL SULFATE 2 PUFF: 90 AEROSOL, METERED RESPIRATORY (INHALATION) at 08:59

## 2024-07-18 RX ADMIN — PROCHLORPERAZINE EDISYLATE 10 MG: 5 INJECTION INTRAMUSCULAR; INTRAVENOUS at 21:16

## 2024-07-18 RX ADMIN — SEVELAMER CARBONATE 800 MG: 800 TABLET, FILM COATED ORAL at 17:17

## 2024-07-18 RX ADMIN — MIDAZOLAM 1 MG: 1 INJECTION INTRAMUSCULAR; INTRAVENOUS at 16:24

## 2024-07-18 RX ADMIN — HYDROMORPHONE HYDROCHLORIDE 1 MG: 1 INJECTION INTRAMUSCULAR; INTRAVENOUS; SUBCUTANEOUS at 16:25

## 2024-07-18 RX ADMIN — GABAPENTIN 300 MG: 300 CAPSULE ORAL at 08:34

## 2024-07-18 RX ADMIN — SODIUM CHLORIDE, PRESERVATIVE FREE 10 ML: 5 INJECTION INTRAVENOUS at 21:22

## 2024-07-18 RX ADMIN — BUMETANIDE 1 MG: 1 TABLET ORAL at 17:17

## 2024-07-18 RX ADMIN — ONDANSETRON 4 MG: 2 INJECTION INTRAMUSCULAR; INTRAVENOUS at 17:41

## 2024-07-18 RX ADMIN — SODIUM CHLORIDE: 9 INJECTION, SOLUTION INTRAVENOUS at 13:00

## 2024-07-18 RX ADMIN — MIDAZOLAM 1 MG: 1 INJECTION INTRAMUSCULAR; INTRAVENOUS at 16:31

## 2024-07-18 ASSESSMENT — PAIN SCALES - GENERAL: PAINLEVEL_OUTOF10: 5

## 2024-07-18 ASSESSMENT — PAIN DESCRIPTION - FREQUENCY: FREQUENCY: CONTINUOUS

## 2024-07-18 ASSESSMENT — PAIN DESCRIPTION - ORIENTATION: ORIENTATION: RIGHT

## 2024-07-18 ASSESSMENT — PAIN DESCRIPTION - LOCATION: LOCATION: HEAD;NECK

## 2024-07-18 ASSESSMENT — PAIN DESCRIPTION - ONSET: ONSET: ON-GOING

## 2024-07-18 ASSESSMENT — PAIN DESCRIPTION - DESCRIPTORS: DESCRIPTORS: ACHING;SORE

## 2024-07-18 ASSESSMENT — PAIN - FUNCTIONAL ASSESSMENT: PAIN_FUNCTIONAL_ASSESSMENT: ACTIVITIES ARE NOT PREVENTED

## 2024-07-18 ASSESSMENT — PAIN DESCRIPTION - PAIN TYPE: TYPE: ACUTE PAIN

## 2024-07-18 NOTE — CARE COORDINATION
7/18/24, 3:19 PM EDT    DISCHARGE ON GOING EVALUATION    Debbie Locke       [facility] day: 5  Location: 8A-11/011-A Reason for admit: Orthopnea [R06.01]  Hypervolemia [E87.70]  Anasarca [R60.1]  Hypervolemia, unspecified hypervolemia type [E87.70]     Procedures:   7/15 Temp Dialysis Catheter     Imaging since last note: no new    Barriers to Discharge: Tunneled cath to be placed today. Heparin gtt on hold for procedure. Creat 3.4.    PCP: Kory Thomas MD  Readmission Risk Score: 17.4    Patient Goals/Plan/Treatment Preferences: Plan to return  home with her .   Set up with new OP hemodialysis    Hemodialysis chair time for [facility] in [facility] at 540 am.     Dialysis start date is now Monday according to admission staff.

## 2024-07-18 NOTE — PLAN OF CARE
Care plan reviewed with patient .  Patient  verbalize understanding of the plan of care and contribute to goal setting.

## 2024-07-18 NOTE — H&P
[facility][note 3]  Sedation/Analgesia History & Physical    Pt Name: Debbie Locke  MRN: [id number]  YOB: 1975  Provider Performing Procedure: Quang Vieyra MD, MD  Primary Care Physician: Kory Thomas MD    Formulation and discussion of sedation / procedure plans, risks, benefits, side effects and alternatives with patient and/or responsible adult completed.    PRE-PROCEDURE   DNR-CCA/DNR-CC []Yes [x]No  Brief History/Pre-Procedure Diagnosis: Renal failure          MEDICAL HISTORY  []CAD/Valve  []Liver Disease  []Lung Disease []Diabetes  []Hypertension []Renal Disease  [x]Additional information:       has a past medical history of Chronic kidney disease, Hyperlipidemia, Neuropathy, and Type II or unspecified type diabetes mellitus without mention of complication, not stated as uncontrolled.    SURGICAL HISTORY   has a past surgical history that includes  section (2002); CT BIOPSY RENAL (2023); Cholecystectomy; and back surgery (N/A, 2024).  Additional information:       ALLERGIES   Allergies as of 2024 - Fully Reviewed 2024   Allergen Reaction Noted    Insulin lispro Other (See Comments) 2024    Insulins Other (See Comments) 2021    Lisinopril  2022     Additional information:       MEDICATIONS   Coumadin Use Last 5 Days [x]No []Yes  Antiplatelet drug therapy use last 5 days  [x]No []Yes  Other anticoagulant use last 5 days  [x]No []Yes    Current Facility-Administered Medications:     0.45 % sodium chloride infusion, , IntraVENous, Continuous, Bridger Harvey MD    ceFAZolin (ANCEF) 2,000 mg in sterile water 20 mL IV syringe, 2,000 mg, IntraVENous, Once, Bridger Harvey MD    HYDROmorphone (DILAUDID) injection 1 mg, 1 mg, IntraVENous, Once, Bridger Harvey MD    midazolam (VERSED) injection 1 mg, 1 mg, IntraVENous, Once, Bridger Harvey MD    albuterol sulfate HFA (PROVENTIL;VENTOLIN;PROAIR) 108 (90 Base) MCG/ACT inhaler 2

## 2024-07-18 NOTE — RT PROTOCOL NOTE
Frequency of every 4 hours PRN for wheezing or increased work of breathing using Per Protocol order mode.        4-6 - enter or revise RT Bronchodilator order(s) to two equivalent RT bronchodilator orders with one order with BID Frequency and one order with Frequency of every 4 hours PRN wheezing or increased work of breathing using Per Protocol order mode.        7-10 - enter or revise RT Bronchodilator order(s) to two equivalent RT bronchodilator orders with one order with TID Frequency and one order with Frequency of every 4 hours PRN wheezing or increased work of breathing using Per Protocol order mode.       11-13 - enter or revise RT Bronchodilator order(s) to one equivalent RT bronchodilator order with QID Frequency and an Albuterol order with Frequency of every 4 hours PRN wheezing or increased work of breathing using Per Protocol order mode.      Greater than 13 - enter or revise RT Bronchodilator order(s) to one equivalent RT bronchodilator order with every 4 hours Frequency and an Albuterol order with Frequency of every 2 hours PRN wheezing or increased work of breathing using Per Protocol order mode.     RT to enter RT Home Evaluation for COPD & MDI Assessment order using Per Protocol order mode.    Electronically signed by Kelle Adams RCP on 7/18/2024 at 8:55 AM

## 2024-07-19 VITALS
TEMPERATURE: 98.2 F | RESPIRATION RATE: 15 BRPM | WEIGHT: 265.65 LBS | HEART RATE: 99 BPM | DIASTOLIC BLOOD PRESSURE: 74 MMHG | HEIGHT: 64 IN | OXYGEN SATURATION: 98 % | BODY MASS INDEX: 45.35 KG/M2 | SYSTOLIC BLOOD PRESSURE: 135 MMHG

## 2024-07-19 LAB
ANION GAP SERPL CALC-SCNC: 7 MEQ/L (ref 8–16)
BUN SERPL-MCNC: 33 MG/DL (ref 7–22)
CALCIUM SERPL-MCNC: 7.8 MG/DL (ref 8.5–10.5)
CHLORIDE SERPL-SCNC: 103 MEQ/L (ref 98–111)
CO2 SERPL-SCNC: 29 MEQ/L (ref 23–33)
CREAT SERPL-MCNC: 4.4 MG/DL (ref 0.4–1.2)
GFR SERPL CREATININE-BSD FRML MDRD: 12 ML/MIN/1.73M2
GLUCOSE BLD STRIP.AUTO-MCNC: 142 MG/DL (ref 70–108)
GLUCOSE BLD STRIP.AUTO-MCNC: 212 MG/DL (ref 70–108)
GLUCOSE SERPL-MCNC: 210 MG/DL (ref 70–108)
HCT VFR BLD AUTO: 23.4 % (ref 37–47)
HCT VFR BLD AUTO: 23.6 % (ref 37–47)
HGB BLD-MCNC: 6.7 GM/DL (ref 12–16)
HGB BLD-MCNC: 7 GM/DL (ref 12–16)
POTASSIUM SERPL-SCNC: 5.2 MEQ/L (ref 3.5–5.2)
SODIUM SERPL-SCNC: 139 MEQ/L (ref 135–145)

## 2024-07-19 PROCEDURE — 36415 COLL VENOUS BLD VENIPUNCTURE: CPT

## 2024-07-19 PROCEDURE — 80048 BASIC METABOLIC PNL TOTAL CA: CPT

## 2024-07-19 PROCEDURE — 99232 SBSQ HOSP IP/OBS MODERATE 35: CPT | Performed by: INTERNAL MEDICINE

## 2024-07-19 PROCEDURE — 94640 AIRWAY INHALATION TREATMENT: CPT

## 2024-07-19 PROCEDURE — 6370000000 HC RX 637 (ALT 250 FOR IP)

## 2024-07-19 PROCEDURE — 6370000000 HC RX 637 (ALT 250 FOR IP): Performed by: INTERNAL MEDICINE

## 2024-07-19 PROCEDURE — 82948 REAGENT STRIP/BLOOD GLUCOSE: CPT

## 2024-07-19 PROCEDURE — 99239 HOSP IP/OBS DSCHRG MGMT >30: CPT | Performed by: NURSE PRACTITIONER

## 2024-07-19 PROCEDURE — 85014 HEMATOCRIT: CPT

## 2024-07-19 PROCEDURE — 85018 HEMOGLOBIN: CPT

## 2024-07-19 PROCEDURE — 94761 N-INVAS EAR/PLS OXIMETRY MLT: CPT

## 2024-07-19 PROCEDURE — 90935 HEMODIALYSIS ONE EVALUATION: CPT

## 2024-07-19 PROCEDURE — 6360000002 HC RX W HCPCS: Performed by: INTERNAL MEDICINE

## 2024-07-19 RX ORDER — SEVELAMER CARBONATE 800 MG/1
800 TABLET, FILM COATED ORAL
Qty: 90 TABLET | Refills: 0 | Status: SHIPPED | OUTPATIENT
Start: 2024-07-19

## 2024-07-19 RX ORDER — BUMETANIDE 1 MG/1
1 TABLET ORAL 2 TIMES DAILY
Qty: 30 TABLET | Refills: 0 | Status: SHIPPED | OUTPATIENT
Start: 2024-07-19

## 2024-07-19 RX ADMIN — INSULIN GLARGINE 50 UNITS: 100 INJECTION, SOLUTION SUBCUTANEOUS at 09:57

## 2024-07-19 RX ADMIN — SEVELAMER CARBONATE 800 MG: 800 TABLET, FILM COATED ORAL at 09:56

## 2024-07-19 RX ADMIN — GABAPENTIN 300 MG: 300 CAPSULE ORAL at 09:57

## 2024-07-19 RX ADMIN — METOPROLOL TARTRATE 12.5 MG: 25 TABLET, FILM COATED ORAL at 00:27

## 2024-07-19 RX ADMIN — INSULIN GLARGINE 50 UNITS: 100 INJECTION, SOLUTION SUBCUTANEOUS at 00:42

## 2024-07-19 RX ADMIN — ASPIRIN 81 MG: 81 TABLET, COATED ORAL at 09:57

## 2024-07-19 RX ADMIN — APIXABAN 2.5 MG: 5 TABLET, FILM COATED ORAL at 00:29

## 2024-07-19 RX ADMIN — METOPROLOL TARTRATE 12.5 MG: 25 TABLET, FILM COATED ORAL at 09:57

## 2024-07-19 RX ADMIN — ACETAMINOPHEN 650 MG: 325 TABLET ORAL at 00:38

## 2024-07-19 RX ADMIN — ATORVASTATIN CALCIUM 40 MG: 40 TABLET, FILM COATED ORAL at 00:26

## 2024-07-19 RX ADMIN — GABAPENTIN 300 MG: 300 CAPSULE ORAL at 00:26

## 2024-07-19 RX ADMIN — DOXAZOSIN MESYLATE 4 MG: 4 TABLET ORAL at 00:26

## 2024-07-19 RX ADMIN — EPOETIN ALFA-EPBX 10000 UNITS: 10000 INJECTION, SOLUTION INTRAVENOUS; SUBCUTANEOUS at 14:26

## 2024-07-19 RX ADMIN — DOXAZOSIN MESYLATE 4 MG: 4 TABLET ORAL at 09:57

## 2024-07-19 RX ADMIN — BUMETANIDE 1 MG: 1 TABLET ORAL at 09:57

## 2024-07-19 RX ADMIN — ALBUTEROL SULFATE 2 PUFF: 90 AEROSOL, METERED RESPIRATORY (INHALATION) at 05:31

## 2024-07-19 RX ADMIN — APIXABAN 2.5 MG: 5 TABLET, FILM COATED ORAL at 09:57

## 2024-07-19 ASSESSMENT — PAIN SCALES - GENERAL
PAINLEVEL_OUTOF10: 2
PAINLEVEL_OUTOF10: 3
PAINLEVEL_OUTOF10: 4

## 2024-07-19 ASSESSMENT — PAIN DESCRIPTION - LOCATION
LOCATION: NECK
LOCATION: NECK

## 2024-07-19 NOTE — PROGRESS NOTES
Hospitalist Progress Note    Patient:  Debbie Locke      Unit/Bed:8A-11/011-A    YOB: 1975    MRN: [id number]       Acct: [id number]     PCP: Kory Thomas MD    Date of Admission: 7/13/2024    Assessment/Plan:    ROXANA on CKD IV:  More recent baseline appears to be around 4-4.2.    Creatinine on admission 5.1, GFR 10.  Continues to climb and currently 5.8  Temporary Dialysis catheter placed. HD planned for today   Started on bumex 1mg BID by nephro  Nephrology following  Avoid nephrotoxic agents, renally dose medications.  BMP daily    Elevated troponin:   HST 95, 100 on admission- negative delta.    EKG showing sinus rhythm without acute ST elevation or depression.    Patient denies chest pain.  Suspect elevated troponin secondary to renal dysfunction.  Patient underwent stress test in February at [facility] which was normal.   Monitor on telemetry.  Limited Echo                                   Anticoagulant use- History of DVT and PAD  Patient reports she has only had one DVT over 5 years ago. Also believes she takes eliquis due to history of PAD with claudication and restenosis of stents. I cannot find formal documentation of this.   Previously on Xarelto, was transitioned to Eliquis low-dose at last admission due to renal function.    Eliquis on hold given plan for dialysis catheter.  IV heparin in the interim- currently on hold given drop in Hgb     Uncontrolled IDDMII with peripheral neuropathy:   Hemoglobin A1c on 5/20/24 was 13.6%.  Follows with Dr. Kidd.    Patient has allergy to short acting insulin-  \"whole body cramping\"     Is currently only on Lantus 50 units twice a day.  Will continue Lantus along with carb controlled diet, hypoglycemia treatment protocol.  Continue home gabapentin.  Educated on importance of diet- pt with Arbys in room     Chronic normocytic anemia- ACD along with LANDRY   Likely secondary to CKD.  Hemoglobin 7.9 on admission. - Down trended to 
              Hospitalist Progress Note    Patient:  Debbie Locke      Unit/Bed:8A-11/011-A    YOB: 1975    MRN: [id number]       Acct: [id number]     PCP: Kory Thomas MD    Date of Admission: 7/13/2024    Assessment/Plan:    ROXANA on CKD IV:  More recent baseline appears to be around 4-4.2.    Creatinine on admission 5.1, GFR 10.    Patient was given 2 mg of IV Bumex twice yesterday. Bump in Cr to 5.5.    Nephrology planning for temporarily dialysis catheter and HD for next 2 to three days   Avoid nephrotoxic agents, renally dose medications.  BMP daily    Elevated troponin:   HST 95, 100 on admission- negative delta.    EKG showing sinus rhythm without acute ST elevation or depression.    Patient denies chest pain.  Suspect elevated troponin secondary to renal dysfunction.  Patient underwent stress test in February at [facility] which was normal.   Monitor on telemetry.  Limited Echo                                   History of recurrent DVT:   Previously on Xarelto, was transitioned to Eliquis low-dose at last admission due to renal function.    Eliquis on hold given plan for dialysis catheter.  IV heparin in the interim    Uncontrolled IDDMII with peripheral neuropathy:   Hemoglobin A1c on 5/20/24 was 13.6%.  Follows with Dr. Kidd.    Patient has allergy to short acting insulin-  \"whole body cramping\"     Is currently only on Lantus 50 units twice a day.  Will continue Lantus along with carb controlled diet, hypoglycemia treatment protocol.  Continue home gabapentin.  Educated on importance of diet- pt with Arbys in room     Chronic normocytic anemia- ACD along with LANDRY   Likely secondary to CKD.  Hemoglobin 7.9 on admission.    Baseline appears to be around 8-9  No evidence of active bleeding.  Will monitor with daily CBC.  Repeat iron studies- were low in February. Start ferrous sulfate       Hypertension: Continue home medications.  Hyperlipidemia: Continue statin.    LDA: []CVC / 
              Hospitalist Progress Note    Patient:  Debbie Locke      Unit/Bed:8A-11/011-A    YOB: 1975    MRN: [id number]       Acct: [id number]     PCP: Kory Thomas MD    Date of Admission: 7/13/2024    Assessment/Plan:    ROXANA on CKD IV: Progressive. Now ESRD. Temporary Dialysis catheter placed. HD 7/15 and 7/16. Continue  bumex 1mg BID by nephro. Nephrology following. Plans for tunneled dialysis catheter tomorrow. Avoid nephrotoxic agents, renally dose medications.  BMP daily. Chair time secured.  Elevated troponin likely due to ERSD: HST 95, 100 on admission- negative delta.  EKG showing sinus rhythm without acute ST elevation or depression.  No chest pain.  Suspect elevated troponin secondary to renal dysfunction. Stress test in February at [facility] which was normal. Monitor on telemetry.  Limited Echo unremarkable.                               Chronic anticoagulant use- History of DVT and PAD. Patient reports she has only had one DVT over 5 years ago. Also believes she takes eliquis due to history of PAD with claudication and restenosis of stents. No formal documentation of this. Previously on Xarelto, was transitioned to Eliquis low-dose at last admission due to renal function. Eliquis on hold given plan for dialysis catheter.  IV heparin in the interim.   Uncontrolled IDDMII with peripheral neuropathy: Hemoglobin A1c on 5/20/24 was 13.6%. Follows with Dr. Kidd. Patient has allergy to short acting insulin-  \"whole body cramping\". Currently only on Lantus 50 units twice a day.  Will continue Lantus along with carb controlled diet, hypoglycemia treatment protocol.  Continue home gabapentin. BS trend is labile due to dietary indiscretions.   Chronic normocytic anemia- ACD along with LANDRY.  Likely secondary to CKD.  Hemoglobin 7.9 on admission. - Down trended to 6.8, repeat without intervention was 7.1- monitor closely and transfuse if < 7  or hemodynamically unstable. Continue 
              Hospitalist Progress Note    Patient:  Debbie Locke      Unit/Bed:8A-11/011-A    YOB: 1975    MRN: [id number]       Acct: [id number]     PCP: Kory Thomas MD    Date of Admission: 7/13/2024    Assessment/Plan:    ROXANA on CKD IV:  Temporary Dialysis catheter placed. HD 7/15 and again today   Continue  bumex 1mg BID by nephro  Nephrology following  Avoid nephrotoxic agents, renally dose medications.  BMP daily    Elevated troponin:   HST 95, 100 on admission- negative delta.    EKG showing sinus rhythm without acute ST elevation or depression.    Patient denies chest pain.  Suspect elevated troponin secondary to renal dysfunction.  Patient underwent stress test in February at [facility] which was normal.   Monitor on telemetry.  Limited Echo unremarkable                                   Anticoagulant use- History of DVT and PAD  Patient reports she has only had one DVT over 5 years ago. Also believes she takes eliquis due to history of PAD with claudication and restenosis of stents. I cannot find formal documentation of this.   Previously on Xarelto, was transitioned to Eliquis low-dose at last admission due to renal function.    Eliquis on hold given plan for dialysis catheter.  IV heparin in the interim- currently on hold given drop in Hgb   H and H pending     Uncontrolled IDDMII with peripheral neuropathy:   Hemoglobin A1c on 5/20/24 was 13.6%.  Follows with Dr. Kidd.    Patient has allergy to short acting insulin-  \"whole body cramping\"     Is currently only on Lantus 50 units twice a day.  Will continue Lantus along with carb controlled diet, hypoglycemia treatment protocol.  Continue home gabapentin.  Educated on importance of diet- pt with Arbys in room     Chronic normocytic anemia- ACD along with LANDRY   Likely secondary to CKD.  Hemoglobin 7.9 on admission. - Down trended to 7.1- monitor closely and transfuse if < 7  or hemodynamically unstable. Continue with heparin 
0800 Patient received in IR for temporary dialysis catheter insertion.   0810 This procedure has been fully reviewed with the patient and written informed consent has been obtained.  0822 Procedure started with Dr. Harvey  0828 Procedure completed; patient tolerated well. Dressing to right neck; no bleeding noted.  0832 Patient on bed; comfort ensured.  0835 Patient taken to 8A11 via bed. Pt alert and oriented x3; follows commands. Skin pink, warm, and dry. Respirations easy, regular, and nonlabored.    
1610 Patient received in IR for temp to tunneled dialysis catheter insertion.   1615 This procedure has been fully reviewed with the patient and written informed consent has been obtained.  1626 Procedure started with Dr. Vieyra.  1635 Procedure completed; patient tolerated well. Dressing to right chest; no bleeding noted.  1644 Patient on bed; ice pack applied to right chest. Comfort ensured. Report called to primary RN.  1653 Patient taken to 8A11 via bed. Pt alert and oriented x3; follows commands. Skin pink, warm, and dry. Respirations easy, regular, and nonlabored.    
Discharge teaching and instructions for diagnosis/procedure of hypervolemia completed with patient using teachback method. AVS reviewed. Printed prescriptions given to patient. Patient voiced understanding regarding prescriptions, follow up appointments, and care of self at home. Discharged in a wheelchair to  home with support per family   
[facility]   Nephrology progress note  7/15/2024, 11:35 AM      Pt Name:    Debbie Locke  MRN:     [id number]     YOB: 1975  Admit Date:    7/13/2024  4:15 PM    Chief Complaint: Nephrology following for progressive CKD    Subjective:  Patient was seen and examined this morning  No chest pain or shortness of breath  Feels okay    Objective:  24HR INTAKE/OUTPUT:    Intake/Output Summary (Last 24 hours) at 7/15/2024 1135  Last data filed at 7/14/2024 1820  Gross per 24 hour   Intake 240 ml   Output --   Net 240 ml         I/O last 3 completed shifts:  In: 1100 [P.O.:1100]  Out: -   No intake/output data recorded.   Admission weight: 115.7 kg (255 lb)  Wt Readings from Last 3 Encounters:   07/15/24 118.5 kg (261 lb 3.2 oz)   06/05/24 106.6 kg (235 lb)   05/22/24 107.7 kg (237 lb 8 oz)        Vitals :   Vitals:    07/15/24 0327 07/15/24 0544 07/15/24 0911 07/15/24 1111   BP: (!) 153/85  (!) 151/79 126/68   Pulse: 99  (!) 105 91   Resp: 17  18 16   Temp: 98 °F (36.7 °C)  97.9 °F (36.6 °C) 98 °F (36.7 °C)   TempSrc: Oral  Oral Oral   SpO2: 97%  98% 96%   Weight:  118.5 kg (261 lb 3.2 oz)     Height:           Physical examination  General Appearance: alert and cooperative with exam, appears comfortable, no distress  Mouth/Throat: Oral mucosa moist  Neck: No JVD  Lungs:  no use of accessory muscles  GI: soft, non-tender, no guarding  Extremities: ++ LE edema    Medications:  Infusion:    sodium chloride      [Held by provider] heparin (PORCINE) Infusion Stopped (07/15/24 1040)     Meds:    ferrous sulfate  325 mg Oral BID WC    epoetin fina-epbx  6,000 Units SubCUTAneous Once per day on Mon Wed Fri    bumetanide  1 mg IntraVENous BID    [Held by provider] apixaban  2.5 mg Oral BID    [Held by provider] aspirin  81 mg Oral Daily    atorvastatin  40 mg Oral Daily    doxazosin  4 mg Oral BID    gabapentin  300 mg Oral BID    metoprolol tartrate  12.5 mg Oral BID    sodium chloride flush  
[facility]   Nephrology progress note  7/17/2024, 11:17 AM      Pt Name:    Debbie Locke  MRN:     [id number]     YOB: 1975  Admit Date:    7/13/2024  4:15 PM    Chief Complaint: Nephrology following for progressive CKD    Subjective:  Patient was seen and examined this morning on dialysis  Feels better  No acute distress  No chest pain  No shortness of breath      Objective:  24HR INTAKE/OUTPUT:    Intake/Output Summary (Last 24 hours) at 7/17/2024 1117  Last data filed at 7/17/2024 1108  Gross per 24 hour   Intake 940 ml   Output 0 ml   Net 940 ml           I/O last 3 completed shifts:  In: 820 [P.O.:420]  Out: 1900   I/O this shift:  In: 520 [P.O.:520]  Out: 0    Admission weight: 115.7 kg (255 lb)  Wt Readings from Last 3 Encounters:   07/17/24 117.1 kg (258 lb 3.2 oz)   06/05/24 106.6 kg (235 lb)   05/22/24 107.7 kg (237 lb 8 oz)        Vitals :   Vitals:    07/17/24 0012 07/17/24 0708 07/17/24 0915 07/17/24 1108   BP: (!) 142/68  (!) 154/81 (!) 154/70   Pulse: 99  99 99   Resp: 18 20 19   Temp: 98.1 °F (36.7 °C)  98.1 °F (36.7 °C) 98.2 °F (36.8 °C)   TempSrc: Oral  Oral Oral   SpO2: 98%  96% 97%   Weight:  117.1 kg (258 lb 3.2 oz)     Height:           Physical examination  General Appearance: alert and cooperative with exam, appears comfortable, no distress  Mouth/Throat: Oral mucosa moist  Neck: No JVD  Lungs:  no use of accessory muscles  GI: soft, non-tender, no guarding  Extremities: ++ LE edema, slightly improved    Medications:  Infusion:    dextrose      sodium chloride      heparin (PORCINE) Infusion 24 Units/kg/hr (07/17/24 1044)     Meds:    sevelamer  800 mg Oral TID WC    epoetin fina-epbx  6,000 Units SubCUTAneous Once per day on Mon Wed Fri    bumetanide  1 mg IntraVENous BID    [Held by provider] apixaban  2.5 mg Oral BID    [Held by provider] aspirin  81 mg Oral Daily    atorvastatin  40 mg Oral Daily    doxazosin  4 mg Oral BID    gabapentin  300 mg Oral 
[facility]   Nephrology progress note  7/18/2024, 12:30 PM      Pt Name:    Debbie Locke  MRN:     [id number]     YOB: 1975  Admit Date:    7/13/2024  4:15 PM    Chief Complaint: Nephrology following for progressive CKD    Subjective:  Patient was seen and examined this morning on dialysis  Feels better  No acute distress  Awaiting TDC placement      Objective:  24HR INTAKE/OUTPUT:    Intake/Output Summary (Last 24 hours) at 7/18/2024 1230  Last data filed at 7/18/2024 1115  Gross per 24 hour   Intake 510 ml   Output 1900 ml   Net -1390 ml           I/O last 3 completed shifts:  In: 1130 [P.O.:1130]  Out: 1900   No intake/output data recorded.   Admission weight: 115.7 kg (255 lb)  Wt Readings from Last 3 Encounters:   07/18/24 117.3 kg (258 lb 9.6 oz)   06/05/24 106.6 kg (235 lb)   05/22/24 107.7 kg (237 lb 8 oz)        Vitals :   Vitals:    07/18/24 0320 07/18/24 0613 07/18/24 0815 07/18/24 1115   BP: (!) 155/79  132/69 116/69   Pulse: (!) 102  100 (!) 106   Resp: 16 19 18   Temp: 98.3 °F (36.8 °C)  98.1 °F (36.7 °C) 98.1 °F (36.7 °C)   TempSrc: Oral  Oral Oral   SpO2: 97%  98% 93%   Weight:  117.3 kg (258 lb 9.6 oz)     Height:           Physical examination  General Appearance: alert and cooperative with exam, appears comfortable, no distress  Mouth/Throat: Oral mucosa moist  Neck: No JVD  Lungs:  no use of accessory muscles  GI: soft, non-tender, no guarding  Extremities: ++ LE edema, slightly improved    Medications:  Infusion:    sodium chloride      dextrose      sodium chloride      heparin (PORCINE) Infusion Stopped (07/18/24 0955)     Meds:    ceFAZolin  2,000 mg IntraVENous Once    HYDROmorphone  1 mg IntraVENous Once    midazolam  1 mg IntraVENous Once    albuterol sulfate HFA  2 puff Inhalation BID RT    epoetin fina-epbx  10,000 Units SubCUTAneous Once per day on Mon Wed Fri    bumetanide  1 mg Oral BID    sevelamer  800 mg Oral TID WC    [Held by provider] 
[facility]   Nephrology progress note  7/19/2024, 11:40 AM      Pt Name:    Debbie Locke  MRN:     [id number]     YOB: 1975  Admit Date:    7/13/2024  4:15 PM    Chief Complaint: Nephrology following for progressive CKD    Subjective:  Patient was seen and examined this morning on dialysis  Feels better  No acute distress  Awaiting HD today      Objective:  24HR INTAKE/OUTPUT:    Intake/Output Summary (Last 24 hours) at 7/19/2024 1140  Last data filed at 7/19/2024 0403  Gross per 24 hour   Intake 200 ml   Output 0 ml   Net 200 ml           I/O last 3 completed shifts:  In: 350 [P.O.:350]  Out: 1900   No intake/output data recorded.   Admission weight: 115.7 kg (255 lb)  Wt Readings from Last 3 Encounters:   07/18/24 117.3 kg (258 lb 9.6 oz)   06/05/24 106.6 kg (235 lb)   05/22/24 107.7 kg (237 lb 8 oz)        Vitals :   Vitals:    07/19/24 0023 07/19/24 0403 07/19/24 0529 07/19/24 0945   BP: (!) 144/79 125/71  (!) 167/87   Pulse: (!) 103 79 86 (!) 102   Resp: 18 18 16 19   Temp: 97.5 °F (36.4 °C) 98.3 °F (36.8 °C)  99 °F (37.2 °C)   TempSrc:  Oral  Axillary   SpO2: 95% 98% 93% 99%   Weight:       Height:           Physical examination  General Appearance: alert and cooperative with exam, appears comfortable, no distress  Mouth/Throat: Oral mucosa moist  Neck: No JVD  Lungs:  no use of accessory muscles  GI: soft, non-tender, no guarding  Extremities: ++ LE edema, slightly improved    Medications:  Infusion:    sodium chloride Stopped (07/19/24 1045)    dextrose Stopped (07/19/24 1047)    sodium chloride 20 mL/hr at 07/18/24 1300     Meds:    ceFAZolin  2,000 mg IntraVENous Once    albuterol sulfate HFA  2 puff Inhalation BID RT    epoetin fina-epbx  10,000 Units SubCUTAneous Once per day on Mon Wed Fri    bumetanide  1 mg Oral BID    sevelamer  800 mg Oral TID WC    apixaban  2.5 mg Oral BID    aspirin  81 mg Oral Daily    atorvastatin  40 mg Oral Daily    doxazosin  4 mg Oral 
Order for temp to tunneled HD catheter received. Last dose aspirin and Eliquis 7/13/24. Pt scheduled for catheter exchange on Thursday, 7/18/24 at 1400. Please hold Heparin drip for 4 hours prior to procedure. Pt to be NPO for 4 hours prior to procedure. Pt's RN updated.   
Pt was in bed and alone at the time of the visit. She was dealing with Hypervolemia. She was encouraged and blessed.    07/17/24 1708   Encounter Summary   Encounter Overview/Reason Initial Encounter   Service Provided For Patient   Referral/Consult From [facility]   Support System Children;Family members;Friends/neighbors   Last Encounter  07/17/24   Complexity of Encounter Low   Begin Time 1010   End Time  1018   Total Time Calculated 8 min   Spiritual/Emotional needs   Type Spiritual Support   Assessment/Intervention/Outcome   Assessment Hopeful   Intervention Empowerment   Outcome Encouraged;Engaged in conversation       
which are inherent in voice recognition technology  
for input(s): \"AST\", \"ALT\", \"BILIDIR\", \"BILITOT\", \"ALKPHOS\" in the last 72 hours.  Recent Labs     07/18/24  0640   INR 0.99       No results for input(s): \"CKTOTAL\", \"TROPONINI\" in the last 72 hours.    Microbiology:      Urinalysis:      Lab Results   Component Value Date/Time    NITRU NEGATIVE 07/13/2024 04:30 PM    WBCUA 5-9 07/13/2024 04:30 PM    BACTERIA NONE SEEN 07/13/2024 04:30 PM    RBCUA 3-5 07/13/2024 04:30 PM    BLOODU SMALL 07/13/2024 04:30 PM    GLUCOSEU >= 1000 07/13/2024 04:30 PM       Radiology:  XR CHEST PORTABLE    Result Date: 7/13/2024  PROCEDURE: XR CHEST PORTABLE CLINICAL INFORMATION: emergency COMPARISON: No prior study. TECHNIQUE: FINDINGS: There is a small left pleural effusion. The heart size is upper limits of normal. Mild dependent bibasilar opacities are present which may present mild atelectasis or pneumonia. No pneumothorax is seen. No acute osseous findings are demonstrated.     1.   There is a small left pleural effusion. 2. Mild dependent bibasilar opacities are present which may present mild atelectasis or pneumonia.  **This report has been created using voice recognition software.  It may contain minor errors which are inherent in voice recognition technology.** Electronically signed by Dr. Franc Riddle           Code Status: Full Code        Active Hospital Problems    Diagnosis Date Noted    ESRD (end stage renal disease) ([facility]) [N18.6] 07/17/2024    CKD (chronic kidney disease), stage V ([facility]) [N18.5] 07/16/2024    Hypervolemia [E87.70] 07/13/2024    Acute kidney injury ([facility]) [N17.9] 02/06/2024       Electronically signed by CHUYITA Alaniz CNP on 7/18/2024 at 12:17 PM

## 2024-07-19 NOTE — FLOWSHEET NOTE
07/19/24 1516   Vital Signs   /64   Temp 98.6 °F (37 °C)   Pulse 94   Respirations 18   SpO2 98 %   Weight - Scale 120.5 kg (265 lb 10.5 oz)   Weight Method Bed scale   Percent Weight Change -2.03   Post-Hemodialysis Assessment   Post-Treatment Procedures Catheter Capped, clamped with Saline x2 ports;Blood returned   Machine Disinfection Process Acid/Vinegar Clean;Exterior Machine Disinfection   Rinseback Volume (ml) 400 ml   Blood Volume Processed (Liters) 69.3 L   Dialyzer Clearance Lightly streaked   Duration of Treatment (minutes) 210 minutes   Hemodialysis Intake (ml) 400 ml   Hemodialysis Output (ml) 2900 ml   NET Removed (ml) 2500   Tolerated Treatment Good     stable 3.5 hour treatment. 2.5 liters net uf. epo administered with treatment. cath lines flushed with 0.9 ns, clamped and tegos in place. report called to primary nurse

## 2024-07-19 NOTE — PLAN OF CARE
Problem: Discharge Planning  Goal: Discharge to home or other facility with appropriate resources  Outcome: Progressing  Discharge to home or other facility with appropriate resources: Identify barriers to discharge with patient and caregiver     Problem: Pain  Goal: Verbalizes/displays adequate comfort level or baseline comfort level  Outcome: Progressing  Verbalizes/displays adequate comfort level or baseline comfort level:   Encourage patient to monitor pain and request assistance   Assess pain using appropriate pain scale     Problem: Skin/Tissue Integrity  Goal: Absence of new skin breakdown  Description: 1.  Monitor for areas of redness and/or skin breakdown  2.  Assess vascular access sites hourly  3.  Every 4-6 hours minimum:  Change oxygen saturation probe site  4.  Every 4-6 hours:  If on nasal continuous positive airway pressure, respiratory therapy assess nares and determine need for appliance change or resting period.  Outcome: Progressing  Note: Skin is assessed every shift, no new skin breakdown noted     Problem: ABCDS Injury Assessment  Goal: Absence of physical injury  Outcome: Progressing  Absence of Physical Injury: Implement safety measures based on patient assessment     Problem: Safety - Adult  Goal: Free from fall injury  Outcome: Progressing  Note: Patient free of falls this shift. Patient on falling star program. Fall band intact. RN visually checks on patient with hourly rounds. Patient tolerates ambulation each shift.       Problem: Metabolic/Fluid and Electrolytes - Adult  Goal: Glucose maintained within prescribed range  Outcome: Progressing  Glucose maintained within prescribed range:   Monitor blood glucose as ordered   Assess for signs and symptoms of hyperglycemia and hypoglycemia

## 2024-07-19 NOTE — CARE COORDINATION
Planning discharge later today after dialysis.     7/19/24, 10:44 AM EDT    Patient goals/plan/ treatment preferences discussed by  and .  Patient goals/plan/ treatment preferences reviewed with patient/ family.  Patient/ family verbalize understanding of discharge plan and are in agreement with goal/plan/treatment preferences.  Understanding was demonstrated using the teach back method.  AVS provided by RN at time of discharge, which includes all necessary medical information pertaining to the patients current course of illness, treatment, post-discharge goals of care, and treatment preferences.     Services At/After Discharge: None

## 2024-07-19 NOTE — RT PROTOCOL NOTE
Frequency of every 4 hours PRN for wheezing or increased work of breathing using Per Protocol order mode.        4-6 - enter or revise RT Bronchodilator order(s) to two equivalent RT bronchodilator orders with one order with BID Frequency and one order with Frequency of every 4 hours PRN wheezing or increased work of breathing using Per Protocol order mode.        7-10 - enter or revise RT Bronchodilator order(s) to two equivalent RT bronchodilator orders with one order with TID Frequency and one order with Frequency of every 4 hours PRN wheezing or increased work of breathing using Per Protocol order mode.       11-13 - enter or revise RT Bronchodilator order(s) to one equivalent RT bronchodilator order with QID Frequency and an Albuterol order with Frequency of every 4 hours PRN wheezing or increased work of breathing using Per Protocol order mode.      Greater than 13 - enter or revise RT Bronchodilator order(s) to one equivalent RT bronchodilator order with every 4 hours Frequency and an Albuterol order with Frequency of every 2 hours PRN wheezing or increased work of breathing using Per Protocol order mode.     RT to enter RT Home Evaluation for COPD & MDI Assessment order using Per Protocol order mode.    Electronically signed by Rosa Garcia RCP on 7/19/2024 at 8:40 AM

## 2024-07-19 NOTE — DISCHARGE SUMMARY
NEURONTIN  Take 1 capsule by mouth in the morning and at bedtime for 10 days. Indications: Nerve Disease     Insulin Pen Needle 31G X 8 MM Misc  1 each by Does not apply route daily     Lantus SoloStar 100 UNIT/ML injection pen  Generic drug: insulin glargine  Inject 50 Units into the skin nightly     metoprolol tartrate 25 MG tablet  Commonly known as: LOPRESSOR  Take 0.5 tablets by mouth 2 times daily            STOP taking these medications      insulin lispro (1 Unit Dial) 100 UNIT/ML Sopn  Commonly known as: HumaLOG KwikPen     metOLazone 10 MG tablet  Commonly known as: ZAROXOLYN            ASK your doctor about these medications      Glucagon Emergency 1 MG Kit  Inject 1 mg into the skin as needed (Blood glucose LESS THAN 70 mg/dL and patient NOT ALERT or NPO and does not have IV access.)     glucose 4 g chewable tablet  Take 4 tablets by mouth as needed for Low blood sugar     True Metrix Blood Glucose Test strip  Generic drug: blood glucose test strips               Where to Get Your Medications        These medications were sent to [facility]/pharmacy #[id number] - [place] [id number] [facility] [phone] -  [phone]  [address] [facility] [address]      Phone: [phone]   bumetanide 1 MG tablet  sevelamer 800 MG tablet         Time Spent on discharge is more than 45 minutes in the examination, evaluation, counseling and review of medications and discharge plan.          Signed:    Thank you Kory Thomas MD for the opportunity to be involved in this patient's care.    Electronically signed by CHUYITA Alaniz CNP on 7/20/2024 at 3:57 PM

## 2024-07-23 ENCOUNTER — TRANSCRIBE ORDERS (OUTPATIENT)
Dept: ADMINISTRATIVE | Age: 49
End: 2024-07-23

## 2024-07-23 DIAGNOSIS — Z99.2 DEPENDENCE ON HEMODIALYSIS (HCC): ICD-10-CM

## 2024-07-23 DIAGNOSIS — Z01.818 PREOP EXAMINATION: Primary | ICD-10-CM

## 2024-07-23 DIAGNOSIS — N18.4 CHRONIC KIDNEY DISEASE, STAGE IV (SEVERE) (HCC): ICD-10-CM

## 2024-07-25 ENCOUNTER — HOSPITAL ENCOUNTER (INPATIENT)
Age: 49
LOS: 1 days | Discharge: HOME | DRG: 194 | End: 2024-07-30
Attending: STUDENT IN AN ORGANIZED HEALTH CARE EDUCATION/TRAINING PROGRAM | Admitting: STUDENT IN AN ORGANIZED HEALTH CARE EDUCATION/TRAINING PROGRAM
Payer: COMMERCIAL

## 2024-07-25 ENCOUNTER — APPOINTMENT (OUTPATIENT)
Dept: GENERAL RADIOLOGY | Age: 49
DRG: 194 | End: 2024-07-25
Payer: COMMERCIAL

## 2024-07-25 DIAGNOSIS — J81.0 ACUTE PULMONARY EDEMA (HCC): Primary | ICD-10-CM

## 2024-07-25 DIAGNOSIS — N18.6 CKD (CHRONIC KIDNEY DISEASE) REQUIRING CHRONIC DIALYSIS (HCC): ICD-10-CM

## 2024-07-25 DIAGNOSIS — R60.1 ANASARCA: ICD-10-CM

## 2024-07-25 DIAGNOSIS — Z79.4 DIABETES MELLITUS TYPE 2, INSULIN DEPENDENT (HCC): ICD-10-CM

## 2024-07-25 DIAGNOSIS — E87.79 OTHER HYPERVOLEMIA: ICD-10-CM

## 2024-07-25 DIAGNOSIS — Z99.2 CKD (CHRONIC KIDNEY DISEASE) REQUIRING CHRONIC DIALYSIS (HCC): ICD-10-CM

## 2024-07-25 DIAGNOSIS — R60.0 LOWER EXTREMITY EDEMA: ICD-10-CM

## 2024-07-25 DIAGNOSIS — E11.9 DIABETES MELLITUS TYPE 2, INSULIN DEPENDENT (HCC): ICD-10-CM

## 2024-07-25 DIAGNOSIS — R06.02 SHORTNESS OF BREATH: ICD-10-CM

## 2024-07-25 DIAGNOSIS — E87.1 HYPONATREMIA: ICD-10-CM

## 2024-07-25 DIAGNOSIS — R00.0 TACHYCARDIA: ICD-10-CM

## 2024-07-25 LAB
ALBUMIN SERPL BCG-MCNC: 2.6 G/DL (ref 3.5–5.1)
ALP SERPL-CCNC: 69 U/L (ref 38–126)
ALT SERPL W/O P-5'-P-CCNC: 9 U/L (ref 11–66)
ANION GAP SERPL CALC-SCNC: 14 MEQ/L (ref 8–16)
AST SERPL-CCNC: 9 U/L (ref 5–40)
BASE EXCESS BLDA CALC-SCNC: 2 MMOL/L (ref -2–3)
BASOPHILS ABSOLUTE: 0 THOU/MM3 (ref 0–0.1)
BASOPHILS NFR BLD AUTO: 0.5 %
BILIRUB SERPL-MCNC: < 0.2 MG/DL (ref 0.3–1.2)
BUN SERPL-MCNC: 38 MG/DL (ref 7–22)
CA-I BLD ISE-SCNC: 0.92 MMOL/L (ref 1.12–1.32)
CA-I BLD ISE-SCNC: 1.03 MMOL/L (ref 1.12–1.32)
CALCIUM SERPL-MCNC: 7.7 MG/DL (ref 8.5–10.5)
CHLORIDE BLD-SCNC: 104 MEQ/L (ref 98–109)
CHLORIDE SERPL-SCNC: 101 MEQ/L (ref 98–111)
CO2 SERPL-SCNC: 20 MEQ/L (ref 23–33)
COLLECTED BY:: ABNORMAL
CREAT SERPL-MCNC: 4.9 MG/DL (ref 0.4–1.2)
DEPRECATED RDW RBC AUTO: 50.8 FL (ref 35–45)
DEVICE: ABNORMAL
EOSINOPHIL NFR BLD AUTO: 4.2 %
EOSINOPHILS ABSOLUTE: 0.4 THOU/MM3 (ref 0–0.4)
ERYTHROCYTE [DISTWIDTH] IN BLOOD BY AUTOMATED COUNT: 15 % (ref 11.5–14.5)
FIO2 ON VENT O2 ANALYZER: 21 %
GFR SERPL CREATININE-BSD FRML MDRD: 10 ML/MIN/1.73M2
GLUCOSE BLD-MCNC: 392 MG/DL (ref 70–108)
GLUCOSE SERPL-MCNC: 354 MG/DL (ref 70–108)
HCO3 BLDA-SCNC: 28 MMOL/L (ref 23–28)
HCT VFR BLD AUTO: 28.9 % (ref 37–47)
HGB BLD-MCNC: 8.2 GM/DL (ref 12–16)
HYPOCHROMIA BLD QL SMEAR: PRESENT
IMM GRANULOCYTES # BLD AUTO: 0.05 THOU/MM3 (ref 0–0.07)
IMM GRANULOCYTES NFR BLD AUTO: 0.6 %
LACTATE BLD-SCNC: 0.5 MMOL/L (ref 0.5–1.9)
LYMPHOCYTES ABSOLUTE: 1.3 THOU/MM3 (ref 1–4.8)
LYMPHOCYTES NFR BLD AUTO: 15.9 %
MAGNESIUM SERPL-MCNC: 1.8 MG/DL (ref 1.6–2.4)
MCH RBC QN AUTO: 26.5 PG (ref 26–33)
MCHC RBC AUTO-ENTMCNC: 28.4 GM/DL (ref 32.2–35.5)
MCV RBC AUTO: 93.5 FL (ref 81–99)
MONOCYTES ABSOLUTE: 0.5 THOU/MM3 (ref 0.4–1.3)
MONOCYTES NFR BLD AUTO: 6 %
NEUTROPHILS ABSOLUTE: 6.1 THOU/MM3 (ref 1.8–7.7)
NEUTROPHILS NFR BLD AUTO: 72.8 %
NRBC BLD AUTO-RTO: 0 /100 WBC
NT-PROBNP SERPL IA-MCNC: 9656 PG/ML (ref 0–124)
OSMOLALITY SERPL CALC.SUM OF ELEC: 293.3 MOSMOL/KG (ref 275–300)
PCO2 TEMP ADJ BLDMV: 47 MMHG (ref 41–51)
PH BLDMV: 7.38 [PH] (ref 7.31–7.41)
PHOSPHATE SERPL-MCNC: 7.2 MG/DL (ref 2.4–4.7)
PLATELET # BLD AUTO: 241 THOU/MM3 (ref 130–400)
PLATELET BLD QL SMEAR: ADEQUATE
PMV BLD AUTO: 9.6 FL (ref 9.4–12.4)
PO2 BLDMV: 16 MMHG (ref 25–40)
POC CREATININE WHOLE BLOOD: 4.9 MG/DL (ref 0.5–1.2)
POLYCHROMASIA BLD QL SMEAR: ABNORMAL
POTASSIUM BLD-SCNC: 5.2 MEQ/L (ref 3.5–4.9)
POTASSIUM SERPL-SCNC: 5.2 MEQ/L (ref 3.5–5.2)
PROT SERPL-MCNC: 5.8 G/DL (ref 6.1–8)
RBC # BLD AUTO: 3.09 MILL/MM3 (ref 4.2–5.4)
SAO2 % BLDMV: 20 %
SCAN OF BLOOD SMEAR: NORMAL
SITE: ABNORMAL
SODIUM BLD-SCNC: 135 MEQ/L (ref 138–146)
SODIUM SERPL-SCNC: 135 MEQ/L (ref 135–145)
TROPONIN, HIGH SENSITIVITY: 73 NG/L (ref 0–12)
VENTILATION MODE VENT: ABNORMAL
WBC # BLD AUTO: 8.4 THOU/MM3 (ref 4.8–10.8)

## 2024-07-25 PROCEDURE — 36415 COLL VENOUS BLD VENIPUNCTURE: CPT

## 2024-07-25 PROCEDURE — 82947 ASSAY GLUCOSE BLOOD QUANT: CPT

## 2024-07-25 PROCEDURE — 2500000003 HC RX 250 WO HCPCS

## 2024-07-25 PROCEDURE — 96375 TX/PRO/DX INJ NEW DRUG ADDON: CPT

## 2024-07-25 PROCEDURE — 99285 EMERGENCY DEPT VISIT HI MDM: CPT

## 2024-07-25 PROCEDURE — 6360000002 HC RX W HCPCS

## 2024-07-25 PROCEDURE — 83880 ASSAY OF NATRIURETIC PEPTIDE: CPT

## 2024-07-25 PROCEDURE — 84484 ASSAY OF TROPONIN QUANT: CPT

## 2024-07-25 PROCEDURE — 84100 ASSAY OF PHOSPHORUS: CPT

## 2024-07-25 PROCEDURE — 82565 ASSAY OF CREATININE: CPT

## 2024-07-25 PROCEDURE — G0378 HOSPITAL OBSERVATION PER HR: HCPCS

## 2024-07-25 PROCEDURE — 96365 THER/PROPH/DIAG IV INF INIT: CPT

## 2024-07-25 PROCEDURE — 82803 BLOOD GASES ANY COMBINATION: CPT

## 2024-07-25 PROCEDURE — 71045 X-RAY EXAM CHEST 1 VIEW: CPT

## 2024-07-25 PROCEDURE — 82330 ASSAY OF CALCIUM: CPT

## 2024-07-25 PROCEDURE — 93005 ELECTROCARDIOGRAM TRACING: CPT

## 2024-07-25 PROCEDURE — 96374 THER/PROPH/DIAG INJ IV PUSH: CPT

## 2024-07-25 PROCEDURE — 83605 ASSAY OF LACTIC ACID: CPT

## 2024-07-25 PROCEDURE — 84132 ASSAY OF SERUM POTASSIUM: CPT

## 2024-07-25 PROCEDURE — 83735 ASSAY OF MAGNESIUM: CPT

## 2024-07-25 PROCEDURE — 85025 COMPLETE CBC W/AUTO DIFF WBC: CPT

## 2024-07-25 PROCEDURE — 80053 COMPREHEN METABOLIC PANEL: CPT

## 2024-07-25 PROCEDURE — 84295 ASSAY OF SERUM SODIUM: CPT

## 2024-07-25 PROCEDURE — 82435 ASSAY OF BLOOD CHLORIDE: CPT

## 2024-07-25 RX ORDER — POLYETHYLENE GLYCOL 3350 17 G/17G
17 POWDER, FOR SOLUTION ORAL DAILY PRN
Status: DISCONTINUED | OUTPATIENT
Start: 2024-07-25 | End: 2024-07-30 | Stop reason: HOSPADM

## 2024-07-25 RX ORDER — SODIUM CHLORIDE 0.9 % (FLUSH) 0.9 %
5-40 SYRINGE (ML) INJECTION EVERY 12 HOURS SCHEDULED
Status: DISCONTINUED | OUTPATIENT
Start: 2024-07-25 | End: 2024-07-27 | Stop reason: SDUPTHER

## 2024-07-25 RX ORDER — ONDANSETRON 2 MG/ML
4 INJECTION INTRAMUSCULAR; INTRAVENOUS EVERY 6 HOURS PRN
Status: DISCONTINUED | OUTPATIENT
Start: 2024-07-25 | End: 2024-07-30 | Stop reason: HOSPADM

## 2024-07-25 RX ORDER — DEXTROSE MONOHYDRATE 100 MG/ML
INJECTION, SOLUTION INTRAVENOUS CONTINUOUS PRN
Status: DISCONTINUED | OUTPATIENT
Start: 2024-07-25 | End: 2024-07-30 | Stop reason: HOSPADM

## 2024-07-25 RX ORDER — SODIUM CHLORIDE 9 MG/ML
INJECTION, SOLUTION INTRAVENOUS PRN
Status: DISCONTINUED | OUTPATIENT
Start: 2024-07-25 | End: 2024-07-27 | Stop reason: SDUPTHER

## 2024-07-25 RX ORDER — FENTANYL CITRATE 50 UG/ML
50 INJECTION, SOLUTION INTRAMUSCULAR; INTRAVENOUS ONCE
Status: COMPLETED | OUTPATIENT
Start: 2024-07-25 | End: 2024-07-25

## 2024-07-25 RX ORDER — CALCIUM GLUCONATE 10 MG/ML
1000 INJECTION, SOLUTION INTRAVENOUS ONCE
Status: COMPLETED | OUTPATIENT
Start: 2024-07-25 | End: 2024-07-26

## 2024-07-25 RX ORDER — ONDANSETRON 2 MG/ML
4 INJECTION INTRAMUSCULAR; INTRAVENOUS ONCE
Status: COMPLETED | OUTPATIENT
Start: 2024-07-25 | End: 2024-07-25

## 2024-07-25 RX ORDER — ACETAMINOPHEN 325 MG/1
650 TABLET ORAL EVERY 6 HOURS PRN
Status: DISCONTINUED | OUTPATIENT
Start: 2024-07-25 | End: 2024-07-30 | Stop reason: HOSPADM

## 2024-07-25 RX ORDER — ACETAMINOPHEN 650 MG/1
650 SUPPOSITORY RECTAL EVERY 6 HOURS PRN
Status: DISCONTINUED | OUTPATIENT
Start: 2024-07-25 | End: 2024-07-30 | Stop reason: HOSPADM

## 2024-07-25 RX ORDER — NITROGLYCERIN 20 MG/100ML
5-200 INJECTION INTRAVENOUS CONTINUOUS
Status: DISCONTINUED | OUTPATIENT
Start: 2024-07-25 | End: 2024-07-25

## 2024-07-25 RX ORDER — SODIUM CHLORIDE 0.9 % (FLUSH) 0.9 %
5-40 SYRINGE (ML) INJECTION PRN
Status: DISCONTINUED | OUTPATIENT
Start: 2024-07-25 | End: 2024-07-27 | Stop reason: SDUPTHER

## 2024-07-25 RX ORDER — BUMETANIDE 0.25 MG/ML
2 INJECTION INTRAMUSCULAR; INTRAVENOUS ONCE
Status: COMPLETED | OUTPATIENT
Start: 2024-07-25 | End: 2024-07-25

## 2024-07-25 RX ORDER — GLUCAGON 1 MG/ML
1 KIT INJECTION PRN
Status: DISCONTINUED | OUTPATIENT
Start: 2024-07-25 | End: 2024-07-30 | Stop reason: HOSPADM

## 2024-07-25 RX ADMIN — CALCIUM GLUCONATE 1000 MG: 10 INJECTION, SOLUTION INTRAVENOUS at 23:41

## 2024-07-25 RX ADMIN — ONDANSETRON 4 MG: 2 INJECTION INTRAMUSCULAR; INTRAVENOUS at 22:38

## 2024-07-25 RX ADMIN — FENTANYL CITRATE 50 MCG: 50 INJECTION, SOLUTION INTRAMUSCULAR; INTRAVENOUS at 22:08

## 2024-07-25 RX ADMIN — BUMETANIDE 2 MG: 0.25 INJECTION INTRAMUSCULAR; INTRAVENOUS at 21:17

## 2024-07-25 ASSESSMENT — PAIN - FUNCTIONAL ASSESSMENT: PAIN_FUNCTIONAL_ASSESSMENT: NONE - DENIES PAIN

## 2024-07-25 ASSESSMENT — PAIN SCALES - GENERAL: PAINLEVEL_OUTOF10: 8

## 2024-07-25 NOTE — ED PROVIDER NOTES
[facility] EMERGENCY DEPARTMENT    EMERGENCY MEDICINE     Patient Name: Debbie Locke  MRN: [id number]  YOB: 1975  Date of Evaluation: 2024  Treating Resident Physician: Brijesh Gregory DO  Supervising Physician: Tres Da Silva MD     CHIEF COMPLAINT       Chief Complaint   Patient presents with    Shortness of Breath       HISTORY OF PRESENT ILLNESS      History obtained from chart review and the patient.    Debbie Locke is a 49 y.o. female who presents to the emergency department from home by private vehicle for evaluation of Shortness of breath.  PMH significant for DM 2, HDL, CKD on dialysis, previous DVT on Eliquis.   Here today with worsening shortness of breath.  Gets dialysis [facility] nephrologist with Dr. Howard.  Patient missed dialysis on Wednesday because her port was not working she went to [facility] yesterday to get port replacement.  Reports since last week she has gained about 7 to 10 pounds.  She was recently discharged on 07/15/2024 from [facility] for CKD requiring dialysis.  Patient is allergic to insulin so dialysis was started.  Denied any chest pain, she does have a chronic cough that is nonproductive.  No fever.  Does have abdominal pain feels her abdomen enlarged but relatively similar to last weeks during her admission.  No dysuria or hematuria.  Has chronic edema of the lower extremity.    Pertinent previous and/or external records reviewed: Non-contributory    PAST MEDICAL AND SURGICAL HISTORY     Past Medical History:   Diagnosis Date    Chronic kidney disease     Hyperlipidemia     Neuropathy     Type II or unspecified type diabetes mellitus without mention of complication, not stated as uncontrolled 2000       Past Surgical History:   Procedure Laterality Date    BACK SURGERY N/A 2024    Excision of Chronic Abscess on Back performed by Kyree Mercado MD at [facility] OR     SECTION  2002    CHOLECYSTECTOMY      CT BIOPSY RENAL  2023    CT BIOPSY  NEPHROLOGY    Decision Rules/Clinical Scores utilized:  Not Applicable     CRITICAL CARE:  None    MEDICATION CHANGES     New Prescriptions    No medications on file       FINAL IMPRESSION     Final diagnoses:   Acute pulmonary edema (HCC)   CKD (chronic kidney disease) requiring chronic dialysis (HCC)   Tachycardia       DISPOSITION   Condition: condition: fair  Dispo: Admit to med/surg floor  DISPOSITION Decision To Admit 07/25/2024 09:32:05 PM        Outpatient Follow-Up:  No follow-up provider specified.    DISCHARGE MEDICATIONS:  New Prescriptions    No medications on file         This transcription was electronically signed. Parts of this transcriptions may have been dictated by use of voice recognition software and electronically transcribed. The transcription may contain errors not detected in proofreading. Please refer to my supervising physician's documentation if my documentation differs.    Electronically Signed: Brijesh Gregory DO, 07/25/24, 9:32 PM

## 2024-07-25 NOTE — ED NOTES
Patient to the ED with shortness of breath. Patient states that she missed dialysis yesterday due to having a new dialysis port placed. She states that she is feeling \"overfilled\" with fluid. Patient's legs are edematous and tight. Patient states she cannot tolerate sitting up in bed. She states that sitting on side of bed is more manageable for her breathing.

## 2024-07-26 ENCOUNTER — APPOINTMENT (OUTPATIENT)
Age: 49
DRG: 194 | End: 2024-07-26
Payer: COMMERCIAL

## 2024-07-26 PROBLEM — J81.0 ACUTE PULMONARY EDEMA (HCC): Status: ACTIVE | Noted: 2024-07-26

## 2024-07-26 LAB
ANION GAP SERPL CALC-SCNC: 13 MEQ/L (ref 8–16)
BUN SERPL-MCNC: 40 MG/DL (ref 7–22)
CALCIUM SERPL-MCNC: 7.6 MG/DL (ref 8.5–10.5)
CHLORIDE SERPL-SCNC: 103 MEQ/L (ref 98–111)
CO2 SERPL-SCNC: 23 MEQ/L (ref 23–33)
CREAT SERPL-MCNC: 4.9 MG/DL (ref 0.4–1.2)
DEPRECATED RDW RBC AUTO: 47.7 FL (ref 35–45)
EKG ATRIAL RATE: 125 BPM
EKG P-R INTERVAL: 136 MS
EKG Q-T INTERVAL: 348 MS
EKG QRS DURATION: 90 MS
EKG QTC CALCULATION (BAZETT): 502 MS
EKG R AXIS: 88 DEGREES
EKG T AXIS: 57 DEGREES
EKG VENTRICULAR RATE: 125 BPM
ERYTHROCYTE [DISTWIDTH] IN BLOOD BY AUTOMATED COUNT: 15 % (ref 11.5–14.5)
GFR SERPL CREATININE-BSD FRML MDRD: 10 ML/MIN/1.73M2
GLUCOSE BLD STRIP.AUTO-MCNC: 163 MG/DL (ref 70–108)
GLUCOSE BLD STRIP.AUTO-MCNC: 279 MG/DL (ref 70–108)
GLUCOSE BLD STRIP.AUTO-MCNC: 299 MG/DL (ref 70–108)
GLUCOSE BLD STRIP.AUTO-MCNC: 334 MG/DL (ref 70–108)
GLUCOSE SERPL-MCNC: 286 MG/DL (ref 70–108)
HCT VFR BLD AUTO: 26.4 % (ref 37–47)
HGB BLD-MCNC: 7.9 GM/DL (ref 12–16)
MAGNESIUM SERPL-MCNC: 1.7 MG/DL (ref 1.6–2.4)
MCH RBC QN AUTO: 26.4 PG (ref 26–33)
MCHC RBC AUTO-ENTMCNC: 29.9 GM/DL (ref 32.2–35.5)
MCV RBC AUTO: 88.3 FL (ref 81–99)
OSMOLALITY SERPL CALC.SUM OF ELEC: 297.7 MOSMOL/KG (ref 275–300)
PHOSPHATE SERPL-MCNC: 7.4 MG/DL (ref 2.4–4.7)
PLATELET # BLD AUTO: 245 THOU/MM3 (ref 130–400)
PMV BLD AUTO: 9.5 FL (ref 9.4–12.4)
POTASSIUM SERPL-SCNC: 5.2 MEQ/L (ref 3.5–5.2)
RBC # BLD AUTO: 2.99 MILL/MM3 (ref 4.2–5.4)
SODIUM SERPL-SCNC: 139 MEQ/L (ref 135–145)
TROPONIN, HIGH SENSITIVITY: 69 NG/L (ref 0–12)
TROPONIN, HIGH SENSITIVITY: 75 NG/L (ref 0–12)
TROPONIN, HIGH SENSITIVITY: 77 NG/L (ref 0–12)
TROPONIN, HIGH SENSITIVITY: 79 NG/L (ref 0–12)
WBC # BLD AUTO: 12.7 THOU/MM3 (ref 4.8–10.8)

## 2024-07-26 PROCEDURE — 2500000003 HC RX 250 WO HCPCS: Performed by: STUDENT IN AN ORGANIZED HEALTH CARE EDUCATION/TRAINING PROGRAM

## 2024-07-26 PROCEDURE — 84484 ASSAY OF TROPONIN QUANT: CPT

## 2024-07-26 PROCEDURE — 93010 ELECTROCARDIOGRAM REPORT: CPT | Performed by: INTERNAL MEDICINE

## 2024-07-26 PROCEDURE — G0378 HOSPITAL OBSERVATION PER HR: HCPCS

## 2024-07-26 PROCEDURE — 85027 COMPLETE CBC AUTOMATED: CPT

## 2024-07-26 PROCEDURE — 94761 N-INVAS EAR/PLS OXIMETRY MLT: CPT

## 2024-07-26 PROCEDURE — 99222 1ST HOSP IP/OBS MODERATE 55: CPT | Performed by: INTERNAL MEDICINE

## 2024-07-26 PROCEDURE — 82948 REAGENT STRIP/BLOOD GLUCOSE: CPT

## 2024-07-26 PROCEDURE — 84100 ASSAY OF PHOSPHORUS: CPT

## 2024-07-26 PROCEDURE — 2580000003 HC RX 258

## 2024-07-26 PROCEDURE — 90935 HEMODIALYSIS ONE EVALUATION: CPT

## 2024-07-26 PROCEDURE — 99223 1ST HOSP IP/OBS HIGH 75: CPT | Performed by: STUDENT IN AN ORGANIZED HEALTH CARE EDUCATION/TRAINING PROGRAM

## 2024-07-26 PROCEDURE — 94640 AIRWAY INHALATION TREATMENT: CPT

## 2024-07-26 PROCEDURE — 83735 ASSAY OF MAGNESIUM: CPT

## 2024-07-26 PROCEDURE — 6370000000 HC RX 637 (ALT 250 FOR IP)

## 2024-07-26 PROCEDURE — 80048 BASIC METABOLIC PNL TOTAL CA: CPT

## 2024-07-26 PROCEDURE — 36415 COLL VENOUS BLD VENIPUNCTURE: CPT

## 2024-07-26 RX ORDER — SEVELAMER CARBONATE 800 MG/1
800 TABLET, FILM COATED ORAL
Status: DISCONTINUED | OUTPATIENT
Start: 2024-07-26 | End: 2024-07-30 | Stop reason: HOSPADM

## 2024-07-26 RX ORDER — GUAIFENESIN 200 MG/10ML
200 LIQUID ORAL EVERY 4 HOURS PRN
Status: DISCONTINUED | OUTPATIENT
Start: 2024-07-26 | End: 2024-07-30 | Stop reason: HOSPADM

## 2024-07-26 RX ORDER — ASPIRIN 81 MG/1
81 TABLET ORAL DAILY
Status: DISCONTINUED | OUTPATIENT
Start: 2024-07-26 | End: 2024-07-30 | Stop reason: HOSPADM

## 2024-07-26 RX ORDER — DOXAZOSIN MESYLATE 4 MG/1
4 TABLET ORAL 2 TIMES DAILY
Status: DISCONTINUED | OUTPATIENT
Start: 2024-07-26 | End: 2024-07-30 | Stop reason: HOSPADM

## 2024-07-26 RX ORDER — BUMETANIDE 1 MG/1
1 TABLET ORAL 2 TIMES DAILY
Status: DISCONTINUED | OUTPATIENT
Start: 2024-07-26 | End: 2024-07-30 | Stop reason: HOSPADM

## 2024-07-26 RX ORDER — ALBUTEROL SULFATE 90 UG/1
2 AEROSOL, METERED RESPIRATORY (INHALATION) EVERY 6 HOURS PRN
Status: DISCONTINUED | OUTPATIENT
Start: 2024-07-26 | End: 2024-07-29

## 2024-07-26 RX ORDER — GABAPENTIN 300 MG/1
300 CAPSULE ORAL 2 TIMES DAILY
Status: DISCONTINUED | OUTPATIENT
Start: 2024-07-26 | End: 2024-07-30 | Stop reason: HOSPADM

## 2024-07-26 RX ORDER — ATORVASTATIN CALCIUM 40 MG/1
40 TABLET, FILM COATED ORAL DAILY
Status: DISCONTINUED | OUTPATIENT
Start: 2024-07-26 | End: 2024-07-30 | Stop reason: HOSPADM

## 2024-07-26 RX ORDER — INSULIN GLARGINE 100 [IU]/ML
50 INJECTION, SOLUTION SUBCUTANEOUS NIGHTLY
Status: DISCONTINUED | OUTPATIENT
Start: 2024-07-26 | End: 2024-07-27

## 2024-07-26 RX ADMIN — METOPROLOL TARTRATE 12.5 MG: 25 TABLET, FILM COATED ORAL at 20:14

## 2024-07-26 RX ADMIN — DOXAZOSIN 4 MG: 4 TABLET ORAL at 08:09

## 2024-07-26 RX ADMIN — SODIUM CHLORIDE, PRESERVATIVE FREE 10 ML: 5 INJECTION INTRAVENOUS at 08:10

## 2024-07-26 RX ADMIN — GABAPENTIN 300 MG: 300 CAPSULE ORAL at 20:14

## 2024-07-26 RX ADMIN — INSULIN GLARGINE 50 UNITS: 100 INJECTION, SOLUTION SUBCUTANEOUS at 21:35

## 2024-07-26 RX ADMIN — GABAPENTIN 300 MG: 300 CAPSULE ORAL at 02:50

## 2024-07-26 RX ADMIN — GUAIFENESIN 200 MG: 200 SOLUTION ORAL at 20:15

## 2024-07-26 RX ADMIN — SEVELAMER CARBONATE 800 MG: 800 TABLET, FILM COATED ORAL at 08:09

## 2024-07-26 RX ADMIN — APIXABAN 2.5 MG: 2.5 TABLET, FILM COATED ORAL at 08:09

## 2024-07-26 RX ADMIN — ALBUTEROL SULFATE 2 PUFF: 90 AEROSOL, METERED RESPIRATORY (INHALATION) at 03:15

## 2024-07-26 RX ADMIN — SODIUM CHLORIDE, PRESERVATIVE FREE 10 ML: 5 INJECTION INTRAVENOUS at 20:16

## 2024-07-26 RX ADMIN — DOXAZOSIN 4 MG: 4 TABLET ORAL at 02:50

## 2024-07-26 RX ADMIN — METOPROLOL TARTRATE 12.5 MG: 25 TABLET, FILM COATED ORAL at 02:50

## 2024-07-26 RX ADMIN — ASPIRIN 81 MG: 81 TABLET, COATED ORAL at 08:08

## 2024-07-26 RX ADMIN — DOXAZOSIN 4 MG: 4 TABLET ORAL at 20:14

## 2024-07-26 RX ADMIN — GABAPENTIN 300 MG: 300 CAPSULE ORAL at 08:10

## 2024-07-26 RX ADMIN — ATORVASTATIN CALCIUM 40 MG: 40 TABLET, FILM COATED ORAL at 08:09

## 2024-07-26 RX ADMIN — APIXABAN 2.5 MG: 2.5 TABLET, FILM COATED ORAL at 20:14

## 2024-07-26 RX ADMIN — APIXABAN 2.5 MG: 2.5 TABLET, FILM COATED ORAL at 02:50

## 2024-07-26 RX ADMIN — METOPROLOL TARTRATE 12.5 MG: 25 TABLET, FILM COATED ORAL at 08:10

## 2024-07-26 RX ADMIN — SEVELAMER CARBONATE 800 MG: 800 TABLET, FILM COATED ORAL at 17:47

## 2024-07-26 RX ADMIN — BUMETANIDE 1 MG: 1 TABLET ORAL at 17:47

## 2024-07-26 RX ADMIN — BUMETANIDE 1 MG: 1 TABLET ORAL at 10:26

## 2024-07-26 ASSESSMENT — PAIN SCALES - GENERAL
PAINLEVEL_OUTOF10: 0

## 2024-07-26 NOTE — CONSULTS
[facility]    [address], [place] [address]  [phone]     Hospital Consult  2024 2:50 PM    Pt Name:    Debbie Locke  MRN:     [id number]   [id number]  YOB: 1975  Admit Date:    2024  6:39 PM  Primary Care Physician:  Kory Thomas MD        Reason for Consult:  ESRD    History:   The patient is a 49 y.o. female seen in renal consult for ESRD. Has hx of biopsy proven diabetic nephropathy and FSGS, HTN, HLD, DM.  Was recently started on HD.  She missed HD on Wed because her catheter cuff was exposed and she had to get it replaced.  Presented to the ED with shortness of breath and worsening leg swelling. Was hypertensive and tachycardic initially in the ED.  CXR showed pulmonary vascular congestion.  I was called by ED resident last night to discuss case. Since patient was satting ok on room air , HD was planned for the morning with plans for diuresing overnight with IV diuretics.    Past Medical History:  Past Medical History:   Diagnosis Date    Chronic kidney disease     Hyperlipidemia     Neuropathy     Type II or unspecified type diabetes mellitus without mention of complication, not stated as uncontrolled 2000       Past Surgical History:  Past Surgical History:   Procedure Laterality Date    BACK SURGERY N/A 2024    Excision of Chronic Abscess on Back performed by Kyree Mercado MD at [facility] OR     SECTION  2002    CHOLECYSTECTOMY      CT BIOPSY RENAL  2023    CT BIOPSY RENAL 2023 [facility] CT SCAN       Family History:  Family History   Problem Relation Age of Onset    Diabetes Mother     High Blood Pressure Mother        Social History:  Social History     Socioeconomic History    Marital status:      Spouse name: Not on file    Number of children: Not on file    Years of education: Not on file    Highest education level: Not on file   Occupational History    Not on file   Tobacco Use    Smoking status:

## 2024-07-26 NOTE — ED NOTES
ED to inpatient nurses report      Chief Complaint:  Chief Complaint   Patient presents with    Shortness of Breath     Present to ED from: home    MOA:     LOC: alert and orientated to name, place, date  Mobility: Independent  Oxygen Baseline: RA    Current needs required: RA     Code Status:   Full Code    What abnormal results were found and what did you give/do to treat them? Pulmonary edema  Any procedures or intervention occur? bumex    Mental Status:  Level of Consciousness: Alert (0)    Psych Assessment:        Vitals:  Patient Vitals for the past 24 hrs:   BP Temp Temp src Pulse Resp SpO2 Weight   07/25/24 2340 (!) 171/79 -- -- (!) 116 18 95 % --   07/25/24 2238 (!) 170/107 -- -- (!) 116 18 96 % --   07/25/24 2208 (!) 181/95 -- -- (!) 115 -- 96 % --   07/25/24 2136 (!) 165/107 -- -- (!) 111 22 94 % --   07/25/24 2115 (!) 173/89 -- -- (!) 112 26 93 % --   07/25/24 2001 (!) 189/104 -- -- (!) 121 25 92 % --   07/25/24 1844 (!) 189/115 98.3 °F (36.8 °C) Oral (!) 126 19 94 % 119.7 kg (264 lb)        LDAs:   Peripheral IV 07/25/24 Right;Anterior Forearm (Active)   Site Assessment Clean, dry & intact 07/25/24 2157   Line Status Normal saline locked 07/25/24 2157   Line Care Connections checked and tightened 07/25/24 2157   Phlebitis Assessment No symptoms 07/25/24 2157   Infiltration Assessment 0 07/25/24 2157   Dressing Status Clean, dry & intact 07/25/24 2157   Dressing Type Transparent 07/25/24 2157       Ambulatory Status:  No data recorded    Diagnosis:  DISPOSITION Admitted 07/25/2024 10:48:44 PM   Final diagnoses:   Acute pulmonary edema (HCC)   CKD (chronic kidney disease) requiring chronic dialysis (HCC)   Tachycardia        Consults:  IP CONSULT TO NEPHROLOGY     Pain Score:  Pain Assessment  Pain Assessment: None - Denies Pain  Pain Level: 8    C-SSRS:   Risk of Suicide: No Risk    Sepsis Screening:       [place] Fall Risk:       Swallow Screening        Preferred Language:   English      ALLERGIES

## 2024-07-26 NOTE — PROGRESS NOTES
Hospitalist Progress Note  Internal Medicine Resident      Patient: Debbie Locke 49 y.o. female      Unit/Bed: -05/005-A    Admit Date: 7/25/2024      ASSESSMENT AND PLAN  Active Problems  Anasarca: 2/2 ESRD, missed dialysis.  Patient recently started on dialysis on 7/18.  Patient on MWF schedule.  Patient has history of ESRD.  On Bumex 1 mg twice daily at home.  Patient presented with shortness of breath and anasarca.  Patient missed dialysis on Wednesday due to port issues. proBNP elevated to 9656.  Chest x-ray showed pulmonary vascular congestion with normal systolic and diastolic function.  2 mg Bumex IV given in ED.   Continue Bumex 1 mg twice daily oral  Patient will have dialysis today and tomorrow due to significant volume overload  Daily weight  Strict I's and O's  2 L fluid restriction, 2 g sodium restriction  ESRD on hemodialysis: Patient recently started on dialysis 7/18.  On MWF schedule.  Had port issues on Wednesday, could not conduct dialysis.  Went to [facility] on Thursday and got port issues sorted, but could not get chair for dialysis.  Presented with hyponatremia, elevated BUN, elevated creatinine, hypocalcemia, hyperphosphatemia.  Will get dialysis today, and tomorrow per nephrology  Nephrology consulted, appreciate recommendation  Type II MI: 2/2 anasarca.  Initial troponin at 73, repeat at 69 then 79.  EKG showed sinus tachycardia, anteroseptal infarct that has been cited back in February  Emesis: Patient had 1 episode of emesis in the ED after initiation of IV Bumex.  Reports she felt the IV was dislodged and caused her vomiting and diarrhea episode.  Given IV Zofran in ED  Continue Zofran as needed  Chronic Conditions (reviewed and stable unless otherwise stated)  IDDM type II: History of nephropathy and neuropathy.  Patient has an allergy to Humalog and reports she takes Lantus 50 units twice daily at home.  Started on 50 units of Lantus nightly, will continue to monitor

## 2024-07-26 NOTE — ED NOTES
This RN in to round. Lab at bedside collecting specimen for VBG. Pt sitting upright in bed. She is alert and oriented X4 at this time.

## 2024-07-26 NOTE — H&P
History & Physical  Internal Medicine Resident         Patient: Debbie Locke 49 y.o. female      : 1975  Date of Admission: 2024  Date of Service: Pt seen/examined on 24 and Admitted to Observation with expected LOS less than two midnights due to medical therapy.       ASSESSMENT AND PLAN  Volume Overload, suspect Acute Heart Failure:2/2 missed dialysis x1. Dialysis MWF. H/o ESRD on Bumex 1 mg bid OP. Pt presented SOB and edematous/anasarca. Pro-BNP 9656 (8693). CXR: Pulmonary vascular congestion and possible interstitial edema, suspect small to moderate bilateral pleural effusions. Last echo (7/15/24) EF 55-60% with normal systolic and diastolic function. Given bumex 2 mg IV in ED.   Continue Bumex 2 mg IV  Dialysis tomorrow  Echo  Dr. Domínguez consulted in ED, appreciate her recommendations.    ESRD on HD: MWF schedule. Missed dialysis on 24. Last dialysis on 24. Good urine output.   Electrolyte abnormalities: hyponatremia, hypocalcemia, hyperphosphatemia, elevated BUN, Cr, BS  Repleating per protocol  Nephrology following, no plan for emergent dialysis  Acute Hypoxic Respiratory Failure (resolved): 2/2 volume overload. Pt presented with SOB after missing dialysis. Given 2 mg IV Bumex in ED and had improvement of symptoms. AB.38/47/16/28. CXR: Pulmonary vascular congestion and possible interstitial edema, suspect small to moderate bilateral pleural effusions.  Treating as above  Supplemental O2 as needed  Use home inhaler as needed  Type II MI: 2/2 ESRD. OA trop 73. Repeat 69->79.  Trend troponin  Will obtain EKG and troponin if CP develops  Episode of Emesis: Given IV zofran.   Chronic Conditions (reviewed and stable unless otherwise stated)   IDDMII: w/ h/o nephropathy and neuropathy. Allergy to Humalog, takes Lantus 50 units bid instead. Follows with Dr. Kidd.   Continue 50 U Lantus BID  POCT glucose checks  Hypoglycemia protocol in place  Continue gabapentin for  Aundrea N Newman-Waterhouse, DO on 7/25/2024 at 11:01 PM.   Case was discussed with the Attending, Dr. Odom.

## 2024-07-26 NOTE — PLAN OF CARE
Problem: Discharge Planning  Goal: Discharge to home or other facility with appropriate resources  7/26/2024 0857 by Aroldo Ruiz, RN  Outcome: Progressing  Flowsheets (Taken 7/26/2024 0100 by Sarahy Trevino, RN)  Discharge to home or other facility with appropriate resources: Identify barriers to discharge with patient and caregiver     Problem: Respiratory - Adult  Goal: Achieves optimal ventilation and oxygenation  7/26/2024 0857 by Aroldo Ruiz, RN  Outcome: Progressing  Flowsheets (Taken 7/26/2024 0857)  Achieves optimal ventilation and oxygenation: Assess for changes in respiratory status     Problem: Safety - Adult  Goal: Free from fall injury  Outcome: Progressing

## 2024-07-26 NOTE — FLOWSHEET NOTE
07/26/24 1615   Vital Signs   BP (!) 147/83   Temp 98 °F (36.7 °C)   Pulse 83   Respirations 20   SpO2 98 %   Weight - Scale 121.1 kg (266 lb 15.6 oz)   Weight Method Bed scale   Percent Weight Change -2.65   Post-Hemodialysis Assessment   Post-Treatment Procedures Blood returned;Catheter Capped, clamped with Saline x2 ports   Machine Disinfection Process Acid/Vinegar Clean;Heat Disinfect;Exterior Machine Disinfection   Rinseback Volume (ml) 400 ml   Blood Volume Processed (Liters) 80.1 L   Dialyzer Clearance Lightly streaked   Duration of Treatment (minutes) 240 minutes   Hemodialysis Output (ml) 3500 ml   Tolerated Treatment Fair     Stable 4 hour treatment completed. Removed 3.5 liters of fluid, patient tolerated fair. pt cramping sides of abdomen, system attempted to clott, new system set up. Dialysis TX ended all blood returned with 400 mls rinse back. Dressing changed and is clean, dry, and intact. Report given to primary RN. Treatment record printed to be scanned into EMR.

## 2024-07-26 NOTE — CARE COORDINATION
07/26/24 1537   Readmission Assessment   Number of Days since last admission? 1-7 days   Previous Disposition Home with Family   Who is being Interviewed Patient  (med record as patient is unavailable)   What was the patient's/caregiver's perception as to why they think they needed to return back to the hospital? Other (Comment)  (SOB d/t malfunction of tunneled cath causing delay in dialysis)   Did you visit your Primary Care Physician after you left the hospital, before you returned this time? No  (LEATHA)   Why weren't you able to visit your PCP? Other (Comment)  (LEATHA)   Did you see a specialist, such as Cardiac, Pulmonary, Orthopedic Physician, etc. after you left the hospital? Other (Comment)  (LEATHA)   Who advised the patient to return to the hospital? Self-referral   Does the patient report anything that got in the way of taking their medications? No  (LEATHA)   In our efforts to provide the best possible care to you and others like you, can you think of anything that we could have done to help you after you left the hospital the first time, so that you might not have needed to return so soon? Other (Comment)  (LEATHA. Will check back when patient available.)

## 2024-07-26 NOTE — PLAN OF CARE
Problem: Discharge Planning  Goal: Discharge to home or other facility with appropriate resources  Outcome: Progressing  Flowsheets (Taken 7/26/2024 0100)  Discharge to home or other facility with appropriate resources: Identify barriers to discharge with patient and caregiver     Problem: Respiratory - Adult  Goal: Achieves optimal ventilation and oxygenation  Outcome: Progressing  Flowsheets (Taken 7/26/2024 0100)  Achieves optimal ventilation and oxygenation: Assess for changes in respiratory status

## 2024-07-27 ENCOUNTER — APPOINTMENT (OUTPATIENT)
Dept: CT IMAGING | Age: 49
DRG: 194 | End: 2024-07-27
Payer: COMMERCIAL

## 2024-07-27 PROBLEM — J96.01 ACUTE RESPIRATORY FAILURE WITH HYPOXIA (HCC): Status: ACTIVE | Noted: 2024-07-27

## 2024-07-27 PROBLEM — E11.9 DIABETES MELLITUS TYPE 2, INSULIN DEPENDENT (HCC): Status: ACTIVE | Noted: 2024-02-13

## 2024-07-27 PROBLEM — Z99.2 ESRD ON HEMODIALYSIS (HCC): Status: ACTIVE | Noted: 2024-07-17

## 2024-07-27 PROBLEM — E83.39 HYPERPHOSPHATEMIA: Status: ACTIVE | Noted: 2024-07-27

## 2024-07-27 PROBLEM — R11.11 VOMITING WITHOUT NAUSEA: Status: ACTIVE | Noted: 2024-07-27

## 2024-07-27 PROBLEM — R79.89 ELEVATED TROPONIN: Status: ACTIVE | Noted: 2024-07-27

## 2024-07-27 LAB
ANION GAP SERPL CALC-SCNC: 7 MEQ/L (ref 8–16)
BUN SERPL-MCNC: 27 MG/DL (ref 7–22)
CALCIUM SERPL-MCNC: 7.4 MG/DL (ref 8.5–10.5)
CHLORIDE SERPL-SCNC: 101 MEQ/L (ref 98–111)
CO2 SERPL-SCNC: 28 MEQ/L (ref 23–33)
CREAT SERPL-MCNC: 3.8 MG/DL (ref 0.4–1.2)
DEPRECATED RDW RBC AUTO: 49.2 FL (ref 35–45)
ERYTHROCYTE [DISTWIDTH] IN BLOOD BY AUTOMATED COUNT: 15.2 % (ref 11.5–14.5)
GFR SERPL CREATININE-BSD FRML MDRD: 14 ML/MIN/1.73M2
GLUCOSE BLD STRIP.AUTO-MCNC: 198 MG/DL (ref 70–108)
GLUCOSE BLD STRIP.AUTO-MCNC: 257 MG/DL (ref 70–108)
GLUCOSE BLD STRIP.AUTO-MCNC: 308 MG/DL (ref 70–108)
GLUCOSE BLD STRIP.AUTO-MCNC: 315 MG/DL (ref 70–108)
GLUCOSE SERPL-MCNC: 278 MG/DL (ref 70–108)
HCT VFR BLD AUTO: 23.5 % (ref 37–47)
HGB BLD-MCNC: 7.1 GM/DL (ref 12–16)
INR PPP: 1.08 (ref 0.85–1.13)
IRON SATN MFR SERPL: 12 % (ref 20–50)
IRON SERPL-MCNC: 31 UG/DL (ref 50–170)
LDH SERPL L TO P-CCNC: 210 U/L (ref 100–190)
MAGNESIUM SERPL-MCNC: 1.7 MG/DL (ref 1.6–2.4)
MCH RBC QN AUTO: 27.1 PG (ref 26–33)
MCHC RBC AUTO-ENTMCNC: 30.2 GM/DL (ref 32.2–35.5)
MCV RBC AUTO: 89.7 FL (ref 81–99)
PHOSPHATE SERPL-MCNC: 5.4 MG/DL (ref 2.4–4.7)
PLATELET # BLD AUTO: 209 THOU/MM3 (ref 130–400)
PMV BLD AUTO: 9.5 FL (ref 9.4–12.4)
POTASSIUM SERPL-SCNC: 4.9 MEQ/L (ref 3.5–5.2)
PROT SERPL-MCNC: 5.4 G/DL (ref 6.1–8)
RBC # BLD AUTO: 2.62 MILL/MM3 (ref 4.2–5.4)
SODIUM SERPL-SCNC: 136 MEQ/L (ref 135–145)
TIBC SERPL-MCNC: 250 UG/DL (ref 171–450)
WBC # BLD AUTO: 7.7 THOU/MM3 (ref 4.8–10.8)

## 2024-07-27 PROCEDURE — 36415 COLL VENOUS BLD VENIPUNCTURE: CPT

## 2024-07-27 PROCEDURE — 85027 COMPLETE CBC AUTOMATED: CPT

## 2024-07-27 PROCEDURE — 6370000000 HC RX 637 (ALT 250 FOR IP)

## 2024-07-27 PROCEDURE — 84155 ASSAY OF PROTEIN SERUM: CPT

## 2024-07-27 PROCEDURE — 99233 SBSQ HOSP IP/OBS HIGH 50: CPT | Performed by: STUDENT IN AN ORGANIZED HEALTH CARE EDUCATION/TRAINING PROGRAM

## 2024-07-27 PROCEDURE — 90935 HEMODIALYSIS ONE EVALUATION: CPT

## 2024-07-27 PROCEDURE — 83540 ASSAY OF IRON: CPT

## 2024-07-27 PROCEDURE — 6360000002 HC RX W HCPCS: Performed by: INTERNAL MEDICINE

## 2024-07-27 PROCEDURE — 71275 CT ANGIOGRAPHY CHEST: CPT

## 2024-07-27 PROCEDURE — 82948 REAGENT STRIP/BLOOD GLUCOSE: CPT

## 2024-07-27 PROCEDURE — 84100 ASSAY OF PHOSPHORUS: CPT

## 2024-07-27 PROCEDURE — 83615 LACTATE (LD) (LDH) ENZYME: CPT

## 2024-07-27 PROCEDURE — 90935 HEMODIALYSIS ONE EVALUATION: CPT | Performed by: INTERNAL MEDICINE

## 2024-07-27 PROCEDURE — G0378 HOSPITAL OBSERVATION PER HR: HCPCS

## 2024-07-27 PROCEDURE — 6360000004 HC RX CONTRAST MEDICATION: Performed by: STUDENT IN AN ORGANIZED HEALTH CARE EDUCATION/TRAINING PROGRAM

## 2024-07-27 PROCEDURE — 83735 ASSAY OF MAGNESIUM: CPT

## 2024-07-27 PROCEDURE — 6370000000 HC RX 637 (ALT 250 FOR IP): Performed by: STUDENT IN AN ORGANIZED HEALTH CARE EDUCATION/TRAINING PROGRAM

## 2024-07-27 PROCEDURE — 87205 SMEAR GRAM STAIN: CPT

## 2024-07-27 PROCEDURE — 83550 IRON BINDING TEST: CPT

## 2024-07-27 PROCEDURE — 85610 PROTHROMBIN TIME: CPT

## 2024-07-27 PROCEDURE — 80048 BASIC METABOLIC PNL TOTAL CA: CPT

## 2024-07-27 PROCEDURE — 2580000003 HC RX 258: Performed by: STUDENT IN AN ORGANIZED HEALTH CARE EDUCATION/TRAINING PROGRAM

## 2024-07-27 RX ORDER — INSULIN GLARGINE 100 [IU]/ML
55 INJECTION, SOLUTION SUBCUTANEOUS NIGHTLY
Status: DISCONTINUED | OUTPATIENT
Start: 2024-07-27 | End: 2024-07-30

## 2024-07-27 RX ORDER — SODIUM CHLORIDE 0.9 % (FLUSH) 0.9 %
5-40 SYRINGE (ML) INJECTION PRN
Status: DISCONTINUED | OUTPATIENT
Start: 2024-07-27 | End: 2024-07-30 | Stop reason: HOSPADM

## 2024-07-27 RX ORDER — SODIUM CHLORIDE 9 MG/ML
INJECTION, SOLUTION INTRAVENOUS PRN
Status: DISCONTINUED | OUTPATIENT
Start: 2024-07-27 | End: 2024-07-30 | Stop reason: HOSPADM

## 2024-07-27 RX ORDER — SODIUM CHLORIDE 0.9 % (FLUSH) 0.9 %
5-40 SYRINGE (ML) INJECTION EVERY 12 HOURS SCHEDULED
Status: DISCONTINUED | OUTPATIENT
Start: 2024-07-27 | End: 2024-07-30 | Stop reason: HOSPADM

## 2024-07-27 RX ADMIN — APIXABAN 2.5 MG: 2.5 TABLET, FILM COATED ORAL at 20:51

## 2024-07-27 RX ADMIN — EPOETIN ALFA-EPBX 10000 UNITS: 10000 INJECTION, SOLUTION INTRAVENOUS; SUBCUTANEOUS at 16:56

## 2024-07-27 RX ADMIN — IOPAMIDOL 80 ML: 755 INJECTION, SOLUTION INTRAVENOUS at 14:29

## 2024-07-27 RX ADMIN — INSULIN GLARGINE 55 UNITS: 100 INJECTION, SOLUTION SUBCUTANEOUS at 20:55

## 2024-07-27 RX ADMIN — BUMETANIDE 1 MG: 1 TABLET ORAL at 16:56

## 2024-07-27 RX ADMIN — BUMETANIDE 1 MG: 1 TABLET ORAL at 13:12

## 2024-07-27 RX ADMIN — SEVELAMER CARBONATE 800 MG: 800 TABLET, FILM COATED ORAL at 16:56

## 2024-07-27 RX ADMIN — ASPIRIN 81 MG: 81 TABLET, COATED ORAL at 13:12

## 2024-07-27 RX ADMIN — SODIUM CHLORIDE, PRESERVATIVE FREE 10 ML: 5 INJECTION INTRAVENOUS at 20:55

## 2024-07-27 RX ADMIN — SEVELAMER CARBONATE 800 MG: 800 TABLET, FILM COATED ORAL at 13:12

## 2024-07-27 RX ADMIN — GABAPENTIN 300 MG: 300 CAPSULE ORAL at 13:12

## 2024-07-27 RX ADMIN — METOPROLOL TARTRATE 12.5 MG: 25 TABLET, FILM COATED ORAL at 20:51

## 2024-07-27 RX ADMIN — DOXAZOSIN 4 MG: 4 TABLET ORAL at 13:13

## 2024-07-27 RX ADMIN — ATORVASTATIN CALCIUM 40 MG: 40 TABLET, FILM COATED ORAL at 13:12

## 2024-07-27 RX ADMIN — METOPROLOL TARTRATE 12.5 MG: 25 TABLET, FILM COATED ORAL at 13:12

## 2024-07-27 RX ADMIN — APIXABAN 2.5 MG: 2.5 TABLET, FILM COATED ORAL at 13:12

## 2024-07-27 RX ADMIN — GABAPENTIN 300 MG: 300 CAPSULE ORAL at 20:51

## 2024-07-27 RX ADMIN — DOXAZOSIN 4 MG: 4 TABLET ORAL at 20:51

## 2024-07-27 NOTE — PLAN OF CARE
Patient was evaluated today for the diagnosis of CHF.  I entered a DME order for home oxygen at 1 lpm on exertion because the diagnosis and testing require the patient to have supplemental oxygen.  Condition will improve or be benefited by oxygen use.  The patient is  able to perform good mobility in a home setting and therefore does require the use of a portable oxygen system.  The need for this equipment was discussed with the patient and she understands and is in agreement.

## 2024-07-27 NOTE — PROGRESS NOTES
[facility]   Nephrology progress note  7/27/2024, 12:50 PM      Pt Name:    Debbie Locke  MRN:     [id number]     YOB: 1975  Admit Date:    7/25/2024  6:39 PM    Chief Complaint: Nephrology following for ESRD    Subjective:  Patient was seen and examined this morning  No chest pain  On nasal cannula  Breathing improved  Reports shortness of breath is improving  She is asking if she can be discharged today    Objective:  24HR INTAKE/OUTPUT:    Intake/Output Summary (Last 24 hours) at 7/27/2024 1250  Last data filed at 7/27/2024 0744  Gross per 24 hour   Intake 480 ml   Output 3500 ml   Net -3020 ml         I/O last 3 completed shifts:  In: 480 [P.O.:480]  Out: 4100 [Urine:600]  I/O this shift:  In: 240 [P.O.:240]  Out: -    Admission weight: 119.7 kg (264 lb)  Wt Readings from Last 3 Encounters:   07/27/24 124 kg (273 lb 5.9 oz)   07/19/24 120.5 kg (265 lb 10.5 oz)   06/05/24 106.6 kg (235 lb)        Vitals :   Vitals:    07/27/24 0042 07/27/24 0439 07/27/24 0744 07/27/24 0810   BP: 129/72 (!) 141/68 (!) 149/87 (!) 158/84   Pulse: 87 98 (!) 114 98   Resp: 18 18 18 21   Temp: 97.7 °F (36.5 °C) 98.2 °F (36.8 °C) 98.2 °F (36.8 °C) 98 °F (36.7 °C)   TempSrc: Oral Oral Oral    SpO2: 92% 90% 91% 92%   Weight:    124 kg (273 lb 5.9 oz)   Height:           Physical examination  General Appearance: alert and cooperative with exam, appears comfortable, no distress  Mouth/Throat: Oral mucosa moist  Neck: No JVD  Lungs: no use of accessory muscles  GI: soft, non-tender, no guarding  Extremities: ++ LE edema    Medications:  Infusion:    sodium chloride      dextrose       Meds:    apixaban  2.5 mg Oral BID    aspirin  81 mg Oral Daily    atorvastatin  40 mg Oral Daily    bumetanide  1 mg Oral BID    doxazosin  4 mg Oral BID    gabapentin  300 mg Oral BID    insulin glargine  50 Units SubCUTAneous Nightly    metoprolol tartrate  12.5 mg Oral BID    sevelamer  800 mg Oral TID WC    sodium  chloride flush  5-40 mL IntraVENous 2 times per day     Meds prn: albuterol sulfate HFA, guaiFENesin, ondansetron, sodium chloride flush, sodium chloride, polyethylene glycol, acetaminophen **OR** acetaminophen, glucose, dextrose bolus **OR** dextrose bolus, glucagon (rDNA), dextrose     Lab Data :  CBC:   Recent Labs     07/25/24 2009 07/26/24 0320 07/27/24  0437   WBC 8.4 12.7* 7.7   HGB 8.2* 7.9* 7.1*   HCT 28.9* 26.4* 23.5*    245 209     CMP:  Recent Labs     07/25/24 2009 07/26/24 0320 07/27/24  0437     135* 139 136   K 5.2  5.2* 5.2 4.9    103 101   CO2 20* 23 28   BUN 38* 40* 27*   CREATININE 4.9* 4.9* 3.8*   GLUCOSE 354* 286* 278*   CALCIUM 7.7* 7.6* 7.4*   MG 1.8 1.7 1.7   PHOS 7.2* 7.4* 5.4*     Hepatic:   Recent Labs     07/25/24 2009   AST 9   ALT 9*   BILITOT <0.2*   ALKPHOS 69         Assessment and Plan:  ESRD on dialysis  Tolerating hemodialysis treatment with ultrafiltration goal 4.4 kg  Advised fluid and salt restriction  Had dialysis treatment yesterday and 3.5 L were removed  Electrolytes are stable  Volume overload.  Anemia in ESRD: Will give Retacrit  Hyperkalemia.  Improved  Acute hypoxic respiratory failure.  Currently on nasal cannula  Diabetes      D/W patient and HD RN    Trevor Garza MD  [facility]    This report has been created using voice recognition software. It may contain minor errors which are inherent in voice recognition technology

## 2024-07-27 NOTE — PROGRESS NOTES
Hospitalist Progress Note        Patient: Debbie Locke 49 y.o. female      Unit/Bed: 6K-05/005-A    Admit Date: 7/25/2024      ASSESSMENT AND PLAN  Active Problems  Acute hypoxic respiratory failure secondary to volume overload: Dialysis per nephrology.  CTA chest on 7/27 negative for PE but showed moderate right-sided and small left-sided pleural effusion with associated atelectasis.  Plan for thoracentesis.  Patient requiring 1 L oxygen on ambulation.  Hopefully will be able to wean off oxygen completely after thoracentesis.  Anasarca: 2/2 ESRD, missed dialysis.  Patient recently started on dialysis on 7/18.  Patient on MWF schedule.  Patient has history of ESRD.  On Bumex 1 mg twice daily at home.  Patient presented with shortness of breath and anasarca.  Patient missed dialysis on Wednesday due to port issues. proBNP elevated to 9656.  Chest x-ray showed pulmonary vascular congestion with normal systolic and diastolic function.  2 mg Bumex IV given in ED.   Continue Bumex 1 mg twice daily oral  Received dialysis on 7/26 and 7/27 with improvement.  Plan for dialysis on 7/29.  Daily weight  Strict I's and O's  2 L fluid restriction, 2 g sodium restriction  Will repeat TTE.  ESRD on hemodialysis: Patient recently started on dialysis 7/18.  On MWF schedule.  Had port issues on Wednesday, could not conduct dialysis.  Went to [facility] on Thursday and got port issues sorted, but could not get chair for dialysis.  Presented with hyponatremia, elevated BUN, elevated creatinine, hypocalcemia, hyperphosphatemia.  Received dialysis on 7/26 and 7/27 with improvement.  Plan for dialysis on 7/29.  Nephrology consulted, appreciate recommendation  Type II MI: 2/2 anasarca.  Initial troponin at 73, repeat at 69 then 79.  EKG showed sinus tachycardia, anteroseptal infarct that has been cited back in February  Emesis: Patient had 1 episode of emesis in the ED after initiation of IV Bumex.  Reports she felt the IV was

## 2024-07-27 NOTE — PLAN OF CARE
Problem: Discharge Planning  Goal: Discharge to home or other facility with appropriate resources  Outcome: Progressing  Flowsheets (Taken 7/27/2024 0129)  Discharge to home or other facility with appropriate resources:   Identify barriers to discharge with patient and caregiver   Arrange for needed discharge resources and transportation as appropriate   Identify discharge learning needs (meds, wound care, etc)     Problem: Respiratory - Adult  Goal: Achieves optimal ventilation and oxygenation  Outcome: Progressing  Flowsheets (Taken 7/27/2024 0129)  Achieves optimal ventilation and oxygenation:   Assess for changes in respiratory status   Assess for changes in mentation and behavior     Problem: Safety - Adult  Goal: Free from fall injury  Outcome: Progressing  Flowsheets (Taken 7/27/2024 0129)  Free From Fall Injury: Instruct family/caregiver on patient safety     Problem: Chronic Conditions and Co-morbidities  Goal: Patient's chronic conditions and co-morbidity symptoms are monitored and maintained or improved  Outcome: Progressing  Flowsheets (Taken 7/27/2024 0129)  Care Plan - Patient's Chronic Conditions and Co-Morbidity Symptoms are Monitored and Maintained or Improved:   Monitor and assess patient's chronic conditions and comorbid symptoms for stability, deterioration, or improvement   Collaborate with multidisciplinary team to address chronic and comorbid conditions and prevent exacerbation or deterioration   Update acute care plan with appropriate goals if chronic or comorbid symptoms are exacerbated and prevent overall improvement and discharge     Problem: Metabolic/Fluid and Electrolytes - Adult  Goal: Electrolytes maintained within normal limits  Outcome: Progressing  Flowsheets (Taken 7/27/2024 0129)  Electrolytes maintained within normal limits:   Monitor labs and assess patient for signs and symptoms of electrolyte imbalances   Administer electrolyte replacement as ordered   Monitor response to

## 2024-07-27 NOTE — PLAN OF CARE
Problem: Discharge Planning  Goal: Discharge to home or other facility with appropriate resources  7/27/2024 0824 by Aroldo Ruiz, RN  Outcome: Progressing  Flowsheets (Taken 7/27/2024 0824)  Discharge to home or other facility with appropriate resources: Identify barriers to discharge with patient and caregiver     Problem: Respiratory - Adult  Goal: Achieves optimal ventilation and oxygenation  7/27/2024 0824 by Aroldo Ruiz, RN  Outcome: Progressing  Flowsheets (Taken 7/27/2024 0824)  Achieves optimal ventilation and oxygenation: Assess for changes in respiratory status     Problem: Metabolic/Fluid and Electrolytes - Adult  Goal: Electrolytes maintained within normal limits  7/27/2024 0824 by Aroldo Ruiz, RN  Outcome: Progressing  Flowsheets (Taken 7/27/2024 0824)  Electrolytes maintained within normal limits: Monitor labs and assess patient for signs and symptoms of electrolyte imbalances

## 2024-07-27 NOTE — CARE COORDINATION
07/27/24 1501   Readmission Assessment   Number of Days since last admission? 1-7 days  (disch 7/19)   Previous Disposition Home with Family   Who is being Interviewed Patient   What was the patient's/caregiver's perception as to why they think they needed to return back to the hospital? Other (Comment)  (started swelling and more short of breath)   Did you visit your Primary Care Physician after you left the hospital, before you returned this time? No   Why weren't you able to visit your PCP? Could not get an appointment  (could not get appt, trying to get situated with Hemodialysis)   Did you see a specialist, such as Cardiac, Pulmonary, Orthopedic Physician, etc. after you left the hospital? Yes  (nephrology)   Who advised the patient to return to the hospital? Self-referral   Does the patient report anything that got in the way of taking their medications? No   In our efforts to provide the best possible care to you and others like you, can you think of anything that we could have done to help you after you left the hospital the first time, so that you might not have needed to return so soon? Other (Comment)  (no nothing)

## 2024-07-27 NOTE — CARE COORDINATION
07/27/24 1505   Service Assessment   Patient Orientation Alert and Oriented   Cognition Alert   History Provided By Patient   Primary Caregiver Self   Accompanied By/Relationship unaccomp   Support Systems Spouse/Significant Other;Children   Patient's Healthcare Decision Maker is: Legal Next of Kin  (HCDM is  Nicho)   PCP Verified by CM Yes   Last Visit to PCP Within last 3 months   Prior Functional Level Independent in ADLs/IADLs   Current Functional Level Independent in ADLs/IADLs   Can patient return to prior living arrangement Yes   Ability to make needs known: Good   Family able to assist with home care needs: Yes   Would you like for me to discuss the discharge plan with any other family members/significant others, and if so, who? No   Financial Resources Medicaid   Community Resources None   CM/SW Referral ADLs/IADLs   Social/Functional History   Lives With Spouse   Type of Home House   Home Layout Two level;Able to Live on Main level with bedroom/bathroom   Active  Yes   Discharge Planning   Type of Residence House   Living Arrangements Spouse/Significant Other   Current Services Prior To Admission Durable Medical Equipment   Current DME Prior to Arrival Walker;Bedside Commode;Shower Chair;Glucometer  (rollator, has CGM (lost the ))   Potential Assistance Needed N/A   DME Ordered? No   Potential Assistance Purchasing Medications No   Type of Home Care Services None   Patient expects to be discharged to: House   Follow Up Appointment: Best Day/Time  Tuesday AM   One/Two Story Residence Two story   Lift Chair Available No   History of falls? 0   Services At/After Discharge   Transition of Care Consult (CM Consult) Discharge Planning;Other  ([facility] on Mon, Wed, Fri 5:40 am)   Services At/After Discharge Outpatient  (OP HD (see above))   Confirm Follow Up Transport Family   Condition of Participation: Discharge Planning   The Plan for Transition of Care is related to the

## 2024-07-27 NOTE — FLOWSHEET NOTE
07/27/24 1256   Vital Signs   BP (!) 158/88   Temp 97.5 °F (36.4 °C)   Pulse 94   Respirations 18   SpO2 98 %   Weight - Scale 120 kg (264 lb 8.8 oz)   Weight Method Bed scale   Percent Weight Change -3.23   Pain Assessment   Pain Assessment None - Denies Pain   Post-Hemodialysis Assessment   Post-Treatment Procedures Blood returned;Catheter Capped, clamped with Saline x2 ports   Machine Disinfection Process Acid/Vinegar Clean;Heat Disinfect;Exterior Machine Disinfection   Rinseback Volume (ml) 400 ml   Blood Volume Processed (Liters) 80 L   Dialyzer Clearance Moderately streaked   Duration of Treatment (minutes) 240 minutes   Hemodialysis Output (ml) 4000 ml   Tolerated Treatment Good     completed 4 hr HD TX and removed 4 Liters of fluid. pt tolerated HD TX well. Dialysis TX ended all blood returned with 400 mls rinse back. administered 2L 02 NC, due to pt Spo2 88%, once 02 placed pt Spo2 increased to 96%. CVC dressing is clean, dry and intact. report given to primary nurse, record completed and printed to be scanned into pt's EMR.

## 2024-07-27 NOTE — PROGRESS NOTES
A home oxygen evaluation has been completed.     [x]Patient is an inpatient. It is expected that the patient will be discharged within the next 48 hours. Qualified provider to write order for home prescription if patient qualifies. Social service/care managers will arrange for home oxygen.  If patient is active, arrange for Home Medical supplier to assess for Oxygen Conserving Device per pulse oximetry.  []Patient is an outpatient. Results will be faxed to the ordering provider. Qualified provider to write order for home prescription if patient qualifies and arranges for home oxygen.    Patient was placed on room air for 60 minutes. SpO2 was 96 % on room air at rest. Patient was walked for 10 minutes. SpO2 was 87 % during walking. Patients SpO2 was below 89% and qualified for home oxygen. Oxygen was applied at 1 lpm via nasal cannula to maintain a SpO2 between 90-92% while walking. Actual SpO2 while walking was 97%      Note: For any SpO2 at 89% see policy and procedure for possible qualifications.

## 2024-07-28 ENCOUNTER — APPOINTMENT (OUTPATIENT)
Dept: INTERVENTIONAL RADIOLOGY/VASCULAR | Age: 49
DRG: 194 | End: 2024-07-28
Payer: COMMERCIAL

## 2024-07-28 ENCOUNTER — APPOINTMENT (OUTPATIENT)
Dept: GENERAL RADIOLOGY | Age: 49
DRG: 194 | End: 2024-07-28
Payer: COMMERCIAL

## 2024-07-28 ENCOUNTER — APPOINTMENT (OUTPATIENT)
Dept: ULTRASOUND IMAGING | Age: 49
DRG: 194 | End: 2024-07-28
Payer: COMMERCIAL

## 2024-07-28 LAB
ABO: NORMAL
ANION GAP SERPL CALC-SCNC: 9 MEQ/L (ref 8–16)
ANTIBODY SCREEN: NORMAL
BUN SERPL-MCNC: 26 MG/DL (ref 7–22)
CA-I BLD ISE-SCNC: 1.01 MMOL/L (ref 1.12–1.32)
CALCIUM SERPL-MCNC: 7.3 MG/DL (ref 8.5–10.5)
CHLORIDE SERPL-SCNC: 100 MEQ/L (ref 98–111)
CO2 SERPL-SCNC: 28 MEQ/L (ref 23–33)
CREAT SERPL-MCNC: 3.9 MG/DL (ref 0.4–1.2)
DEPRECATED RDW RBC AUTO: 47.8 FL (ref 35–45)
ECHO BSA: 2.33 M2
ERYTHROCYTE [DISTWIDTH] IN BLOOD BY AUTOMATED COUNT: 14.9 % (ref 11.5–14.5)
GFR SERPL CREATININE-BSD FRML MDRD: 13 ML/MIN/1.73M2
GLUCOSE BLD STRIP.AUTO-MCNC: 236 MG/DL (ref 70–108)
GLUCOSE BLD STRIP.AUTO-MCNC: 237 MG/DL (ref 70–108)
GLUCOSE BLD STRIP.AUTO-MCNC: 260 MG/DL (ref 70–108)
GLUCOSE BLD STRIP.AUTO-MCNC: 275 MG/DL (ref 70–108)
GLUCOSE FLD-MCNC: 242 MG/DL
GLUCOSE SERPL-MCNC: 185 MG/DL (ref 70–108)
HCT VFR BLD AUTO: 22.2 % (ref 37–47)
HCT VFR BLD AUTO: 22.6 % (ref 37–47)
HCT VFR BLD AUTO: 24 % (ref 37–47)
HGB BLD-MCNC: 6.6 GM/DL (ref 12–16)
HGB BLD-MCNC: 6.8 GM/DL (ref 12–16)
HGB BLD-MCNC: 7.1 GM/DL (ref 12–16)
LDH FLD L TO P-CCNC: 67 U/L
MAGNESIUM SERPL-MCNC: 1.6 MG/DL (ref 1.6–2.4)
MCH RBC QN AUTO: 26.5 PG (ref 26–33)
MCHC RBC AUTO-ENTMCNC: 29.7 GM/DL (ref 32.2–35.5)
MCV RBC AUTO: 89.2 FL (ref 81–99)
PH BIFL: 7.42 [PH]
PHOSPHATE SERPL-MCNC: 4.1 MG/DL (ref 2.4–4.7)
PLATELET # BLD AUTO: 187 THOU/MM3 (ref 130–400)
PMV BLD AUTO: 9.5 FL (ref 9.4–12.4)
POTASSIUM SERPL-SCNC: 4.4 MEQ/L (ref 3.5–5.2)
PROT FLD-MCNC: 0.9 GM/DL
RBC # BLD AUTO: 2.49 MILL/MM3 (ref 4.2–5.4)
RH FACTOR: NORMAL
SODIUM SERPL-SCNC: 137 MEQ/L (ref 135–145)
WBC # BLD AUTO: 8.4 THOU/MM3 (ref 4.8–10.8)

## 2024-07-28 PROCEDURE — 85018 HEMOGLOBIN: CPT

## 2024-07-28 PROCEDURE — 84157 ASSAY OF PROTEIN OTHER: CPT

## 2024-07-28 PROCEDURE — P9016 RBC LEUKOCYTES REDUCED: HCPCS

## 2024-07-28 PROCEDURE — 86901 BLOOD TYPING SEROLOGIC RH(D): CPT

## 2024-07-28 PROCEDURE — 93970 EXTREMITY STUDY: CPT

## 2024-07-28 PROCEDURE — 6370000000 HC RX 637 (ALT 250 FOR IP)

## 2024-07-28 PROCEDURE — 87070 CULTURE OTHR SPECIMN AEROBIC: CPT

## 2024-07-28 PROCEDURE — 99232 SBSQ HOSP IP/OBS MODERATE 35: CPT | Performed by: INTERNAL MEDICINE

## 2024-07-28 PROCEDURE — 83735 ASSAY OF MAGNESIUM: CPT

## 2024-07-28 PROCEDURE — 32555 ASPIRATE PLEURA W/ IMAGING: CPT

## 2024-07-28 PROCEDURE — 88305 TISSUE EXAM BY PATHOLOGIST: CPT

## 2024-07-28 PROCEDURE — G0378 HOSPITAL OBSERVATION PER HR: HCPCS

## 2024-07-28 PROCEDURE — 85014 HEMATOCRIT: CPT

## 2024-07-28 PROCEDURE — 86923 COMPATIBILITY TEST ELECTRIC: CPT

## 2024-07-28 PROCEDURE — 99233 SBSQ HOSP IP/OBS HIGH 50: CPT | Performed by: STUDENT IN AN ORGANIZED HEALTH CARE EDUCATION/TRAINING PROGRAM

## 2024-07-28 PROCEDURE — 6370000000 HC RX 637 (ALT 250 FOR IP): Performed by: STUDENT IN AN ORGANIZED HEALTH CARE EDUCATION/TRAINING PROGRAM

## 2024-07-28 PROCEDURE — 88112 CYTOPATH CELL ENHANCE TECH: CPT

## 2024-07-28 PROCEDURE — 86850 RBC ANTIBODY SCREEN: CPT

## 2024-07-28 PROCEDURE — 82945 GLUCOSE OTHER FLUID: CPT

## 2024-07-28 PROCEDURE — 85027 COMPLETE CBC AUTOMATED: CPT

## 2024-07-28 PROCEDURE — 86900 BLOOD TYPING SEROLOGIC ABO: CPT

## 2024-07-28 PROCEDURE — 71045 X-RAY EXAM CHEST 1 VIEW: CPT

## 2024-07-28 PROCEDURE — 36415 COLL VENOUS BLD VENIPUNCTURE: CPT

## 2024-07-28 PROCEDURE — 83615 LACTATE (LD) (LDH) ENZYME: CPT

## 2024-07-28 PROCEDURE — 87075 CULTR BACTERIA EXCEPT BLOOD: CPT

## 2024-07-28 PROCEDURE — 2580000003 HC RX 258: Performed by: STUDENT IN AN ORGANIZED HEALTH CARE EDUCATION/TRAINING PROGRAM

## 2024-07-28 PROCEDURE — 84100 ASSAY OF PHOSPHORUS: CPT

## 2024-07-28 PROCEDURE — 80048 BASIC METABOLIC PNL TOTAL CA: CPT

## 2024-07-28 PROCEDURE — 83986 ASSAY PH BODY FLUID NOS: CPT

## 2024-07-28 PROCEDURE — 82330 ASSAY OF CALCIUM: CPT

## 2024-07-28 PROCEDURE — 89051 BODY FLUID CELL COUNT: CPT

## 2024-07-28 PROCEDURE — 82948 REAGENT STRIP/BLOOD GLUCOSE: CPT

## 2024-07-28 RX ORDER — SODIUM CHLORIDE 9 MG/ML
INJECTION, SOLUTION INTRAVENOUS PRN
Status: DISCONTINUED | OUTPATIENT
Start: 2024-07-28 | End: 2024-07-29

## 2024-07-28 RX ADMIN — DOXAZOSIN 4 MG: 4 TABLET ORAL at 20:36

## 2024-07-28 RX ADMIN — BUMETANIDE 1 MG: 1 TABLET ORAL at 10:33

## 2024-07-28 RX ADMIN — BUMETANIDE 1 MG: 1 TABLET ORAL at 16:56

## 2024-07-28 RX ADMIN — DOXAZOSIN 4 MG: 4 TABLET ORAL at 10:33

## 2024-07-28 RX ADMIN — METOPROLOL TARTRATE 12.5 MG: 25 TABLET, FILM COATED ORAL at 20:36

## 2024-07-28 RX ADMIN — METOPROLOL TARTRATE 12.5 MG: 25 TABLET, FILM COATED ORAL at 10:33

## 2024-07-28 RX ADMIN — APIXABAN 2.5 MG: 2.5 TABLET, FILM COATED ORAL at 20:36

## 2024-07-28 RX ADMIN — INSULIN GLARGINE 55 UNITS: 100 INJECTION, SOLUTION SUBCUTANEOUS at 20:39

## 2024-07-28 RX ADMIN — SEVELAMER CARBONATE 800 MG: 800 TABLET, FILM COATED ORAL at 16:56

## 2024-07-28 RX ADMIN — ATORVASTATIN CALCIUM 40 MG: 40 TABLET, FILM COATED ORAL at 10:33

## 2024-07-28 RX ADMIN — GABAPENTIN 300 MG: 300 CAPSULE ORAL at 10:33

## 2024-07-28 RX ADMIN — GABAPENTIN 300 MG: 300 CAPSULE ORAL at 20:36

## 2024-07-28 RX ADMIN — SEVELAMER CARBONATE 800 MG: 800 TABLET, FILM COATED ORAL at 10:33

## 2024-07-28 RX ADMIN — SODIUM CHLORIDE, PRESERVATIVE FREE 10 ML: 5 INJECTION INTRAVENOUS at 20:36

## 2024-07-28 NOTE — PLAN OF CARE
Problem: Discharge Planning  Goal: Discharge to home or other facility with appropriate resources  7/28/2024 1109 by Aroldo Ruiz, RN  Outcome: Progressing  Flowsheets (Taken 7/28/2024 1109)  Discharge to home or other facility with appropriate resources: Identify barriers to discharge with patient and caregiver     Problem: Respiratory - Adult  Goal: Achieves optimal ventilation and oxygenation  7/28/2024 1109 by Aroldo Ruiz, RN  Outcome: Progressing  Flowsheets (Taken 7/28/2024 1109)  Achieves optimal ventilation and oxygenation: Assess for changes in respiratory status     Problem: Metabolic/Fluid and Electrolytes - Adult  Goal: Electrolytes maintained within normal limits  7/28/2024 1109 by Aroldo Ruiz, RN  Flowsheets (Taken 7/28/2024 1109)  Electrolytes maintained within normal limits: Monitor labs and assess patient for signs and symptoms of electrolyte imbalances

## 2024-07-28 NOTE — CONSENT
Informed Consent for Blood Component Transfusion Note    I have discussed with the patient the rationale for blood component transfusion; its benefits in treating or preventing fatigue, organ damage, or death; and its risk which includes mild transfusion reactions, rare risk of blood borne infection, or more serious but rare reactions. I have discussed the alternatives to transfusion, including the risk and consequences of not receiving transfusion. The patient had an opportunity to ask questions and had agreed to proceed with transfusion of blood components.    Electronically signed by Nafisa Ramirez MD on 7/28/24 at 6:06 AM EDT

## 2024-07-28 NOTE — PROGRESS NOTES
HPI  \" Debbie Locke is a 49 y.o. female with PMHx of ESRD, CHF, T2DM, PAD who presents to [facility] with shortness of breath. The patient is a MWF dialysis patient and she missed her session on 24 due to the port not drawing. She went to [facility] to have it replaced, but no one was able to get her in for dialysis after. Over the course of the day, she has had worsening shortness of breath to the point of barely being able to talk. Along with the SOB, she has noticed a 6lb weight gain and increased swelling. She denies any CP, fevers, chills.       In the ED, the patient was given IV bumex and fentanyl. Shortly after she felt abdominal pain and had an episode of diarrhea and vomiting. Patient states she felt some relief after vomiting, but was still having some pain and nausea so she was given zofran.      ED course: VS: 98.3, RR 19, , /115, SPO2 94 RA->96 RA. Labs: Na 135, K 5.2, CO2 20, BUN 38, Cr 4.9, iCal 0.92, eGFR 10, Mg 1.8, , Ca 7.7, Ph 7.2, Tprotein 5.8, pro-BNP 9656, Trop 73, Albumin 2.6, ALT 9, Tbili <0.2, WBC 8.4, Hgb 8.2, hematocrit 28.9, MCHC 28.4. AB.38/47/16/28. Given IV bumex 2 mg and fentanyl. \"    Medications:    Infusion Medications    sodium chloride      sodium chloride      dextrose      Scheduled Medications    sodium chloride flush  5-40 mL IntraVENous 2 times per day    insulin glargine  55 Units SubCUTAneous Nightly    apixaban  2.5 mg Oral BID    aspirin  81 mg Oral Daily    atorvastatin  40 mg Oral Daily    bumetanide  1 mg Oral BID    doxazosin  4 mg Oral BID    gabapentin  300 mg Oral BID    metoprolol tartrate  12.5 mg Oral BID    sevelamer  800 mg Oral TID     PRN Meds: sodium chloride, sodium chloride flush, sodium chloride, albuterol sulfate HFA, guaiFENesin, ondansetron, polyethylene glycol, acetaminophen **OR** acetaminophen, glucose, dextrose bolus **OR** dextrose bolus, glucagon (rDNA), dextrose    Exam:  BP (!) 144/70   Pulse 82

## 2024-07-28 NOTE — PROGRESS NOTES
[facility]   Nephrology progress note  7/28/2024, 1:08 PM      Pt Name:    Debbie Locke  MRN:     [id number]     YOB: 1975  Admit Date:    7/25/2024  6:39 PM    Chief Complaint: Nephrology following for ESRD    Subjective:  Patient was seen and examined this morning  No chest pain  Breathing improved  Reports shortness of breath is improving    Objective:  24HR INTAKE/OUTPUT:    Intake/Output Summary (Last 24 hours) at 7/28/2024 1308  Last data filed at 7/28/2024 0915  Gross per 24 hour   Intake 730 ml   Output --   Net 730 ml           I/O last 3 completed shifts:  In: 690 [P.O.:690]  Out: 4000   I/O this shift:  In: 280 [P.O.:280]  Out: -    Admission weight: 119.7 kg (264 lb)  Wt Readings from Last 3 Encounters:   07/27/24 120 kg (264 lb 8.8 oz)   07/19/24 120.5 kg (265 lb 10.5 oz)   06/05/24 106.6 kg (235 lb)        Vitals :   Vitals:    07/28/24 0029 07/28/24 0433 07/28/24 0805 07/28/24 1217   BP: (!) 144/67 (!) 140/78 (!) 142/78 (!) 144/70   Pulse: 94 85 80 82   Resp: 15 16 16 16   Temp: 98.4 °F (36.9 °C) 98.7 °F (37.1 °C) 98.7 °F (37.1 °C) 98.1 °F (36.7 °C)   TempSrc: Oral Oral Oral Oral   SpO2: 98% 96% 91%    Weight:       Height:           Physical examination  General Appearance: alert and cooperative with exam, appears comfortable, no distress  Mouth/Throat: Oral mucosa moist  Neck: No JVD  Lungs: no use of accessory muscles  GI: soft, non-tender, no guarding  Extremities: ++ LE edema    Medications:  Infusion:    sodium chloride      sodium chloride      dextrose       Meds:    sodium chloride flush  5-40 mL IntraVENous 2 times per day    insulin glargine  55 Units SubCUTAneous Nightly    apixaban  2.5 mg Oral BID    aspirin  81 mg Oral Daily    atorvastatin  40 mg Oral Daily    bumetanide  1 mg Oral BID    doxazosin  4 mg Oral BID    gabapentin  300 mg Oral BID    metoprolol tartrate  12.5 mg Oral BID    sevelamer  800 mg Oral TID      Meds prn: sodium chloride,

## 2024-07-28 NOTE — PLAN OF CARE
Problem: Discharge Planning  Goal: Discharge to home or other facility with appropriate resources  7/27/2024 2211 by Anabel Ch, RN  Outcome: Progressing  Flowsheets (Taken 7/27/2024 2211)  Discharge to home or other facility with appropriate resources:   Identify barriers to discharge with patient and caregiver   Arrange for needed discharge resources and transportation as appropriate   Identify discharge learning needs (meds, wound care, etc)     Problem: Respiratory - Adult  Goal: Achieves optimal ventilation and oxygenation  7/27/2024 2211 by Anabel Ch, RN  Outcome: Progressing  Flowsheets (Taken 7/27/2024 2211)  Achieves optimal ventilation and oxygenation:   Assess for changes in respiratory status   Assess for changes in mentation and behavior     Problem: Safety - Adult  Goal: Free from fall injury  Outcome: Progressing  Flowsheets (Taken 7/27/2024 2211)  Free From Fall Injury: Instruct family/caregiver on patient safety     Problem: Chronic Conditions and Co-morbidities  Goal: Patient's chronic conditions and co-morbidity symptoms are monitored and maintained or improved  Outcome: Progressing  Flowsheets (Taken 7/27/2024 2211)  Care Plan - Patient's Chronic Conditions and Co-Morbidity Symptoms are Monitored and Maintained or Improved:   Monitor and assess patient's chronic conditions and comorbid symptoms for stability, deterioration, or improvement   Collaborate with multidisciplinary team to address chronic and comorbid conditions and prevent exacerbation or deterioration   Update acute care plan with appropriate goals if chronic or comorbid symptoms are exacerbated and prevent overall improvement and discharge     Problem: Metabolic/Fluid and Electrolytes - Adult  Goal: Electrolytes maintained within normal limits  7/27/2024 2211 by Anabel Ch, RN  Outcome: Progressing  Flowsheets (Taken 7/27/2024 2211)  Electrolytes maintained within normal limits:   Monitor labs and assess patient for

## 2024-07-29 ENCOUNTER — APPOINTMENT (OUTPATIENT)
Age: 49
DRG: 194 | End: 2024-07-29
Attending: STUDENT IN AN ORGANIZED HEALTH CARE EDUCATION/TRAINING PROGRAM
Payer: COMMERCIAL

## 2024-07-29 LAB
ANION GAP SERPL CALC-SCNC: 12 MEQ/L (ref 8–16)
BUN SERPL-MCNC: 42 MG/DL (ref 7–22)
CALCIUM SERPL-MCNC: 7.2 MG/DL (ref 8.5–10.5)
CHARACTER, BODY FLUID: CLEAR
CHLORIDE SERPL-SCNC: 102 MEQ/L (ref 98–111)
CO2 SERPL-SCNC: 23 MEQ/L (ref 23–33)
COLOR FLD: NORMAL
CREAT SERPL-MCNC: 5.1 MG/DL (ref 0.4–1.2)
DEPRECATED RDW RBC AUTO: 49 FL (ref 35–45)
ECHO AO ASC DIAM: 3.3 CM
ECHO AO ASCENDING AORTA INDEX: 1.53 CM/M2
ECHO AO SINUS VALSALVA DIAM: 3.4 CM
ECHO AO SINUS VALSALVA INDEX: 1.58 CM/M2
ECHO AO ST JNCT DIAM: 2.7 CM
ECHO AV CUSP MM: 2.1 CM
ECHO BSA: 2.33 M2
ECHO LA DIAMETER INDEX: 1.91 CM/M2
ECHO LA DIAMETER: 4.1 CM
ECHO LV EDV A2C: 93 ML
ECHO LV EDV A4C: 117 ML
ECHO LV EDV INDEX A4C: 54 ML/M2
ECHO LV EDV NDEX A2C: 43 ML/M2
ECHO LV EJECTION FRACTION A2C: 51 %
ECHO LV EJECTION FRACTION A4C: 50 %
ECHO LV EJECTION FRACTION BIPLANE: 50 % (ref 55–100)
ECHO LV ESV A2C: 46 ML
ECHO LV ESV A4C: 58 ML
ECHO LV ESV INDEX A2C: 21 ML/M2
ECHO LV ESV INDEX A4C: 27 ML/M2
ECHO LV FRACTIONAL SHORTENING: 29 % (ref 28–44)
ECHO LV INTERNAL DIMENSION DIASTOLE INDEX: 2.23 CM/M2
ECHO LV INTERNAL DIMENSION DIASTOLIC: 4.8 CM (ref 3.9–5.3)
ECHO LV INTERNAL DIMENSION SYSTOLIC INDEX: 1.58 CM/M2
ECHO LV INTERNAL DIMENSION SYSTOLIC: 3.4 CM
ECHO LV IVSD: 1.2 CM (ref 0.6–0.9)
ECHO LV MASS 2D: 219.1 G (ref 67–162)
ECHO LV MASS INDEX 2D: 101.9 G/M2 (ref 43–95)
ECHO LV POSTERIOR WALL DIASTOLIC: 1.2 CM (ref 0.6–0.9)
ECHO LV RELATIVE WALL THICKNESS RATIO: 0.5
ECHO RV INTERNAL DIMENSION: 3.4 CM
ECHO RV TAPSE: 1.7 CM (ref 1.7–?)
ERYTHROCYTE [DISTWIDTH] IN BLOOD BY AUTOMATED COUNT: 14.9 % (ref 11.5–14.5)
GFR SERPL CREATININE-BSD FRML MDRD: 10 ML/MIN/1.73M2
GLUCOSE BLD STRIP.AUTO-MCNC: 148 MG/DL (ref 70–108)
GLUCOSE BLD STRIP.AUTO-MCNC: 167 MG/DL (ref 70–108)
GLUCOSE BLD STRIP.AUTO-MCNC: 318 MG/DL (ref 70–108)
GLUCOSE SERPL-MCNC: 152 MG/DL (ref 70–108)
GRANULOCYTES NFR FLD AUTO: 0 %
HCT VFR BLD AUTO: 21.6 % (ref 37–47)
HCT VFR BLD AUTO: 23.2 % (ref 37–47)
HCT VFR BLD AUTO: 27.6 % (ref 37–47)
HGB BLD-MCNC: 6.4 GM/DL (ref 12–16)
HGB BLD-MCNC: 6.8 GM/DL (ref 12–16)
HGB BLD-MCNC: 8.2 GM/DL (ref 12–16)
LYMPHOCYTES NFR FLD MANUAL: 82 %
MAGNESIUM SERPL-MCNC: 1.7 MG/DL (ref 1.6–2.4)
MCH RBC QN AUTO: 26.5 PG (ref 26–33)
MCHC RBC AUTO-ENTMCNC: 29.3 GM/DL (ref 32.2–35.5)
MCV RBC AUTO: 90.3 FL (ref 81–99)
MESOTHELIAL CELLS BODY FLUID: NORMAL
MONOCYTES NFR FLD MANUAL: 17 %
MONONUC CELLS NFR FLD AUTO: 0 %
NEUTS SEG NFR FLD MANUAL: 1 %
NUC CELL # FLD AUTO: 237 /CUMM (ref 0–500)
PATHOLOGIST REVIEW: NORMAL
PLATELET # BLD AUTO: 189 THOU/MM3 (ref 130–400)
PMV BLD AUTO: 9.6 FL (ref 9.4–12.4)
POTASSIUM SERPL-SCNC: 4.7 MEQ/L (ref 3.5–5.2)
RBC # BLD AUTO: 2.57 MILL/MM3 (ref 4.2–5.4)
RBC # FLD AUTO: < 2000 /CUMM
SODIUM SERPL-SCNC: 137 MEQ/L (ref 135–145)
SPECIMEN: NORMAL
TOTAL VOLUME RECEIVED BODY FLUID: 73 ML
WBC # BLD AUTO: 8 THOU/MM3 (ref 4.8–10.8)

## 2024-07-29 PROCEDURE — G0378 HOSPITAL OBSERVATION PER HR: HCPCS

## 2024-07-29 PROCEDURE — 94640 AIRWAY INHALATION TREATMENT: CPT

## 2024-07-29 PROCEDURE — 80048 BASIC METABOLIC PNL TOTAL CA: CPT

## 2024-07-29 PROCEDURE — 6370000000 HC RX 637 (ALT 250 FOR IP)

## 2024-07-29 PROCEDURE — 85027 COMPLETE CBC AUTOMATED: CPT

## 2024-07-29 PROCEDURE — P9016 RBC LEUKOCYTES REDUCED: HCPCS

## 2024-07-29 PROCEDURE — 6370000000 HC RX 637 (ALT 250 FOR IP): Performed by: STUDENT IN AN ORGANIZED HEALTH CARE EDUCATION/TRAINING PROGRAM

## 2024-07-29 PROCEDURE — 94760 N-INVAS EAR/PLS OXIMETRY 1: CPT

## 2024-07-29 PROCEDURE — 82948 REAGENT STRIP/BLOOD GLUCOSE: CPT

## 2024-07-29 PROCEDURE — 36415 COLL VENOUS BLD VENIPUNCTURE: CPT

## 2024-07-29 PROCEDURE — 85014 HEMATOCRIT: CPT

## 2024-07-29 PROCEDURE — 85018 HEMOGLOBIN: CPT

## 2024-07-29 PROCEDURE — 93307 TTE W/O DOPPLER COMPLETE: CPT | Performed by: INTERNAL MEDICINE

## 2024-07-29 PROCEDURE — 93307 TTE W/O DOPPLER COMPLETE: CPT

## 2024-07-29 PROCEDURE — 2580000003 HC RX 258: Performed by: STUDENT IN AN ORGANIZED HEALTH CARE EDUCATION/TRAINING PROGRAM

## 2024-07-29 PROCEDURE — 83735 ASSAY OF MAGNESIUM: CPT

## 2024-07-29 PROCEDURE — 90935 HEMODIALYSIS ONE EVALUATION: CPT

## 2024-07-29 PROCEDURE — 99232 SBSQ HOSP IP/OBS MODERATE 35: CPT | Performed by: INTERNAL MEDICINE

## 2024-07-29 RX ORDER — SODIUM CHLORIDE 9 MG/ML
INJECTION, SOLUTION INTRAVENOUS PRN
Status: DISCONTINUED | OUTPATIENT
Start: 2024-07-29 | End: 2024-07-30 | Stop reason: HOSPADM

## 2024-07-29 RX ORDER — ALBUTEROL SULFATE 90 UG/1
2 AEROSOL, METERED RESPIRATORY (INHALATION)
Status: DISCONTINUED | OUTPATIENT
Start: 2024-07-29 | End: 2024-07-30 | Stop reason: HOSPADM

## 2024-07-29 RX ADMIN — SODIUM CHLORIDE, PRESERVATIVE FREE 10 ML: 5 INJECTION INTRAVENOUS at 20:08

## 2024-07-29 RX ADMIN — GABAPENTIN 300 MG: 300 CAPSULE ORAL at 20:08

## 2024-07-29 RX ADMIN — ASPIRIN 81 MG: 81 TABLET, COATED ORAL at 09:51

## 2024-07-29 RX ADMIN — GABAPENTIN 300 MG: 300 CAPSULE ORAL at 09:52

## 2024-07-29 RX ADMIN — INSULIN GLARGINE 55 UNITS: 100 INJECTION, SOLUTION SUBCUTANEOUS at 21:41

## 2024-07-29 RX ADMIN — SEVELAMER CARBONATE 800 MG: 800 TABLET, FILM COATED ORAL at 09:51

## 2024-07-29 RX ADMIN — ATORVASTATIN CALCIUM 40 MG: 40 TABLET, FILM COATED ORAL at 09:52

## 2024-07-29 RX ADMIN — BUMETANIDE 1 MG: 1 TABLET ORAL at 09:52

## 2024-07-29 RX ADMIN — SEVELAMER CARBONATE 800 MG: 800 TABLET, FILM COATED ORAL at 17:25

## 2024-07-29 RX ADMIN — SODIUM CHLORIDE, PRESERVATIVE FREE 10 ML: 5 INJECTION INTRAVENOUS at 09:57

## 2024-07-29 RX ADMIN — APIXABAN 2.5 MG: 2.5 TABLET, FILM COATED ORAL at 09:52

## 2024-07-29 RX ADMIN — ALBUTEROL SULFATE 2 PUFF: 90 AEROSOL, METERED RESPIRATORY (INHALATION) at 20:19

## 2024-07-29 RX ADMIN — METOPROLOL TARTRATE 12.5 MG: 25 TABLET, FILM COATED ORAL at 20:09

## 2024-07-29 RX ADMIN — APIXABAN 2.5 MG: 2.5 TABLET, FILM COATED ORAL at 20:08

## 2024-07-29 RX ADMIN — BUMETANIDE 1 MG: 1 TABLET ORAL at 17:25

## 2024-07-29 RX ADMIN — DOXAZOSIN 4 MG: 4 TABLET ORAL at 09:52

## 2024-07-29 RX ADMIN — METOPROLOL TARTRATE 12.5 MG: 25 TABLET, FILM COATED ORAL at 09:52

## 2024-07-29 RX ADMIN — DOXAZOSIN 4 MG: 4 TABLET ORAL at 20:08

## 2024-07-29 NOTE — PROGRESS NOTES
[facility]   Nephrology progress note  7/29/2024, 11:35 AM      Pt Name:    Debbie Locke  MRN:     [id number]     YOB: 1975  Admit Date:    7/25/2024  6:39 PM    Chief Complaint: Nephrology following for ESRD    Subjective:  Patient was seen and examined this morning  As well denies any complaints    Objective:  24HR INTAKE/OUTPUT:    Intake/Output Summary (Last 24 hours) at 7/29/2024 1135  Last data filed at 7/29/2024 0830  Gross per 24 hour   Intake 1280 ml   Output 1200 ml   Net 80 ml      Admission weight: 119.7 kg (264 lb)  Wt Readings from Last 3 Encounters:   07/29/24 120.2 kg (264 lb 15.9 oz)   07/19/24 120.5 kg (265 lb 10.5 oz)   06/05/24 106.6 kg (235 lb)        Vitals :   Vitals:    07/28/24 2315 07/29/24 0345 07/29/24 0600 07/29/24 0758   BP: (!) 144/80 (!) 142/74  137/66   Pulse: 95 (!) 102  93   Resp: 16 18  16   Temp: 98.1 °F (36.7 °C) 97.9 °F (36.6 °C)  98.9 °F (37.2 °C)   TempSrc: Oral Oral  Oral   SpO2: 97% 95%  98%   Weight:   120.2 kg (264 lb 15.9 oz)    Height:           Physical examination  General Appearance: Awake and alert no distress  Mouth/Throat: Oral mucosa moist  Neck: No JVD  Lungs: no use of accessory muscles  GI: soft, non-tender, no guarding  Extremities: Significant anasarca    Medications:  Infusion:    sodium chloride      sodium chloride      dextrose       Meds:    sodium chloride flush  5-40 mL IntraVENous 2 times per day    insulin glargine  55 Units SubCUTAneous Nightly    apixaban  2.5 mg Oral BID    aspirin  81 mg Oral Daily    atorvastatin  40 mg Oral Daily    bumetanide  1 mg Oral BID    doxazosin  4 mg Oral BID    gabapentin  300 mg Oral BID    metoprolol tartrate  12.5 mg Oral BID    sevelamer  800 mg Oral TID WC     Meds prn: sodium chloride, sodium chloride flush, sodium chloride, albuterol sulfate HFA, guaiFENesin, ondansetron, polyethylene glycol, acetaminophen **OR** acetaminophen, glucose, dextrose bolus **OR** dextrose  bolus, glucagon (rDNA), dextrose     Lab Data :  CBC:   Recent Labs     07/27/24  0437 07/28/24  0519 07/28/24  0608 07/28/24  0920 07/29/24  0537 07/29/24  1050   WBC 7.7 8.4  --   --  8.0  --    HGB 7.1* 6.6*   < > 7.1* 6.8* 6.4*   HCT 23.5* 22.2*   < > 24.0* 23.2* 21.6*    187  --   --  189  --     < > = values in this interval not displayed.       CMP:  Recent Labs     07/27/24  0437 07/28/24  0519 07/29/24  0537    137 137   K 4.9 4.4 4.7    100 102   CO2 28 28 23   BUN 27* 26* 42*   CREATININE 3.8* 3.9* 5.1*   GLUCOSE 278* 185* 152*   CALCIUM 7.4* 7.3* 7.2*   MG 1.7 1.6 1.7   PHOS 5.4* 4.1  --        Hepatic:   No results for input(s): \"LABALBU\", \"AST\", \"ALT\", \"BILITOT\", \"ALKPHOS\" in the last 72 hours.    Invalid input(s): \"ALB\"        Assessment and Plan:  ESRD on dialysis  Patient is in positive fluid balance we will get her dialyzed today aim for at least 4 L ultrafiltration  Will get her dialyzed again tomorrow  Volume overload.  I saw UF again in the morning  Anemia in ESRD: Retacrit 10,000 units given 07/27/24 check H&H and will give unit of blood transfusion if still low  Electrolytes within normal limits  Acute hypoxic respiratory failure.  Improved.  On room air  Diabetes  Pleural effusion.       Torin Neville MD  [facility]    This report has been created using voice recognition software. It may contain minor errors which are inherent in voice recognition technology

## 2024-07-29 NOTE — PROGRESS NOTES
Hospitalist Progress Note  Internal Medicine Resident      Patient: Debbie Locke 49 y.o. female      Unit/Bed: -05/005-A    Admit Date: 7/25/2024      ASSESSMENT AND PLAN  Active Problems  Acute hypoxic respiratory failure secondary to volume overload: CTA chest on 7/27 was negative for PE but showed moderate right-sided and small left-sided pleural effusion with associated atelectasis.  Patient requiring 1 L oxygen on ambulation.  Thoracentesis completed yesterday.  Switch breathing treatments to 4 times daily  Anasarca: 2/2 ESRD, missed dialysis.  Patient recently started on dialysis on 7/18.  Patient on MWF schedule.  Patient has history of ESRD.  On Bumex 1 mg twice daily at home.  Patient presented with shortness of breath and anasarca.  Patient missed dialysis on Wednesday due to port issues. proBNP elevated to 9656.  Chest x-ray showed pulmonary vascular congestion with normal systolic and diastolic function.  2 mg Bumex IV given in ED. Had dialysis on 7/26 and 7/27 with improvement.    Continue Bumex 1 mg twice daily oral  Dialysis done today, plan for dialysis again tomorrow  Daily weight  Strict I's and O's  2 L fluid restriction, 2 g sodium restriction  ESRD on hemodialysis: Patient recently started on dialysis 7/18.  On MWF schedule.  Had port issues on Wednesday, could not conduct dialysis.  Went to [facility] on Thursday and got port issues sorted, but could not get chair for dialysis.  Presented with hyponatremia, elevated BUN, elevated creatinine, hypocalcemia, hyperphosphatemia.Had dialysis on 7/26 and 7/27, with improvement.  Nephrology consulted, appreciate recommendation  Type II MI: 2/2 anasarca.  Initial troponin at 73, repeat at 69 then 79.  EKG showed sinus tachycardia, anteroseptal infarct that has been cited back in February  Anemia in ESRD: Iron studies on 7/27/2024 show iron level of 31, iron saturation of 12%.  Patient's hemoglobin at 6.8 this morning, recheck of

## 2024-07-29 NOTE — PLAN OF CARE
Problem: Discharge Planning  Goal: Discharge to home or other facility with appropriate resources  7/29/2024 1027 by Aroldo Ruiz, RN  Outcome: Progressing  Flowsheets (Taken 7/29/2024 1027)  Discharge to home or other facility with appropriate resources: Identify barriers to discharge with patient and caregiver     Problem: Respiratory - Adult  Goal: Achieves optimal ventilation and oxygenation  7/29/2024 1027 by Aroldo Ruiz, RN  Outcome: Progressing  Flowsheets (Taken 7/29/2024 1027)  Achieves optimal ventilation and oxygenation: Assess for changes in respiratory status  7/29/2024 0527 by Tari Ovalles, RN  Outcome: Progressing  Flowsheets (Taken 7/29/2024 0527)  Achieves optimal ventilation and oxygenation:   Assess for changes in respiratory status   Assess for changes in mentation and behavior   Position to facilitate oxygenation and minimize respiratory effort   Oxygen supplementation based on oxygen saturation or arterial blood gases     Problem: Respiratory - Adult  Goal: Achieves optimal ventilation and oxygenation  7/29/2024 1027 by Aroldo Ruiz, RN  Outcome: Progressing  Flowsheets (Taken 7/29/2024 1027)  Achieves optimal ventilation and oxygenation: Assess for changes in respiratory status     Problem: Metabolic/Fluid and Electrolytes - Adult  Goal: Electrolytes maintained within normal limits  7/29/2024 1027 by Aroldo Ruiz, RN  Flowsheets (Taken 7/29/2024 1027)  Electrolytes maintained within normal limits: Monitor labs and assess patient for signs and symptoms of electrolyte imbalances

## 2024-07-29 NOTE — FLOWSHEET NOTE
4 hour tx completed. 4 L of fluid removed, tolerated well. System clotted half way through TX. Unable to return blood. Dr. Neville notified. Gave orders to restart TX and give 1000 u of heparin with TX re-start. Gave 1 unit of PRBCs with TX. pt tolerated well. Cath lines capped and clamped. Report called to primary RN. Paperwork printed and placed in Bin to be scanned in to EMR.    07/29/24 1040 07/29/24 1515   Vital Signs   BP (!) 166/85 (!) 151/81   Temp 98 °F (36.7 °C) 97.9 °F (36.6 °C)   Pulse 95 81   Respirations 18 18   SpO2 96 % 99 %   Weight - Scale 123.5 kg (272 lb 4.3 oz) 119.5 kg (263 lb 7.2 oz)   Weight Method  --  Bed scale   Percent Weight Change  --  -0.58   Post-Hemodialysis Assessment   Post-Treatment Procedures  --  Blood returned;Access bleeding time < 10 minutes   Machine Disinfection Process  --  Acid/Vinegar Clean;Heat Disinfect;Exterior Machine Disinfection   Blood Volume Processed (Liters)  --  76.4 L   Dialyzer Clearance  --  Clotted   Duration of Treatment (minutes)  --  240 minutes   Heparin Amount Administered During Treatment (mL)  --  1000 mL   Hemodialysis Intake (ml)  --  600 ml   Hemodialysis Output (ml)  --  4600 ml   NET Removed (ml)  --  4000

## 2024-07-29 NOTE — PLAN OF CARE
Problem: Discharge Planning  Goal: Discharge to home or other facility with appropriate resources  Outcome: Progressing  Flowsheets (Taken 7/29/2024 0527)  Discharge to home or other facility with appropriate resources:   Identify barriers to discharge with patient and caregiver   Arrange for needed discharge resources and transportation as appropriate   Identify discharge learning needs (meds, wound care, etc)     Problem: Respiratory - Adult  Goal: Achieves optimal ventilation and oxygenation  Outcome: Progressing  Flowsheets (Taken 7/29/2024 0527)  Achieves optimal ventilation and oxygenation:   Assess for changes in respiratory status   Assess for changes in mentation and behavior   Position to facilitate oxygenation and minimize respiratory effort   Oxygen supplementation based on oxygen saturation or arterial blood gases     Problem: Metabolic/Fluid and Electrolytes - Adult  Goal: Electrolytes maintained within normal limits  Outcome: Progressing  Flowsheets (Taken 7/29/2024 0527)  Electrolytes maintained within normal limits:   Administer electrolyte replacement as ordered   Monitor labs and assess patient for signs and symptoms of electrolyte imbalances   Monitor response to electrolyte replacements, including repeat lab results as appropriate  Goal: Hemodynamic stability and optimal renal function maintained  Outcome: Progressing  Flowsheets (Taken 7/29/2024 0527)  Hemodynamic stability and optimal renal function maintained:   Monitor labs and assess for signs and symptoms of volume excess or deficit   Monitor intake, output and patient weight   Monitor urine specific gravity, serum osmolarity and serum sodium as indicated or ordered   Monitor response to interventions for patient's volume status, including labs, urine output, blood pressure (other measures as available)  Goal: Glucose maintained within prescribed range  Outcome: Progressing  Flowsheets (Taken 7/29/2024 0527)  Glucose maintained within  prescribed range:   Monitor blood glucose as ordered   Assess for signs and symptoms of hyperglycemia and hypoglycemia   Assess barriers to adequate nutritional intake and initiate nutrition consult as needed   Administer ordered medications to maintain glucose within target range     Problem: Hematologic - Adult  Goal: Maintains hematologic stability  Outcome: Progressing  Flowsheets (Taken 7/29/2024 0527)  Maintains hematologic stability:   Assess for signs and symptoms of bleeding or hemorrhage   Monitor labs for bleeding or clotting disorders     Problem: Neurosensory - Adult  Goal: Achieves stable or improved neurological status  Outcome: Progressing  Flowsheets (Taken 7/29/2024 0527)  Achieves stable or improved neurological status:   Initiate measures to prevent increased intracranial pressure   Assess for and report changes in neurological status   Maintain blood pressure and fluid volume within ordered parameters to optimize cerebral perfusion and minimize risk of hemorrhage     Problem: Cardiovascular - Adult  Goal: Maintains optimal cardiac output and hemodynamic stability  Outcome: Progressing  Flowsheets (Taken 7/29/2024 0527)  Maintains optimal cardiac output and hemodynamic stability:   Monitor blood pressure and heart rate   Assess for signs of decreased cardiac output   Monitor urine output and notify Licensed Independent Practitioner for values outside of normal range   Administer fluid and/or volume expanders as ordered     Problem: Skin/Tissue Integrity - Adult  Goal: Skin integrity remains intact  Outcome: Progressing  Flowsheets (Taken 7/29/2024 0527)  Skin Integrity Remains Intact:   Monitor for areas of redness and/or skin breakdown   Assess vascular access sites hourly  Goal: Incisions, wounds, or drain sites healing without S/S of infection  Outcome: Progressing  Flowsheets (Taken 7/29/2024 0527)  Incisions, Wounds, or Drain Sites Healing Without Sign and Symptoms of Infection:   ADMISSION

## 2024-07-30 VITALS
RESPIRATION RATE: 18 BRPM | BODY MASS INDEX: 47.75 KG/M2 | TEMPERATURE: 98.6 F | HEIGHT: 62 IN | SYSTOLIC BLOOD PRESSURE: 152 MMHG | DIASTOLIC BLOOD PRESSURE: 79 MMHG | OXYGEN SATURATION: 100 % | HEART RATE: 96 BPM | WEIGHT: 259.48 LBS

## 2024-07-30 PROBLEM — E87.70 VOLUME OVERLOAD: Status: ACTIVE | Noted: 2024-07-30

## 2024-07-30 LAB
ANION GAP SERPL CALC-SCNC: 10 MEQ/L (ref 8–16)
BUN SERPL-MCNC: 29 MG/DL (ref 7–22)
CALCIUM SERPL-MCNC: 7.3 MG/DL (ref 8.5–10.5)
CHLORIDE SERPL-SCNC: 101 MEQ/L (ref 98–111)
CO2 SERPL-SCNC: 25 MEQ/L (ref 23–33)
CREAT SERPL-MCNC: 4 MG/DL (ref 0.4–1.2)
DEPRECATED RDW RBC AUTO: 51.5 FL (ref 35–45)
ERYTHROCYTE [DISTWIDTH] IN BLOOD BY AUTOMATED COUNT: 15.7 % (ref 11.5–14.5)
GFR SERPL CREATININE-BSD FRML MDRD: 13 ML/MIN/1.73M2
GLUCOSE BLD STRIP.AUTO-MCNC: 269 MG/DL (ref 70–108)
GLUCOSE BLD STRIP.AUTO-MCNC: 376 MG/DL (ref 70–108)
GLUCOSE SERPL-MCNC: 298 MG/DL (ref 70–108)
HCT VFR BLD AUTO: 25.5 % (ref 37–47)
HGB BLD-MCNC: 7.5 GM/DL (ref 12–16)
MAGNESIUM SERPL-MCNC: 1.8 MG/DL (ref 1.6–2.4)
MCH RBC QN AUTO: 26.5 PG (ref 26–33)
MCHC RBC AUTO-ENTMCNC: 29.4 GM/DL (ref 32.2–35.5)
MCV RBC AUTO: 90.1 FL (ref 81–99)
PLATELET # BLD AUTO: 177 THOU/MM3 (ref 130–400)
PMV BLD AUTO: 9.8 FL (ref 9.4–12.4)
POTASSIUM SERPL-SCNC: 4.4 MEQ/L (ref 3.5–5.2)
RBC # BLD AUTO: 2.83 MILL/MM3 (ref 4.2–5.4)
SODIUM SERPL-SCNC: 136 MEQ/L (ref 135–145)
WBC # BLD AUTO: 7.7 THOU/MM3 (ref 4.8–10.8)

## 2024-07-30 PROCEDURE — 36415 COLL VENOUS BLD VENIPUNCTURE: CPT

## 2024-07-30 PROCEDURE — G0378 HOSPITAL OBSERVATION PER HR: HCPCS

## 2024-07-30 PROCEDURE — 80048 BASIC METABOLIC PNL TOTAL CA: CPT

## 2024-07-30 PROCEDURE — 1200000000 HC SEMI PRIVATE

## 2024-07-30 PROCEDURE — 6370000000 HC RX 637 (ALT 250 FOR IP)

## 2024-07-30 PROCEDURE — 85027 COMPLETE CBC AUTOMATED: CPT

## 2024-07-30 PROCEDURE — 90935 HEMODIALYSIS ONE EVALUATION: CPT

## 2024-07-30 PROCEDURE — 99239 HOSP IP/OBS DSCHRG MGMT >30: CPT | Performed by: STUDENT IN AN ORGANIZED HEALTH CARE EDUCATION/TRAINING PROGRAM

## 2024-07-30 PROCEDURE — 83735 ASSAY OF MAGNESIUM: CPT

## 2024-07-30 PROCEDURE — 5A1D70Z PERFORMANCE OF URINARY FILTRATION, INTERMITTENT, LESS THAN 6 HOURS PER DAY: ICD-10-PCS | Performed by: INTERNAL MEDICINE

## 2024-07-30 PROCEDURE — 82948 REAGENT STRIP/BLOOD GLUCOSE: CPT

## 2024-07-30 PROCEDURE — 6370000000 HC RX 637 (ALT 250 FOR IP): Performed by: STUDENT IN AN ORGANIZED HEALTH CARE EDUCATION/TRAINING PROGRAM

## 2024-07-30 PROCEDURE — 2580000003 HC RX 258: Performed by: STUDENT IN AN ORGANIZED HEALTH CARE EDUCATION/TRAINING PROGRAM

## 2024-07-30 PROCEDURE — 99232 SBSQ HOSP IP/OBS MODERATE 35: CPT | Performed by: INTERNAL MEDICINE

## 2024-07-30 RX ORDER — INSULIN GLARGINE 100 [IU]/ML
50 INJECTION, SOLUTION SUBCUTANEOUS 2 TIMES DAILY
Qty: 5 ADJUSTABLE DOSE PRE-FILLED PEN SYRINGE | Refills: 0 | Status: SHIPPED | OUTPATIENT
Start: 2024-07-30

## 2024-07-30 RX ORDER — INSULIN GLARGINE 100 [IU]/ML
60 INJECTION, SOLUTION SUBCUTANEOUS NIGHTLY
Status: DISCONTINUED | OUTPATIENT
Start: 2024-07-30 | End: 2024-07-30 | Stop reason: HOSPADM

## 2024-07-30 RX ORDER — INSULIN GLARGINE 100 [IU]/ML
30 INJECTION, SOLUTION SUBCUTANEOUS 2 TIMES DAILY
Status: DISCONTINUED | OUTPATIENT
Start: 2024-07-30 | End: 2024-07-30

## 2024-07-30 RX ORDER — IBUPROFEN 600 MG/1
1 TABLET ORAL PRN
Qty: 1 KIT | Refills: 0 | Status: SHIPPED | OUTPATIENT
Start: 2024-07-30

## 2024-07-30 RX ORDER — INSULIN GLARGINE 100 [IU]/ML
30 INJECTION, SOLUTION SUBCUTANEOUS ONCE
Status: COMPLETED | OUTPATIENT
Start: 2024-07-30 | End: 2024-07-30

## 2024-07-30 RX ORDER — BLOOD PRESSURE TEST KIT
1 KIT MISCELLANEOUS 2 TIMES DAILY
Qty: 1 KIT | Refills: 0 | Status: SHIPPED | OUTPATIENT
Start: 2024-07-30

## 2024-07-30 RX ORDER — INSULIN GLARGINE 100 [IU]/ML
16 INJECTION, SOLUTION SUBCUTANEOUS ONCE
Status: DISCONTINUED | OUTPATIENT
Start: 2024-07-30 | End: 2024-07-30

## 2024-07-30 RX ORDER — CALCIUM CITRATE/VITAMIN D3 200MG-6.25
TABLET ORAL
Qty: 100 EACH | Refills: 3
Start: 2024-07-30

## 2024-07-30 RX ORDER — INSULIN GLARGINE 100 [IU]/ML
50 INJECTION, SOLUTION SUBCUTANEOUS NIGHTLY
Qty: 5 ADJUSTABLE DOSE PRE-FILLED PEN SYRINGE | Refills: 0 | Status: SHIPPED | OUTPATIENT
Start: 2024-07-30 | End: 2024-07-30

## 2024-07-30 RX ORDER — AMLODIPINE BESYLATE 5 MG/1
5 TABLET ORAL DAILY
Qty: 90 TABLET | Refills: 0 | Status: SHIPPED | OUTPATIENT
Start: 2024-07-30

## 2024-07-30 RX ADMIN — SEVELAMER CARBONATE 800 MG: 800 TABLET, FILM COATED ORAL at 13:48

## 2024-07-30 RX ADMIN — BUMETANIDE 1 MG: 1 TABLET ORAL at 13:49

## 2024-07-30 RX ADMIN — DOXAZOSIN 4 MG: 4 TABLET ORAL at 13:49

## 2024-07-30 RX ADMIN — APIXABAN 2.5 MG: 2.5 TABLET, FILM COATED ORAL at 13:48

## 2024-07-30 RX ADMIN — INSULIN GLARGINE 30 UNITS: 100 INJECTION, SOLUTION SUBCUTANEOUS at 13:58

## 2024-07-30 RX ADMIN — SODIUM CHLORIDE, PRESERVATIVE FREE 10 ML: 5 INJECTION INTRAVENOUS at 13:49

## 2024-07-30 RX ADMIN — ASPIRIN 81 MG: 81 TABLET, COATED ORAL at 13:58

## 2024-07-30 RX ADMIN — ATORVASTATIN CALCIUM 40 MG: 40 TABLET, FILM COATED ORAL at 13:50

## 2024-07-30 RX ADMIN — GABAPENTIN 300 MG: 300 CAPSULE ORAL at 13:49

## 2024-07-30 RX ADMIN — METOPROLOL TARTRATE 12.5 MG: 25 TABLET, FILM COATED ORAL at 13:48

## 2024-07-30 ASSESSMENT — PAIN SCALES - GENERAL: PAINLEVEL_OUTOF10: 0

## 2024-07-30 NOTE — FLOWSHEET NOTE
Stable 4 hour isolated UF treatment completed. Removed 4 liters of fluid as per order. Tolerated fluid removal well. HD catheter ports flushed, clamped and capped. Dressing clean, dry and intact. Report given to primary RN. Treatment record printed for scanning into EMR.   07/30/24 0839 07/30/24 1257   Vital Signs   BP (!) 178/79 (!) 167/79   Temp 99.3 °F (37.4 °C) 98.6 °F (37 °C)   Pulse 99 85   Respirations 18 18   SpO2 97 % 98 %   Weight - Scale 121.7 kg (268 lb 4.8 oz) 117.7 kg (259 lb 7.7 oz)   Weight Method  --  Bed scale   Percent Weight Change  --  -2.08   Post-Hemodialysis Assessment   Post-Treatment Procedures  --  Blood returned;Catheter Capped, clamped with Saline x2 ports   Machine Disinfection Process  --  Heat Disinfect;Acid/Vinegar Clean;Bleach   Blood Volume Processed (Liters)  --  0 L   Dialyzer Clearance  --  Heavily streaked   Duration of Treatment (minutes)  --  240 minutes   Heparin Amount Administered During Treatment (mL)  --  2000 mL   Hemodialysis Intake (ml)  --  400 ml   Hemodialysis Output (ml)  --  4400 ml   NET Removed (ml)  --  4000   Tolerated Treatment  --  Good

## 2024-07-30 NOTE — DISCHARGE INSTRUCTIONS
Follow-up with PCP in 1 week.  Get BMP before PCP appointment.  Take amlodipine once daily for blood pressure.  Check blood pressure 2 times daily.  Once in the morning, once at bedtime.  Check blood pressure around same time daily.  Keep blood pressure log  Insulin Lantus 50 units twice daily ordered.  Regularly check blood glucose.  Follow-up with PCP regarding insulin dosage.

## 2024-07-30 NOTE — CARE COORDINATION
7/30/24, 3:21 PM EDT    Discharge to home with  and daughter. HD at [facility] MWF at 0540. Denies all needs.   Patient goals/plan/ treatment preferences discussed by  and .  Patient goals/plan/ treatment preferences reviewed with patient/ family.  Patient/ family verbalize understanding of discharge plan and are in agreement with goal/plan/treatment preferences.  Understanding was demonstrated using the teach back method.  AVS provided by RN at time of discharge, which includes all necessary medical information pertaining to the patients current course of illness, treatment, post-discharge goals of care, and treatment preferences.     Services At/After Discharge: None

## 2024-07-30 NOTE — PROGRESS NOTES
07/30/24 1428   Encounter Summary   Encounter Overview/Reason Spiritual/Emotional Needs   Service Provided For Patient and family together   Referral/Consult From Rounding   Support System Spouse   Last Encounter  07/30/24   Complexity of Encounter Moderate   Begin Time 1420   End Time  1428   Total Time Calculated 8 min   Spiritual/Emotional needs   Type Spiritual Support   Assessment/Intervention/Outcome   Assessment Coping   Intervention Nurtured Hope;Prayer (assurance of)/[place];Sustaining Presence/Ministry of presence   Outcome Comfort     Assessment:  In my encounter with the 49 yr old patient the pt's family was supportively present. While rounding the unit 6K,  I provided spiritual care to patient and their family through conversation, I also came to assess their spiritual needs. The pt was admitted due to shortness of breath.     Interventions:  I provided, prayer, emotional support and words of comfort.  provided a listening presence and encouraged pt to share their beliefs and how I can support them during their hospitalization.       Outcomes:  The patient was encouraged and didn't share any further spiritual needs at this time. The pt remains optimistic and hopeful. The pt shared that they were appreciative for the support.     Plan:  Chaplains will follow-up at a later time for assessment of any spiritual care needs present.

## 2024-07-30 NOTE — PROGRESS NOTES
[facility]   Nephrology progress note  7/30/2024, 10:53 AM      Pt Name:    Debbie Locke  MRN:     [id number]     YOB: 1975  Admit Date:    7/25/2024  6:39 PM    Chief Complaint: Nephrology following for ESRD    Subjective:  Patient was seen and examined this morning  Patient states she is feeling somewhat better    Objective:  24HR INTAKE/OUTPUT:    Intake/Output Summary (Last 24 hours) at 7/30/2024 1053  Last data filed at 7/30/2024 0931  Gross per 24 hour   Intake 2420 ml   Output 4600 ml   Net -2180 ml      Admission weight: 119.7 kg (264 lb)  Wt Readings from Last 3 Encounters:   07/30/24 120.2 kg (265 lb)   07/19/24 120.5 kg (265 lb 10.5 oz)   06/05/24 106.6 kg (235 lb)        Vitals :   Vitals:    07/29/24 2019 07/29/24 2311 07/30/24 0415 07/30/24 0811   BP:  (!) 145/75 (!) 188/86 (!) 173/72   Pulse: 95 96 99 98   Resp: 16 16 16 16   Temp:  98.3 °F (36.8 °C) 98.3 °F (36.8 °C) 98.5 °F (36.9 °C)   TempSrc:  Oral Oral Oral   SpO2: 98% 96% 96% 97%   Weight:   120.2 kg (265 lb)    Height:           Physical examination  General Appearance: Awake and alert no distress  Mouth/Throat: Oral mucosa moist  Neck: No JVD  Lungs: no use of accessory muscles  GI: soft, non-tender, no guarding  Extremities: Significant anasarca seems getting better    Medications:  Infusion:    sodium chloride      sodium chloride      dextrose       Meds:    insulin glargine  16 Units SubCUTAneous Once    insulin glargine  60 Units SubCUTAneous Nightly    albuterol sulfate HFA  2 puff Inhalation 4x Daily RT    sodium chloride flush  5-40 mL IntraVENous 2 times per day    apixaban  2.5 mg Oral BID    aspirin  81 mg Oral Daily    atorvastatin  40 mg Oral Daily    bumetanide  1 mg Oral BID    doxazosin  4 mg Oral BID    gabapentin  300 mg Oral BID    metoprolol tartrate  12.5 mg Oral BID    sevelamer  800 mg Oral TID WC     Meds prn: sodium chloride, sodium chloride flush, sodium chloride, guaiFENesin,

## 2024-07-30 NOTE — PROGRESS NOTES
A home oxygen evaluation has been completed.     [x]Patient is an inpatient. It is expected that the patient will be discharged within the next 48 hours. Qualified provider to write order for home prescription if patient qualifies. Social service/care managers will arrange for home oxygen.  If patient is active, arrange for Home Medical supplier to assess for Oxygen Conserving Device per pulse oximetry.  []Patient is an outpatient. Results will be faxed to the ordering provider. Qualified provider to write order for home prescription if patient qualifies and arranges for home oxygen.    Patient was placed on room air for 20 minutes. SpO2 was 98 % on room air at rest. Patients SpO2 was 89% or above and did not qualify for home oxygen. Patient was walked for 6 minutes. SpO2 was 92-95% % during walking. Patients SpO2 was above 89% and did not qualify for home oxygen.   Note: For any SpO2 at 89% see policy and procedure for possible qualifications.

## 2024-07-30 NOTE — DISCHARGE SUMMARY
Resident Discharge Summary (Hospitalist)      Patient: Debbie Locke 49 y.o. female  : 1975  MRN: [id number]   Account: [id number]   Patient's PCP: Kory Thomas MD    Admit Date: 2024   Discharge Date:   24    Admitting Physician: Ashita Behl, MD  Discharge Physician: Teto Tinajero DO       Discharge Diagnoses:  Acute hypoxic respiratory failure secondary to volume overload: CTA chest on  was negative for PE but showed moderate right-sided and small left-sided pleural effusion with associated atelectasis.  Patient requiring 1 L oxygen on ambulation.  Thoracentesis completed .  Patient was on breathing treatments 4 times as needed but patient forgot to ask for it.  Switched to scheduled breathing treatments 4 times daily.  Patient had improvement.  Patient was placed on 1 L oxygen on ambulation.  Repeat home O2 eval showed patient does not require oxygen at home.  Anasarca: 2/2 ESRD, missed dialysis.  Patient recently started on dialysis on .  Patient on MWF schedule.  Patient has history of ESRD.  On Bumex 1 mg twice daily at home.  Patient presented with shortness of breath and anasarca.  Patient missed dialysis on Wednesday due to port issues. proBNP elevated to 9656.  Chest x-ray showed pulmonary vascular congestion with normal systolic and diastolic function.  2 mg Bumex IV given in ED. Had dialysis on  and  with improvement.  Patient was continued on Bumex 1 mg twice daily during admission.  Patient was on strict I's and O's, 2 L fluid restriction, 2 g sodium restriction.  Patient had dialysis a total of 4 times during admission.  Will continue on  schedule on discharge. Patient will continue Bumex 1 mg daily on discharge.  ESRD on hemodialysis: Patient recently started on dialysis .  On MWF schedule.  Had port issues on Wednesday, could not conduct dialysis.  Went to [facility] on Thursday and got port issues sorted, but  doctor about these medications  True Metrix Blood Glucose Test strip                Time Spent on discharge is 60 minutes in the examination, evaluation, counseling and review of medications and discharge plan.    Thank you Kory Thomas MD for the opportunity to be involved in this patient's care.      Signed:    Electronically signed by Teto Tinajero DO on 7/30/24 at 3:31 PM EDT     Case was discussed with Attending, Dr. Behl.

## 2024-07-30 NOTE — PROGRESS NOTES
Patient given written discharge instruction. This nurse read instructions to patient. Patient verbalized understanding and all questions were answered. Reviewed medications and all follow up appointments.

## 2024-07-31 NOTE — ADT AUTH CERT
Utilization Reviews       Pa rec by Sandrita Pimentel   Last Updated by Sandrita Pimentel on 2024 0855     Review Status Created By   In Primary Sandrita Pimentel       Review Type   --      Criteria Review   Name: Debbie Locke  : 1975  CSN: [id number]  INSURANCE: [facility]    Date of admission: 24    Current status: Observation    We recommend that patient's status is changed to INPATIENT. If you agree, please place a new order as recommended.    Rationale: Hypoxia, pleural effusion and volume overload    Clinical summary   ED SOB, recent HD start, missed dialysis x1. CXR: Pulmonary vascular congestion and possible interstitial edema, suspect small to moderate bilateral pleural effusions   HD -3.5 L   Reports shortness of breath is improving, SpO2 was 87 % during walking - for home O2. CTA chest on  moderate right-sided and small left-sided pleural effusion with associated atelectasis. Plan for thoracentesis   HGB 6.6, transfuse 1 U PRBC  Vitals  Labs and Imaging  Status decision based on clinical judgment and [employer] [facility] criteria    This chart was reviewed at 12:21 PM EST on 2024    Radha San MD  Physician Advisor  [employer]  [phone]

## 2024-08-01 ENCOUNTER — HOSPITAL ENCOUNTER (OUTPATIENT)
Dept: INTERVENTIONAL RADIOLOGY/VASCULAR | Age: 49
Discharge: HOME | End: 2024-08-03
Attending: INTERNAL MEDICINE
Payer: COMMERCIAL

## 2024-08-01 DIAGNOSIS — Z99.2 DEPENDENCE ON HEMODIALYSIS (HCC): ICD-10-CM

## 2024-08-01 DIAGNOSIS — N18.4 CHRONIC KIDNEY DISEASE, STAGE IV (SEVERE) (HCC): ICD-10-CM

## 2024-08-01 DIAGNOSIS — Z01.818 PREOP EXAMINATION: ICD-10-CM

## 2024-08-01 PROCEDURE — 93986 DUP-SCAN HEMO COMPL UNI STD: CPT

## 2024-08-02 LAB
BACTERIA SPEC ANAEROBE CULT: NORMAL
BACTERIA SPEC BFLD CULT: NORMAL
GRAM STN SPEC: NORMAL

## 2024-08-02 NOTE — PROGRESS NOTES
Physician Progress Note      PATIENT:               SIS PENN  CSN #:                  [id number]  :                       1975  ADMIT DATE:       2024 6:39 PM  DISCH DATE:        2024 4:15 PM  RESPONDING  PROVIDER #:        Ashita Behl MD          QUERY TEXT:    Pt admitted with volume overload and has CHF documented. If possible, please   document in progress notes and discharge summary further specificity regarding   the type and acuity of CHF:    The medical record reflects the following:  Risk Factors: 49 y.o. female with PMHx of ESRD, CHF, T2DM, PAD who presents to   [facility] with shortness of breath. The patient is a MWF   dialysis patient and she missed her session on 24 due to the port not   drawing.  Clinical Indicators: H&P \"Volume Overload, suspect Acute Heart Failure:2/2   missed dialysis x1.\"   progress note \"Acute hypoxic respiratory failure secondary to volume   overload\"  Pro-BNP: 9,656.0   Echo: Low normal left ventricular systolic function with a visually   estimated EF of 50 - 55%. Left ventricle size is normal. Normal wall   thickness. Normal wall motion. Indeterminate diastolic function.  7/15 Echo: Normal left ventricular systolic function with a visually estimated   EF of 55 - 60%. Left ventricle size is normal. Normal wall thickness. Normal   wall motion. Normal diastolic function.  Treatment: Echo, IV bumex 1X in ED, Bumex 1mg PO BID    Thank you,  Jenny Menchaca RN, BSN, CDI  [email or web address]  .  Options provided:  -- Acute on Chronic Systolic CHF/HFrEF  -- Acute on Chronic Diastolic CHF/HFpEF  -- Acute on Chronic Systolic and Diastolic CHF  -- Chronic Systolic CHF/HFrEF  -- Chronic Diastolic CHF/HFpEF  -- Chronic Systolic and Diastolic CHF  -- Other - I will add my own diagnosis  -- Disagree - Not applicable / Not valid  -- Disagree - Clinically unable to determine / Unknown  -- Refer to Clinical Documentation

## 2024-08-22 RX ORDER — SEVELAMER CARBONATE 800 MG/1
TABLET, FILM COATED ORAL
Qty: 90 TABLET | Refills: 0 | OUTPATIENT
Start: 2024-08-22

## 2024-08-26 PROBLEM — R79.89 ELEVATED TROPONIN: Status: RESOLVED | Noted: 2024-07-27 | Resolved: 2024-08-26

## 2024-08-29 PROBLEM — N18.6 END STAGE RENAL DISEASE ON DIALYSIS (HCC): Status: ACTIVE | Noted: 2024-08-29

## 2024-08-29 PROBLEM — Z99.2 END STAGE RENAL DISEASE ON DIALYSIS (HCC): Status: ACTIVE | Noted: 2024-08-29

## 2024-10-15 RX ORDER — INSULIN GLARGINE 100 [IU]/ML
50 INJECTION, SOLUTION SUBCUTANEOUS 2 TIMES DAILY
OUTPATIENT
Start: 2024-10-15

## 2024-10-15 RX ORDER — BUMETANIDE 2 MG/1
TABLET ORAL
Qty: 30 TABLET | Refills: 3 | OUTPATIENT
Start: 2024-10-15

## 2024-10-18 ENCOUNTER — HOSPITAL ENCOUNTER (EMERGENCY)
Age: 49
Discharge: HOME | End: 2024-10-18
Payer: COMMERCIAL

## 2024-10-18 ENCOUNTER — APPOINTMENT (OUTPATIENT)
Dept: CT IMAGING | Age: 49
End: 2024-10-18
Payer: COMMERCIAL

## 2024-10-18 VITALS
OXYGEN SATURATION: 92 % | HEIGHT: 70 IN | WEIGHT: 220 LBS | RESPIRATION RATE: 16 BRPM | TEMPERATURE: 98.2 F | DIASTOLIC BLOOD PRESSURE: 65 MMHG | BODY MASS INDEX: 31.5 KG/M2 | HEART RATE: 96 BPM | SYSTOLIC BLOOD PRESSURE: 167 MMHG

## 2024-10-18 DIAGNOSIS — S05.01XA CORNEAL ABRASION, BILATERAL, INITIAL ENCOUNTER: Primary | ICD-10-CM

## 2024-10-18 DIAGNOSIS — E11.65 TYPE 2 DIABETES MELLITUS WITH HYPERGLYCEMIA, WITH LONG-TERM CURRENT USE OF INSULIN (HCC): ICD-10-CM

## 2024-10-18 DIAGNOSIS — S05.02XA CORNEAL ABRASION, BILATERAL, INITIAL ENCOUNTER: Primary | ICD-10-CM

## 2024-10-18 DIAGNOSIS — Z79.4 TYPE 2 DIABETES MELLITUS WITH HYPERGLYCEMIA, WITH LONG-TERM CURRENT USE OF INSULIN (HCC): ICD-10-CM

## 2024-10-18 LAB
ANION GAP SERPL CALC-SCNC: 18 MEQ/L (ref 8–16)
BASOPHILS ABSOLUTE: 0 THOU/MM3 (ref 0–0.1)
BASOPHILS NFR BLD AUTO: 0.3 %
BUN SERPL-MCNC: 47 MG/DL (ref 7–22)
CALCIUM SERPL-MCNC: 8 MG/DL (ref 8.5–10.5)
CHLORIDE SERPL-SCNC: 94 MEQ/L (ref 98–111)
CO2 SERPL-SCNC: 22 MEQ/L (ref 23–33)
CREAT SERPL-MCNC: 5.7 MG/DL (ref 0.4–1.2)
DEPRECATED RDW RBC AUTO: 47.5 FL (ref 35–45)
EOSINOPHIL NFR BLD AUTO: 5 %
EOSINOPHILS ABSOLUTE: 0.4 THOU/MM3 (ref 0–0.4)
ERYTHROCYTE [DISTWIDTH] IN BLOOD BY AUTOMATED COUNT: 15 % (ref 11.5–14.5)
GFR SERPL CREATININE-BSD FRML MDRD: 9 ML/MIN/1.73M2
GLUCOSE SERPL-MCNC: 425 MG/DL (ref 70–108)
HCT VFR BLD AUTO: 31.1 % (ref 37–47)
HGB BLD-MCNC: 9.8 GM/DL (ref 12–16)
IMM GRANULOCYTES # BLD AUTO: 0.06 THOU/MM3 (ref 0–0.07)
IMM GRANULOCYTES NFR BLD AUTO: 0.7 %
LYMPHOCYTES ABSOLUTE: 1.1 THOU/MM3 (ref 1–4.8)
LYMPHOCYTES NFR BLD AUTO: 12.5 %
MAGNESIUM SERPL-MCNC: 2.1 MG/DL (ref 1.6–2.4)
MCH RBC QN AUTO: 27.8 PG (ref 26–33)
MCHC RBC AUTO-ENTMCNC: 31.5 GM/DL (ref 32.2–35.5)
MCV RBC AUTO: 88.4 FL (ref 81–99)
MONOCYTES ABSOLUTE: 0.3 THOU/MM3 (ref 0.4–1.3)
MONOCYTES NFR BLD AUTO: 3.8 %
NEUTROPHILS ABSOLUTE: 6.8 THOU/MM3 (ref 1.8–7.7)
NEUTROPHILS NFR BLD AUTO: 77.7 %
NRBC BLD AUTO-RTO: 0 /100 WBC
OSMOLALITY SERPL CALC.SUM OF ELEC: 298.6 MOSMOL/KG (ref 275–300)
PHOSPHATE SERPL-MCNC: 8 MG/DL (ref 2.4–4.7)
PLATELET # BLD AUTO: 208 THOU/MM3 (ref 130–400)
PMV BLD AUTO: 9.5 FL (ref 9.4–12.4)
POTASSIUM SERPL-SCNC: 4.7 MEQ/L (ref 3.5–5.2)
RBC # BLD AUTO: 3.52 MILL/MM3 (ref 4.2–5.4)
SODIUM SERPL-SCNC: 134 MEQ/L (ref 135–145)
WBC # BLD AUTO: 8.7 THOU/MM3 (ref 4.8–10.8)

## 2024-10-18 PROCEDURE — 96374 THER/PROPH/DIAG INJ IV PUSH: CPT

## 2024-10-18 PROCEDURE — 96375 TX/PRO/DX INJ NEW DRUG ADDON: CPT

## 2024-10-18 PROCEDURE — 84100 ASSAY OF PHOSPHORUS: CPT

## 2024-10-18 PROCEDURE — 80048 BASIC METABOLIC PNL TOTAL CA: CPT

## 2024-10-18 PROCEDURE — 2500000003 HC RX 250 WO HCPCS: Performed by: PHYSICIAN ASSISTANT

## 2024-10-18 PROCEDURE — 6370000000 HC RX 637 (ALT 250 FOR IP): Performed by: PHYSICIAN ASSISTANT

## 2024-10-18 PROCEDURE — 6360000002 HC RX W HCPCS: Performed by: PHYSICIAN ASSISTANT

## 2024-10-18 PROCEDURE — 85025 COMPLETE CBC W/AUTO DIFF WBC: CPT

## 2024-10-18 PROCEDURE — 99284 EMERGENCY DEPT VISIT MOD MDM: CPT

## 2024-10-18 PROCEDURE — 83735 ASSAY OF MAGNESIUM: CPT

## 2024-10-18 PROCEDURE — 70480 CT ORBIT/EAR/FOSSA W/O DYE: CPT

## 2024-10-18 PROCEDURE — 36415 COLL VENOUS BLD VENIPUNCTURE: CPT

## 2024-10-18 RX ORDER — ERYTHROMYCIN 5 MG/G
OINTMENT OPHTHALMIC ONCE
Status: COMPLETED | OUTPATIENT
Start: 2024-10-18 | End: 2024-10-18

## 2024-10-18 RX ORDER — SEVELAMER CARBONATE 800 MG/1
800 TABLET, FILM COATED ORAL ONCE
Status: COMPLETED | OUTPATIENT
Start: 2024-10-18 | End: 2024-10-18

## 2024-10-18 RX ORDER — CIPROFLOXACIN HYDROCHLORIDE 3.5 MG/ML
2 SOLUTION/ DROPS TOPICAL ONCE
Status: COMPLETED | OUTPATIENT
Start: 2024-10-18 | End: 2024-10-18

## 2024-10-18 RX ORDER — PROPARACAINE HYDROCHLORIDE 5 MG/ML
2 SOLUTION/ DROPS OPHTHALMIC ONCE
Status: COMPLETED | OUTPATIENT
Start: 2024-10-18 | End: 2024-10-18

## 2024-10-18 RX ORDER — DIPHENHYDRAMINE HYDROCHLORIDE 50 MG/ML
50 INJECTION INTRAMUSCULAR; INTRAVENOUS ONCE
Status: COMPLETED | OUTPATIENT
Start: 2024-10-18 | End: 2024-10-18

## 2024-10-18 RX ORDER — INSULIN GLARGINE 100 [IU]/ML
50 INJECTION, SOLUTION SUBCUTANEOUS ONCE
Status: COMPLETED | OUTPATIENT
Start: 2024-10-18 | End: 2024-10-18

## 2024-10-18 RX ORDER — MORPHINE SULFATE 4 MG/ML
4 INJECTION, SOLUTION INTRAMUSCULAR; INTRAVENOUS
Status: COMPLETED | OUTPATIENT
Start: 2024-10-18 | End: 2024-10-18

## 2024-10-18 RX ORDER — ONDANSETRON 2 MG/ML
4 INJECTION INTRAMUSCULAR; INTRAVENOUS ONCE
Status: COMPLETED | OUTPATIENT
Start: 2024-10-18 | End: 2024-10-18

## 2024-10-18 RX ADMIN — DIPHENHYDRAMINE HYDROCHLORIDE 50 MG: 50 INJECTION INTRAMUSCULAR; INTRAVENOUS at 06:50

## 2024-10-18 RX ADMIN — ERYTHROMYCIN: 5 OINTMENT OPHTHALMIC at 09:21

## 2024-10-18 RX ADMIN — SEVELAMER CARBONATE 800 MG: 800 TABLET, FILM COATED ORAL at 09:21

## 2024-10-18 RX ADMIN — PROPARACAINE HYDROCHLORIDE 2 DROP: 5 SOLUTION/ DROPS OPHTHALMIC at 06:54

## 2024-10-18 RX ADMIN — ONDANSETRON 4 MG: 2 INJECTION INTRAMUSCULAR; INTRAVENOUS at 06:47

## 2024-10-18 RX ADMIN — INSULIN GLARGINE 50 UNITS: 100 INJECTION, SOLUTION SUBCUTANEOUS at 09:36

## 2024-10-18 RX ADMIN — MORPHINE SULFATE 4 MG: 4 INJECTION, SOLUTION INTRAMUSCULAR; INTRAVENOUS at 06:48

## 2024-10-18 RX ADMIN — CIPROFLOXACIN 2 DROP: 3 SOLUTION OPHTHALMIC at 09:21

## 2024-10-18 ASSESSMENT — PAIN SCALES - GENERAL
PAINLEVEL_OUTOF10: 10
PAINLEVEL_OUTOF10: 10

## 2024-10-18 ASSESSMENT — PAIN - FUNCTIONAL ASSESSMENT
PAIN_FUNCTIONAL_ASSESSMENT: 0-10

## 2024-10-18 ASSESSMENT — TONOMETRY
OD_IOP_MMHG: 18
OS_IOP_MMHG: 24
IOP_HANDHELD: 1

## 2024-10-18 ASSESSMENT — PAIN DESCRIPTION - LOCATION: LOCATION: EYE

## 2024-10-18 NOTE — ED NOTES
ED provider in to discuss POC at this time. Discussed not being transferred to [facility] due to an ophthalmologist able to see patient in [place], rather than being transferred to [facility]. A decision will be made at that appointment if she needs to travel to [facility] to be seen. Patient agreed with this plan and daughter at bedside will assist with transportation.

## 2024-10-18 NOTE — ED PROVIDER NOTES
[facility] EMERGENCY DEPT      EMERGENCY MEDICINE     Pt Name: Debbie Locke  MRN: [id number]  Birthdate 1975  Date of evaluation: 10/18/2024  Provider: TOBI Henriquez    CHIEF COMPLAINT       Chief Complaint   Patient presents with    Eye Pain     HISTORY OF PRESENT ILLNESS   Debbie Locke is a pleasant 49 y.o. female who presents to the emergency department from home complaint bilateral eye pain.  Patient states it feels like she has razor blades both eyes.  Patient had procedure done yesterday by Dr. Lomeli.  Patient states she had injections in both eyes for diabetic retinopathy.    Patient denying any other symptomology.  Patient states that her eyelids are hurting so much she cannot open her eyes.    Patient states that she had the same injection done last month and had no reaction.  PASTMEDICAL HISTORY     Past Medical History:   Diagnosis Date    Chronic kidney disease     FSGS (focal segmental glomerulosclerosis)     Hemodialysis patient ([facility])     M-W-F    Hyperlipidemia     Hypertension     Neuropathy     diabetic neuropathy    PVD (peripheral vascular disease) ([facility])     Type II or unspecified type diabetes mellitus without mention of complication, not stated as uncontrolled 01/01/2000       Patient Active Problem List   Diagnosis Code    Diabetes type 2, uncontrolled [id number]    Arthritis M19.90    Claudication in peripheral vascular disease ([facility]) I73.9    DVT (deep venous thrombosis) ([facility]) I82.409    Frozen shoulder syndrome M75.00    Primary hypertension I10    Hypoalbuminemia E88.09    Neuropathy of left sural nerve G57.82    Nicotine dependence F17.200    PAD (peripheral artery disease) ([facility]) I73.9    Right shoulder pain M25.511    Type 2 diabetes mellitus with peripheral angiopathy ([facility]) E11.51    Weakness R53.1    Acute kidney injury ([facility]) N17.9    Chronic kidney disease (CKD), stage IV (severe) ([facility]) N18.4    Other fluid overload E87.79    Hyponatremia E87.1    Anasarca R60.1    Back

## 2024-10-18 NOTE — DISCHARGE INSTRUCTIONS
Follow-up with the optometrist, Dr. Metz at 10:45 AM today at the office at [address] [facility].    Use the ciprofloxacin eyedrops 1 drop to each eye every 30 minutes until recheck by the optometrist.      You should also make an appointment with your regular doctor for recheck within the next 1 to 4 days.  Take your insulin as directed.  Go to dialysis ASAP    CT scan showed mild mucosal thickening of bilateral maxillary sinuses.  Follow-up with your regular physician regarding this.        Discharge warning    Please remember that examination and testing performed in the emergency department is not a comprehensive evaluation of all medical conditions and does not replace the need to follow up with your primary care provider.  In the emergency department, we are only able to evaluate your symptoms in the current condition, but symptoms may change or worsen.  Although you are felt safe to be discharged today, if your symptoms persist or change, you need to be re-evaluated by your regular/primary care doctor as soon as possible.  If you are unable to make appointment with your regular doctor, please come back to the ER to be re-evaluated.

## 2024-10-28 LAB
ANION GAP SERPL CALCULATED.3IONS-SCNC: 8 MMOL/L (ref 4–12)
BUN BLDV-MCNC: 40 MG/DL (ref 7–20)
CALCIUM SERPL-MCNC: 7.7 MG/DL (ref 8.8–10.5)
CHLORIDE BLD-SCNC: 99 MEQ/L (ref 101–111)
CO2: 26 MEQ/L (ref 21–32)
CREAT SERPL-MCNC: 5.34 MG/DL (ref 0.6–1.3)
CREATININE CLEARANCE: 9
GLUCOSE: 316 MG/DL (ref 70–110)
HCT VFR BLD CALC: 24.5 % (ref 35–44)
HEMOGLOBIN: 8 GM/DL (ref 12–15)
MCH RBC QN AUTO: 28.6 PG (ref 27.5–33)
MCHC RBC AUTO-ENTMCNC: 32.7 GM/DL (ref 33–36)
MCV RBC AUTO: 87.4 CU MIC (ref 80–97)
PDW BLD-RTO: 17 % (ref 12–16)
PLATELET # BLD: 223 TH/CMM (ref 150–400)
POTASSIUM SERPL-SCNC: 5.1 MEQ/L (ref 3.6–5)
RBC # BLD: 2.8 MIL/CMM (ref 4–5.1)
SODIUM BLD-SCNC: 133 MEQ/L (ref 135–145)
WBC # BLD: 10.5 TH/CMM (ref 4.4–10.5)

## 2024-11-12 RX ORDER — INSULIN GLARGINE 100 [IU]/ML
50 INJECTION, SOLUTION SUBCUTANEOUS 2 TIMES DAILY
OUTPATIENT
Start: 2024-11-12

## 2024-11-12 RX ORDER — BUMETANIDE 2 MG/1
TABLET ORAL
Qty: 30 TABLET | Refills: 3 | OUTPATIENT
Start: 2024-11-12

## 2024-11-12 RX ORDER — BUMETANIDE 1 MG/1
TABLET ORAL
Qty: 30 TABLET | Refills: 0 | OUTPATIENT
Start: 2024-11-12

## 2024-12-12 ENCOUNTER — HOSPITAL ENCOUNTER (OUTPATIENT)
Dept: INTERVENTIONAL RADIOLOGY/VASCULAR | Age: 49
Discharge: HOME | End: 2024-12-12
Payer: COMMERCIAL

## 2024-12-12 VITALS
HEART RATE: 90 BPM | SYSTOLIC BLOOD PRESSURE: 171 MMHG | OXYGEN SATURATION: 99 % | DIASTOLIC BLOOD PRESSURE: 93 MMHG | RESPIRATION RATE: 18 BRPM | TEMPERATURE: 98.4 F

## 2024-12-12 DIAGNOSIS — N18.6 ESRD (END STAGE RENAL DISEASE) (HCC): ICD-10-CM

## 2024-12-12 PROCEDURE — 6360000002 HC RX W HCPCS: Performed by: RADIOLOGY

## 2024-12-12 PROCEDURE — 2580000003 HC RX 258: Performed by: RADIOLOGY

## 2024-12-12 RX ORDER — SODIUM CHLORIDE 450 MG/100ML
INJECTION, SOLUTION INTRAVENOUS CONTINUOUS
Status: DISCONTINUED | OUTPATIENT
Start: 2024-12-12 | End: 2024-12-13 | Stop reason: HOSPADM

## 2024-12-12 RX ADMIN — CEFAZOLIN 2000 MG: 2 INJECTION, POWDER, FOR SOLUTION INTRAVENOUS at 08:37

## 2024-12-12 RX ADMIN — SODIUM CHLORIDE: 4.5 INJECTION, SOLUTION INTRAVENOUS at 08:33

## 2024-12-12 NOTE — PROGRESS NOTES
Pt arrived to IR for tunneled HD removal. Pt concerned that her left upper arm at the fistula site is very bruised and tender. She states that dialysis used her fistula on Monday but she does not feel that it was accessed right. She states it \"felt different.\" She states that since then her arm has gotten very bruised and tender. Dialysis did use the fistula again on Wednesday but could only place one needle and had to use the venous port of her tunneled catheter to complete her treatment. She states that although they saw her arm on Wednesday, it was not as bruised and tender as it is today. Pt does not want tunneled catheter removed at this time. Dr Vieyra assessed pt's fistula site and agrees to pt's request to leave tunneled catheter in at this time until dialysis can re-evaluate tomorrow at dialysis.

## 2024-12-12 NOTE — PROGRESS NOTES
0800 Patient ambulatory to OPN for dialysis catheter removal. Patient confirms NPO, held blood thinners Aspirin and Eliquis since Saturday. PT RIGHTS AND RESPONSIBILITIES OFFERED TO PT.    0903 Patient to IR for procedure.     0945 Patient back to room. IV removed. Dialysis catheter was not removed due to left arm swelling at fistula. AVS Reviewed with patient. Verbalizes understanding. Patient left ambulatory to discharge lobby.       _M___ Safety:       (Environmental)  [place] to environment  Ensure ID band is correct and in place/ allergy band as needed  Assess for fall risk  Initiate fall precautions as applicable (fall band, side rails, etc.)  Call light within reach  Bed in low position/ wheels locked    _M___ Pain:       Assess pain level and characteristics  Administer analgesics as ordered  Assess effectiveness of pain management and report to MD as needed    _M___ Knowledge Deficit:  Assess baseline knowledge  Provide teaching at level of understanding  Provide teaching via preferred learning method  Evaluate teaching effectiveness    _M___ Hemodynamic/Respiratory Status:       (Pre and Post Procedure Monitoring)  Assess/Monitor vital signs and LOC  Assess Baseline SpO2 prior to any sedation  Obtain weight/height  Assess vital signs/ LOC until patient meets discharge criteria  Monitor procedure site and notify MD of any issues

## 2024-12-12 NOTE — DISCHARGE INSTRUCTIONS
ACTIVITY:  Continue usual care with your doctor. Call your doctor immediately if any severe problems or go to the nearest emergency room.      I have been treated and hereby acknowledge receiving this instruction sheet.

## 2024-12-12 NOTE — PROGRESS NOTES
Pharmacy Pre-Op Antibiotic Dose Adjustment    Debbie Locke is a 49 y.o. female scheduled for surgery. Pharmacy will adjust the following pre-op antibiotic per P&T approved policy: cefazolin    Weight:  Wt Readings from Last 1 Encounters:   11/21/24 99.8 kg (220 lb)     There is no height or weight on file to calculate BMI.    Plan:   Pre-op antibiotic adjustment: increase cefazolin from 1 g to 2 g    Kristin Santos PharmD 12/12/2024 8:08 AM

## 2024-12-23 RX ORDER — INSULIN GLARGINE 100 [IU]/ML
50 INJECTION, SOLUTION SUBCUTANEOUS 2 TIMES DAILY
OUTPATIENT
Start: 2024-12-23

## 2025-01-09 NOTE — H&P
Zahira Bryant  [address]  [address] [place] [id number]      1/15/2025    Dear Zahira     We have made several attempts to contact you by telephone, but have been unable to do so.       Please call the clinic at your earliest convenience to: Schedule an appointment for a hospital follow-up visit.      Sincerely,     [facility]    [address]  [facility] [address]  [facility] [id number] [phone]   kidney disease     Hyperlipidemia     Neuropathy     Type II or unspecified type diabetes mellitus without mention of complication, not stated as uncontrolled 2000       Past Surgical History:        Procedure Laterality Date     SECTION  2002    CHOLECYSTECTOMY      CT BIOPSY RENAL  2023    CT BIOPSY RENAL 2023 [facility] CT SCAN       Medications Prior to Admission:   Prior to Admission medications    Medication Sig Start Date End Date Taking? Authorizing Provider   tacrolimus (PROGRAF) 1 MG capsule TAKE 4 CAPSULES BY MOUTH 2 TIMES DAILY. 23   Torin Neville MD   doxazosin (CARDURA) 4 MG tablet TAKE 1 TABLET BY MOUTH EVERY DAY AT NIGHT 23   Torin Neville MD   valsartan (DIOVAN) 320 MG tablet TAKE 1 TABLET BY MOUTH EVERY DAY 11/3/23   Torin Neville MD   bumetanide (BUMEX) 2 MG tablet TAKE 1/2 TABLET TWICE A DAY BY MOUTH 23   Torin Neville MD   LANTUS SOLOSTAR 100 UNIT/ML injection pen INJECT 20 (TWENTY) UNITS UNDER THE SKIN NIGHTLY . 23   Meet Stewart MD   potassium chloride (KLOR-CON M) 10 MEQ extended release tablet Take 2 tablets by mouth 2 times daily 23   Torin Neville MD   Liraglutide (VICTOZA) 18 MG/3ML SOPN SC injection Inject 1.8 mg into the skin daily    Meet Stewart MD   metFORMIN (GLUCOPHAGE-XR) 500 MG extended release tablet 1 tablet in the morning and at bedtime 22   Meet Stewart MD   atorvastatin (LIPITOR) 40 MG tablet Take 1 tablet by mouth daily    Meet Stewart MD   aspirin 81 MG EC tablet Take 1 tablet by mouth daily    Meet Stewart MD   rivaroxaban (XARELTO) 20 MG TABS tablet Take 1 tablet by mouth Daily with supper    Meet Stewart MD   gabapentin (NEURONTIN) 300 MG capsule Take 1 capsule by mouth 3 times daily. Indications: Nerve Disease    Meet Stewart MD       Allergies:  Insulins and Lisinopril    Social History:    The patient currently lives at home.   Tobacco

## 2025-01-20 ENCOUNTER — APPOINTMENT (OUTPATIENT)
Dept: GENERAL RADIOLOGY | Age: 50
DRG: 194 | End: 2025-01-20
Payer: COMMERCIAL

## 2025-01-20 ENCOUNTER — APPOINTMENT (OUTPATIENT)
Dept: CT IMAGING | Age: 50
DRG: 194 | End: 2025-01-20
Payer: COMMERCIAL

## 2025-01-20 ENCOUNTER — HOSPITAL ENCOUNTER (INPATIENT)
Age: 50
LOS: 3 days | Discharge: HOME | DRG: 194 | End: 2025-01-23
Attending: STUDENT IN AN ORGANIZED HEALTH CARE EDUCATION/TRAINING PROGRAM | Admitting: INTERNAL MEDICINE
Payer: COMMERCIAL

## 2025-01-20 DIAGNOSIS — I50.9 ACUTE CONGESTIVE HEART FAILURE, UNSPECIFIED HEART FAILURE TYPE (HCC): ICD-10-CM

## 2025-01-20 DIAGNOSIS — Z99.2 HEMODIALYSIS PATIENT (HCC): Primary | ICD-10-CM

## 2025-01-20 DIAGNOSIS — N18.6 END STAGE RENAL DISEASE ON DIALYSIS (HCC): ICD-10-CM

## 2025-01-20 DIAGNOSIS — J18.9 PNEUMONIA OF RIGHT UPPER LOBE DUE TO INFECTIOUS ORGANISM: ICD-10-CM

## 2025-01-20 DIAGNOSIS — Z99.2 END STAGE RENAL DISEASE ON DIALYSIS (HCC): ICD-10-CM

## 2025-01-20 PROBLEM — I50.31 ACUTE HEART FAILURE WITH PRESERVED EJECTION FRACTION (HCC): Status: ACTIVE | Noted: 2025-01-20

## 2025-01-20 LAB
ALBUMIN SERPL BCG-MCNC: 3.3 G/DL (ref 3.5–5.1)
ALP SERPL-CCNC: 75 U/L (ref 38–126)
ALT SERPL W/O P-5'-P-CCNC: 9 U/L (ref 11–66)
ANION GAP SERPL CALC-SCNC: 18 MEQ/L (ref 8–16)
AST SERPL-CCNC: 9 U/L (ref 5–40)
BASOPHILS ABSOLUTE: 0 THOU/MM3 (ref 0–0.1)
BASOPHILS NFR BLD AUTO: 0.3 %
BILIRUB CONJ SERPL-MCNC: < 0.1 MG/DL (ref 0.1–13.8)
BILIRUB SERPL-MCNC: 0.2 MG/DL (ref 0.3–1.2)
BUN SERPL-MCNC: 39 MG/DL (ref 7–22)
CA-I BLD ISE-SCNC: 1.05 MMOL/L (ref 1.12–1.32)
CALCIUM SERPL-MCNC: 8.1 MG/DL (ref 8.5–10.5)
CHLORIDE SERPL-SCNC: 97 MEQ/L (ref 98–111)
CO2 SERPL-SCNC: 22 MEQ/L (ref 23–33)
CREAT SERPL-MCNC: 6.2 MG/DL (ref 0.4–1.2)
D DIMER PPP IA.FEU-MCNC: 1272 NG/ML FEU (ref 0–500)
DEPRECATED MEAN GLUCOSE BLD GHB EST-ACNC: 270 MG/DL (ref 70–126)
DEPRECATED RDW RBC AUTO: 50.5 FL (ref 35–45)
EKG ATRIAL RATE: 107 BPM
EKG P AXIS: 39 DEGREES
EKG P-R INTERVAL: 162 MS
EKG Q-T INTERVAL: 374 MS
EKG QRS DURATION: 90 MS
EKG QTC CALCULATION (BAZETT): 499 MS
EKG R AXIS: 110 DEGREES
EKG T AXIS: 70 DEGREES
EKG VENTRICULAR RATE: 107 BPM
EOSINOPHIL NFR BLD AUTO: 2.2 %
EOSINOPHILS ABSOLUTE: 0.2 THOU/MM3 (ref 0–0.4)
ERYTHROCYTE [DISTWIDTH] IN BLOOD BY AUTOMATED COUNT: 15.5 % (ref 11.5–14.5)
FLUAV RNA RESP QL NAA+PROBE: NOT DETECTED
FLUBV RNA RESP QL NAA+PROBE: NOT DETECTED
GFR SERPL CREATININE-BSD FRML MDRD: 8 ML/MIN/1.73M2
GLUCOSE BLD STRIP.AUTO-MCNC: 253 MG/DL (ref 70–108)
GLUCOSE SERPL-MCNC: 349 MG/DL (ref 70–108)
HBA1C MFR BLD HPLC: 11 % (ref 4.4–6.4)
HBV CORE IGM SERPL QL IA: NEGATIVE
HBV SURFACE AB SER QL IA: POSITIVE
HBV SURFACE AG SERPL QL IA: NEGATIVE
HCT VFR BLD AUTO: 32.5 % (ref 37–47)
HGB BLD-MCNC: 10 GM/DL (ref 12–16)
IMM GRANULOCYTES # BLD AUTO: 0.06 THOU/MM3 (ref 0–0.07)
IMM GRANULOCYTES NFR BLD AUTO: 0.6 %
LIPASE SERPL-CCNC: 66.1 U/L (ref 5.6–51.3)
LYMPHOCYTES ABSOLUTE: 1.2 THOU/MM3 (ref 1–4.8)
LYMPHOCYTES NFR BLD AUTO: 12.1 %
MAGNESIUM SERPL-MCNC: 2 MG/DL (ref 1.6–2.4)
MCH RBC QN AUTO: 28.2 PG (ref 26–33)
MCHC RBC AUTO-ENTMCNC: 30.8 GM/DL (ref 32.2–35.5)
MCV RBC AUTO: 91.5 FL (ref 81–99)
MONOCYTES ABSOLUTE: 0.5 THOU/MM3 (ref 0.4–1.3)
MONOCYTES NFR BLD AUTO: 5.2 %
NEUTROPHILS ABSOLUTE: 7.8 THOU/MM3 (ref 1.8–7.7)
NEUTROPHILS NFR BLD AUTO: 79.6 %
NRBC BLD AUTO-RTO: 0 /100 WBC
NT-PROBNP SERPL IA-MCNC: ABNORMAL PG/ML (ref 0–124)
OSMOLALITY SERPL CALC.SUM OF ELEC: 297.1 MOSMOL/KG (ref 275–300)
PHOSPHATE SERPL-MCNC: 6.2 MG/DL (ref 2.4–4.7)
PLATELET # BLD AUTO: 237 THOU/MM3 (ref 130–400)
PMV BLD AUTO: 9.6 FL (ref 9.4–12.4)
POTASSIUM SERPL-SCNC: 4.8 MEQ/L (ref 3.5–5.2)
PROCALCITONIN SERPL IA-MCNC: 0.17 NG/ML (ref 0.01–0.09)
PROT SERPL-MCNC: 6.6 G/DL (ref 6.1–8)
RBC # BLD AUTO: 3.55 MILL/MM3 (ref 4.2–5.4)
SARS-COV-2 RNA RESP QL NAA+PROBE: NOT DETECTED
SODIUM SERPL-SCNC: 137 MEQ/L (ref 135–145)
TROPONIN, HIGH SENSITIVITY: 66 NG/L (ref 0–12)
TROPONIN, HIGH SENSITIVITY: 69 NG/L (ref 0–12)
WBC # BLD AUTO: 9.8 THOU/MM3 (ref 4.8–10.8)

## 2025-01-20 PROCEDURE — 1200000003 HC TELEMETRY R&B

## 2025-01-20 PROCEDURE — 2500000003 HC RX 250 WO HCPCS

## 2025-01-20 PROCEDURE — 82330 ASSAY OF CALCIUM: CPT

## 2025-01-20 PROCEDURE — 85379 FIBRIN DEGRADATION QUANT: CPT

## 2025-01-20 PROCEDURE — 86705 HEP B CORE ANTIBODY IGM: CPT

## 2025-01-20 PROCEDURE — 6370000000 HC RX 637 (ALT 250 FOR IP)

## 2025-01-20 PROCEDURE — 83735 ASSAY OF MAGNESIUM: CPT

## 2025-01-20 PROCEDURE — 86706 HEP B SURFACE ANTIBODY: CPT

## 2025-01-20 PROCEDURE — 93005 ELECTROCARDIOGRAM TRACING: CPT | Performed by: STUDENT IN AN ORGANIZED HEALTH CARE EDUCATION/TRAINING PROGRAM

## 2025-01-20 PROCEDURE — 85025 COMPLETE CBC W/AUTO DIFF WBC: CPT

## 2025-01-20 PROCEDURE — 6360000004 HC RX CONTRAST MEDICATION: Performed by: STUDENT IN AN ORGANIZED HEALTH CARE EDUCATION/TRAINING PROGRAM

## 2025-01-20 PROCEDURE — 87636 SARSCOV2 & INF A&B AMP PRB: CPT

## 2025-01-20 PROCEDURE — 5A1D70Z PERFORMANCE OF URINARY FILTRATION, INTERMITTENT, LESS THAN 6 HOURS PER DAY: ICD-10-PCS | Performed by: INTERNAL MEDICINE

## 2025-01-20 PROCEDURE — 84145 PROCALCITONIN (PCT): CPT

## 2025-01-20 PROCEDURE — 2500000003 HC RX 250 WO HCPCS: Performed by: INTERNAL MEDICINE

## 2025-01-20 PROCEDURE — 83690 ASSAY OF LIPASE: CPT

## 2025-01-20 PROCEDURE — 6360000002 HC RX W HCPCS: Performed by: INTERNAL MEDICINE

## 2025-01-20 PROCEDURE — 93010 ELECTROCARDIOGRAM REPORT: CPT | Performed by: INTERNAL MEDICINE

## 2025-01-20 PROCEDURE — 36415 COLL VENOUS BLD VENIPUNCTURE: CPT

## 2025-01-20 PROCEDURE — 84484 ASSAY OF TROPONIN QUANT: CPT

## 2025-01-20 PROCEDURE — 90935 HEMODIALYSIS ONE EVALUATION: CPT

## 2025-01-20 PROCEDURE — 82948 REAGENT STRIP/BLOOD GLUCOSE: CPT

## 2025-01-20 PROCEDURE — 80048 BASIC METABOLIC PNL TOTAL CA: CPT

## 2025-01-20 PROCEDURE — 87340 HEPATITIS B SURFACE AG IA: CPT

## 2025-01-20 PROCEDURE — 83036 HEMOGLOBIN GLYCOSYLATED A1C: CPT

## 2025-01-20 PROCEDURE — 71046 X-RAY EXAM CHEST 2 VIEWS: CPT

## 2025-01-20 PROCEDURE — 99285 EMERGENCY DEPT VISIT HI MDM: CPT

## 2025-01-20 PROCEDURE — 99223 1ST HOSP IP/OBS HIGH 75: CPT | Performed by: INTERNAL MEDICINE

## 2025-01-20 PROCEDURE — 80076 HEPATIC FUNCTION PANEL: CPT

## 2025-01-20 PROCEDURE — 83880 ASSAY OF NATRIURETIC PEPTIDE: CPT

## 2025-01-20 PROCEDURE — 84100 ASSAY OF PHOSPHORUS: CPT

## 2025-01-20 PROCEDURE — 71275 CT ANGIOGRAPHY CHEST: CPT

## 2025-01-20 RX ORDER — SUCRALFATE 1 G/1
1 TABLET ORAL 4 TIMES DAILY
COMMUNITY

## 2025-01-20 RX ORDER — SODIUM CHLORIDE 9 MG/ML
INJECTION, SOLUTION INTRAVENOUS PRN
Status: DISCONTINUED | OUTPATIENT
Start: 2025-01-20 | End: 2025-01-23 | Stop reason: HOSPADM

## 2025-01-20 RX ORDER — ATORVASTATIN CALCIUM 40 MG/1
40 TABLET, FILM COATED ORAL NIGHTLY
Status: DISCONTINUED | OUTPATIENT
Start: 2025-01-21 | End: 2025-01-23 | Stop reason: HOSPADM

## 2025-01-20 RX ORDER — DEXTROSE MONOHYDRATE 25 G/50ML
25 INJECTION, SOLUTION INTRAVENOUS PRN
Status: DISCONTINUED | OUTPATIENT
Start: 2025-01-20 | End: 2025-01-23 | Stop reason: HOSPADM

## 2025-01-20 RX ORDER — IOPAMIDOL 755 MG/ML
80 INJECTION, SOLUTION INTRAVASCULAR
Status: COMPLETED | OUTPATIENT
Start: 2025-01-20 | End: 2025-01-20

## 2025-01-20 RX ORDER — ALBUTEROL SULFATE 90 UG/1
2 INHALANT RESPIRATORY (INHALATION) EVERY 6 HOURS PRN
Status: DISCONTINUED | OUTPATIENT
Start: 2025-01-20 | End: 2025-01-23 | Stop reason: HOSPADM

## 2025-01-20 RX ORDER — AMLODIPINE BESYLATE 5 MG/1
5 TABLET ORAL DAILY
Status: DISCONTINUED | OUTPATIENT
Start: 2025-01-20 | End: 2025-01-20

## 2025-01-20 RX ORDER — GABAPENTIN 300 MG/1
300 CAPSULE ORAL 2 TIMES DAILY
Status: DISCONTINUED | OUTPATIENT
Start: 2025-01-20 | End: 2025-01-23 | Stop reason: HOSPADM

## 2025-01-20 RX ORDER — ASPIRIN 81 MG/1
81 TABLET ORAL DAILY
Status: DISCONTINUED | OUTPATIENT
Start: 2025-01-21 | End: 2025-01-23 | Stop reason: HOSPADM

## 2025-01-20 RX ORDER — AMLODIPINE BESYLATE 5 MG/1
5 TABLET ORAL DAILY
Status: DISCONTINUED | OUTPATIENT
Start: 2025-01-21 | End: 2025-01-23 | Stop reason: HOSPADM

## 2025-01-20 RX ORDER — INSULIN GLARGINE 100 [IU]/ML
50 INJECTION, SOLUTION SUBCUTANEOUS 2 TIMES DAILY
Status: DISCONTINUED | OUTPATIENT
Start: 2025-01-20 | End: 2025-01-23 | Stop reason: HOSPADM

## 2025-01-20 RX ORDER — ONDANSETRON 4 MG/1
4 TABLET, ORALLY DISINTEGRATING ORAL EVERY 8 HOURS PRN
Status: DISCONTINUED | OUTPATIENT
Start: 2025-01-20 | End: 2025-01-23 | Stop reason: HOSPADM

## 2025-01-20 RX ORDER — POLYETHYLENE GLYCOL 3350 17 G/17G
17 POWDER, FOR SOLUTION ORAL DAILY PRN
Status: DISCONTINUED | OUTPATIENT
Start: 2025-01-20 | End: 2025-01-23 | Stop reason: HOSPADM

## 2025-01-20 RX ORDER — ONDANSETRON 2 MG/ML
4 INJECTION INTRAMUSCULAR; INTRAVENOUS EVERY 6 HOURS PRN
Status: DISCONTINUED | OUTPATIENT
Start: 2025-01-20 | End: 2025-01-23 | Stop reason: HOSPADM

## 2025-01-20 RX ORDER — GLUCAGON 1 MG/ML
1 KIT INJECTION PRN
Status: DISCONTINUED | OUTPATIENT
Start: 2025-01-20 | End: 2025-01-23 | Stop reason: HOSPADM

## 2025-01-20 RX ORDER — MORPHINE SULFATE 2 MG/ML
2 INJECTION, SOLUTION INTRAMUSCULAR; INTRAVENOUS ONCE
Status: COMPLETED | OUTPATIENT
Start: 2025-01-20 | End: 2025-01-20

## 2025-01-20 RX ORDER — ACETAMINOPHEN 650 MG/1
650 SUPPOSITORY RECTAL EVERY 6 HOURS PRN
Status: DISCONTINUED | OUTPATIENT
Start: 2025-01-20 | End: 2025-01-23 | Stop reason: HOSPADM

## 2025-01-20 RX ORDER — METOPROLOL SUCCINATE 25 MG/1
25 TABLET, EXTENDED RELEASE ORAL 2 TIMES DAILY
Status: DISCONTINUED | OUTPATIENT
Start: 2025-01-20 | End: 2025-01-23 | Stop reason: HOSPADM

## 2025-01-20 RX ORDER — ASPIRIN 81 MG/1
81 TABLET ORAL DAILY
Status: DISCONTINUED | OUTPATIENT
Start: 2025-01-20 | End: 2025-01-20

## 2025-01-20 RX ORDER — DOXAZOSIN 4 MG/1
4 TABLET ORAL 2 TIMES DAILY
Status: DISCONTINUED | OUTPATIENT
Start: 2025-01-20 | End: 2025-01-20

## 2025-01-20 RX ORDER — DOXAZOSIN 4 MG/1
4 TABLET ORAL 2 TIMES DAILY
Status: DISCONTINUED | OUTPATIENT
Start: 2025-01-20 | End: 2025-01-23 | Stop reason: HOSPADM

## 2025-01-20 RX ORDER — SODIUM CHLORIDE 0.9 % (FLUSH) 0.9 %
5-40 SYRINGE (ML) INJECTION PRN
Status: DISCONTINUED | OUTPATIENT
Start: 2025-01-20 | End: 2025-01-23 | Stop reason: HOSPADM

## 2025-01-20 RX ORDER — HYDRALAZINE HYDROCHLORIDE 20 MG/ML
10 INJECTION INTRAMUSCULAR; INTRAVENOUS EVERY 6 HOURS PRN
Status: DISCONTINUED | OUTPATIENT
Start: 2025-01-20 | End: 2025-01-23 | Stop reason: HOSPADM

## 2025-01-20 RX ORDER — SODIUM CHLORIDE 0.9 % (FLUSH) 0.9 %
5-40 SYRINGE (ML) INJECTION EVERY 12 HOURS SCHEDULED
Status: DISCONTINUED | OUTPATIENT
Start: 2025-01-20 | End: 2025-01-23 | Stop reason: HOSPADM

## 2025-01-20 RX ORDER — ATORVASTATIN CALCIUM 40 MG/1
40 TABLET, FILM COATED ORAL DAILY
Status: DISCONTINUED | OUTPATIENT
Start: 2025-01-20 | End: 2025-01-20

## 2025-01-20 RX ORDER — ACETAMINOPHEN 325 MG/1
650 TABLET ORAL EVERY 6 HOURS PRN
Status: DISCONTINUED | OUTPATIENT
Start: 2025-01-20 | End: 2025-01-23 | Stop reason: HOSPADM

## 2025-01-20 RX ORDER — SEVELAMER CARBONATE 800 MG/1
1600 TABLET, FILM COATED ORAL
Status: DISCONTINUED | OUTPATIENT
Start: 2025-01-20 | End: 2025-01-23 | Stop reason: HOSPADM

## 2025-01-20 RX ORDER — DEXTROSE MONOHYDRATE 100 MG/ML
INJECTION, SOLUTION INTRAVENOUS CONTINUOUS PRN
Status: DISCONTINUED | OUTPATIENT
Start: 2025-01-20 | End: 2025-01-23 | Stop reason: HOSPADM

## 2025-01-20 RX ORDER — BUMETANIDE 1 MG/1
2 TABLET ORAL 2 TIMES DAILY
Status: DISCONTINUED | OUTPATIENT
Start: 2025-01-20 | End: 2025-01-23 | Stop reason: HOSPADM

## 2025-01-20 RX ADMIN — BUMETANIDE 2 MG: 1 TABLET ORAL at 21:31

## 2025-01-20 RX ADMIN — DEXTROSE MONOHYDRATE 25 G: 25 INJECTION, SOLUTION INTRAVENOUS at 16:25

## 2025-01-20 RX ADMIN — GABAPENTIN 300 MG: 300 CAPSULE ORAL at 16:55

## 2025-01-20 RX ADMIN — MORPHINE SULFATE 2 MG: 2 INJECTION, SOLUTION INTRAMUSCULAR; INTRAVENOUS at 16:39

## 2025-01-20 RX ADMIN — DOXAZOSIN 4 MG: 4 TABLET ORAL at 21:31

## 2025-01-20 RX ADMIN — METOPROLOL SUCCINATE 25 MG: 25 TABLET, FILM COATED, EXTENDED RELEASE ORAL at 21:31

## 2025-01-20 RX ADMIN — INSULIN GLARGINE 50 UNITS: 100 INJECTION, SOLUTION SUBCUTANEOUS at 21:30

## 2025-01-20 RX ADMIN — IOPAMIDOL 80 ML: 755 INJECTION, SOLUTION INTRAVENOUS at 09:28

## 2025-01-20 RX ADMIN — APIXABAN 2.5 MG: 5 TABLET, FILM COATED ORAL at 21:31

## 2025-01-20 RX ADMIN — ACETAMINOPHEN 650 MG: 325 TABLET ORAL at 21:31

## 2025-01-20 RX ADMIN — SEVELAMER CARBONATE 1600 MG: 800 TABLET, FILM COATED ORAL at 21:31

## 2025-01-20 RX ADMIN — SODIUM CHLORIDE, PRESERVATIVE FREE 10 ML: 5 INJECTION INTRAVENOUS at 21:20

## 2025-01-20 ASSESSMENT — ENCOUNTER SYMPTOMS
NAUSEA: 0
EYE PAIN: 0
SHORTNESS OF BREATH: 1
VOMITING: 0
DIARRHEA: 0
EYES NEGATIVE: 1
SORE THROAT: 0
ABDOMINAL PAIN: 0
COUGH: 0
BACK PAIN: 0

## 2025-01-20 ASSESSMENT — PAIN - FUNCTIONAL ASSESSMENT
PAIN_FUNCTIONAL_ASSESSMENT: NONE - DENIES PAIN

## 2025-01-20 ASSESSMENT — PAIN DESCRIPTION - LOCATION: LOCATION: LEG

## 2025-01-20 ASSESSMENT — PAIN SCALES - GENERAL: PAINLEVEL_OUTOF10: 7

## 2025-01-20 NOTE — FLOWSHEET NOTE
01/20/25 1630   Vital Signs   BP (!) 191/96   Temp 98 °F (36.7 °C)   Pulse 99   Respirations 16   Weight - Scale 107.1 kg (236 lb 1.8 oz)   Weight Method Bed scale   Percent Weight Change -1.83   Post-Hemodialysis Assessment   Post-Treatment Procedures Blood returned   Machine Disinfection Process Acid/Vinegar Clean;Exterior Machine Disinfection   Rinseback Volume (ml) 400 ml   Blood Volume Processed (Liters) 70 L   Dialyzer Clearance Moderately streaked   Duration of Treatment (minutes) 240 minutes   Hemodialysis Intake (ml) 500 ml   Hemodialysis Output (ml) 2600 ml   NET Removed (ml) 2100   Tolerated Treatment Poor   Interventions Taken Ultrafiltration goal decreased;Ultrafiltration stopped;Medication     3 hour 30 minute treatment complete of 4 hours complete.  Pt. Experienced significant cramping requiring early stop to dialysis treatment today.  100 mL fluid bolus, D50, and morphine given per Dr's order.  Dr. Neville at bedside during cramping episode.  Blood returned following treatment.  Pressure held to needle access sites for 10 min x 2.  Pt. Experienced relief of cramping shortly after discontinuation of dialysis.  Report provided to Primary RN. Paperwork printed and placed in bin to be scanned in to EMR.

## 2025-01-20 NOTE — ED TRIAGE NOTES
Pt presents to the ED through triage with c/c CP and SOB. Pt reports that symptoms started yesterday. Pt reports she went to Dialysis this morning where she was hypertensive and SOB. Pt report hx HTN and states she took her medications as prescribed this morning. Rates pain in chest 7/10. EKG completed on arrival reading ST.

## 2025-01-20 NOTE — H&P
History & Physical  Internal Medicine Resident         Patient: Debbie Locke 49 y.o. female      : 1975  Date of Admission: 2025  Date of Service: Pt seen/examined on 25 and Admitted to Inpatient with expected LOS greater than two midnights due to medical therapy.       ASSESSMENT AND PLAN    Acute on Chronic Diastolic HFpEF: NYHA II. No OP cardiology f/u. TTE (2024) LVEF 50-55% w/ small pericardial effusion (<1cm) w/o tamponade physiology. Home GDMT includes Toprol 25mg BID and diuresis w/ bumex 2mg BID. Continue Toprol 25mg BID and bumex 2mg BID. HD for hypervolemia. Na/Fluid restriction. Daily weights. Strict I/Os. Tele. Would benefit from OP cardiology and CHF clinic referral.  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 Follows OP w/ Dr. Neville, Nephrology. Last HD session 2025. Dry weight 100kg; has gained ~9lbs over last 2 days. Continue Renvela 800mg TID. Nephrolgoy consulted to manage HD; plan to dialyze -. Avoid nephrotoxins and renally dose meds as needed. Trend electrolytes; replace as needed.  Uncontrolled Primary HTN: continue Toprol 25mg BID, cardura 4mg BID, norvasc 5mg daily and diuresis. HD should help.   Elevated Troponin: trended equivocal 66, 69. 2/2 chronic HF and ESRD. EKG w/o acute ischemic changes. Monitor for cardiac s/s; repeat EKG and trop as needed.  Poorly Controlled IDDMII w/ neuropathy: A1c 9.9 (10/29/2012). Home meds include Lantus 50u BID; reportedly allergic to short-acting insulin (Humalog, Novalog, etc.). continue Lantus 50u BID for now; will adjust as needed. Continue gabapentin 300mg TID. Carb-controlled diet. Hypoglycemia protocols. POCT glucose.  PVD: continue ASA/Lipitor 40mg daily.  COPD: continue home inhalers.  Hx DVT x1 w/ FH Factor V Leiden Mutation: father has Factor V Leiden, patient endorses not having the mutation. Increase Eliquis from 2.5mg BID to 5mg BID; does not meet criteria for reduced dosing.      Data

## 2025-01-20 NOTE — ED PROVIDER NOTES
[facility] EMERGENCY DEPARTMENT  EMERGENCY DEPARTMENT ENCOUNTER          Pt Name: Debbie Locke  MRN: [id number]  Birthdate 1975  Date of evaluation: 1/20/2025  Physician: Danny Miramontes MD  Supervising Attending Physician: Tres Da Silva MD       CHIEF COMPLAINT       Chief Complaint   Patient presents with   • Shortness of Breath         HISTORY OF PRESENT ILLNESS    HPI  Debbie Locke is a 49 y.o. female past medical history of type 2 diabetes mellitus, neuropathic, CKD, hemodialysis, focal segmental global glomerulosclerosis, hyperlipidemia, hypertension, PVD, who presents to the emergency department  from dialysis center  for evaluation of shortness of breath. The patient reports shortness of breath for the past 2 days. Which is getting worsening, and associated with chest pain while walking which is new for her.  The patient went today to the dialysis center for her dialysis session but they sent her to the ED due to shortness of breath and chest pain.  The patient report fatigue and weakness while walking. Patient report increasing in weight from 105-109 without any changes in her diet, the patient reports that she is getting 2-3 letter of fluid exchanges  during the dialysis but recently unable to complete her full session due to pain. she is getting now 2 to 3 L of fluid exchange and her last dialysis was Friday. .    The patient denies fever, chills, headache, lightheadedness, dizziness, vision changes, tinnitus, cough, congestion, sore throat, neck pain/stiffness, abdominal pain, nausea, vomiting, constipation, diarrhea, dysuria, blood in the urine or stool, numbness/tingling/weakness in extremities.    The patient has no other acute complaints at this time.      PAST MEDICAL AND SURGICAL HISTORY     Past Medical History:   Diagnosis Date   • Chronic kidney disease    • FSGS (focal segmental glomerulosclerosis)    • Hemodialysis patient (HCC)     M-W-F   •

## 2025-01-20 NOTE — ED NOTES
Hospitalist at bedside. Pt denies further needs. Call light within reach.  
Pt off floor to dialysis.   
Pt reassessed at this time. Sitting on the side of cot. Denies pain. Vitals stable with telemetry in place.   
Pt reassessed at this time. Vitals stable. Denies pain at this time. Call light in reach  
Pt remains at dialysis. Pt to go to 6K23 following dialysis treatment.   
Pt remains off floor @ dialysis.   
Report given to Nichol CAO  
Report received from NASH Alfaro. Pt resting in bed with family at bedside. Denies further needs. Call light within reach.  
Spoke with Jo on 6K to approve the transport of patient to 6K23. Patient in stable condition at this time.   
heart failure, unspecified heart failure type (HCC)   Pneumonia of right upper lobe due to infectious organism        Consults:  IP CONSULT TO NEPHROLOGY     Pain Score:  Pain Assessment  Pain Assessment: None - Denies Pain    C-SSRS:   Risk of Suicide: No Risk    Sepsis Screening:       Redfield Fall Risk:  Redfield 1 Fall Risk  Presents to emergency department  because of falls (Syncope, seizure, or loss of consciousness): No  Age > 70: No  Altered Mental Status, Intoxication with alcohol or substance confusion (Disorientation, impaired judgment, poor safety awaremess, or inability to follow instructions): No  Impaired Mobility: Ambulates or transfers with assistive devices or assistance; Unable to ambulate or transer.: No  Nursing Judgement: Yes    Swallow Screening        Preferred Language:   English      ALLERGIES     Insulin lispro, Insulins, and Lisinopril    SURGICAL HISTORY       Past Surgical History:   Procedure Laterality Date    BACK SURGERY N/A 2024    Excision of Chronic Abscess on Back performed by Kyree Mercado MD at [facility] OR     SECTION  2002    CHOLECYSTECTOMY  2023    CT BIOPSY RENAL  2023    CT BIOPSY RENAL 2023 [facility] CT SCAN    DIALYSIS FISTULA CREATION Left 10/1/2024    LEFT BRACHIAL TO CEPHALIC ARTERIOVENOUS FISTULA PLACEMENT performed by Jaxon Hall MD at [facility] OR    THYROIDECTOMY, PARTIAL      VASCULAR SURGERY Right 2021    Angiogram with stent RLE       PAST MEDICAL HISTORY       Past Medical History:   Diagnosis Date    Chronic kidney disease     FSGS (focal segmental glomerulosclerosis)     Hemodialysis patient ([facility])     M-W-F    Hyperlipidemia     Hypertension     Neuropathy     diabetic neuropathy    PVD (peripheral vascular disease) ([facility])     Type II or unspecified type diabetes mellitus without mention of complication, not stated as uncontrolled 2000           Electronically signed by Nichol Cardenas RN on 2025 at 12:03 PM

## 2025-01-20 NOTE — CONSULTS
Patient seen and examined by me during hemodialysis  So far has 2600 mL removed patient is having severe cramps and patient is crying in pain  The decrease in UF and decrease in blood flow did not help much  Will give D50 and also 2 mg of IV morphine  And will take her off the machine  We will try isolated ultrafiltration tomorrow with the risks low blood flow  See full consult for additional details    Torin Neville MD   
General: Swelling present.      Cervical back: Normal range of motion and neck supple.      Right lower leg: Edema present.      Left lower leg: Edema present.   Skin:     General: Skin is warm and dry.      Findings: No rash.   Neurological:      General: No focal deficit present.      Mental Status: She is alert and oriented to person, place, and time.   Psychiatric:         Mood and Affect: Mood normal.         Behavior: Behavior normal.          BP (!) 165/86   Pulse 85   Temp 98.1 °F (36.7 °C)   Resp 21   Ht 1.626 m (5' 4\")   Wt 109.1 kg (240 lb 8.4 oz)   SpO2 97%   BMI 41.29 kg/m²   Labs, Radiology and Tests :  CBC:   Recent Labs     01/20/25  0711   WBC 9.8   HGB 10.0*   HCT 32.5*        CMP:  Recent Labs     01/20/25  0711      K 4.8   CL 97*   CO2 22*   BUN 39*   CREATININE 6.2*   GLUCOSE 349*   CALCIUM 8.1*   MG 2.0   PHOS 6.2*     Hepatic:   Recent Labs     01/20/25  0711   AST 9   ALT 9*   BILITOT 0.2*   ALKPHOS 75       Radiology : Chest x-ray personally reviewed by me shows mild pleural effusions and no significant congestion    Other / Echocardiogram: EF 50 to 55%    Assessment and Plan:  Renal -ESRD on hemodialysis Monday Wednesday Friday  Patient clinically fluid overloaded we will get the patient dialyzed today  Most likely will need dialysis every day for the next few days to get her back to her euvolemic status  Electrolytes -within normal limits HD on a 2K bath  Mild acidosis  Secondary hyperparathyroidism  Essential hypertension  Diabetes mellitus  Fluid overload plan as mentioned above  Meds reviewed and discussed with patient in detail    Torin Neville MD  [facility]    This report has been created using voice recognition software. It may contain minor errors which are inherent in voice recognition technology

## 2025-01-21 LAB
ANION GAP SERPL CALC-SCNC: 15 MEQ/L (ref 8–16)
BUN SERPL-MCNC: 27 MG/DL (ref 7–22)
CALCIUM SERPL-MCNC: 7.9 MG/DL (ref 8.5–10.5)
CHLORIDE SERPL-SCNC: 98 MEQ/L (ref 98–111)
CO2 SERPL-SCNC: 23 MEQ/L (ref 23–33)
CREAT SERPL-MCNC: 4.9 MG/DL (ref 0.4–1.2)
DEPRECATED RDW RBC AUTO: 51 FL (ref 35–45)
ERYTHROCYTE [DISTWIDTH] IN BLOOD BY AUTOMATED COUNT: 15.8 % (ref 11.5–14.5)
GFR SERPL CREATININE-BSD FRML MDRD: 10 ML/MIN/1.73M2
GLUCOSE BLD STRIP.AUTO-MCNC: 313 MG/DL (ref 70–108)
GLUCOSE BLD STRIP.AUTO-MCNC: 335 MG/DL (ref 70–108)
GLUCOSE BLD STRIP.AUTO-MCNC: 392 MG/DL (ref 70–108)
GLUCOSE SERPL-MCNC: 313 MG/DL (ref 70–108)
HCT VFR BLD AUTO: 29.3 % (ref 37–47)
HGB BLD-MCNC: 9.1 GM/DL (ref 12–16)
MAGNESIUM SERPL-MCNC: 1.9 MG/DL (ref 1.6–2.4)
MCH RBC QN AUTO: 28.3 PG (ref 26–33)
MCHC RBC AUTO-ENTMCNC: 31.1 GM/DL (ref 32.2–35.5)
MCV RBC AUTO: 91 FL (ref 81–99)
PHOSPHATE SERPL-MCNC: 5.8 MG/DL (ref 2.4–4.7)
PLATELET # BLD AUTO: 189 THOU/MM3 (ref 130–400)
PMV BLD AUTO: 9.4 FL (ref 9.4–12.4)
POTASSIUM SERPL-SCNC: 4.8 MEQ/L (ref 3.5–5.2)
RBC # BLD AUTO: 3.22 MILL/MM3 (ref 4.2–5.4)
SODIUM SERPL-SCNC: 136 MEQ/L (ref 135–145)
WBC # BLD AUTO: 7.5 THOU/MM3 (ref 4.8–10.8)

## 2025-01-21 PROCEDURE — 83735 ASSAY OF MAGNESIUM: CPT

## 2025-01-21 PROCEDURE — 90935 HEMODIALYSIS ONE EVALUATION: CPT

## 2025-01-21 PROCEDURE — 6370000000 HC RX 637 (ALT 250 FOR IP)

## 2025-01-21 PROCEDURE — 82948 REAGENT STRIP/BLOOD GLUCOSE: CPT

## 2025-01-21 PROCEDURE — 36415 COLL VENOUS BLD VENIPUNCTURE: CPT

## 2025-01-21 PROCEDURE — 1200000003 HC TELEMETRY R&B

## 2025-01-21 PROCEDURE — 90935 HEMODIALYSIS ONE EVALUATION: CPT | Performed by: INTERNAL MEDICINE

## 2025-01-21 PROCEDURE — 2500000003 HC RX 250 WO HCPCS

## 2025-01-21 PROCEDURE — 85027 COMPLETE CBC AUTOMATED: CPT

## 2025-01-21 PROCEDURE — 84100 ASSAY OF PHOSPHORUS: CPT

## 2025-01-21 PROCEDURE — 80048 BASIC METABOLIC PNL TOTAL CA: CPT

## 2025-01-21 RX ORDER — CYCLOBENZAPRINE HCL 10 MG
5 TABLET ORAL 4 TIMES DAILY PRN
Status: DISCONTINUED | OUTPATIENT
Start: 2025-01-21 | End: 2025-01-23 | Stop reason: HOSPADM

## 2025-01-21 RX ADMIN — ATORVASTATIN CALCIUM 40 MG: 40 TABLET, FILM COATED ORAL at 20:36

## 2025-01-21 RX ADMIN — BUMETANIDE 2 MG: 1 TABLET ORAL at 13:17

## 2025-01-21 RX ADMIN — INSULIN GLARGINE 50 UNITS: 100 INJECTION, SOLUTION SUBCUTANEOUS at 20:49

## 2025-01-21 RX ADMIN — BUMETANIDE 2 MG: 1 TABLET ORAL at 17:28

## 2025-01-21 RX ADMIN — APIXABAN 2.5 MG: 5 TABLET, FILM COATED ORAL at 13:17

## 2025-01-21 RX ADMIN — DOXAZOSIN 4 MG: 4 TABLET ORAL at 13:17

## 2025-01-21 RX ADMIN — SODIUM CHLORIDE, PRESERVATIVE FREE 10 ML: 5 INJECTION INTRAVENOUS at 20:37

## 2025-01-21 RX ADMIN — GABAPENTIN 300 MG: 300 CAPSULE ORAL at 20:37

## 2025-01-21 RX ADMIN — GABAPENTIN 300 MG: 300 CAPSULE ORAL at 13:17

## 2025-01-21 RX ADMIN — AMLODIPINE BESYLATE 5 MG: 5 TABLET ORAL at 13:16

## 2025-01-21 RX ADMIN — METOPROLOL SUCCINATE 25 MG: 25 TABLET, FILM COATED, EXTENDED RELEASE ORAL at 13:18

## 2025-01-21 RX ADMIN — METOPROLOL SUCCINATE 25 MG: 25 TABLET, FILM COATED, EXTENDED RELEASE ORAL at 20:36

## 2025-01-21 RX ADMIN — SEVELAMER CARBONATE 1600 MG: 800 TABLET, FILM COATED ORAL at 13:19

## 2025-01-21 RX ADMIN — SODIUM CHLORIDE, PRESERVATIVE FREE 10 ML: 5 INJECTION INTRAVENOUS at 13:18

## 2025-01-21 RX ADMIN — INSULIN HUMAN 3 UNITS: 100 INJECTION, SOLUTION PARENTERAL at 21:46

## 2025-01-21 RX ADMIN — DOXAZOSIN 4 MG: 4 TABLET ORAL at 20:36

## 2025-01-21 RX ADMIN — SEVELAMER CARBONATE 1600 MG: 800 TABLET, FILM COATED ORAL at 17:28

## 2025-01-21 RX ADMIN — ASPIRIN 81 MG: 81 TABLET, COATED ORAL at 13:17

## 2025-01-21 RX ADMIN — CYCLOBENZAPRINE 5 MG: 10 TABLET, FILM COATED ORAL at 20:35

## 2025-01-21 RX ADMIN — INSULIN GLARGINE 50 UNITS: 100 INJECTION, SOLUTION SUBCUTANEOUS at 13:17

## 2025-01-21 RX ADMIN — APIXABAN 2.5 MG: 5 TABLET, FILM COATED ORAL at 20:36

## 2025-01-21 NOTE — PROGRESS NOTES
Advanced Directive Consult: It was started but not completed. I will check back with them.    01/21/25 1357   Encounter Summary   Encounter Overview/Reason Palliative Care   Service Provided For Patient and family together   Referral/Consult From Nurse   Support System Children   Last Encounter  01/21/25   Complexity of Encounter Low   Begin Time 1246   End Time  1254   Total Time Calculated 8 min   Spiritual/Emotional needs   Type Spiritual Support   Advance Care Planning   Type ACP conversation   Assessment/Intervention/Outcome   Assessment Hopeful

## 2025-01-21 NOTE — CARE COORDINATION
Good   Family able to assist with home care needs: Yes   Would you like for me to discuss the discharge plan with any other family members/significant others, and if so, who? Yes  (discussed with daughter and patient's  in room as patient is CY)   Financial Resources Medicaid   Community Resources None   Discharge Planning   Type of Residence Other (Comment)  (living between hotel and sister's house while looking for a new home)   Living Arrangements Spouse/Significant Other;Children   Current Services Prior To Admission Durable Medical Equipment;Other (Comment)  (HD MWF 0500 at [facility])   Current DME Prior to Arrival Bedside Commode;Walker;Shower Chair   Potential Assistance Needed N/A   Potential Assistance Purchasing Medications No   Type of Home Care Services None   Patient expects to be discharged to: Other (comment)  (hotel)   Condition of Participation: Discharge Planning   The Plan for Transition of Care is related to the following treatment goals: improve SOB

## 2025-01-21 NOTE — PROGRESS NOTES
[facility]   Nephrology progress note  1/21/2025, 8:27 AM      Pt Name:    Debbie Locke  MRN:     [id number]     YOB: 1975  Admit Date:    1/20/2025  6:44 AM    Chief Complaint: Nephrology following for ESRD on hemodialysis.    Subjective:  Patient seen and examined  No chest pain or shortness of breath  No other complaints.    Objective:  24HR INTAKE/OUTPUT:    Intake/Output Summary (Last 24 hours) at 1/21/2025 0827  Last data filed at 1/20/2025 1630  Gross per 24 hour   Intake 500 ml   Output 2600 ml   Net -2100 ml      Admission weight: 109 kg (240 lb 4.8 oz)  Wt Readings from Last 3 Encounters:   01/21/25 110.6 kg (243 lb 13.3 oz)   11/21/24 99.8 kg (220 lb)   10/24/24 99.8 kg (220 lb)        Vitals :   Vitals:    01/20/25 2114 01/21/25 0017 01/21/25 0320 01/21/25 0730   BP: (!) 175/96 (!) 140/72 124/77 (!) 176/90   Pulse: 86 80 82 80   Resp: 18 18 18 19   Temp: 97.8 °F (36.6 °C) 98.3 °F (36.8 °C) 98.2 °F (36.8 °C) 97.9 °F (36.6 °C)   TempSrc: Oral Oral Oral    SpO2: 95% 94% 93% 99%   Weight:    110.6 kg (243 lb 13.3 oz)   Height:           Physical examination  General Appearance:  Well developed. No distress  Mouth/Throat:  Oral mucosa moist  Neck:  Supple, no JVD  Lungs:  Breath sounds: clear  Heart::  S1,S2 heard  Abdomen:  Soft, non - tender  Musculoskeletal:  Edema -noted    Medications:  Infusion:    sodium chloride      dextrose       Meds:    sodium chloride flush  5-40 mL IntraVENous 2 times per day    insulin glargine  50 Units SubCUTAneous BID    bumetanide  2 mg Oral BID    gabapentin  300 mg Oral BID    metoprolol succinate  25 mg Oral BID    sevelamer  1,600 mg Oral TID WC    amLODIPine  5 mg Oral Daily    apixaban  2.5 mg Oral BID    atorvastatin  40 mg Oral Nightly    aspirin  81 mg Oral Daily    doxazosin  4 mg Oral BID       Lab Data :  CBC:   Recent Labs     01/20/25  0711 01/21/25  0730   WBC 9.8 7.5   HGB 10.0* 9.1*   HCT 32.5* 29.3*    189

## 2025-01-21 NOTE — FLOWSHEET NOTE
4 hour of 4.5 hour isolated UF TX complete. TX ended 30 min early per pt request due to c/o cramping. Dr. Neville notified. Removed 2.6 L of fluid. Tolerated well. Bilateral cath ports flushed with normal saline, clamped, and secured with tegos. CVC drsg clean, dry, and intact. Report called to primary RN. TX record printed for scanning into EMR.    01/21/25 0730 01/21/25 1200   Vital Signs   BP (!) 176/90 (!) 165/86   Temp 97.9 °F (36.6 °C) 97.6 °F (36.4 °C)   Pulse 80 71   Respirations 19 19   SpO2 99 % 98 %   Weight - Scale 110.6 kg (243 lb 13.3 oz) 108 kg (238 lb 1.6 oz)   Weight Method Bed scale Bed scale   Percent Weight Change  --  0   Post-Hemodialysis Assessment   Post-Treatment Procedures  --  Blood returned;Access bleeding time < 10 minutes   Machine Disinfection Process  --  Heat Disinfect;Acid/Vinegar Clean;Exterior Machine Disinfection   Blood Volume Processed (Liters)  --  0 L   Dialyzer Clearance  --  Lightly streaked   Duration of Treatment (minutes)  --  240 minutes   Heparin Amount Administered During Treatment (mL)  --  0 mL   Hemodialysis Intake (ml)  --  400 ml   Hemodialysis Output (ml)  --  3066 ml   NET Removed (ml)  --  2666

## 2025-01-21 NOTE — ACP (ADVANCE CARE PLANNING)
Advance Care Planning     Advance Care Planning Inpatient Note  [facility] Department    Today's Date: 1/21/2025  Unit: [facility] RENAL TELEMETRY 6K    Received request from IDT Member.  Upon review of chart and communication with care team, patient's decision making abilities are not in question.. Patient and Child/Children was/were present in the room during visit.    Goals of ACP Conversation:  Discuss advance care planning documents  Facilitate a discussion related to patient's goals of care as they align with the patient's values and beliefs.    Health Care Decision Makers:       Primary Decision Maker: Margarita Mustafa - Child - [phone]    Secondary Decision Maker: Manuel Mustafa - Child - [phone]  Summary:  Completed New Documents    Advance Care Planning Documents (Patient Wishes):  Healthcare Power of /Advance Directive Appointment of Health Care Agent  Living Will/Advance Directive     Assessment:  Advanced Directives Consult: It was completed and filed. A copy sent to medical records.     Interventions:  Assisted in the completion of documents according to patient's wishes at this time            Electronically signed by LOGAN Solorio on 1/21/2025 at 4:11 PM

## 2025-01-22 PROBLEM — Z99.2 HEMODIALYSIS PATIENT (HCC): Status: ACTIVE | Noted: 2025-01-22

## 2025-01-22 LAB
ANION GAP SERPL CALC-SCNC: 13 MEQ/L (ref 8–16)
BUN SERPL-MCNC: 43 MG/DL (ref 7–22)
CALCIUM SERPL-MCNC: 7.9 MG/DL (ref 8.5–10.5)
CHLORIDE SERPL-SCNC: 97 MEQ/L (ref 98–111)
CO2 SERPL-SCNC: 25 MEQ/L (ref 23–33)
CREAT SERPL-MCNC: 6.1 MG/DL (ref 0.4–1.2)
DEPRECATED RDW RBC AUTO: 50.6 FL (ref 35–45)
ERYTHROCYTE [DISTWIDTH] IN BLOOD BY AUTOMATED COUNT: 15.5 % (ref 11.5–14.5)
GFR SERPL CREATININE-BSD FRML MDRD: 8 ML/MIN/1.73M2
GLUCOSE BLD STRIP.AUTO-MCNC: 115 MG/DL (ref 70–108)
GLUCOSE BLD STRIP.AUTO-MCNC: 119 MG/DL (ref 70–108)
GLUCOSE BLD STRIP.AUTO-MCNC: 122 MG/DL (ref 70–108)
GLUCOSE BLD STRIP.AUTO-MCNC: 129 MG/DL (ref 70–108)
GLUCOSE SERPL-MCNC: 150 MG/DL (ref 70–108)
HCT VFR BLD AUTO: 28.5 % (ref 37–47)
HGB BLD-MCNC: 8.8 GM/DL (ref 12–16)
MAGNESIUM SERPL-MCNC: 2 MG/DL (ref 1.6–2.4)
MCH RBC QN AUTO: 27.8 PG (ref 26–33)
MCHC RBC AUTO-ENTMCNC: 30.9 GM/DL (ref 32.2–35.5)
MCV RBC AUTO: 89.9 FL (ref 81–99)
PHOSPHATE SERPL-MCNC: 7.1 MG/DL (ref 2.4–4.7)
PLATELET # BLD AUTO: 178 THOU/MM3 (ref 130–400)
PMV BLD AUTO: 9.5 FL (ref 9.4–12.4)
POTASSIUM SERPL-SCNC: 5.4 MEQ/L (ref 3.5–5.2)
RBC # BLD AUTO: 3.17 MILL/MM3 (ref 4.2–5.4)
SODIUM SERPL-SCNC: 135 MEQ/L (ref 135–145)
WBC # BLD AUTO: 7.6 THOU/MM3 (ref 4.8–10.8)

## 2025-01-22 PROCEDURE — 80048 BASIC METABOLIC PNL TOTAL CA: CPT

## 2025-01-22 PROCEDURE — 90935 HEMODIALYSIS ONE EVALUATION: CPT

## 2025-01-22 PROCEDURE — 99232 SBSQ HOSP IP/OBS MODERATE 35: CPT | Performed by: INTERNAL MEDICINE

## 2025-01-22 PROCEDURE — 85027 COMPLETE CBC AUTOMATED: CPT

## 2025-01-22 PROCEDURE — 6370000000 HC RX 637 (ALT 250 FOR IP)

## 2025-01-22 PROCEDURE — 82948 REAGENT STRIP/BLOOD GLUCOSE: CPT

## 2025-01-22 PROCEDURE — 36415 COLL VENOUS BLD VENIPUNCTURE: CPT

## 2025-01-22 PROCEDURE — 84100 ASSAY OF PHOSPHORUS: CPT

## 2025-01-22 PROCEDURE — 2500000003 HC RX 250 WO HCPCS

## 2025-01-22 PROCEDURE — 1200000003 HC TELEMETRY R&B

## 2025-01-22 PROCEDURE — 83735 ASSAY OF MAGNESIUM: CPT

## 2025-01-22 RX ADMIN — AMLODIPINE BESYLATE 5 MG: 5 TABLET ORAL at 13:10

## 2025-01-22 RX ADMIN — GABAPENTIN 300 MG: 300 CAPSULE ORAL at 11:37

## 2025-01-22 RX ADMIN — ACETAMINOPHEN 650 MG: 325 TABLET ORAL at 08:12

## 2025-01-22 RX ADMIN — ACETAMINOPHEN 650 MG: 325 TABLET ORAL at 21:27

## 2025-01-22 RX ADMIN — DOXAZOSIN 4 MG: 4 TABLET ORAL at 13:10

## 2025-01-22 RX ADMIN — CYCLOBENZAPRINE 5 MG: 10 TABLET, FILM COATED ORAL at 21:27

## 2025-01-22 RX ADMIN — SEVELAMER CARBONATE 1600 MG: 800 TABLET, FILM COATED ORAL at 13:10

## 2025-01-22 RX ADMIN — ATORVASTATIN CALCIUM 40 MG: 40 TABLET, FILM COATED ORAL at 21:28

## 2025-01-22 RX ADMIN — DOXAZOSIN 4 MG: 4 TABLET ORAL at 21:27

## 2025-01-22 RX ADMIN — METOPROLOL SUCCINATE 25 MG: 25 TABLET, FILM COATED, EXTENDED RELEASE ORAL at 13:10

## 2025-01-22 RX ADMIN — INSULIN GLARGINE 50 UNITS: 100 INJECTION, SOLUTION SUBCUTANEOUS at 21:27

## 2025-01-22 RX ADMIN — SEVELAMER CARBONATE 1600 MG: 800 TABLET, FILM COATED ORAL at 16:12

## 2025-01-22 RX ADMIN — SODIUM CHLORIDE, PRESERVATIVE FREE 10 ML: 5 INJECTION INTRAVENOUS at 21:20

## 2025-01-22 RX ADMIN — BUMETANIDE 2 MG: 1 TABLET ORAL at 13:10

## 2025-01-22 RX ADMIN — METOPROLOL SUCCINATE 25 MG: 25 TABLET, FILM COATED, EXTENDED RELEASE ORAL at 21:27

## 2025-01-22 RX ADMIN — INSULIN GLARGINE 50 UNITS: 100 INJECTION, SOLUTION SUBCUTANEOUS at 13:10

## 2025-01-22 RX ADMIN — GABAPENTIN 300 MG: 300 CAPSULE ORAL at 21:27

## 2025-01-22 RX ADMIN — APIXABAN 2.5 MG: 5 TABLET, FILM COATED ORAL at 13:10

## 2025-01-22 RX ADMIN — APIXABAN 2.5 MG: 5 TABLET, FILM COATED ORAL at 21:28

## 2025-01-22 RX ADMIN — SODIUM CHLORIDE, PRESERVATIVE FREE 10 ML: 5 INJECTION INTRAVENOUS at 13:11

## 2025-01-22 RX ADMIN — BUMETANIDE 2 MG: 1 TABLET ORAL at 16:12

## 2025-01-22 RX ADMIN — CYCLOBENZAPRINE 5 MG: 10 TABLET, FILM COATED ORAL at 11:36

## 2025-01-22 RX ADMIN — ASPIRIN 81 MG: 81 TABLET, COATED ORAL at 13:10

## 2025-01-22 ASSESSMENT — PAIN DESCRIPTION - LOCATION: LOCATION: LEG

## 2025-01-22 ASSESSMENT — PAIN SCALES - GENERAL: PAINLEVEL_OUTOF10: 6

## 2025-01-22 NOTE — FLOWSHEET NOTE
01/22/25 0800 01/22/25 1215   Vital Signs   /75 129/68   Temp 97.5 °F (36.4 °C) 97.5 °F (36.4 °C)   Pulse 71 69   Respirations 18 19   SpO2  --  97 %   Weight - Scale 104.5 kg (230 lb 6.1 oz) 104.2 kg (229 lb 11.5 oz)   Weight Method Bed scale Standing scale   Percent Weight Change -3.24 -0.29   Post-Hemodialysis Assessment   Post-Treatment Procedures  --  Blood returned;Access bleeding time < 10 minutes   Machine Disinfection Process  --  Acid/Vinegar Clean;Heat Disinfect;Exterior Machine Disinfection   Blood Volume Processed (Liters)  --  74.4 L   Dialyzer Clearance  --  Lightly streaked   Duration of Treatment (minutes)  --  225 minutes   Hemodialysis Intake (ml)  --  400 ml   Hemodialysis Output (ml)  --  3400 ml   NET Removed (ml)  --  3000   Tolerated Treatment  --  Good     3 hour and 45 minute treatment complete. Patient requested to end treatment 15 minutes early due to cramping. Dr. Neville notified. 3 L net UF removed. Pt. tolerated well. Blood returned following treatment.  Pressure held to needle access sites for 10 min x 2.  Report provided to Primary RN.  Paperwork printed and placed in bin to be scanned in to EMR Reported post treatment standing weight to Dr. Neville.

## 2025-01-22 NOTE — PROGRESS NOTES
[facility]   Nephrology progress note  1/22/2025, 11:32 AM      Pt Name:    Debbie Locke  MRN:     [id number]     YOB: 1975  Admit Date:    1/20/2025  6:44 AM    Chief Complaint: Nephrology following for ESRD on hemodialysis.    Subjective:  Patient seen and examined  No chest pain or shortness of breath  Weight is down to 105 kg    Objective:  24HR INTAKE/OUTPUT:    Intake/Output Summary (Last 24 hours) at 1/22/2025 1132  Last data filed at 1/22/2025 0440  Gross per 24 hour   Intake 1400 ml   Output 3066 ml   Net -1666 ml      Admission weight: 109 kg (240 lb 4.8 oz)  Wt Readings from Last 3 Encounters:   01/22/25 104.5 kg (230 lb 6.1 oz)   11/21/24 99.8 kg (220 lb)   10/24/24 99.8 kg (220 lb)        Vitals :   Vitals:    01/21/25 2028 01/21/25 2338 01/22/25 0427 01/22/25 0800   BP: (!) 145/76 (!) 141/73 136/74 139/75   Pulse: 79 77 74 71   Resp: 16 17 18 18   Temp: 97.8 °F (36.6 °C) 98.1 °F (36.7 °C) 97.9 °F (36.6 °C) 97.5 °F (36.4 °C)   TempSrc: Oral Oral Oral    SpO2: 96% 95% 95%    Weight:    104.5 kg (230 lb 6.1 oz)   Height:           Physical examination  General Appearance:  Well developed. No distress  Mouth/Throat:  Oral mucosa moist  Neck:  Supple, no JVD  Lungs:  Breath sounds: clear  Heart::  S1,S2 heard  Abdomen:  Soft, non - tender  Musculoskeletal:  Edema -noted but getting better    Medications:  Infusion:    sodium chloride      dextrose       Meds:    insulin regular  0-4 Units SubCUTAneous 4x Daily AC & HS    sodium chloride flush  5-40 mL IntraVENous 2 times per day    insulin glargine  50 Units SubCUTAneous BID    bumetanide  2 mg Oral BID    gabapentin  300 mg Oral BID    metoprolol succinate  25 mg Oral BID    sevelamer  1,600 mg Oral TID WC    amLODIPine  5 mg Oral Daily    apixaban  2.5 mg Oral BID    atorvastatin  40 mg Oral Nightly    aspirin  81 mg Oral Daily    doxazosin  4 mg Oral BID       Lab Data :  CBC:   Recent Labs     01/20/25  0711

## 2025-01-22 NOTE — PROGRESS NOTES
Hospitalist Progress Note  Internal Medicine Resident      Patient: Debbie Locke 49 y.o. female      Unit/Bed: 6K-23/023-A    Admit Date: 1/20/2025    ASSESSMENT AND PLAN  Volume overload  Nonoliguric ESRD  Hx FSGS  -ESRD secondary to diabetic nephropathy  -Patient's dialysis regimen complicated by cramping during treatment  -Nephrology consulted: Dialysis regimen switched to sequential 250 blood flow with compression stockings  -01/21/2025 dialysis 4.5-hour treatment with ultrafiltration; treatment terminated 30 minutes early secondary to cramping; 2.6 L fluid removal  -Strict I's and O's, daily weights  -Continue patient's home sevelamer    Type 2 diabetes mellitus, with hyperglycemia, with chronic use of insulin, with complication  -01/20/2025 HbA1c 11%  -Patient endorsing significant cramping with use of insulin lispro  -Home regimen: Insulin Lantus 50 units twice daily  -The risks of untreated hyperglycemia as well as progression of diabetes mellitus were discussed at length with the patient at the bedside.  She verbalized understanding is agreeable to a trial of sliding scale insulin with regular insulin with treatment of cramping; cyclobenzaprine regimen initiated around administration of sliding scale  -Care coordinated with overnight resident, pharmacy team, nursing  -Patient endorses previous use of CGM, but having lost access to it  -Patient endorses having a follow-up appointment with endocrinology for advance care recommendations  -Recommendation for outpatient follow-up with diabetes clinic for further optimization of care plan and consideration for GLP-1 agonist initiation    Chronic:  Hyperlipidemia, on statin  Tobacco use disorder  Hx RLE DVT 2019, on DOAC  Chronic normocytic anemia  Hx PAD with total occlusion of distal right SFA to mid right popliteal artery  Essential hypertension, on Toprol succinate/amlodipine/doxazosin  Hx right posterior lumbar abscess with MSSA    LDA: Hemodialysis

## 2025-01-22 NOTE — PLAN OF CARE
Problem: Chronic Conditions and Co-morbidities  Goal: Patient's chronic conditions and co-morbidity symptoms are monitored and maintained or improved  Outcome: Progressing  Flowsheets (Taken 1/21/2025 2319)  Care Plan - Patient's Chronic Conditions and Co-Morbidity Symptoms are Monitored and Maintained or Improved:   Monitor and assess patient's chronic conditions and comorbid symptoms for stability, deterioration, or improvement   Collaborate with multidisciplinary team to address chronic and comorbid conditions and prevent exacerbation or deterioration   Update acute care plan with appropriate goals if chronic or comorbid symptoms are exacerbated and prevent overall improvement and discharge     Problem: Discharge Planning  Goal: Discharge to home or other facility with appropriate resources  Outcome: Progressing  Flowsheets (Taken 1/21/2025 2319)  Discharge to home or other facility with appropriate resources:   Identify barriers to discharge with patient and caregiver   Identify discharge learning needs (meds, wound care, etc)   Arrange for needed discharge resources and transportation as appropriate     Problem: Safety - Adult  Goal: Free from fall injury  Outcome: Progressing  Flowsheets (Taken 1/21/2025 2319)  Free From Fall Injury:   Instruct family/caregiver on patient safety   Based on caregiver fall risk screen, instruct family/caregiver to ask for assistance with transferring infant if caregiver noted to have fall risk factors     Problem: ABCDS Injury Assessment  Goal: Absence of physical injury  Outcome: Progressing  Flowsheets (Taken 1/21/2025 2319)  Absence of Physical Injury: Implement safety measures based on patient assessment     Problem: Neurosensory - Adult  Goal: Achieves stable or improved neurological status  Outcome: Progressing  Flowsheets (Taken 1/21/2025 2319)  Achieves stable or improved neurological status: Assess for and report changes in neurological status     Problem: Respiratory

## 2025-01-22 NOTE — PROGRESS NOTES
PROGRESS NOTE      Patient:  Debbie Locke  Unit/Bed:6K-23/023-A  YOB: 1975  MRN: [id number]   Acct: [id number]    PCP: Kory Thomas MD    Date of Admission: 1/20/2025 LOS: 2    Date of Evaluation:  1/22/2025    Anticipated Discharge: pending clinical status    Assessment/Plan:    Volume overload  Nonoliguric ESRD  History of focal segmental glomerulosclerosis  ESRD secondary to diabetic nephropathy/FSGS  Nephrology consulted; appreciate recommendations  Patient status regimen has been previously complicated by cramping, patient states that she does have frequent cramps and cyclobenzaprine has helped  As per nephrology for dialysis treatment  Strict I&O's, daily weights  Continue home sevelamer  Patient has had mild improvement in her volume overload after day 2 of dialysis on 1/22    Type II DM with hyperglycemia and chronic use of insulin, with nephropathy  Last hemoglobin A1c 11% on 1/20/2025  Significant cramping side effects with use of insulin lispro, home regimen includes insulin Lantus 50 units twice daily  Continue Lantus 50 units twice daily, continue Humulin via low-dose ISS  Can use Flexeril as needed for cramping side effect  Follow-up outpatient with PCP/diabetes clinic/endocrinology  Patient would benefit GLP-1 therapy    Hyperlipidemia  Continue home statin    Chronic normocytic anemia  Likely secondary to ESRD, baseline hemoglobin appears to be around 8, but has had some lows recently  Last iron studies were 7/27/2024, consider repeat  As per nephrology for management, recommended follow-up outpatient    Essential hypertension  Continue Toprol, amlodipine, doxazosin    Hx peripheral artery disease with total occlusion of distal right SFA to mid right popliteal artery  Hx right posterior lumbar abscess with MSSA  Tobacco use disorder  Noted from history    Chief Complaint: shortness of breath    Hospital Course:     1/22: dialysis again today, continue current management,

## 2025-01-23 VITALS
HEIGHT: 64 IN | HEART RATE: 75 BPM | RESPIRATION RATE: 18 BRPM | BODY MASS INDEX: 39.22 KG/M2 | DIASTOLIC BLOOD PRESSURE: 65 MMHG | SYSTOLIC BLOOD PRESSURE: 130 MMHG | WEIGHT: 229.72 LBS | TEMPERATURE: 97.6 F | OXYGEN SATURATION: 94 %

## 2025-01-23 LAB
ANION GAP SERPL CALC-SCNC: 12 MEQ/L (ref 8–16)
BASOPHILS ABSOLUTE: 0 THOU/MM3 (ref 0–0.1)
BASOPHILS NFR BLD AUTO: 0.4 %
BUN SERPL-MCNC: 38 MG/DL (ref 7–22)
CALCIUM SERPL-MCNC: 7.9 MG/DL (ref 8.5–10.5)
CHLORIDE SERPL-SCNC: 96 MEQ/L (ref 98–111)
CO2 SERPL-SCNC: 25 MEQ/L (ref 23–33)
CREAT SERPL-MCNC: 4.8 MG/DL (ref 0.4–1.2)
DEPRECATED RDW RBC AUTO: 50.7 FL (ref 35–45)
EOSINOPHIL NFR BLD AUTO: 2.8 %
EOSINOPHILS ABSOLUTE: 0.2 THOU/MM3 (ref 0–0.4)
ERYTHROCYTE [DISTWIDTH] IN BLOOD BY AUTOMATED COUNT: 15.5 % (ref 11.5–14.5)
GFR SERPL CREATININE-BSD FRML MDRD: 10 ML/MIN/1.73M2
GLUCOSE BLD STRIP.AUTO-MCNC: 225 MG/DL (ref 70–108)
GLUCOSE SERPL-MCNC: 197 MG/DL (ref 70–108)
HCT VFR BLD AUTO: 32.7 % (ref 37–47)
HGB BLD-MCNC: 10.1 GM/DL (ref 12–16)
IMM GRANULOCYTES # BLD AUTO: 0.05 THOU/MM3 (ref 0–0.07)
IMM GRANULOCYTES NFR BLD AUTO: 0.6 %
LYMPHOCYTES ABSOLUTE: 1.2 THOU/MM3 (ref 1–4.8)
LYMPHOCYTES NFR BLD AUTO: 15.6 %
MCH RBC QN AUTO: 28.1 PG (ref 26–33)
MCHC RBC AUTO-ENTMCNC: 30.9 GM/DL (ref 32.2–35.5)
MCV RBC AUTO: 90.8 FL (ref 81–99)
MONOCYTES ABSOLUTE: 0.4 THOU/MM3 (ref 0.4–1.3)
MONOCYTES NFR BLD AUTO: 5.5 %
NEUTROPHILS ABSOLUTE: 5.9 THOU/MM3 (ref 1.8–7.7)
NEUTROPHILS NFR BLD AUTO: 75.1 %
NRBC BLD AUTO-RTO: 0 /100 WBC
PLATELET # BLD AUTO: 186 THOU/MM3 (ref 130–400)
PMV BLD AUTO: 9.7 FL (ref 9.4–12.4)
POTASSIUM SERPL-SCNC: 5.6 MEQ/L (ref 3.5–5.2)
RBC # BLD AUTO: 3.6 MILL/MM3 (ref 4.2–5.4)
SODIUM SERPL-SCNC: 133 MEQ/L (ref 135–145)
WBC # BLD AUTO: 7.8 THOU/MM3 (ref 4.8–10.8)

## 2025-01-23 PROCEDURE — 36415 COLL VENOUS BLD VENIPUNCTURE: CPT

## 2025-01-23 PROCEDURE — 85025 COMPLETE CBC W/AUTO DIFF WBC: CPT

## 2025-01-23 PROCEDURE — 6370000000 HC RX 637 (ALT 250 FOR IP)

## 2025-01-23 PROCEDURE — 2500000003 HC RX 250 WO HCPCS

## 2025-01-23 PROCEDURE — 82948 REAGENT STRIP/BLOOD GLUCOSE: CPT

## 2025-01-23 PROCEDURE — 80048 BASIC METABOLIC PNL TOTAL CA: CPT

## 2025-01-23 RX ADMIN — BUMETANIDE 2 MG: 1 TABLET ORAL at 07:56

## 2025-01-23 RX ADMIN — INSULIN HUMAN 1 UNITS: 100 INJECTION, SOLUTION PARENTERAL at 11:32

## 2025-01-23 RX ADMIN — GABAPENTIN 300 MG: 300 CAPSULE ORAL at 07:56

## 2025-01-23 RX ADMIN — AMLODIPINE BESYLATE 5 MG: 5 TABLET ORAL at 07:56

## 2025-01-23 RX ADMIN — ASPIRIN 81 MG: 81 TABLET, COATED ORAL at 07:56

## 2025-01-23 RX ADMIN — CYCLOBENZAPRINE 5 MG: 10 TABLET, FILM COATED ORAL at 07:56

## 2025-01-23 RX ADMIN — APIXABAN 2.5 MG: 5 TABLET, FILM COATED ORAL at 07:56

## 2025-01-23 RX ADMIN — SODIUM CHLORIDE, PRESERVATIVE FREE 10 ML: 5 INJECTION INTRAVENOUS at 07:56

## 2025-01-23 RX ADMIN — SEVELAMER CARBONATE 1600 MG: 800 TABLET, FILM COATED ORAL at 07:56

## 2025-01-23 RX ADMIN — INSULIN GLARGINE 50 UNITS: 100 INJECTION, SOLUTION SUBCUTANEOUS at 07:57

## 2025-01-23 RX ADMIN — INSULIN HUMAN 1 UNITS: 100 INJECTION, SOLUTION PARENTERAL at 07:57

## 2025-01-23 RX ADMIN — DOXAZOSIN 4 MG: 4 TABLET ORAL at 07:56

## 2025-01-23 RX ADMIN — SODIUM ZIRCONIUM CYCLOSILICATE 10 G: 10 POWDER, FOR SUSPENSION ORAL at 11:32

## 2025-01-23 RX ADMIN — SEVELAMER CARBONATE 1600 MG: 800 TABLET, FILM COATED ORAL at 11:33

## 2025-01-23 RX ADMIN — METOPROLOL SUCCINATE 25 MG: 25 TABLET, FILM COATED, EXTENDED RELEASE ORAL at 07:56

## 2025-01-23 NOTE — PLAN OF CARE
Problem: Chronic Conditions and Co-morbidities  Goal: Patient's chronic conditions and co-morbidity symptoms are monitored and maintained or improved  1/23/2025 1053 by Neymar Moody RN  Outcome: Completed  1/22/2025 2330 by Erwin Gandara RN  Outcome: Progressing     Problem: Discharge Planning  Goal: Discharge to home or other facility with appropriate resources  1/23/2025 1053 by Neymar Moody RN  Outcome: Completed  1/22/2025 2330 by Erwin Gandara RN  Outcome: Progressing     Problem: Safety - Adult  Goal: Free from fall injury  1/23/2025 1053 by Neymar Moody RN  Outcome: Completed  1/22/2025 2330 by Erwin Gandara RN  Outcome: Progressing     Problem: ABCDS Injury Assessment  Goal: Absence of physical injury  1/23/2025 1053 by Neymar Moody RN  Outcome: Completed  1/22/2025 2330 by Erwin Gandara RN  Outcome: Progressing     Problem: Neurosensory - Adult  Goal: Achieves stable or improved neurological status  1/23/2025 1053 by Neymar Moody RN  Outcome: Completed  1/22/2025 2330 by Erwin Gandara RN  Outcome: Progressing     Problem: Respiratory - Adult  Goal: Achieves optimal ventilation and oxygenation  1/23/2025 1053 by Neymar Moody RN  Outcome: Completed  1/22/2025 2330 by Erwin Gandara RN  Outcome: Progressing     Problem: Cardiovascular - Adult  Goal: Maintains optimal cardiac output and hemodynamic stability  1/23/2025 1053 by Neymar Moody RN  Outcome: Completed  1/22/2025 2330 by Erwin Gandara RN  Outcome: Progressing  Goal: Absence of cardiac dysrhythmias or at baseline  1/23/2025 1053 by Neymar Moody RN  Outcome: Completed  1/22/2025 2330 by Erwin Gandara RN  Outcome: Progressing     Problem: Skin/Tissue Integrity - Adult  Goal: Skin integrity remains intact  1/23/2025 1053 by Neymar Moody RN  Outcome: Completed  1/22/2025 2330 by Erwin Gandara RN  Outcome: Progressing  Goal: Incisions, wounds, or drain sites healing without S/S of

## 2025-01-23 NOTE — DISCHARGE SUMMARY
DISCHARGE SUMMARY      Patient Identification:   Debbie Locke   : 1975  MRN: [id number]   Account: [id number]      Patient's PCP: Kory Thomas MD    Admit Date: 2025     Discharge Date:   25    Admitting Physician: No admitting provider for patient encounter.     Discharge Physician: Dorian Desai DO     Discharge Diagnoses:    Volume overload  Nonoliguric ESRD  Hx of focal segmental glomerulosclerosis  Type 2 diabetes mellitus with hyperglycemia chronic use of insulin with nephropathy  Hyperlipidemia  Chronic normocytic anemia  Essential hypertension  Hx peripheral artery disease with total occlusion of distal right SFA to mid right popliteal artery  Hx right posterior lumbar abscess with MSSA  Tobacco use disorder      Updated Assessment and Plan from Day of Discharge    Volume overload  Nonoliguric ESRD  History of focal segmental glomerulosclerosis  ESRD secondary to diabetic nephropathy/FSGS  Nephrology consulted; appreciate recommendations  Patient status regimen has been previously complicated by cramping, patient states that she does have frequent cramps and cyclobenzaprine has helped  As per nephrology for dialysis treatment  Strict I&O's, daily weights  Continue home sevelamer  Significant proved and volume status on , administered one 10 mg dose of Lokelma at discharge due to mild hyperkalemia secondary to ESRD  Follow-up with nephrology as outpatient, resume MWF dialysis plan     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

## 2025-01-23 NOTE — PROGRESS NOTES
[facility]   Nephrology progress note  1/23/2025, 10:41 AM      Pt Name:    Debbie Locke  MRN:     [id number]     YOB: 1975  Admit Date:    1/20/2025  6:44 AM    Chief Complaint: Nephrology following for ESRD on hemodialysis.    Subjective:  Patient seen and examined  No chest pain or shortness of breath  Weight is down to 104 kg    Objective:  24HR INTAKE/OUTPUT:    Intake/Output Summary (Last 24 hours) at 1/23/2025 1041  Last data filed at 1/23/2025 0753  Gross per 24 hour   Intake 880 ml   Output 3400 ml   Net -2520 ml      Admission weight: 109 kg (240 lb 4.8 oz)  Wt Readings from Last 3 Encounters:   01/22/25 104.2 kg (229 lb 11.5 oz)   11/21/24 99.8 kg (220 lb)   10/24/24 99.8 kg (220 lb)        Vitals :   Vitals:    01/22/25 2104 01/22/25 2350 01/23/25 0413 01/23/25 0752   BP: (!) 145/81 (!) 149/77 (!) 121/59 (!) 128/54   Pulse: 76 93 83 82   Resp: 16 18 18 17   Temp: 97.4 °F (36.3 °C) 98.3 °F (36.8 °C) 98.1 °F (36.7 °C) 97.6 °F (36.4 °C)   TempSrc: Oral Oral Oral Oral   SpO2: 94% 94% 93% 95%   Weight:       Height:           Physical examination  General Appearance:  Well developed. No distress  Mouth/Throat:  Oral mucosa moist  Neck:  Supple, no JVD  Lungs:  Breath sounds: clear  Heart::  S1,S2 heard  Abdomen:  Soft, non - tender  Musculoskeletal:  Edema -noted chronic    Medications:  Infusion:    sodium chloride      dextrose       Meds:    insulin regular  0-4 Units SubCUTAneous 4x Daily AC & HS    sodium chloride flush  5-40 mL IntraVENous 2 times per day    insulin glargine  50 Units SubCUTAneous BID    bumetanide  2 mg Oral BID    gabapentin  300 mg Oral BID    metoprolol succinate  25 mg Oral BID    sevelamer  1,600 mg Oral TID WC    amLODIPine  5 mg Oral Daily    apixaban  2.5 mg Oral BID    atorvastatin  40 mg Oral Nightly    aspirin  81 mg Oral Daily    doxazosin  4 mg Oral BID       Lab Data :  CBC:   Recent Labs     01/21/25  0730 01/22/25  0815

## 2025-01-23 NOTE — PROGRESS NOTES
Discharge teaching and instructions for diagnosis/procedure of acute heart failure with preserved ejection fraction completed with patient using teachback method. AVS reviewed. Patient voiced understanding regarding prescriptions, follow up appointments, and care of self at home. Discharged in a wheelchair to  home with support per family    This is a 80-year-old female who presents ambulatory to the emergency room with complaints of a laceration to her right thigh, self-mutilation. Patient states today she wanted the \"pain to stop. \"  States she wanted to feel pain in another part of her body instead of chronic fatigue and normal pain. Patient states \"I ripped apart the bedroom looking for something to cut myself with. \"  States she found a scissors but it did not penetrate the skin. Found a razor blade and cut her right thigh in a linear fashion. Applied pressure to the site and came to the hospital for evaluation. Patient states she is suicidal but does not have a plan. States \"I would be so happy if I . \"  Has had psych admissions in the past.       Past Medical History:   Diagnosis Date    Acquired talipes planus, unspecified laterality     Anal spasm 2021    Anemia     Anxiety     Bronchitis     Chronic pain     neck and back    Contact dermatitis and eczema due to cause     Depression     Dysphagia     Hashimoto's thyroiditis     Headache     Hypercholesterolemia     Hypertensive disorder     Insomnia     Osteoarthritis     Plantar fasciitis     Seasonal allergic reaction     Trauma        Past Surgical History:   Procedure Laterality Date    HX COLONOSCOPY      HX GI      Ileostomy and reversal    HX GYN  2011    endometrioma removal    [place] UNLISTED PROCEDURE ABDOMEN PERITONEUM & OMENTUM           Family History:   Problem Relation Age of Onset    COPD Mother     Headache Mother     Heart Disease Mother     Migraines Mother     Stroke Mother     COPD Father     Heart Attack Father     Headache Father     Heart Disease Father     Migraines Father     Cancer Sister         Rectal akin cancer    Headache Sister     Migraines Sister     Diabetes Brother     Headache Brother     Breast Cancer Paternal Grandmother     Headache Sister     Migraines Sister        Social History     Socioeconomic History    Marital status:      Spouse name: Not on file    Number of children: Not on file    Years of education: Not on file    Highest education level: Not on file   Occupational History    Not on file   Tobacco Use    Smoking status: Never    Smokeless tobacco: Never   Vaping Use    Vaping Use: Some days    Substances: CBD   Substance and Sexual Activity    Alcohol use: Yes     Alcohol/week: 1.0 standard drink     Types: 1 Glasses of wine per week    Drug use: Yes     Frequency: 10.0 times per week     Types: Marijuana     Comment: medical    Sexual activity: Yes     Partners: Male     Birth control/protection: None   Other Topics Concern    Not on file   Social History Narrative    Not on file     Social Determinants of Health     Financial Resource Strain: Medium Risk    Difficulty of Paying Living Expenses: Somewhat hard   Food Insecurity: No Food Insecurity    Worried About Running Out of Food in the Last Year: Never true    Ran Out of Food in the Last Year: Never true   Transportation Needs: Not on file   Physical Activity: Not on file   Stress: Not on file   Social Connections: Not on file   Intimate Partner Violence: Not on file   Housing Stability: Not on file         ALLERGIES: Methylprednisolone, Silicone, and Penicillins    Review of Systems   Constitutional:  Negative for fatigue. HENT:  Negative for congestion. Eyes:  Negative for redness. Respiratory:  Negative for shortness of breath. Cardiovascular:  Negative for chest pain. Gastrointestinal:  Negative for abdominal distention, nausea and vomiting. Endocrine: Negative. Genitourinary:  Negative for difficulty urinating. Musculoskeletal:  Positive for arthralgias (right thigh at laceration site. ). Negative for back pain and neck pain. Skin:  Positive for wound (right thigh with linear laceration, hemostatic with pressure. ). Negative for color change, pallor and rash. Allergic/Immunologic: Negative.     Neurological:  Negative for dizziness, speech difficulty, weakness and headaches. Hematological:  Does not bruise/bleed easily. Psychiatric/Behavioral:  Positive for agitation, self-injury and suicidal ideas. The patient is nervous/anxious. Vitals:    02/16/23 1350 02/16/23 1401   BP: (!) 155/83    Pulse: 84    Resp: 14    Temp: 98.5 °F (36.9 °C)    SpO2: 94% 99%            Physical Exam  Vitals and nursing note reviewed. Constitutional:       General: She is not in acute distress. Appearance: Normal appearance. She is well-developed. She is obese. She is not ill-appearing. HENT:      Head: Normocephalic and atraumatic. Right Ear: External ear normal.      Left Ear: External ear normal.      Nose: Nose normal. No congestion. Mouth/Throat:      Mouth: Mucous membranes are moist.   Eyes:      General:         Right eye: No discharge. Left eye: No discharge. Conjunctiva/sclera: Conjunctivae normal.      Pupils: Pupils are equal, round, and reactive to light. Neck:      Vascular: No JVD. Trachea: No tracheal deviation. Cardiovascular:      Rate and Rhythm: Normal rate. Pulses: Normal pulses. Pulmonary:      Effort: Pulmonary effort is normal. No respiratory distress. Abdominal:      General: There is no distension. Tenderness: There is no guarding. Genitourinary:     Comments: Negative    Musculoskeletal:         General: No tenderness. Normal range of motion. Cervical back: Normal range of motion and neck supple. Skin:     General: Skin is warm and dry. Capillary Refill: Capillary refill takes less than 2 seconds. Coloration: Skin is not pale. Findings: Lesion (right thigh with laceration to the anterior portion) present. No erythema or rash. Neurological:      General: No focal deficit present. Mental Status: She is alert and oriented to person, place, and time. Motor: No weakness. Coordination: Coordination normal.   Psychiatric:         Thought Content:  Thought content normal.         Judgment: Judgment normal.      Comments: States she has suicidal ideations without a plan. Positive self mutilation. Medical Decision Making  Differential diagnosis includes: suicidal ideations, self harm, laceration and others. This is a 61year old female who presents to the emergency room with complaints of suicidal ideations and self mutilation. Patient states she wanted to feel a different pain to escape her chronic pain so she attempted to cut herself with a scissors. This did not penetrate the skin so she \"tore her bedroom apart and found a razor. \" She then cut her right thigh. Also noted to have multiple scratches to her upper extremities consistent with cutting. After initial assessment the following was completed:    1. Previous notes (external to Emergency room) were reviewed: chart review    2. History was obtained by: patient    3. Chronic medical issues affecting this visit:     Past Medical History:  No date: Acquired talipes planus, unspecified laterality  07/14/2021: Anal spasm  No date: Anemia  No date: Anxiety  No date: Bronchitis  No date: Chronic pain      Comment:  neck and back  No date: Contact dermatitis and eczema due to cause  No date: Depression  No date: Dysphagia  No date: Hashimoto's thyroiditis  No date: Headache  No date: Hypercholesterolemia  No date: Hypertensive disorder  No date: Insomnia  No date: Osteoarthritis  No date: Plantar fasciitis  No date: Seasonal allergic reaction  No date: Trauma  Past Surgical History:  No date: HX COLONOSCOPY  No date: HX GI      Comment:  Ileostomy and reversal  2011: HX GYN      Comment:  endometrioma removal  No date: [place] UNLISTED PROCEDURE ABDOMEN PERITONEUM & OMENTUM      4. I ordered the following tests, followed by review of final reads by lab and radiology: cbc, cmp, etoh, urinalysis, urine drug screen, salicylate, acetaminophen, right femur film. 5. I considered ordering: as above    6.  Any intervention/ surgery needed: After physical examination patient will need repair of laceration and [employer] consult. States she will abide by suicidal precautions and will inform staff if she wants to hurt herself. 7. Social determinants of health: none    8 Final diagnosis: suicidal ideations, self mutilation. Medications prescribed: patient eloped    9. Disposition: patient eloped. [place] police notified, IV removed by nursing. Amount and/or Complexity of Data Reviewed  External Data Reviewed: notes. Labs: ordered. Radiology: ordered. 1525: Patient eloped. [facility] was informed, IV removed. Dr. Yennifer Fan aware. Laceration was not repaired, tetanus shot not given.       Procedures

## 2025-01-23 NOTE — PLAN OF CARE
Problem: Chronic Conditions and Co-morbidities  Goal: Patient's chronic conditions and co-morbidity symptoms are monitored and maintained or improved  Outcome: Progressing     Problem: Discharge Planning  Goal: Discharge to home or other facility with appropriate resources  Outcome: Progressing     Problem: Safety - Adult  Goal: Free from fall injury  Outcome: Progressing     Problem: ABCDS Injury Assessment  Goal: Absence of physical injury  Outcome: Progressing     Problem: Neurosensory - Adult  Goal: Achieves stable or improved neurological status  Outcome: Progressing     Problem: Respiratory - Adult  Goal: Achieves optimal ventilation and oxygenation  Outcome: Progressing     Problem: Cardiovascular - Adult  Goal: Maintains optimal cardiac output and hemodynamic stability  Outcome: Progressing  Goal: Absence of cardiac dysrhythmias or at baseline  Outcome: Progressing     Problem: Skin/Tissue Integrity - Adult  Goal: Skin integrity remains intact  Outcome: Progressing  Goal: Incisions, wounds, or drain sites healing without S/S of infection  Outcome: Progressing  Goal: Oral mucous membranes remain intact  Outcome: Progressing     Problem: Musculoskeletal - Adult  Goal: Return mobility to safest level of function  Outcome: Progressing  Goal: Return ADL status to a safe level of function  Outcome: Progressing     Problem: Gastrointestinal - Adult  Goal: Minimal or absence of nausea and vomiting  Outcome: Progressing  Goal: Maintains or returns to baseline bowel function  Outcome: Progressing  Goal: Maintains adequate nutritional intake  Outcome: Progressing     Problem: Genitourinary - Adult  Goal: Absence of urinary retention  Outcome: Progressing     Problem: Metabolic/Fluid and Electrolytes - Adult  Goal: Electrolytes maintained within normal limits  Outcome: Progressing  Goal: Hemodynamic stability and optimal renal function maintained  Outcome: Progressing  Goal: Glucose maintained within prescribed

## 2025-01-31 ENCOUNTER — HOSPITAL ENCOUNTER (INPATIENT)
Age: 50
LOS: 1 days | Discharge: HOME | End: 2025-02-01
Attending: EMERGENCY MEDICINE
Payer: COMMERCIAL

## 2025-01-31 ENCOUNTER — APPOINTMENT (OUTPATIENT)
Dept: GENERAL RADIOLOGY | Age: 50
End: 2025-01-31
Payer: COMMERCIAL

## 2025-01-31 ENCOUNTER — APPOINTMENT (OUTPATIENT)
Dept: INTERVENTIONAL RADIOLOGY/VASCULAR | Age: 50
End: 2025-01-31
Payer: COMMERCIAL

## 2025-01-31 DIAGNOSIS — N18.9 CHRONIC KIDNEY DISEASE, UNSPECIFIED CKD STAGE: Primary | ICD-10-CM

## 2025-01-31 DIAGNOSIS — E87.70 HYPERVOLEMIA, UNSPECIFIED HYPERVOLEMIA TYPE: ICD-10-CM

## 2025-01-31 PROBLEM — E87.5 HYPERKALEMIA: Status: ACTIVE | Noted: 2025-01-31

## 2025-01-31 PROBLEM — T82.898A: Status: ACTIVE | Noted: 2025-01-31

## 2025-01-31 LAB
ANION GAP SERPL CALC-SCNC: 22 MEQ/L (ref 8–16)
BASOPHILS ABSOLUTE: 0 THOU/MM3 (ref 0–0.1)
BASOPHILS NFR BLD AUTO: 0.4 %
BUN SERPL-MCNC: 85 MG/DL (ref 7–22)
CALCIUM SERPL-MCNC: 7.8 MG/DL (ref 8.5–10.5)
CHLORIDE SERPL-SCNC: 98 MEQ/L (ref 98–111)
CO2 SERPL-SCNC: 17 MEQ/L (ref 23–33)
CREAT SERPL-MCNC: 8.7 MG/DL (ref 0.4–1.2)
DEPRECATED RDW RBC AUTO: 48.3 FL (ref 35–45)
EKG ATRIAL RATE: 86 BPM
EKG P AXIS: 43 DEGREES
EKG P-R INTERVAL: 178 MS
EKG Q-T INTERVAL: 434 MS
EKG QRS DURATION: 94 MS
EKG QTC CALCULATION (BAZETT): 519 MS
EKG R AXIS: 10 DEGREES
EKG T AXIS: 70 DEGREES
EKG VENTRICULAR RATE: 86 BPM
EOSINOPHIL NFR BLD AUTO: 2.4 %
EOSINOPHILS ABSOLUTE: 0.2 THOU/MM3 (ref 0–0.4)
ERYTHROCYTE [DISTWIDTH] IN BLOOD BY AUTOMATED COUNT: 15 % (ref 11.5–14.5)
GFR SERPL CREATININE-BSD FRML MDRD: 5 ML/MIN/1.73M2
GLUCOSE BLD STRIP.AUTO-MCNC: 184 MG/DL (ref 70–108)
GLUCOSE SERPL-MCNC: 236 MG/DL (ref 70–108)
HCT VFR BLD AUTO: 28.2 % (ref 37–47)
HGB BLD-MCNC: 8.9 GM/DL (ref 12–16)
IMM GRANULOCYTES # BLD AUTO: 0.04 THOU/MM3 (ref 0–0.07)
IMM GRANULOCYTES NFR BLD AUTO: 0.5 %
INR PPP: 1.03 (ref 0.85–1.13)
LYMPHOCYTES ABSOLUTE: 1.1 THOU/MM3 (ref 1–4.8)
LYMPHOCYTES NFR BLD AUTO: 13.5 %
MCH RBC QN AUTO: 28 PG (ref 26–33)
MCHC RBC AUTO-ENTMCNC: 31.6 GM/DL (ref 32.2–35.5)
MCV RBC AUTO: 88.7 FL (ref 81–99)
MONOCYTES ABSOLUTE: 0.4 THOU/MM3 (ref 0.4–1.3)
MONOCYTES NFR BLD AUTO: 5.3 %
NEUTROPHILS ABSOLUTE: 6.5 THOU/MM3 (ref 1.8–7.7)
NEUTROPHILS NFR BLD AUTO: 77.9 %
NRBC BLD AUTO-RTO: 0 /100 WBC
OSMOLALITY SERPL CALC.SUM OF ELEC: 307.3 MOSMOL/KG (ref 275–300)
PLATELET # BLD AUTO: 218 THOU/MM3 (ref 130–400)
PMV BLD AUTO: 9.2 FL (ref 9.4–12.4)
POTASSIUM SERPL-SCNC: 5.6 MEQ/L (ref 3.5–5.2)
RBC # BLD AUTO: 3.18 MILL/MM3 (ref 4.2–5.4)
SODIUM SERPL-SCNC: 137 MEQ/L (ref 135–145)
WBC # BLD AUTO: 8.3 THOU/MM3 (ref 4.8–10.8)

## 2025-01-31 PROCEDURE — 6360000002 HC RX W HCPCS: Performed by: RADIOLOGY

## 2025-01-31 PROCEDURE — 057Y3ZZ DILATION OF UPPER VEIN, PERCUTANEOUS APPROACH: ICD-10-PCS | Performed by: RADIOLOGY

## 2025-01-31 PROCEDURE — C1894 INTRO/SHEATH, NON-LASER: HCPCS

## 2025-01-31 PROCEDURE — P9047 ALBUMIN (HUMAN), 25%, 50ML: HCPCS | Performed by: INTERNAL MEDICINE

## 2025-01-31 PROCEDURE — 71045 X-RAY EXAM CHEST 1 VIEW: CPT

## 2025-01-31 PROCEDURE — 96375 TX/PRO/DX INJ NEW DRUG ADDON: CPT

## 2025-01-31 PROCEDURE — 96374 THER/PROPH/DIAG INJ IV PUSH: CPT

## 2025-01-31 PROCEDURE — 6370000000 HC RX 637 (ALT 250 FOR IP)

## 2025-01-31 PROCEDURE — 90935 HEMODIALYSIS ONE EVALUATION: CPT

## 2025-01-31 PROCEDURE — C1725 CATH, TRANSLUMIN NON-LASER: HCPCS

## 2025-01-31 PROCEDURE — 36901 INTRO CATH DIALYSIS CIRCUIT: CPT

## 2025-01-31 PROCEDURE — 6360000004 HC RX CONTRAST MEDICATION: Performed by: RADIOLOGY

## 2025-01-31 PROCEDURE — 85025 COMPLETE CBC W/AUTO DIFF WBC: CPT

## 2025-01-31 PROCEDURE — 36415 COLL VENOUS BLD VENIPUNCTURE: CPT

## 2025-01-31 PROCEDURE — 36902 INTRO CATH DIALYSIS CIRCUIT: CPT

## 2025-01-31 PROCEDURE — 93005 ELECTROCARDIOGRAM TRACING: CPT | Performed by: EMERGENCY MEDICINE

## 2025-01-31 PROCEDURE — 85610 PROTHROMBIN TIME: CPT

## 2025-01-31 PROCEDURE — 2500000003 HC RX 250 WO HCPCS

## 2025-01-31 PROCEDURE — 99223 1ST HOSP IP/OBS HIGH 75: CPT | Performed by: STUDENT IN AN ORGANIZED HEALTH CARE EDUCATION/TRAINING PROGRAM

## 2025-01-31 PROCEDURE — 1200000003 HC TELEMETRY R&B

## 2025-01-31 PROCEDURE — 90935 HEMODIALYSIS ONE EVALUATION: CPT | Performed by: INTERNAL MEDICINE

## 2025-01-31 PROCEDURE — 6360000002 HC RX W HCPCS: Performed by: EMERGENCY MEDICINE

## 2025-01-31 PROCEDURE — 5A1D70Z PERFORMANCE OF URINARY FILTRATION, INTERMITTENT, LESS THAN 6 HOURS PER DAY: ICD-10-PCS | Performed by: INTERNAL MEDICINE

## 2025-01-31 PROCEDURE — 6360000002 HC RX W HCPCS: Performed by: INTERNAL MEDICINE

## 2025-01-31 PROCEDURE — 99285 EMERGENCY DEPT VISIT HI MDM: CPT

## 2025-01-31 PROCEDURE — 82948 REAGENT STRIP/BLOOD GLUCOSE: CPT

## 2025-01-31 PROCEDURE — 80048 BASIC METABOLIC PNL TOTAL CA: CPT

## 2025-01-31 RX ORDER — DEXTROSE MONOHYDRATE 100 MG/ML
INJECTION, SOLUTION INTRAVENOUS CONTINUOUS PRN
Status: DISCONTINUED | OUTPATIENT
Start: 2025-01-31 | End: 2025-02-01 | Stop reason: HOSPADM

## 2025-01-31 RX ORDER — SODIUM CHLORIDE 0.9 % (FLUSH) 0.9 %
5-40 SYRINGE (ML) INJECTION PRN
Status: DISCONTINUED | OUTPATIENT
Start: 2025-01-31 | End: 2025-02-01 | Stop reason: HOSPADM

## 2025-01-31 RX ORDER — METOPROLOL SUCCINATE 25 MG/1
25 TABLET, EXTENDED RELEASE ORAL 2 TIMES DAILY
Status: DISCONTINUED | OUTPATIENT
Start: 2025-01-31 | End: 2025-02-01 | Stop reason: HOSPADM

## 2025-01-31 RX ORDER — SODIUM CHLORIDE 9 MG/ML
INJECTION, SOLUTION INTRAVENOUS PRN
Status: DISCONTINUED | OUTPATIENT
Start: 2025-01-31 | End: 2025-02-01 | Stop reason: HOSPADM

## 2025-01-31 RX ORDER — LIDOCAINE HYDROCHLORIDE 20 MG/ML
INJECTION, SOLUTION INFILTRATION; PERINEURAL PRN
Status: COMPLETED | OUTPATIENT
Start: 2025-01-31 | End: 2025-01-31

## 2025-01-31 RX ORDER — ASPIRIN 81 MG/1
81 TABLET, CHEWABLE ORAL DAILY
Status: DISCONTINUED | OUTPATIENT
Start: 2025-01-31 | End: 2025-02-01 | Stop reason: HOSPADM

## 2025-01-31 RX ORDER — CALCIUM GLUCONATE 94 MG/ML
1000 INJECTION, SOLUTION INTRAVENOUS ONCE
Status: COMPLETED | OUTPATIENT
Start: 2025-01-31 | End: 2025-01-31

## 2025-01-31 RX ORDER — ALBUTEROL SULFATE 90 UG/1
2 INHALANT RESPIRATORY (INHALATION) EVERY 6 HOURS PRN
Status: DISCONTINUED | OUTPATIENT
Start: 2025-01-31 | End: 2025-02-01 | Stop reason: HOSPADM

## 2025-01-31 RX ORDER — HEPARIN 100 UNIT/ML
SYRINGE INTRAVENOUS PRN
Status: COMPLETED | OUTPATIENT
Start: 2025-01-31 | End: 2025-01-31

## 2025-01-31 RX ORDER — AMLODIPINE BESYLATE 5 MG/1
5 TABLET ORAL DAILY
Status: DISCONTINUED | OUTPATIENT
Start: 2025-02-01 | End: 2025-02-01 | Stop reason: HOSPADM

## 2025-01-31 RX ORDER — ONDANSETRON 2 MG/ML
4 INJECTION INTRAMUSCULAR; INTRAVENOUS EVERY 6 HOURS PRN
Status: DISCONTINUED | OUTPATIENT
Start: 2025-01-31 | End: 2025-02-01 | Stop reason: SDUPTHER

## 2025-01-31 RX ORDER — GLUCAGON 1 MG/ML
1 KIT INJECTION PRN
Status: DISCONTINUED | OUTPATIENT
Start: 2025-01-31 | End: 2025-02-01 | Stop reason: HOSPADM

## 2025-01-31 RX ORDER — BUMETANIDE 1 MG/1
2 TABLET ORAL 2 TIMES DAILY
Status: DISCONTINUED | OUTPATIENT
Start: 2025-01-31 | End: 2025-02-01 | Stop reason: HOSPADM

## 2025-01-31 RX ORDER — ONDANSETRON 2 MG/ML
4 INJECTION INTRAMUSCULAR; INTRAVENOUS EVERY 6 HOURS PRN
Status: DISCONTINUED | OUTPATIENT
Start: 2025-01-31 | End: 2025-02-01 | Stop reason: HOSPADM

## 2025-01-31 RX ORDER — ONDANSETRON 4 MG/1
4 TABLET, ORALLY DISINTEGRATING ORAL EVERY 8 HOURS PRN
Status: DISCONTINUED | OUTPATIENT
Start: 2025-01-31 | End: 2025-02-01 | Stop reason: HOSPADM

## 2025-01-31 RX ORDER — SODIUM CHLORIDE 0.9 % (FLUSH) 0.9 %
5-40 SYRINGE (ML) INJECTION EVERY 12 HOURS SCHEDULED
Status: DISCONTINUED | OUTPATIENT
Start: 2025-01-31 | End: 2025-02-01 | Stop reason: HOSPADM

## 2025-01-31 RX ORDER — ATORVASTATIN CALCIUM 40 MG/1
40 TABLET, FILM COATED ORAL DAILY
Status: DISCONTINUED | OUTPATIENT
Start: 2025-02-01 | End: 2025-02-01 | Stop reason: HOSPADM

## 2025-01-31 RX ORDER — POLYETHYLENE GLYCOL 3350 17 G/17G
17 POWDER, FOR SOLUTION ORAL DAILY PRN
Status: DISCONTINUED | OUTPATIENT
Start: 2025-01-31 | End: 2025-02-01 | Stop reason: HOSPADM

## 2025-01-31 RX ORDER — INSULIN GLARGINE 100 [IU]/ML
40 INJECTION, SOLUTION SUBCUTANEOUS 2 TIMES DAILY
Status: DISCONTINUED | OUTPATIENT
Start: 2025-01-31 | End: 2025-02-01 | Stop reason: HOSPADM

## 2025-01-31 RX ORDER — ALBUMIN (HUMAN) 12.5 G/50ML
25 SOLUTION INTRAVENOUS ONCE
Status: COMPLETED | OUTPATIENT
Start: 2025-01-31 | End: 2025-02-01

## 2025-01-31 RX ORDER — ACETAMINOPHEN 650 MG/1
650 SUPPOSITORY RECTAL EVERY 6 HOURS PRN
Status: DISCONTINUED | OUTPATIENT
Start: 2025-01-31 | End: 2025-02-01 | Stop reason: HOSPADM

## 2025-01-31 RX ORDER — ACETAMINOPHEN 325 MG/1
650 TABLET ORAL EVERY 6 HOURS PRN
Status: DISCONTINUED | OUTPATIENT
Start: 2025-01-31 | End: 2025-02-01 | Stop reason: HOSPADM

## 2025-01-31 RX ORDER — IOPAMIDOL 612 MG/ML
INJECTION, SOLUTION INTRAVASCULAR PRN
Status: COMPLETED | OUTPATIENT
Start: 2025-01-31 | End: 2025-01-31

## 2025-01-31 RX ORDER — MIDAZOLAM HYDROCHLORIDE 5 MG/ML
INJECTION, SOLUTION INTRAMUSCULAR; INTRAVENOUS PRN
Status: COMPLETED | OUTPATIENT
Start: 2025-01-31 | End: 2025-01-31

## 2025-01-31 RX ORDER — HEPARIN SODIUM 1000 [USP'U]/ML
INJECTION, SOLUTION INTRAVENOUS; SUBCUTANEOUS PRN
Status: COMPLETED | OUTPATIENT
Start: 2025-01-31 | End: 2025-01-31

## 2025-01-31 RX ORDER — GABAPENTIN 300 MG/1
300 CAPSULE ORAL 3 TIMES DAILY
Status: DISCONTINUED | OUTPATIENT
Start: 2025-01-31 | End: 2025-02-01 | Stop reason: HOSPADM

## 2025-01-31 RX ORDER — DOXAZOSIN 4 MG/1
4 TABLET ORAL NIGHTLY
Status: DISCONTINUED | OUTPATIENT
Start: 2025-02-01 | End: 2025-02-01 | Stop reason: HOSPADM

## 2025-01-31 RX ADMIN — HYDROMORPHONE HYDROCHLORIDE 0.5 MG: 1 INJECTION, SOLUTION INTRAMUSCULAR; INTRAVENOUS; SUBCUTANEOUS at 14:37

## 2025-01-31 RX ADMIN — MIDAZOLAM HYDROCHLORIDE 0.5 MG: 5 INJECTION, SOLUTION INTRAMUSCULAR; INTRAVENOUS at 14:15

## 2025-01-31 RX ADMIN — SODIUM CHLORIDE, PRESERVATIVE FREE 10 ML: 5 INJECTION INTRAVENOUS at 22:35

## 2025-01-31 RX ADMIN — HEPARIN 500 UNITS: 100 SYRINGE at 15:10

## 2025-01-31 RX ADMIN — HYDROMORPHONE HYDROCHLORIDE 0.5 MG: 1 INJECTION, SOLUTION INTRAMUSCULAR; INTRAVENOUS; SUBCUTANEOUS at 14:15

## 2025-01-31 RX ADMIN — GABAPENTIN 300 MG: 300 CAPSULE ORAL at 21:35

## 2025-01-31 RX ADMIN — APIXABAN 2.5 MG: 2.5 TABLET, FILM COATED ORAL at 22:28

## 2025-01-31 RX ADMIN — METOPROLOL SUCCINATE 25 MG: 25 TABLET, FILM COATED, EXTENDED RELEASE ORAL at 22:29

## 2025-01-31 RX ADMIN — CALCIUM GLUCONATE 1000 MG: 98 INJECTION, SOLUTION INTRAVENOUS at 11:24

## 2025-01-31 RX ADMIN — HYDROMORPHONE HYDROCHLORIDE 0.5 MG: 1 INJECTION, SOLUTION INTRAMUSCULAR; INTRAVENOUS; SUBCUTANEOUS at 14:47

## 2025-01-31 RX ADMIN — ALBUMIN (HUMAN) 25 G: 0.25 INJECTION, SOLUTION INTRAVENOUS at 15:38

## 2025-01-31 RX ADMIN — MIDAZOLAM HYDROCHLORIDE 0.5 MG: 5 INJECTION, SOLUTION INTRAMUSCULAR; INTRAVENOUS at 14:37

## 2025-01-31 RX ADMIN — MIDAZOLAM HYDROCHLORIDE 0.5 MG: 5 INJECTION, SOLUTION INTRAMUSCULAR; INTRAVENOUS at 14:47

## 2025-01-31 RX ADMIN — LIDOCAINE HYDROCHLORIDE 6 ML: 20 INJECTION, SOLUTION INFILTRATION; PERINEURAL at 15:06

## 2025-01-31 RX ADMIN — HEPARIN SODIUM 4000 UNITS: 1000 INJECTION INTRAVENOUS; SUBCUTANEOUS at 14:47

## 2025-01-31 RX ADMIN — INSULIN GLARGINE 40 UNITS: 100 INJECTION, SOLUTION SUBCUTANEOUS at 22:33

## 2025-01-31 RX ADMIN — ONDANSETRON 4 MG: 2 INJECTION INTRAMUSCULAR; INTRAVENOUS at 15:39

## 2025-01-31 RX ADMIN — BUMETANIDE 2 MG: 1 TABLET ORAL at 22:29

## 2025-01-31 RX ADMIN — IOPAMIDOL 85 ML: 612 INJECTION, SOLUTION INTRAVENOUS at 15:08

## 2025-01-31 ASSESSMENT — PAIN SCALES - GENERAL: PAINLEVEL_OUTOF10: 0

## 2025-01-31 ASSESSMENT — ENCOUNTER SYMPTOMS: SHORTNESS OF BREATH: 0

## 2025-01-31 NOTE — OP NOTE
Department of Radiology  Post Procedure Progress Note      Pre-Procedure Diagnosis: Malfunctioning dialysis fistula/graft     Procedure Performed:  Dialysis fistulagram with angioplasty     Anesthesia: local / versed and dilaudid    Findings: successful    Immediate Complications:  None    Estimated Blood Loss: minimal    SEE DICTATED PROCEDURE NOTE FOR COMPLETE DETAILS.    Electronically signed by Quang Vieyra MD on 1/31/2025 at 3:28 PM

## 2025-01-31 NOTE — H&P
Internal Medicine Resident H&P Note      Patient:  Debbie Locke    Unit/Bed:04/004A  YOB: 1975  MRN: [id number]   Acct: [id number]   PCP: Kory Thomas MD  Date of Admission: 1/31/2025  Date of Service: Pt seen/examined on 01/31/25      Assessment/Plan:  Malfunctioning AV fistula: Patient presented to the hospital from the dialysis center as her venous side of her AV fistula was not working.  IR consulted per ED for fistulogram with angiography.  ESRD on HD: Schedule Monday Wednesday Friday.  Access: AV fistula.  Follows with nephrology.  Per nephrology they would like to keep her in the hospital overnight for dialysis tomorrow morning.  Nephrology consulted and following  IDDMT2: With history of neuropathy and nephropathy.  Takes Lantus 50 units twice daily.  Follows with Dr. Kidd.  Has allergy to Humalog.  Continue Lantus 40 units twice daily  POCT glucose checks  Hypoglycemia protocol  Continue gabapentin for neuropathy  History of FSGS, diabetic nephropathy: Follows with Dr. Neville  Hypertension: Continue Lopressor, Cardura, amlodipine  History of PAD: 100% occlusion of distal right SFA to mid right popliteal artery: Continue aspirin and Eliquis  Hyperlipidemia: Continue statin  History of DVT: On Eliquis        Expected discharge date:  TBD    Disposition: TBD  [] Home  [] Inpatient Rehab  [] Psychiatric Unit  [] SNF  [] Long Term Care Facility  [] Other-    ===================================================================      Chief Complaint: Dialysis fistula not working    HPI: Patient is a 49-year-old female past medical history significant for insulin-dependent type 2 diabetes, hypertension, ESRD on HD, neuropathy, hyperlipidemia, PAD with total occlusion of distal right SFA to mid right popliteal artery who presented to [facility] with AV fistula not working.        ROS: reviewed complete ROS unchanged unless otherwise stated in hospital course/subjective portion.

## 2025-01-31 NOTE — CONSULTS
[facility]    [address] [id number]  [phone]     Hospital Consult  2025    Pt Name:    Debbie Locke  MRN:     [id number]   [id number]  YOB: 1975  Admit Date:    2025  7:58 AM  Primary Care Physician:  Kory Thomas MD    CSN Number:   [id number]    Reason for Consult:  ESRD, volume overload and hyperkalemia  Requesting provider:  Hospitalist    History:   The patient is a 49 y.o. female with history of ESRD, hypertension, diabetes, chronic volume overload who was sent from her dialysis unit due to malfunctioning of left upper extremity AV fistula.  Patient reports shortness of breath and weight gain.  Has not had dialysis since Monday.  Could not get dialysis treatment due to access issues on Wednesday.  Case discussed with ER.  Upon arrival patient noted to be volume overloaded and hyperkalemic.  IR intervention requested and patient went to IR and underwent dialysis fistulogram with angioplasty.  Patient is getting admitted and plan is for her to go dialysis treatment today    Past Medical History:  Past Medical History:   Diagnosis Date    Chronic kidney disease     FSGS (focal segmental glomerulosclerosis)     Hemodialysis patient ([facility])     M-W-F    Hyperlipidemia     Hypertension     Neuropathy     diabetic neuropathy    PVD (peripheral vascular disease) ([facility])     Type II or unspecified type diabetes mellitus without mention of complication, not stated as uncontrolled 2000       Past Surgical History:  Past Surgical History:   Procedure Laterality Date    BACK SURGERY N/A 2024    Excision of Chronic Abscess on Back performed by Kyree Mercado MD at [facility] OR     SECTION  2002    CHOLECYSTECTOMY  2023    CT BIOPSY RENAL  2023    CT BIOPSY RENAL 2023 [facility] CT SCAN    DIALYSIS FISTULA CREATION Left 10/1/2024    LEFT BRACHIAL TO CEPHALIC ARTERIOVENOUS FISTULA PLACEMENT performed by Jaxon Hall  negative for slurred speech  Psychiatric: Reports stable mood, negative for depression or insomnia    All other review of systems were reviewed and negative    Current Meds:  Infusion:    sodium chloride      dextrose       Meds:    sodium chloride flush  5-40 mL IntraVENous 2 times per day    amLODIPine  5 mg Oral Daily    apixaban  2.5 mg Oral BID    aspirin  81 mg Oral Daily    atorvastatin  40 mg Oral Daily    bumetanide  2 mg Oral BID    doxazosin  4 mg Oral Nightly    gabapentin  300 mg Oral BID    insulin glargine  40 Units SubCUTAneous BID    metoprolol succinate  25 mg Oral BID     Meds prn: ondansetron, sodium chloride flush, sodium chloride, ondansetron **OR** ondansetron, polyethylene glycol, acetaminophen **OR** acetaminophen, albuterol sulfate HFA, glucose, dextrose bolus **OR** dextrose bolus, glucagon (rDNA), dextrose     Allergies/Intolerances:  ALLERGIES: Insulin lispro, Insulins, and Lisinopril    24HR INTAKE/OUTPUT:    Intake/Output Summary (Last 24 hours) at 1/31/2025 1850  Last data filed at 1/31/2025 1730  Gross per 24 hour   Intake 400 ml   Output 2162 ml   Net -1762 ml     No intake/output data recorded.  I/O this shift:  In: 400   Out: 2162   Admission weight: 112 kg (246 lb 14.6 oz)  Wt Readings from Last 3 Encounters:   01/31/25 114.6 kg (252 lb 10.4 oz)   01/22/25 104.2 kg (229 lb 11.5 oz)   11/21/24 99.8 kg (220 lb)     Body mass index is 43.37 kg/m².    Physical Examination:  VITALS:   Vitals:    01/31/25 1510 01/31/25 1520 01/31/25 1730 01/31/25 1850   BP: (!) 142/82 134/72 128/81 (!) 147/77   Pulse: 84 78 85 86   Resp: 30 30 18    Temp:  97.6 °F (36.4 °C) 97.7 °F (36.5 °C)    TempSrc:       SpO2: 90% 92%  91%   Weight:  116.3 kg (256 lb 6.3 oz) 114.6 kg (252 lb 10.4 oz)      Weight:   Wt Readings from Last 3 Encounters:   01/31/25 114.6 kg (252 lb 10.4 oz)   01/22/25 104.2 kg (229 lb 11.5 oz)   11/21/24 99.8 kg (220 lb)     Constitutional and General Appearance: alert and cooperative

## 2025-01-31 NOTE — FLOWSHEET NOTE
01/31/25 1730   Vital Signs   /81   Temp 97.7 °F (36.5 °C)   Pulse 85   Respirations 18   Weight - Scale 114.6 kg (252 lb 10.4 oz)   Weight Method Bed scale   Percent Weight Change -1.46   Post-Hemodialysis Assessment   Post-Treatment Procedures Access bleeding time < 10 minutes   Machine Disinfection Process Acid/Vinegar Clean;Exterior Machine Disinfection   Rinseback Volume (ml) 400 ml   Blood Volume Processed (Liters) 35.7 L   Dialyzer Clearance Lightly streaked   Duration of Treatment (minutes) 120 minutes   Hemodialysis Intake (ml) 400 ml   Hemodialysis Output (ml) 2162 ml   NET Removed (ml) 1762   Tolerated Treatment Fair     2 hours of 4 hour treatment complete.  1762 mL net UF removed. Arterial side sheath became occluded and would not run. Pulled sheath. Pt. would not let us continue with treatment any further.  Repeatedly Requesting that we stop for now.    Blood unable to be returned following treatment.  Pressure held to needle access sites for 20 min x 2.  Report provided to primary RN. Paperwork printed and placed in bin to be scanned in to emr.

## 2025-01-31 NOTE — ED TRIAGE NOTES
Pt to rm 04 per intake states she was sent over from dialysis for new catheter placement because her current fistula keeps 'blowing' for them. Last full treatment was Monday because the same thing happened Wednesday. Advised they would give her arm a 'rest' and try again today. Pt reports feeling more SOB r/t missing the last two treatments this week. Also states she is scheduled for CAPD cath placement later this month and would rather get that today instead of the cath in her chest. Denies other needs or concerns. VSS.

## 2025-01-31 NOTE — H&P
[facility]  Sedation/Analgesia History & Physical    Pt Name: Debbie Locke  MRN: [id number]  YOB: 1975  Provider Performing Procedure: Quang Vieyar MD, MD  Primary Care Physician: Kory Thomas MD    Formulation and discussion of sedation / procedure plans, risks, benefits, side effects and alternatives with patient and/or responsible adult completed.    PRE-PROCEDURE   DNR-CCA/DNR-CC []Yes [x]No  Brief History/Pre-Procedure Diagnosis: Renal failure          MEDICAL HISTORY  []CAD/Valve  []Liver Disease  []Lung Disease []Diabetes  []Hypertension []Renal Disease  [x]Additional information:       has a past medical history of Chronic kidney disease, FSGS (focal segmental glomerulosclerosis), Hemodialysis patient ([facility]), Hyperlipidemia, Hypertension, Neuropathy, PVD (peripheral vascular disease) ([facility]), and Type II or unspecified type diabetes mellitus without mention of complication, not stated as uncontrolled.    SURGICAL HISTORY   has a past surgical history that includes  section (2002); CT BIOPSY RENAL (2023); Cholecystectomy (2023); back surgery (N/A, 2024); vascular surgery (Right, 2021); Thyroidectomy, partial; and Dialysis fistula creation (Left, 10/1/2024).  Additional information:       ALLERGIES   Allergies as of 2025 - Fully Reviewed 2025   Allergen Reaction Noted    Insulin lispro Other (See Comments) 2024    Insulins Other (See Comments) 2021    Lisinopril  2022     Additional information:       MEDICATIONS   Coumadin Use Last 5 Days [x]No []Yes  Antiplatelet drug therapy use last 5 days  [x]No []Yes  Other anticoagulant use last 5 days  [x]No []Yes  No current facility-administered medications for this encounter.    Current Outpatient Medications:     sucralfate (CARAFATE) 1 GM tablet, Take 1 tablet by mouth 4 times daily, Disp: , Rfl:     metoprolol succinate (TOPROL XL) 25 MG extended release  anesthesia. (Refer to nursing sedation/analgesia documentation record)  [x]Formulation and discussion of sedation/procedure plan, risks, and expectations with patient and/or responsible adult completed.  [x]Patient examined immediately prior to the procedure. (Refer to nursing sedation/analgesia documentation record)    Quang Vieyra MD, MD  Electronically signed 1/31/2025 at 2:13 PM

## 2025-01-31 NOTE — PROGRESS NOTES
1344 Pt in specials radiology for dialysis fistulogram with possible tunneled dialysis catheter insertion. Discussed procedure with pt and pt verbalizes understanding.   1400 Pt moved to table and attached to monitor. Pt very short of breath with exertion.  1411 Pt states \"I can't breath.\" SPO2 91%. O2 2 liters per nasal cannula applied.  1413 Left arm prepped and draped. Dr Albright to speak to pt.  1418 Procedure started.  1448 Angioplasty of run-off vein with 10 x 40 [place] 35 balloon.  1459 Angioplasty of arterial anastamosis with 5 x 40 [place] 35 balloon.  1500 SPO2 90%. O2 increased to 4 liters per nasal cannula.  1505 Angioplasty of arterial anastamosis with 8 x 40 [place] 35 balloon.  1510 Procedure complete. Sheaths x 2 dwellled with heparin 500 units each and secured with op-site dressings. Capped and sites without redness, swelling or hematoma.  1516 Pt positioned on cart for comfort and transferred to IP dialysis per cart. Report updated to RN.

## 2025-02-01 VITALS
OXYGEN SATURATION: 94 % | BODY MASS INDEX: 41.55 KG/M2 | WEIGHT: 243.39 LBS | SYSTOLIC BLOOD PRESSURE: 150 MMHG | DIASTOLIC BLOOD PRESSURE: 73 MMHG | HEART RATE: 94 BPM | HEIGHT: 64 IN | RESPIRATION RATE: 18 BRPM | TEMPERATURE: 97.8 F

## 2025-02-01 LAB
ANION GAP SERPL CALC-SCNC: 17 MEQ/L (ref 8–16)
BASOPHILS ABSOLUTE: 0 THOU/MM3 (ref 0–0.1)
BASOPHILS NFR BLD AUTO: 0.4 %
BUN SERPL-MCNC: 63 MG/DL (ref 7–22)
CALCIUM SERPL-MCNC: 8.1 MG/DL (ref 8.5–10.5)
CHLORIDE SERPL-SCNC: 97 MEQ/L (ref 98–111)
CO2 SERPL-SCNC: 23 MEQ/L (ref 23–33)
CREAT SERPL-MCNC: 7.4 MG/DL (ref 0.4–1.2)
DEPRECATED RDW RBC AUTO: 47.8 FL (ref 35–45)
EOSINOPHIL NFR BLD AUTO: 1 %
EOSINOPHILS ABSOLUTE: 0.1 THOU/MM3 (ref 0–0.4)
ERYTHROCYTE [DISTWIDTH] IN BLOOD BY AUTOMATED COUNT: 15 % (ref 11.5–14.5)
GFR SERPL CREATININE-BSD FRML MDRD: 6 ML/MIN/1.73M2
GLUCOSE BLD STRIP.AUTO-MCNC: 174 MG/DL (ref 70–108)
GLUCOSE BLD STRIP.AUTO-MCNC: 250 MG/DL (ref 70–108)
GLUCOSE SERPL-MCNC: 217 MG/DL (ref 70–108)
HCT VFR BLD AUTO: 26.2 % (ref 37–47)
HGB BLD-MCNC: 8.1 GM/DL (ref 12–16)
IMM GRANULOCYTES # BLD AUTO: 0.05 THOU/MM3 (ref 0–0.07)
IMM GRANULOCYTES NFR BLD AUTO: 0.6 %
LYMPHOCYTES ABSOLUTE: 1 THOU/MM3 (ref 1–4.8)
LYMPHOCYTES NFR BLD AUTO: 11.9 %
MCH RBC QN AUTO: 27.5 PG (ref 26–33)
MCHC RBC AUTO-ENTMCNC: 30.9 GM/DL (ref 32.2–35.5)
MCV RBC AUTO: 88.8 FL (ref 81–99)
MONOCYTES ABSOLUTE: 0.3 THOU/MM3 (ref 0.4–1.3)
MONOCYTES NFR BLD AUTO: 3.5 %
NEUTROPHILS ABSOLUTE: 6.8 THOU/MM3 (ref 1.8–7.7)
NEUTROPHILS NFR BLD AUTO: 82.6 %
NRBC BLD AUTO-RTO: 0 /100 WBC
OSMOLALITY SERPL CALC.SUM OF ELEC: 298.4 MOSMOL/KG (ref 275–300)
PLATELET # BLD AUTO: 194 THOU/MM3 (ref 130–400)
PMV BLD AUTO: 9.1 FL (ref 9.4–12.4)
POTASSIUM SERPL-SCNC: 6.1 MEQ/L (ref 3.5–5.2)
RBC # BLD AUTO: 2.95 MILL/MM3 (ref 4.2–5.4)
SODIUM SERPL-SCNC: 137 MEQ/L (ref 135–145)
WBC # BLD AUTO: 8.2 THOU/MM3 (ref 4.8–10.8)

## 2025-02-01 PROCEDURE — 6370000000 HC RX 637 (ALT 250 FOR IP)

## 2025-02-01 PROCEDURE — 80048 BASIC METABOLIC PNL TOTAL CA: CPT

## 2025-02-01 PROCEDURE — 82948 REAGENT STRIP/BLOOD GLUCOSE: CPT

## 2025-02-01 PROCEDURE — 90935 HEMODIALYSIS ONE EVALUATION: CPT

## 2025-02-01 PROCEDURE — 36415 COLL VENOUS BLD VENIPUNCTURE: CPT

## 2025-02-01 PROCEDURE — 90935 HEMODIALYSIS ONE EVALUATION: CPT | Performed by: INTERNAL MEDICINE

## 2025-02-01 PROCEDURE — 85025 COMPLETE CBC W/AUTO DIFF WBC: CPT

## 2025-02-01 PROCEDURE — 2500000003 HC RX 250 WO HCPCS

## 2025-02-01 RX ORDER — BUMETANIDE 2 MG/1
2 TABLET ORAL 2 TIMES DAILY
Qty: 60 TABLET | Refills: 0 | Status: SHIPPED | OUTPATIENT
Start: 2025-02-01

## 2025-02-01 RX ADMIN — ATORVASTATIN CALCIUM 40 MG: 40 TABLET, FILM COATED ORAL at 12:46

## 2025-02-01 RX ADMIN — SODIUM CHLORIDE, PRESERVATIVE FREE 10 ML: 5 INJECTION INTRAVENOUS at 12:49

## 2025-02-01 RX ADMIN — BUMETANIDE 2 MG: 1 TABLET ORAL at 12:46

## 2025-02-01 RX ADMIN — ASPIRIN 81 MG: 81 TABLET, CHEWABLE ORAL at 12:45

## 2025-02-01 RX ADMIN — INSULIN GLARGINE 40 UNITS: 100 INJECTION, SOLUTION SUBCUTANEOUS at 12:46

## 2025-02-01 RX ADMIN — AMLODIPINE BESYLATE 5 MG: 5 TABLET ORAL at 12:46

## 2025-02-01 RX ADMIN — GABAPENTIN 300 MG: 300 CAPSULE ORAL at 12:45

## 2025-02-01 RX ADMIN — APIXABAN 2.5 MG: 2.5 TABLET, FILM COATED ORAL at 12:46

## 2025-02-01 RX ADMIN — METOPROLOL SUCCINATE 25 MG: 25 TABLET, FILM COATED, EXTENDED RELEASE ORAL at 12:45

## 2025-02-01 NOTE — ED PROVIDER NOTES
EMERGENCY DEPARTMENT         CHIEF COMPLAINT    Chief Complaint   Patient presents with    Vascular Access Problem     States sent over by dialysis for new dialysis cath placement       HPI    Debbie Locke is a 49 y.o. female who presents fistula complication. Patient with AV fistula on the left AC. Present since 10/1/24. Reports prior issues with fistuLa. She states was able to be used on Monday. Then not on Wednesday. Then attempted today and unable to be used so sent to the ED. Thrill present.     PAST MEDICAL HISTORY    Past Medical History:   Diagnosis Date    Chronic kidney disease     FSGS (focal segmental glomerulosclerosis)     Hemodialysis patient ([facility])     M-W-F    Hyperlipidemia     Hypertension     Neuropathy     diabetic neuropathy    PVD (peripheral vascular disease) ([facility])     Type II or unspecified type diabetes mellitus without mention of complication, not stated as uncontrolled 2000       SURGICAL HISTORY    Past Surgical History:   Procedure Laterality Date    BACK SURGERY N/A 2024    Excision of Chronic Abscess on Back performed by Kyree Mercado MD at [facility] OR     SECTION  2002    CHOLECYSTECTOMY  2023    CT BIOPSY RENAL  2023    CT BIOPSY RENAL 2023 [facility] CT SCAN    DIALYSIS FISTULA CREATION Left 10/1/2024    LEFT BRACHIAL TO CEPHALIC ARTERIOVENOUS FISTULA PLACEMENT performed by Jaxon Hall MD at [facility] OR    THYROIDECTOMY, PARTIAL      VASCULAR SURGERY Right 2021    Angiogram with stent RLE       CURRENT MEDICATIONS        ALLERGIES    Allergies   Allergen Reactions    Insulin Lispro Other (See Comments)     Body cramping.     Insulins Other (See Comments)     Pt stated that she cant take insulin it makes her feet curl inward   Put as alg. now    Lisinopril      Other reaction(s): Cough       FAMILY HISTORY    Family History   Problem Relation Age of Onset    Diabetes Mother     High Blood Pressure Mother        SOCIAL HISTORY    Social

## 2025-02-01 NOTE — ED NOTES
Pt resting on cot at this time. Side rail put down per pt request. Pt updated on POC, pt verbalizes understanding. Pt denies any needs at this time. Vitals collected. Pt breathing even and unlabored. Call light in reach.

## 2025-02-01 NOTE — PLAN OF CARE
Problem: Chronic Conditions and Co-morbidities  Goal: Patient's chronic conditions and co-morbidity symptoms are monitored and maintained or improved  2/1/2025 1257 by Naz Anne RN  Outcome: Progressing  Flowsheets (Taken 2/1/2025 1257)  Care Plan - Patient's Chronic Conditions and Co-Morbidity Symptoms are Monitored and Maintained or Improved:   Monitor and assess patient's chronic conditions and comorbid symptoms for stability, deterioration, or improvement   Collaborate with multidisciplinary team to address chronic and comorbid conditions and prevent exacerbation or deterioration     Problem: Discharge Planning  Goal: Discharge to home or other facility with appropriate resources  2/1/2025 1257 by Naz Anne RN  Outcome: Progressing  Flowsheets (Taken 2/1/2025 1257)  Discharge to home or other facility with appropriate resources:   Identify barriers to discharge with patient and caregiver   Identify discharge learning needs (meds, wound care, etc)     Problem: Safety - Adult  Goal: Free from fall injury  2/1/2025 1257 by Naz Anne RN  Outcome: Progressing  Flowsheets (Taken 2/1/2025 1257)  Free From Fall Injury: Instruct family/caregiver on patient safety  Note: No falls noted this shift. Continue falling star program. Bed alarm on, bed in low position. Call light and personal belongings in reach.  Patient uses call light appropriately.      Problem: ABCDS Injury Assessment  Goal: Absence of physical injury  2/1/2025 1257 by Naz Anne RN  Outcome: Progressing  Flowsheets (Taken 2/1/2025 1257)  Absence of Physical Injury: Implement safety measures based on patient assessment     Problem: Respiratory - Adult  Goal: Achieves optimal ventilation and oxygenation  2/1/2025 1257 by Naz Anne RN  Outcome: Progressing  Flowsheets (Taken 2/1/2025 1257)  Achieves optimal ventilation and oxygenation: Assess for changes in respiratory status     Problem: Cardiovascular - Adult  Goal: Maintains

## 2025-02-01 NOTE — PROGRESS NOTES
[facility]   Nephrology progress note  2/1/2025      Pt Name:    Debbie Locke  MRN:     [id number]     YOB: 1975  Admit Date:    1/31/2025  7:58 AM    Chief Complaint: Nephrology following for ESRD and HD    Subjective:  Patient was seen and examined this morning  No chest pain or shortness of breath  Feels okay  Wants to go home after dialysis treatment  Tolerating hemodialysis treatment well at this time  She does not want us to remove any more than 3 L    Objective:  24HR INTAKE/OUTPUT:    Intake/Output Summary (Last 24 hours) at 2/1/2025 1256  Last data filed at 2/1/2025 0319  Gross per 24 hour   Intake 636 ml   Output 2162 ml   Net -1526 ml         I/O last 3 completed shifts:  In: 636 [P.O.:236]  Out: 2162   No intake/output data recorded.   Admission weight: 112 kg (246 lb 14.6 oz)  Wt Readings from Last 3 Encounters:   02/01/25 110.4 kg (243 lb 6.2 oz)   01/22/25 104.2 kg (229 lb 11.5 oz)   11/21/24 99.8 kg (220 lb)        Vitals :   Vitals:    02/01/25 0134 02/01/25 0319 02/01/25 0730 02/01/25 1230   BP:  (!) 148/82 (!) 154/80 (!) 150/73   Pulse:  96 97 94   Resp:  18 20 18   Temp:  97.8 °F (36.6 °C) 98 °F (36.7 °C) 97.8 °F (36.6 °C)   TempSrc:  Oral  Oral   SpO2:  97% 98% 94%   Weight: 114.3 kg (252 lb)  110.4 kg (243 lb 6.2 oz)    Height: 1.626 m (5' 4\")          Physical examination  General Appearance: alert and cooperative with exam, appears comfortable, no distress  Mouth/Throat: Oral mucosa moist  Neck: No JVD  Lungs: Air entry diminished  Heart:  S1, S2 heard  GI: soft, non-tender, no guarding  Extremities: ++ LE edema    Medications:  Infusion:    sodium chloride      dextrose       Meds:    sodium chloride flush  5-40 mL IntraVENous 2 times per day    amLODIPine  5 mg Oral Daily    apixaban  2.5 mg Oral BID    aspirin  81 mg Oral Daily    atorvastatin  40 mg Oral Daily    bumetanide  2 mg Oral BID    doxazosin  4 mg Oral Nightly    gabapentin  300 mg Oral TID

## 2025-02-01 NOTE — ED NOTES
ED to inpatient nurses report      Chief Complaint:  Chief Complaint   Patient presents with    Vascular Access Problem     States sent over by dialysis for new dialysis cath placement     Present to ED from: home    MOA:     LOC: alert and orientated to name, place, date  Mobility: Requires assistance * 1  Oxygen Baseline: RA    Current needs required: 2L NC     Code Status:   Full Code    What abnormal results were found and what did you give/do to treat them? Chronic kidney disease  Any procedures or intervention occur? IR fistulagram    Mental Status:  Level of Consciousness: Alert (0)    Psych Assessment:        Vitals:  Patient Vitals for the past 24 hrs:   BP Temp Temp src Pulse Resp SpO2 Weight   01/31/25 2019 (!) 148/79 -- -- 81 15 98 % --   01/31/25 1919 (!) 142/76 -- -- 89 15 93 % --   01/31/25 1850 (!) 147/77 -- -- 86 16 91 % --   01/31/25 1730 128/81 97.7 °F (36.5 °C) -- 85 18 -- 114.6 kg (252 lb 10.4 oz)   01/31/25 1520 134/72 97.6 °F (36.4 °C) -- 78 30 92 % 116.3 kg (256 lb 6.3 oz)   01/31/25 1510 (!) 142/82 -- -- 84 30 90 % --   01/31/25 1505 (!) 146/88 -- -- 83 22 93 % --   01/31/25 1500 (!) 153/90 -- -- 82 22 91 % --   01/31/25 1455 (!) 143/86 -- -- 83 22 90 % --   01/31/25 1450 (!) 156/92 -- -- 84 (!) 2 91 % --   01/31/25 1440 (!) 163/95 -- -- 83 22 92 % --   01/31/25 1435 (!) 156/91 -- -- 82 17 95 % --   01/31/25 1430 (!) 157/92 -- -- 83 20 95 % --   01/31/25 1425 (!) 154/92 -- -- 84 18 96 % --   01/31/25 1420 (!) 157/92 -- -- 87 21 97 % --   01/31/25 1405 (!) 156/93 -- -- 84 18 97 % --   01/31/25 1234 (!) 142/79 -- -- 80 18 95 % --   01/31/25 1045 (!) 143/76 -- -- 80 20 94 % --   01/31/25 0900 (!) 141/71 -- -- 84 -- 95 % --   01/31/25 0804 (!) 165/88 97.6 °F (36.4 °C) Oral 90 22 97 % 112 kg (246 lb 14.6 oz)        LDAs:   Peripheral IV 01/31/25 Posterior;Right Hand (Active)   Site Assessment Clean, dry & intact 01/31/25 0838   Line Status Flushed 01/31/25 0838       Ambulatory Status:  No data

## 2025-02-01 NOTE — DISCHARGE INSTRUCTIONS
Peritoneal Dialysis Catheter Care: Care Instructions  Overview     Dialysis does the work of your kidneys when you have kidney failure. It filters wastes and removes extra fluid. It also keeps the right balance of chemicals in your blood. Peritoneal dialysis uses the lining of your belly to filter your blood.  Before you start dialysis, your doctor creates a dialysis access. This is the place where the dialysis solution can flow into and out of your body. To make the access, the doctor places a soft tube in your belly or chest. This tube is called a catheter. When you do dialysis, the solution flows into your belly and stays there for several hours. Then you remove it through the catheter.  It is important to take care of the catheter and the access area to prevent infection.  Follow-up care is a key part of your treatment and safety. Be sure to make and go to all appointments, and call your doctor if you are having problems. It's also a good idea to know your test results and keep a list of the medicines you take.  How can you care for yourself at home?  Care of the catheter and access  After the doctor creates your access, keep the bandage dry and clean. Change a dirty or bloody bandage.  Keep your access area clean and dry. Check it every day for signs of infection.  Always clean and dry your catheter and access area right away after you get wet.  Always wash your hands before you touch the catheter.  Fasten or tape the catheter to your body to keep it from catching on your clothes.  Never use scissors or other sharp objects around your catheter.  Do not use unapproved clamps on your catheter.  Store your dialysis supplies in a cool, dry place.  Activity when you have an access  Do not lift heavy objects.  Do not swim or take a bath unless your doctor tells you it is okay.  When should you call for help?   Call your doctor now or seek immediate medical care if:    You have signs of infection, such

## 2025-02-01 NOTE — DISCHARGE SUMMARY
Hospital Medicine Discharge Summary      Patient Identification:   Debbie Locke   : 1975  MRN: [id number]   Account: [id number]      Patient's PCP: Kory Thomas MD    Admit Date: 2025     Discharge Date: 25    Admitting Physician: No admitting provider for patient encounter.     Discharge Physician: Terence Zavala PA-C     Debbie Locke is a 49 y.o. female admitted to [facility] on 2025.      HPI On Admission From H&P:    \"Patient is a 49-year-old female past medical history significant for insulin-dependent type 2 diabetes, hypertension, ESRD on HD, neuropathy, hyperlipidemia, PAD with total occlusion of distal right SFA to mid right popliteal artery who presented to [facility] with AV fistula not working.\"    Assessment/Plan With Discharge Diagnoses:    \"Malfunctioning AV fistula: Patient presented to the hospital from the dialysis center as her venous side of her AV fistula was not working.  IR consulted per ED for fistulogram with angiography.  ESRD on HD: Schedule .  Access: AV fistula.  Follows with nephrology.  Per nephrology they would like to keep her in the hospital overnight for dialysis tomorrow morning.  Nephrology consulted and following  IDDMT2: With history of neuropathy and nephropathy.  Takes Lantus 50 units twice daily.  Follows with Dr. Kidd.  Has allergy to Humalog.  Continue Lantus 40 units twice daily  POCT glucose checks  Hypoglycemia protocol  Continue gabapentin for neuropathy  History of FSGS, diabetic nephropathy: Follows with Dr. Neville  Hypertension: Continue Lopressor, Cardura, amlodipine  History of PAD: 100% occlusion of distal right SFA to mid right popliteal artery: Continue aspirin and Eliquis  Hyperlipidemia: Continue statin  History of DVT: On Eliquis\"    Assumed care of patient on day of discharge. Patient taken for fistulogram by IR.  Dialysis catheter was functioning.  Patient received dialysis on day    Final Result   1. Mild cardiomegaly and increased pulmonary vascularity consistent with fluid   overload..               **This report has been created using voice recognition software. It may contain   minor errors which are inherent in voice recognition technology.**      Electronically signed by Dr. Cyndie Box             Consults:     IP CONSULT TO NEPHROLOGY    Disposition: Home  Condition at Discharge: Stable    Code Status:  Full Code    Patient Instructions:    Discharge lab work:   Activity: activity as tolerated  Diet: ADULT DIET; Regular; 5 carb choices (75 gm/meal); Low Sodium (2 gm); Low Potassium (Less than 3000 mg/day); Low Phosphorus (Less than 1000 mg); 2000 ml      Follow-up visits:   No follow-up provider specified.       Discharge Medications:        Medication List        CHANGE how you take these medications      bumetanide 2 MG tablet  Commonly known as: BUMEX  Take 1 tablet by mouth in the morning and 1 tablet in the evening.  What changed:   medication strength  how much to take            CONTINUE taking these medications      acetaminophen 325 MG tablet  Commonly known as: TYLENOL  Take 2 tablets by mouth every 6 hours as needed for Pain     albuterol sulfate  (90 Base) MCG/ACT inhaler  Commonly known as: PROVENTIL;VENTOLIN;PROAIR     amLODIPine 5 MG tablet  Commonly known as: NORVASC  Take 1 tablet by mouth daily     apixaban 2.5 MG Tabs tablet  Commonly known as: Eliquis  Take 1 tablet by mouth 2 times daily     aspirin 81 MG EC tablet     atorvastatin 40 MG tablet  Commonly known as: LIPITOR     Blood Pressure Kit  1 Units by Does not apply route in the morning and at bedtime     doxazosin 4 MG tablet  Commonly known as: CARDURA  Take 1 tablet by mouth in the morning and at bedtime     gabapentin 300 MG capsule  Commonly known as: NEURONTIN  Take 1 capsule by mouth in the morning and at bedtime for 10 days. Indications: Nerve Disease     Glucagon Emergency 1 MG Kit  Inject 1

## 2025-02-01 NOTE — PLAN OF CARE
Problem: Chronic Conditions and Co-morbidities  Goal: Patient's chronic conditions and co-morbidity symptoms are monitored and maintained or improved  Outcome: Progressing  Flowsheets (Taken 2/1/2025 0149)  Care Plan - Patient's Chronic Conditions and Co-Morbidity Symptoms are Monitored and Maintained or Improved:   Monitor and assess patient's chronic conditions and comorbid symptoms for stability, deterioration, or improvement   Collaborate with multidisciplinary team to address chronic and comorbid conditions and prevent exacerbation or deterioration   Update acute care plan with appropriate goals if chronic or comorbid symptoms are exacerbated and prevent overall improvement and discharge     Problem: Discharge Planning  Goal: Discharge to home or other facility with appropriate resources  Outcome: Progressing  Flowsheets (Taken 2/1/2025 0149)  Discharge to home or other facility with appropriate resources:   Identify barriers to discharge with patient and caregiver   Arrange for needed discharge resources and transportation as appropriate   Identify discharge learning needs (meds, wound care, etc)     Problem: Safety - Adult  Goal: Free from fall injury  Outcome: Progressing  Flowsheets (Taken 2/1/2025 0149)  Free From Fall Injury:   Instruct family/caregiver on patient safety   Based on caregiver fall risk screen, instruct family/caregiver to ask for assistance with transferring infant if caregiver noted to have fall risk factors     Problem: ABCDS Injury Assessment  Goal: Absence of physical injury  Outcome: Progressing  Flowsheets (Taken 2/1/2025 0149)  Absence of Physical Injury: Implement safety measures based on patient assessment     Problem: Respiratory - Adult  Goal: Achieves optimal ventilation and oxygenation  Outcome: Progressing  Flowsheets (Taken 2/1/2025 0149)  Achieves optimal ventilation and oxygenation:   Assess for changes in respiratory status   Assess for changes in mentation and  behavior   Position to facilitate oxygenation and minimize respiratory effort   Oxygen supplementation based on oxygen saturation or arterial blood gases   Encourage broncho-pulmonary hygiene including cough, deep breathe, incentive spirometry   Assess and instruct to report shortness of breath or any respiratory difficulty   Respiratory therapy support as indicated     Problem: Cardiovascular - Adult  Goal: Maintains optimal cardiac output and hemodynamic stability  Outcome: Progressing  Flowsheets (Taken 2/1/2025 0149)  Maintains optimal cardiac output and hemodynamic stability:   Monitor blood pressure and heart rate   Monitor urine output and notify Licensed Independent Practitioner for values outside of normal range   Assess for signs of decreased cardiac output  Goal: Absence of cardiac dysrhythmias or at baseline  Outcome: Progressing  Flowsheets (Taken 2/1/2025 0149)  Absence of cardiac dysrhythmias or at baseline:   Monitor cardiac rate and rhythm   Assess for signs of decreased cardiac output   Administer antiarrhythmia medication and electrolyte replacement as ordered     Problem: Skin/Tissue Integrity - Adult  Goal: Skin integrity remains intact  Outcome: Progressing  Flowsheets (Taken 2/1/2025 0149)  Skin Integrity Remains Intact: Monitor for areas of redness and/or skin breakdown  Goal: Incisions, wounds, or drain sites healing without S/S of infection  Outcome: Progressing  Flowsheets (Taken 2/1/2025 0149)  Incisions, Wounds, or Drain Sites Healing Without Sign and Symptoms of Infection:   ADMISSION and DAILY: Assess and document risk factors for pressure ulcer development   TWICE DAILY: Assess and document skin integrity   TWICE DAILY: Assess and document dressing/incision, wound bed, drain sites and surrounding tissue  Goal: Oral mucous membranes remain intact  Outcome: Progressing  Flowsheets (Taken 2/1/2025 0149)  Oral Mucous Membranes Remain Intact: Assess oral mucosa and hygiene practices

## 2025-02-01 NOTE — PROGRESS NOTES
Discharge teaching and instructions for diagnosis/procedure of inadequate flow of HD AV fistula completed with patient using teachback method. AVS reviewed. Printed prescriptions given to patient. Patient voiced understanding regarding prescriptions, follow up appointments, and care of self at home. Discharged in a wheelchair to  home with support per family.

## 2025-02-01 NOTE — CARE COORDINATION
02/01/25 1250   Readmission Assessment   Number of Days since last admission? 8-30 days   Previous Disposition Home with Family   Who is being Interviewed Patient   What was the patient's/caregiver's perception as to why they think they needed to return back to the hospital? Other (Comment)  (sent from HD for malfunctioning fistula)   Did you visit your Primary Care Physician after you left the hospital, before you returned this time? No   Why weren't you able to visit your PCP? Did not want to go (Comment)  (Felt sick; didn't go to PCP)   Did you see a specialist, such as Cardiac, Pulmonary, Orthopedic Physician, etc. after you left the hospital? No   Who advised the patient to return to the hospital? Physician's Nurse/Office staff  (HD staff)   Does the patient report anything that got in the way of taking their medications? No   In our efforts to provide the best possible care to you and others like you, can you think of anything that we could have done to help you after you left the hospital the first time, so that you might not have needed to return so soon? Other (Comment)  (No)

## 2025-02-01 NOTE — ED NOTES
Pt resting on cot at this time. Pt updated on POC, pt verbalizes understanding. Pt denies any needs at this time. Vitals collected. Pt breathing even and unlabored. Call light in reach.

## 2025-02-01 NOTE — CARE COORDINATION
02/01/25 1250   Service Assessment   Patient Orientation Alert and Oriented   Cognition Alert   History Provided By Patient   Primary Caregiver Self   Accompanied By/Relationship n/a   Support Systems Spouse/Significant Other;Children   Patient's Healthcare Decision Maker is: Named in Scanned ACP Document   PCP Verified by CM Yes   Last Visit to PCP Within last 6 months   Prior Functional Level Assistance with the following:;Housework;Cooking;Shopping   Current Functional Level Assistance with the following:;Housework;Cooking;Shopping   Can patient return to prior living arrangement Yes   Ability to make needs known: Good   Family able to assist with home care needs: Yes   Would you like for me to discuss the discharge plan with any other family members/significant others, and if so, who? No   Financial Resources Medicaid   Community Resources Other (Comment)  (OP HD)   CM/SW Referral ADLs/IADLs;DME   Social/Functional History   Lives With Daughter   Type of Home House   Active  Yes  (Dtr transports to/from HD)   Discharge Planning   Type of Residence House   Living Arrangements Children   Current Services Prior To Admission Durable Medical Equipment;Other (Comment)  (OP HD)   Current DME Prior to Arrival [facility]   Potential Assistance Needed N/A   DME Ordered? No   Potential Assistance Purchasing Medications No   Type of Home Care Services None   Patient expects to be discharged to: House   Services At/After Discharge   Transition of Care Consult (CM Consult) Discharge Planning   Mode of Transport at Discharge Other (see comment)  (family)   Confirm Follow Up Transport Family   Condition of Participation: Discharge Planning   The Plan for Transition of Care is related to the following treatment goals: \"Just get me out of here\"     Patient Goals/Plan/Treatment Preferences: Met w/ Deepthi. Verified insurance and PCP. She is mostly independent. Her daughter transports to/from HD and appts. Has needed DME, listed

## 2025-03-11 RX ORDER — INSULIN GLARGINE 100 [IU]/ML
50 INJECTION, SOLUTION SUBCUTANEOUS NIGHTLY
OUTPATIENT
Start: 2025-03-11

## 2025-03-17 ENCOUNTER — HOSPITAL ENCOUNTER (INPATIENT)
Age: 50
LOS: 11 days | Discharge: HOME | DRG: 291 | End: 2025-03-28
Attending: STUDENT IN AN ORGANIZED HEALTH CARE EDUCATION/TRAINING PROGRAM | Admitting: STUDENT IN AN ORGANIZED HEALTH CARE EDUCATION/TRAINING PROGRAM
Payer: MEDICAID

## 2025-03-17 ENCOUNTER — APPOINTMENT (OUTPATIENT)
Dept: GENERAL RADIOLOGY | Age: 50
DRG: 291 | End: 2025-03-17
Payer: MEDICAID

## 2025-03-17 DIAGNOSIS — E87.70 HYPERVOLEMIA, UNSPECIFIED HYPERVOLEMIA TYPE: Primary | ICD-10-CM

## 2025-03-17 DIAGNOSIS — Z99.2 END STAGE RENAL DISEASE ON DIALYSIS (HCC): ICD-10-CM

## 2025-03-17 DIAGNOSIS — R06.02 SHORTNESS OF BREATH: ICD-10-CM

## 2025-03-17 DIAGNOSIS — I50.9 CONGESTIVE HEART FAILURE, UNSPECIFIED HF CHRONICITY, UNSPECIFIED HEART FAILURE TYPE (HCC): ICD-10-CM

## 2025-03-17 DIAGNOSIS — N18.6 END STAGE RENAL DISEASE ON DIALYSIS (HCC): ICD-10-CM

## 2025-03-17 LAB
ANION GAP SERPL CALC-SCNC: 10 MEQ/L (ref 8–16)
ANION GAP SERPL CALC-SCNC: 11 MEQ/L (ref 8–16)
B PERT DNA NPH QL NAA+PROBE: NOT DETECTED
BASOPHILS ABSOLUTE: 0 THOU/MM3 (ref 0–0.1)
BASOPHILS NFR BLD AUTO: 0.4 %
BORDETELLA PARAPERTUSSIS BY PCR: NOT DETECTED
BUN SERPL-MCNC: 39 MG/DL (ref 8–23)
BUN SERPL-MCNC: 39 MG/DL (ref 8–23)
C PNEUM DNA SPEC QL NAA+PROBE: NOT DETECTED
CA-I BLD ISE-SCNC: 1.04 MMOL/L (ref 1.12–1.32)
CALCIUM SERPL-MCNC: 8.9 MG/DL (ref 8.6–10)
CALCIUM SERPL-MCNC: 9 MG/DL (ref 8.6–10)
CHLORIDE SERPL-SCNC: 98 MEQ/L (ref 98–111)
CHLORIDE SERPL-SCNC: 99 MEQ/L (ref 98–111)
CO2 SERPL-SCNC: 25 MEQ/L (ref 22–29)
CO2 SERPL-SCNC: 27 MEQ/L (ref 22–29)
CREAT SERPL-MCNC: 6.7 MG/DL (ref 0.5–0.9)
CREAT SERPL-MCNC: 6.8 MG/DL (ref 0.5–0.9)
DEPRECATED RDW RBC AUTO: 52.2 FL (ref 35–45)
DEPRECATED RDW RBC AUTO: 52.6 FL (ref 35–45)
EKG ATRIAL RATE: 109 BPM
EKG P AXIS: 45 DEGREES
EKG P-R INTERVAL: 158 MS
EKG Q-T INTERVAL: 382 MS
EKG QRS DURATION: 94 MS
EKG QTC CALCULATION (BAZETT): 514 MS
EKG R AXIS: 98 DEGREES
EKG T AXIS: 90 DEGREES
EKG VENTRICULAR RATE: 109 BPM
EOSINOPHIL NFR BLD AUTO: 2 %
EOSINOPHILS ABSOLUTE: 0.2 THOU/MM3 (ref 0–0.4)
ERYTHROCYTE [DISTWIDTH] IN BLOOD BY AUTOMATED COUNT: 16.2 % (ref 11.5–14.5)
ERYTHROCYTE [DISTWIDTH] IN BLOOD BY AUTOMATED COUNT: 16.2 % (ref 11.5–14.5)
FLUAV RNA NPH QL NAA+PROBE: NOT DETECTED
FLUAV RNA RESP QL NAA+PROBE: NOT DETECTED
FLUBV RNA NPH QL NAA+PROBE: NOT DETECTED
FLUBV RNA RESP QL NAA+PROBE: NOT DETECTED
GFR SERPL CREATININE-BSD FRML MDRD: 7 ML/MIN/1.73M2
GFR SERPL CREATININE-BSD FRML MDRD: 7 ML/MIN/1.73M2
GLUCOSE BLD STRIP.AUTO-MCNC: 390 MG/DL (ref 70–108)
GLUCOSE SERPL-MCNC: 360 MG/DL (ref 74–109)
GLUCOSE SERPL-MCNC: 418 MG/DL (ref 74–109)
HADV DNA NPH QL NAA+PROBE: NOT DETECTED
HCOV 229E RNA SPEC QL NAA+PROBE: NOT DETECTED
HCOV HKU1 RNA SPEC QL NAA+PROBE: NOT DETECTED
HCOV NL63 RNA SPEC QL NAA+PROBE: NOT DETECTED
HCOV OC43 RNA SPEC QL NAA+PROBE: NOT DETECTED
HCT VFR BLD AUTO: 30 % (ref 37–47)
HCT VFR BLD AUTO: 31.8 % (ref 37–47)
HGB BLD-MCNC: 9.1 GM/DL (ref 12–16)
HGB BLD-MCNC: 9.8 GM/DL (ref 12–16)
HMPV RNA NPH QL NAA+PROBE: NOT DETECTED
HPIV1 RNA NPH QL NAA+PROBE: NOT DETECTED
HPIV2 RNA NPH QL NAA+PROBE: NOT DETECTED
HPIV3 RNA NPH QL NAA+PROBE: NOT DETECTED
HPIV4 RNA NPH QL NAA+PROBE: NOT DETECTED
IMM GRANULOCYTES # BLD AUTO: 0.04 THOU/MM3 (ref 0–0.07)
IMM GRANULOCYTES NFR BLD AUTO: 0.4 %
LYMPHOCYTES ABSOLUTE: 1.1 THOU/MM3 (ref 1–4.8)
LYMPHOCYTES NFR BLD AUTO: 11.1 %
M PNEUMO DNA SPEC QL NAA+PROBE: NOT DETECTED
MAGNESIUM SERPL-MCNC: 2.2 MG/DL (ref 1.6–2.6)
MAGNESIUM SERPL-MCNC: 2.3 MG/DL (ref 1.6–2.6)
MCH RBC QN AUTO: 27.7 PG (ref 26–33)
MCH RBC QN AUTO: 27.8 PG (ref 26–33)
MCHC RBC AUTO-ENTMCNC: 30.3 GM/DL (ref 32.2–35.5)
MCHC RBC AUTO-ENTMCNC: 30.8 GM/DL (ref 32.2–35.5)
MCV RBC AUTO: 90.1 FL (ref 81–99)
MCV RBC AUTO: 91.5 FL (ref 81–99)
MONOCYTES ABSOLUTE: 0.5 THOU/MM3 (ref 0.4–1.3)
MONOCYTES NFR BLD AUTO: 4.7 %
NEUTROPHILS ABSOLUTE: 8.3 THOU/MM3 (ref 1.8–7.7)
NEUTROPHILS NFR BLD AUTO: 81.4 %
NRBC BLD AUTO-RTO: 0 /100 WBC
NT-PROBNP SERPL IA-MCNC: ABNORMAL PG/ML (ref 0–124)
OSMOLALITY SERPL CALC.SUM OF ELEC: 295.4 MOSMOL/KG (ref 275–300)
OSMOLALITY SERPL CALC.SUM OF ELEC: 295.9 MOSMOL/KG (ref 275–300)
PHOSPHATE SERPL-MCNC: 5.1 MG/DL (ref 2.5–4.5)
PLATELET # BLD AUTO: 222 THOU/MM3 (ref 130–400)
PLATELET # BLD AUTO: 246 THOU/MM3 (ref 130–400)
PMV BLD AUTO: 9.3 FL (ref 9.4–12.4)
PMV BLD AUTO: 9.6 FL (ref 9.4–12.4)
POTASSIUM SERPL-SCNC: 5.1 MEQ/L (ref 3.5–5.2)
POTASSIUM SERPL-SCNC: 5.4 MEQ/L (ref 3.5–5.2)
RBC # BLD AUTO: 3.28 MILL/MM3 (ref 4.2–5.4)
RBC # BLD AUTO: 3.53 MILL/MM3 (ref 4.2–5.4)
RSV RNA NPH QL NAA+PROBE: NOT DETECTED
RV+EV RNA SPEC QL NAA+PROBE: NOT DETECTED
SARS-COV-2 RNA NPH QL NAA+NON-PROBE: NOT DETECTED
SARS-COV-2 RNA RESP QL NAA+PROBE: NOT DETECTED
SODIUM SERPL-SCNC: 134 MEQ/L (ref 135–145)
SODIUM SERPL-SCNC: 136 MEQ/L (ref 135–145)
TROPONIN, HIGH SENSITIVITY: 81 NG/L (ref 0–12)
TROPONIN, HIGH SENSITIVITY: 85 NG/L (ref 0–12)
WBC # BLD AUTO: 10.2 THOU/MM3 (ref 4.8–10.8)
WBC # BLD AUTO: 9.2 THOU/MM3 (ref 4.8–10.8)

## 2025-03-17 PROCEDURE — 84100 ASSAY OF PHOSPHORUS: CPT

## 2025-03-17 PROCEDURE — 80048 BASIC METABOLIC PNL TOTAL CA: CPT

## 2025-03-17 PROCEDURE — 6360000002 HC RX W HCPCS: Performed by: STUDENT IN AN ORGANIZED HEALTH CARE EDUCATION/TRAINING PROGRAM

## 2025-03-17 PROCEDURE — 99222 1ST HOSP IP/OBS MODERATE 55: CPT | Performed by: INTERNAL MEDICINE

## 2025-03-17 PROCEDURE — 6370000000 HC RX 637 (ALT 250 FOR IP): Performed by: STUDENT IN AN ORGANIZED HEALTH CARE EDUCATION/TRAINING PROGRAM

## 2025-03-17 PROCEDURE — 99223 1ST HOSP IP/OBS HIGH 75: CPT | Performed by: STUDENT IN AN ORGANIZED HEALTH CARE EDUCATION/TRAINING PROGRAM

## 2025-03-17 PROCEDURE — 93010 ELECTROCARDIOGRAM REPORT: CPT | Performed by: INTERNAL MEDICINE

## 2025-03-17 PROCEDURE — 84484 ASSAY OF TROPONIN QUANT: CPT

## 2025-03-17 PROCEDURE — 2500000003 HC RX 250 WO HCPCS: Performed by: STUDENT IN AN ORGANIZED HEALTH CARE EDUCATION/TRAINING PROGRAM

## 2025-03-17 PROCEDURE — 87636 SARSCOV2 & INF A&B AMP PRB: CPT

## 2025-03-17 PROCEDURE — 85025 COMPLETE CBC W/AUTO DIFF WBC: CPT

## 2025-03-17 PROCEDURE — 90935 HEMODIALYSIS ONE EVALUATION: CPT | Performed by: INTERNAL MEDICINE

## 2025-03-17 PROCEDURE — 83735 ASSAY OF MAGNESIUM: CPT

## 2025-03-17 PROCEDURE — 0202U NFCT DS 22 TRGT SARS-COV-2: CPT

## 2025-03-17 PROCEDURE — 82330 ASSAY OF CALCIUM: CPT

## 2025-03-17 PROCEDURE — 85027 COMPLETE CBC AUTOMATED: CPT

## 2025-03-17 PROCEDURE — 83880 ASSAY OF NATRIURETIC PEPTIDE: CPT

## 2025-03-17 PROCEDURE — 1200000003 HC TELEMETRY R&B

## 2025-03-17 PROCEDURE — 36415 COLL VENOUS BLD VENIPUNCTURE: CPT

## 2025-03-17 PROCEDURE — 90935 HEMODIALYSIS ONE EVALUATION: CPT

## 2025-03-17 PROCEDURE — 82948 REAGENT STRIP/BLOOD GLUCOSE: CPT

## 2025-03-17 PROCEDURE — 71045 X-RAY EXAM CHEST 1 VIEW: CPT

## 2025-03-17 PROCEDURE — 99285 EMERGENCY DEPT VISIT HI MDM: CPT

## 2025-03-17 PROCEDURE — 93005 ELECTROCARDIOGRAM TRACING: CPT | Performed by: NURSE PRACTITIONER

## 2025-03-17 RX ORDER — ATORVASTATIN CALCIUM 40 MG/1
40 TABLET, FILM COATED ORAL DAILY
Status: DISCONTINUED | OUTPATIENT
Start: 2025-03-17 | End: 2025-03-28 | Stop reason: HOSPADM

## 2025-03-17 RX ORDER — SENNOSIDES 8.8 MG/5ML
5 LIQUID ORAL 2 TIMES DAILY PRN
Status: DISCONTINUED | OUTPATIENT
Start: 2025-03-17 | End: 2025-03-28 | Stop reason: HOSPADM

## 2025-03-17 RX ORDER — ACETAMINOPHEN 325 MG/1
650 TABLET ORAL EVERY 6 HOURS PRN
Status: DISCONTINUED | OUTPATIENT
Start: 2025-03-17 | End: 2025-03-28 | Stop reason: HOSPADM

## 2025-03-17 RX ORDER — MAGNESIUM SULFATE IN WATER 40 MG/ML
2000 INJECTION, SOLUTION INTRAVENOUS PRN
Status: DISCONTINUED | OUTPATIENT
Start: 2025-03-17 | End: 2025-03-17

## 2025-03-17 RX ORDER — BENZONATATE 100 MG/1
100 CAPSULE ORAL 3 TIMES DAILY PRN
Status: DISCONTINUED | OUTPATIENT
Start: 2025-03-17 | End: 2025-03-28 | Stop reason: HOSPADM

## 2025-03-17 RX ORDER — SODIUM CHLORIDE 0.9 % (FLUSH) 0.9 %
5-40 SYRINGE (ML) INJECTION EVERY 12 HOURS SCHEDULED
Status: DISCONTINUED | OUTPATIENT
Start: 2025-03-17 | End: 2025-03-28 | Stop reason: HOSPADM

## 2025-03-17 RX ORDER — ONDANSETRON 2 MG/ML
4 INJECTION INTRAMUSCULAR; INTRAVENOUS EVERY 6 HOURS PRN
Status: DISCONTINUED | OUTPATIENT
Start: 2025-03-17 | End: 2025-03-28 | Stop reason: HOSPADM

## 2025-03-17 RX ORDER — MAGNESIUM HYDROXIDE/ALUMINUM HYDROXICE/SIMETHICONE 120; 1200; 1200 MG/30ML; MG/30ML; MG/30ML
30 SUSPENSION ORAL EVERY 6 HOURS PRN
Status: DISCONTINUED | OUTPATIENT
Start: 2025-03-17 | End: 2025-03-28 | Stop reason: HOSPADM

## 2025-03-17 RX ORDER — ONDANSETRON 4 MG/1
4 TABLET, ORALLY DISINTEGRATING ORAL EVERY 8 HOURS PRN
Status: DISCONTINUED | OUTPATIENT
Start: 2025-03-17 | End: 2025-03-28 | Stop reason: HOSPADM

## 2025-03-17 RX ORDER — SODIUM CHLORIDE 0.9 % (FLUSH) 0.9 %
5-40 SYRINGE (ML) INJECTION PRN
Status: DISCONTINUED | OUTPATIENT
Start: 2025-03-17 | End: 2025-03-28 | Stop reason: HOSPADM

## 2025-03-17 RX ORDER — METOPROLOL SUCCINATE 25 MG/1
25 TABLET, EXTENDED RELEASE ORAL 2 TIMES DAILY
Status: DISCONTINUED | OUTPATIENT
Start: 2025-03-17 | End: 2025-03-28 | Stop reason: HOSPADM

## 2025-03-17 RX ORDER — SEVELAMER CARBONATE 800 MG/1
1600 TABLET, FILM COATED ORAL
Status: DISCONTINUED | OUTPATIENT
Start: 2025-03-17 | End: 2025-03-28 | Stop reason: HOSPADM

## 2025-03-17 RX ORDER — ALBUTEROL SULFATE 0.83 MG/ML
2.5 SOLUTION RESPIRATORY (INHALATION) EVERY 6 HOURS PRN
Status: DISCONTINUED | OUTPATIENT
Start: 2025-03-17 | End: 2025-03-28 | Stop reason: HOSPADM

## 2025-03-17 RX ORDER — FUROSEMIDE 10 MG/ML
40 INJECTION INTRAMUSCULAR; INTRAVENOUS ONCE
Status: COMPLETED | OUTPATIENT
Start: 2025-03-17 | End: 2025-03-17

## 2025-03-17 RX ORDER — POLYETHYLENE GLYCOL 3350 17 G/17G
17 POWDER, FOR SOLUTION ORAL DAILY PRN
Status: DISCONTINUED | OUTPATIENT
Start: 2025-03-17 | End: 2025-03-28 | Stop reason: HOSPADM

## 2025-03-17 RX ORDER — INSULIN GLARGINE 100 [IU]/ML
25 INJECTION, SOLUTION SUBCUTANEOUS 2 TIMES DAILY
Status: DISCONTINUED | OUTPATIENT
Start: 2025-03-17 | End: 2025-03-19

## 2025-03-17 RX ORDER — BUMETANIDE 1 MG/1
2 TABLET ORAL 2 TIMES DAILY
Status: DISCONTINUED | OUTPATIENT
Start: 2025-03-17 | End: 2025-03-28 | Stop reason: HOSPADM

## 2025-03-17 RX ORDER — AMLODIPINE BESYLATE 5 MG/1
5 TABLET ORAL DAILY
Status: DISCONTINUED | OUTPATIENT
Start: 2025-03-17 | End: 2025-03-28 | Stop reason: HOSPADM

## 2025-03-17 RX ORDER — ALBUTEROL SULFATE 90 UG/1
2 INHALANT RESPIRATORY (INHALATION) EVERY 6 HOURS PRN
Status: DISCONTINUED | OUTPATIENT
Start: 2025-03-17 | End: 2025-03-28 | Stop reason: HOSPADM

## 2025-03-17 RX ORDER — SEVELAMER CARBONATE 800 MG/1
800 TABLET, FILM COATED ORAL
Status: DISCONTINUED | OUTPATIENT
Start: 2025-03-17 | End: 2025-03-17 | Stop reason: CLARIF

## 2025-03-17 RX ORDER — GABAPENTIN 300 MG/1
300 CAPSULE ORAL 2 TIMES DAILY
Status: DISCONTINUED | OUTPATIENT
Start: 2025-03-17 | End: 2025-03-28 | Stop reason: HOSPADM

## 2025-03-17 RX ORDER — ASPIRIN 81 MG/1
81 TABLET ORAL DAILY
Status: DISCONTINUED | OUTPATIENT
Start: 2025-03-17 | End: 2025-03-28 | Stop reason: HOSPADM

## 2025-03-17 RX ORDER — ACETAMINOPHEN 650 MG/1
650 SUPPOSITORY RECTAL EVERY 6 HOURS PRN
Status: DISCONTINUED | OUTPATIENT
Start: 2025-03-17 | End: 2025-03-28 | Stop reason: HOSPADM

## 2025-03-17 RX ORDER — DOXAZOSIN 4 MG/1
4 TABLET ORAL NIGHTLY
Status: DISCONTINUED | OUTPATIENT
Start: 2025-03-17 | End: 2025-03-28 | Stop reason: HOSPADM

## 2025-03-17 RX ORDER — SODIUM CHLORIDE 9 MG/ML
INJECTION, SOLUTION INTRAVENOUS PRN
Status: DISCONTINUED | OUTPATIENT
Start: 2025-03-17 | End: 2025-03-28 | Stop reason: HOSPADM

## 2025-03-17 RX ADMIN — ATORVASTATIN CALCIUM 40 MG: 40 TABLET, FILM COATED ORAL at 17:42

## 2025-03-17 RX ADMIN — APIXABAN 2.5 MG: 2.5 TABLET, FILM COATED ORAL at 20:38

## 2025-03-17 RX ADMIN — SEVELAMER CARBONATE 1600 MG: 800 TABLET, FILM COATED ORAL at 18:36

## 2025-03-17 RX ADMIN — AMLODIPINE BESYLATE 5 MG: 5 TABLET ORAL at 17:42

## 2025-03-17 RX ADMIN — GUAIFENESIN, DEXTROMETHORPHAN HBR 1 TABLET: 600; 30 TABLET ORAL at 17:42

## 2025-03-17 RX ADMIN — INSULIN GLARGINE 25 UNITS: 100 INJECTION, SOLUTION SUBCUTANEOUS at 20:50

## 2025-03-17 RX ADMIN — ACETAMINOPHEN 650 MG: 325 TABLET ORAL at 18:39

## 2025-03-17 RX ADMIN — FUROSEMIDE 40 MG: 10 INJECTION, SOLUTION INTRAMUSCULAR; INTRAVENOUS at 17:19

## 2025-03-17 RX ADMIN — SODIUM CHLORIDE, PRESERVATIVE FREE 10 ML: 5 INJECTION INTRAVENOUS at 20:38

## 2025-03-17 RX ADMIN — DOXAZOSIN 4 MG: 4 TABLET ORAL at 20:37

## 2025-03-17 RX ADMIN — BUMETANIDE 2 MG: 1 TABLET ORAL at 17:42

## 2025-03-17 RX ADMIN — GUAIFENESIN, DEXTROMETHORPHAN HBR 1 TABLET: 600; 30 TABLET ORAL at 20:37

## 2025-03-17 RX ADMIN — METOPROLOL SUCCINATE 25 MG: 25 TABLET, EXTENDED RELEASE ORAL at 20:38

## 2025-03-17 RX ADMIN — GABAPENTIN 300 MG: 300 CAPSULE ORAL at 20:37

## 2025-03-17 RX ADMIN — ASPIRIN 81 MG: 81 TABLET, COATED ORAL at 17:19

## 2025-03-17 ASSESSMENT — PAIN SCALES - GENERAL: PAINLEVEL_OUTOF10: 6

## 2025-03-17 ASSESSMENT — PAIN DESCRIPTION - LOCATION: LOCATION: HEAD

## 2025-03-17 ASSESSMENT — PAIN - FUNCTIONAL ASSESSMENT: PAIN_FUNCTIONAL_ASSESSMENT: NONE - DENIES PAIN

## 2025-03-17 ASSESSMENT — PAIN DESCRIPTION - DESCRIPTORS: DESCRIPTORS: ACHING

## 2025-03-17 NOTE — CONSULTS
[facility]    [address] [place], [place] [id number]  [phone]     Hospital Consult  3/17/2025    Pt Name:    Debbie Locke  MRN:     [id number]   [id number]  YOB: 1975  Admit Date:    3/17/2025  6:53 AM  Primary Care Physician:  Kory Thomas MD    CSN Number:   [id number]    Reason for Consult:  ESRD on HD  Requesting provider:  Hospitalist    History:   The patient is a 49 y.o. female with history of ESRD on dialysis, hypertension, chronic volume overload, diabetes who presented With complaints of shortness of breath associated with some productive cough, and bilateral lower extremity swelling.  Symptoms progressively getting worse over last 1 week.  Reports some nausea.  Denies any chest pain.  No dizziness or lightheadedness.  She has a PD catheter in place but has not been started on PD yet.  She is currently getting hemodialysis treatments at [facility] dialysis unit on ,  and .  In the emergency room chest x-ray showed pulmonary vascular congestion, CHF and volume overload.  proBNP is greater than 65,000.  She was admitted.  Nephrology consulted for management of ESRD    Past Medical History:  Past Medical History:   Diagnosis Date    Chronic kidney disease     FSGS (focal segmental glomerulosclerosis)     Hemodialysis patient     M-W-F    Hyperlipidemia     Hypertension     Neuropathy     diabetic neuropathy    PVD (peripheral vascular disease)     Type II or unspecified type diabetes mellitus without mention of complication, not stated as uncontrolled 2000       Past Surgical History:  Past Surgical History:   Procedure Laterality Date    BACK SURGERY N/A 2024    Excision of Chronic Abscess on Back performed by Kyree Mercado MD at [facility] OR     SECTION  2002    CHOLECYSTECTOMY  2023    CT BIOPSY RENAL  2023    CT BIOPSY RENAL 2023 [facility] CT SCAN    DIALYSIS FISTULA CREATION Left 10/1/2024

## 2025-03-17 NOTE — ED PROVIDER NOTES
available at the time of this note (none if blank):    XR CHEST PORTABLE    (Results Pending)       LABS: (none if blank)  Labs Reviewed   COVID-19 & INFLUENZA COMBO   CBC WITH AUTO DIFFERENTIAL   BASIC METABOLIC PANEL   MAGNESIUM   PHOSPHORUS   CALCIUM, IONIZED   TROPONIN   BRAIN NATRIURETIC PEPTIDE       (Any cultures that may have been sent were not resulted at the time of this patient visit)          MEDICAL DECISION MAKING / ED COURSE:     Social Determinants of Health:  None    Summary of Patient Presentation:      Plan: Labs, chest x-ray, EKG    1) Number and Complexity of Problems                    Differential Diagnosis includes (but not limited to):  Fluid volume overload, electrolyte imbalances, ACS, pneumonia, viral URI                   Pertinent Comorbid Conditions:    Reviewed per the chart    2)  Data Reviewed (none if left blank, additional information can be found in the ED course)          My Independent interpretations:     EKG:      Sinus tachycardia at 109 bpm.  MS interval 158, QRS duration 94, QT/QTc interval 382/514.  No ST segment elevation.    Imaging:   XR CHEST PORTABLE   Final Result   1. Mild stable cardiomegaly with pulmonary vascular congestion suspicious for   congestive heart failure.   2. Minimal bilateral lower lung atelectasis/infiltrate.               **This report has been created using voice recognition software. It may contain   minor errors which are inherent in voice recognition technology.**            Electronically signed by Dr. Kory Ivey              Labs:        Labs Reviewed   CBC WITH AUTO DIFFERENTIAL - Abnormal; Notable for the following components:       Result Value    RBC 3.53 (*)     Hemoglobin 9.8 (*)     Hematocrit 31.8 (*)     MCHC 30.8 (*)     RDW-CV 16.2 (*)     RDW-SD 52.2 (*)     MPV 9.3 (*)     Neutrophils Absolute 8.3 (*)     All other components within normal limits   PHOSPHORUS - Abnormal; Notable for the following components:    Phosphorus

## 2025-03-17 NOTE — ED NOTES
ED to inpatient nurses report      Chief Complaint:  Chief Complaint   Patient presents with    Shortness of Breath     Present to ED from: home    MOA:     LOC: alert and orientated to name, place, date  Mobility: Independent  Oxygen Baseline: room air    Current needs required: none     Code Status:   Prior    What abnormal results were found and what did you give/do to treat them? Short of breath  Any procedures or intervention occur? Admission    Mental Status:  Level of Consciousness: Alert (0)    Psych Assessment:        Vitals:  Patient Vitals for the past 24 hrs:   BP Temp Temp src Pulse Resp SpO2 Height Weight   03/17/25 1012 -- -- -- 99 20 97 % -- --   03/17/25 0706 (!) 188/97 97.8 °F (36.6 °C) Oral (!) 113 20 94 % 1.626 m (5' 4\") 99.8 kg (220 lb)        LDAs:      Ambulatory Status:  Presents to emergency department  because of falls (Syncope, seizure, or loss of consciousness): No, Age > 70: No, Altered Mental Status, Intoxication with alcohol or substance confusion (Disorientation, impaired judgment, poor safety awaremess, or inability to follow instructions): No, Impaired Mobility: Ambulates or transfers with assistive devices or assistance; Unable to ambulate or transer.: No, Nursing Judgement: No    Diagnosis:  DISPOSITION Decision To Admit 03/17/2025 10:27:31 AM   Final diagnoses:   Hypervolemia, unspecified hypervolemia type   End stage renal disease on dialysis (HCC)        Consults:  IP CONSULT TO NEPHROLOGY     Pain Score:  Pain Assessment  Pain Assessment: None - Denies Pain    C-SSRS:   Risk of Suicide: No Risk    Sepsis Screening:       Tahir Fall Risk:  Emigrant 1 Fall Risk  Presents to emergency department  because of falls (Syncope, seizure, or loss of consciousness): No  Age > 70: No  Altered Mental Status, Intoxication with alcohol or substance confusion (Disorientation, impaired judgment, poor safety awaremess, or inability to follow instructions): No  Impaired Mobility: Ambulates or

## 2025-03-17 NOTE — ED TRIAGE NOTES
Pt present to the ED from home with c/c of SOB and cough. Pt states she started feeling SOB due to her dialysis. Pt states her fluid intake is to high and she has not taken any of her home meds for the past week. Pt is coming from home. Pt states the cough started last week. Pt's last dialysis was on Friday. IV access established. EKG completed. Labs drawn. Covid/flu collected.

## 2025-03-18 ENCOUNTER — APPOINTMENT (OUTPATIENT)
Age: 50
DRG: 291 | End: 2025-03-18
Payer: MEDICAID

## 2025-03-18 PROBLEM — D63.8 ANEMIA, CHRONIC DISEASE: Status: ACTIVE | Noted: 2025-03-18

## 2025-03-18 PROBLEM — E08.42 DIABETIC POLYNEUROPATHY ASSOCIATED WITH DIABETES MELLITUS DUE TO UNDERLYING CONDITION: Status: ACTIVE | Noted: 2025-03-18

## 2025-03-18 PROBLEM — I25.10 CORONARY ARTERY DISEASE INVOLVING NATIVE CORONARY ARTERY OF NATIVE HEART WITHOUT ANGINA PECTORIS: Status: ACTIVE | Noted: 2025-03-18

## 2025-03-18 PROBLEM — N18.6 ESRD ON HEMODIALYSIS (HCC): Status: ACTIVE | Noted: 2025-01-22

## 2025-03-18 PROBLEM — I16.0 HYPERTENSIVE URGENCY: Status: ACTIVE | Noted: 2025-03-18

## 2025-03-18 PROBLEM — R06.00 DYSPNEA: Status: ACTIVE | Noted: 2024-07-25

## 2025-03-18 LAB
ANION GAP SERPL CALC-SCNC: 8 MEQ/L (ref 8–16)
BASOPHILS ABSOLUTE: 0 THOU/MM3 (ref 0–0.1)
BASOPHILS NFR BLD AUTO: 0.3 %
BUN SERPL-MCNC: 27 MG/DL (ref 8–23)
CALCIUM SERPL-MCNC: 8.6 MG/DL (ref 8.6–10)
CHLORIDE SERPL-SCNC: 98 MEQ/L (ref 98–111)
CO2 SERPL-SCNC: 31 MEQ/L (ref 22–29)
CREAT SERPL-MCNC: 4.9 MG/DL (ref 0.5–0.9)
DEPRECATED RDW RBC AUTO: 53.9 FL (ref 35–45)
ECHO AO ASC DIAM: 3.3 CM
ECHO AO ASCENDING AORTA INDEX: 1.54 CM/M2
ECHO AV CUSP MM: 2 CM
ECHO AV PEAK GRADIENT: 9 MMHG
ECHO AV PEAK VELOCITY: 1.5 M/S
ECHO AV VELOCITY RATIO: 0.6
ECHO BSA: 2.12 M2
ECHO EST RA PRESSURE: 20 MMHG
ECHO LA AREA 2C: 23.1 CM2
ECHO LA AREA 4C: 20 CM2
ECHO LA DIAMETER INDEX: 1.92 CM/M2
ECHO LA DIAMETER: 4.1 CM
ECHO LA MAJOR AXIS: 5.4 CM
ECHO LA MINOR AXIS: 5.2 CM
ECHO LA VOL BP: 73 ML (ref 22–52)
ECHO LA VOL MOD A2C: 83 ML (ref 22–52)
ECHO LA VOL MOD A4C: 62 ML (ref 22–52)
ECHO LA VOL/BSA BIPLANE: 34 ML/M2 (ref 16–34)
ECHO LA VOLUME INDEX MOD A2C: 39 ML/M2 (ref 16–34)
ECHO LA VOLUME INDEX MOD A4C: 29 ML/M2 (ref 16–34)
ECHO LV E' LATERAL VELOCITY: 7.2 CM/S
ECHO LV E' SEPTAL VELOCITY: 5.9 CM/S
ECHO LV EF PHYSICIAN: 55 %
ECHO LV FRACTIONAL SHORTENING: 30 % (ref 28–44)
ECHO LV INTERNAL DIMENSION DIASTOLE INDEX: 2.34 CM/M2
ECHO LV INTERNAL DIMENSION DIASTOLIC: 5 CM (ref 3.9–5.3)
ECHO LV INTERNAL DIMENSION SYSTOLIC INDEX: 1.64 CM/M2
ECHO LV INTERNAL DIMENSION SYSTOLIC: 3.5 CM
ECHO LV ISOVOLUMETRIC RELAXATION TIME (IVRT): 70 MS
ECHO LV IVSD: 1.3 CM (ref 0.6–0.9)
ECHO LV MASS 2D: 261.8 G (ref 67–162)
ECHO LV MASS INDEX 2D: 122.3 G/M2 (ref 43–95)
ECHO LV POSTERIOR WALL DIASTOLIC: 1.3 CM (ref 0.6–0.9)
ECHO LV RELATIVE WALL THICKNESS RATIO: 0.52
ECHO LVOT PEAK GRADIENT: 3 MMHG
ECHO LVOT PEAK VELOCITY: 0.9 M/S
ECHO MV A VELOCITY: 1 M/S
ECHO MV E DECELERATION TIME (DT): 187 MS
ECHO MV E VELOCITY: 1.23 M/S
ECHO MV E/A RATIO: 1.23
ECHO MV E/E' LATERAL: 17.08
ECHO MV E/E' RATIO (AVERAGED): 18.97
ECHO MV E/E' SEPTAL: 20.85
ECHO MV REGURGITANT PEAK GRADIENT: 121 MMHG
ECHO MV REGURGITANT PEAK VELOCITY: 5.5 M/S
ECHO PV MAX VELOCITY: 0.8 M/S
ECHO PV PEAK GRADIENT: 2 MMHG
ECHO RIGHT VENTRICULAR SYSTOLIC PRESSURE (RVSP): 33 MMHG
ECHO RV INTERNAL DIMENSION: 3.1 CM
ECHO RV TAPSE: 2.7 CM (ref 1.7–?)
ECHO TV E WAVE: 0.6 M/S
ECHO TV REGURGITANT MAX VELOCITY: 1.78 M/S
ECHO TV REGURGITANT PEAK GRADIENT: 13 MMHG
EOSINOPHIL NFR BLD AUTO: 3.2 %
EOSINOPHILS ABSOLUTE: 0.2 THOU/MM3 (ref 0–0.4)
ERYTHROCYTE [DISTWIDTH] IN BLOOD BY AUTOMATED COUNT: 16.4 % (ref 11.5–14.5)
GFR SERPL CREATININE-BSD FRML MDRD: 10 ML/MIN/1.73M2
GLUCOSE BLD STRIP.AUTO-MCNC: 328 MG/DL (ref 70–108)
GLUCOSE BLD STRIP.AUTO-MCNC: 350 MG/DL (ref 70–108)
GLUCOSE SERPL-MCNC: 284 MG/DL (ref 74–109)
HCT VFR BLD AUTO: 27.9 % (ref 37–47)
HGB BLD-MCNC: 8.5 GM/DL (ref 12–16)
IMM GRANULOCYTES # BLD AUTO: 0.03 THOU/MM3 (ref 0–0.07)
IMM GRANULOCYTES NFR BLD AUTO: 0.4 %
LYMPHOCYTES ABSOLUTE: 1.1 THOU/MM3 (ref 1–4.8)
LYMPHOCYTES NFR BLD AUTO: 14.8 %
MCH RBC QN AUTO: 28.2 PG (ref 26–33)
MCHC RBC AUTO-ENTMCNC: 30.5 GM/DL (ref 32.2–35.5)
MCV RBC AUTO: 92.7 FL (ref 81–99)
MONOCYTES ABSOLUTE: 0.4 THOU/MM3 (ref 0.4–1.3)
MONOCYTES NFR BLD AUTO: 5.6 %
NEUTROPHILS ABSOLUTE: 5.5 THOU/MM3 (ref 1.8–7.7)
NEUTROPHILS NFR BLD AUTO: 75.7 %
NRBC BLD AUTO-RTO: 0 /100 WBC
PHOSPHATE SERPL-MCNC: 4.3 MG/DL (ref 2.5–4.5)
PLATELET # BLD AUTO: 168 THOU/MM3 (ref 130–400)
PMV BLD AUTO: 9.3 FL (ref 9.4–12.4)
POTASSIUM SERPL-SCNC: 5.3 MEQ/L (ref 3.5–5.2)
RBC # BLD AUTO: 3.01 MILL/MM3 (ref 4.2–5.4)
SODIUM SERPL-SCNC: 137 MEQ/L (ref 135–145)
WBC # BLD AUTO: 7.2 THOU/MM3 (ref 4.8–10.8)

## 2025-03-18 PROCEDURE — 36415 COLL VENOUS BLD VENIPUNCTURE: CPT

## 2025-03-18 PROCEDURE — 1200000003 HC TELEMETRY R&B

## 2025-03-18 PROCEDURE — 80048 BASIC METABOLIC PNL TOTAL CA: CPT

## 2025-03-18 PROCEDURE — 82948 REAGENT STRIP/BLOOD GLUCOSE: CPT

## 2025-03-18 PROCEDURE — 94669 MECHANICAL CHEST WALL OSCILL: CPT

## 2025-03-18 PROCEDURE — 2500000003 HC RX 250 WO HCPCS: Performed by: STUDENT IN AN ORGANIZED HEALTH CARE EDUCATION/TRAINING PROGRAM

## 2025-03-18 PROCEDURE — 99232 SBSQ HOSP IP/OBS MODERATE 35: CPT | Performed by: FAMILY MEDICINE

## 2025-03-18 PROCEDURE — 93306 TTE W/DOPPLER COMPLETE: CPT

## 2025-03-18 PROCEDURE — 93306 TTE W/DOPPLER COMPLETE: CPT | Performed by: INTERNAL MEDICINE

## 2025-03-18 PROCEDURE — 84100 ASSAY OF PHOSPHORUS: CPT

## 2025-03-18 PROCEDURE — 1200000000 HC SEMI PRIVATE

## 2025-03-18 PROCEDURE — 6370000000 HC RX 637 (ALT 250 FOR IP): Performed by: STUDENT IN AN ORGANIZED HEALTH CARE EDUCATION/TRAINING PROGRAM

## 2025-03-18 PROCEDURE — 94640 AIRWAY INHALATION TREATMENT: CPT

## 2025-03-18 PROCEDURE — 99232 SBSQ HOSP IP/OBS MODERATE 35: CPT | Performed by: INTERNAL MEDICINE

## 2025-03-18 PROCEDURE — 90935 HEMODIALYSIS ONE EVALUATION: CPT

## 2025-03-18 PROCEDURE — 85025 COMPLETE CBC W/AUTO DIFF WBC: CPT

## 2025-03-18 PROCEDURE — 94761 N-INVAS EAR/PLS OXIMETRY MLT: CPT

## 2025-03-18 PROCEDURE — 6370000000 HC RX 637 (ALT 250 FOR IP)

## 2025-03-18 PROCEDURE — 5A1D70Z PERFORMANCE OF URINARY FILTRATION, INTERMITTENT, LESS THAN 6 HOURS PER DAY: ICD-10-PCS | Performed by: INTERNAL MEDICINE

## 2025-03-18 RX ADMIN — AMLODIPINE BESYLATE 5 MG: 5 TABLET ORAL at 06:36

## 2025-03-18 RX ADMIN — ACETAMINOPHEN 650 MG: 325 TABLET ORAL at 11:52

## 2025-03-18 RX ADMIN — GUAIFENESIN, DEXTROMETHORPHAN HBR 1 TABLET: 600; 30 TABLET ORAL at 19:42

## 2025-03-18 RX ADMIN — GUAIFENESIN, DEXTROMETHORPHAN HBR 1 TABLET: 600; 30 TABLET ORAL at 11:52

## 2025-03-18 RX ADMIN — ATORVASTATIN CALCIUM 40 MG: 40 TABLET, FILM COATED ORAL at 06:35

## 2025-03-18 RX ADMIN — GABAPENTIN 300 MG: 300 CAPSULE ORAL at 19:42

## 2025-03-18 RX ADMIN — GABAPENTIN 300 MG: 300 CAPSULE ORAL at 11:52

## 2025-03-18 RX ADMIN — SODIUM CHLORIDE, PRESERVATIVE FREE 10 ML: 5 INJECTION INTRAVENOUS at 19:42

## 2025-03-18 RX ADMIN — ASPIRIN 81 MG: 81 TABLET, COATED ORAL at 06:35

## 2025-03-18 RX ADMIN — INSULIN GLARGINE 25 UNITS: 100 INJECTION, SOLUTION SUBCUTANEOUS at 22:21

## 2025-03-18 RX ADMIN — SODIUM CHLORIDE, PRESERVATIVE FREE 10 ML: 5 INJECTION INTRAVENOUS at 11:52

## 2025-03-18 RX ADMIN — METOPROLOL SUCCINATE 25 MG: 25 TABLET, EXTENDED RELEASE ORAL at 19:42

## 2025-03-18 RX ADMIN — SEVELAMER CARBONATE 1600 MG: 800 TABLET, FILM COATED ORAL at 06:35

## 2025-03-18 RX ADMIN — INSULIN GLARGINE 25 UNITS: 100 INJECTION, SOLUTION SUBCUTANEOUS at 06:43

## 2025-03-18 RX ADMIN — METOPROLOL SUCCINATE 25 MG: 25 TABLET, EXTENDED RELEASE ORAL at 06:35

## 2025-03-18 RX ADMIN — BENZONATATE 100 MG: 100 CAPSULE ORAL at 22:21

## 2025-03-18 RX ADMIN — DOXAZOSIN 4 MG: 4 TABLET ORAL at 19:42

## 2025-03-18 RX ADMIN — APIXABAN 2.5 MG: 2.5 TABLET, FILM COATED ORAL at 19:42

## 2025-03-18 RX ADMIN — SEVELAMER CARBONATE 1600 MG: 800 TABLET, FILM COATED ORAL at 11:52

## 2025-03-18 RX ADMIN — SEVELAMER CARBONATE 1600 MG: 800 TABLET, FILM COATED ORAL at 17:04

## 2025-03-18 RX ADMIN — BUMETANIDE 2 MG: 1 TABLET ORAL at 11:52

## 2025-03-18 RX ADMIN — BUMETANIDE 2 MG: 1 TABLET ORAL at 17:04

## 2025-03-18 RX ADMIN — APIXABAN 2.5 MG: 2.5 TABLET, FILM COATED ORAL at 06:36

## 2025-03-18 RX ADMIN — BENZONATATE 100 MG: 100 CAPSULE ORAL at 00:00

## 2025-03-18 RX ADMIN — ALBUTEROL SULFATE 2 PUFF: 90 AEROSOL, METERED RESPIRATORY (INHALATION) at 04:54

## 2025-03-18 ASSESSMENT — PAIN DESCRIPTION - LOCATION: LOCATION: HEAD

## 2025-03-18 ASSESSMENT — PAIN DESCRIPTION - DESCRIPTORS: DESCRIPTORS: ACHING

## 2025-03-18 ASSESSMENT — PAIN SCALES - GENERAL: PAINLEVEL_OUTOF10: 4

## 2025-03-18 NOTE — RT PROTOCOL NOTE
use the equivalent nebulizer order(s) with same Frequency and PRN reasons based on the score.  If a patient is on this medication at home then do not decrease Frequency below that used at home.    0-3 - enter or revise RT bronchodilator order(s) to equivalent RT Bronchodilator order with Frequency of every 4 hours PRN for wheezing or increased work of breathing using Per Protocol order mode.        4-6 - enter or revise RT Bronchodilator order(s) to two equivalent RT bronchodilator orders with one order with BID Frequency and one order with Frequency of every 4 hours PRN wheezing or increased work of breathing using Per Protocol order mode.        7-10 - enter or revise RT Bronchodilator order(s) to two equivalent RT bronchodilator orders with one order with TID Frequency and one order with Frequency of every 4 hours PRN wheezing or increased work of breathing using Per Protocol order mode.       11-13 - enter or revise RT Bronchodilator order(s) to one equivalent RT bronchodilator order with QID Frequency and an Albuterol order with Frequency of every 4 hours PRN wheezing or increased work of breathing using Per Protocol order mode.      Greater than 13 - enter or revise RT Bronchodilator order(s) to one equivalent RT bronchodilator order with every 4 hours Frequency and an Albuterol order with Frequency of every 2 hours PRN wheezing or increased work of breathing using Per Protocol order mode.     RT to enter RT Home Evaluation for COPD & MDI Assessment order using Per Protocol order mode.    Electronically signed by Kelle Adams RCP on 3/18/2025 at 8:06 AM

## 2025-03-18 NOTE — FLOWSHEET NOTE
03/17/25 1645   Vital Signs   BP (!) 179/89   Temp 97.8 °F (36.6 °C)   Pulse 91   Respirations 19   Weight - Scale 112 kg (246 lb 14.6 oz)   Weight Method Estimated   Percent Weight Change -1.84   Post-Hemodialysis Assessment   Post-Treatment Procedures Blood returned;Access bleeding time < 10 minutes   Machine Disinfection Process Acid/Vinegar Clean;Exterior Machine Disinfection   Rinseback Volume (ml) 400 ml   Blood Volume Processed (Liters) 69.7 L   Dialyzer Clearance Lightly streaked   Duration of Treatment (minutes) 225 minutes   Hemodialysis Intake (ml) 400 ml   Hemodialysis Output (ml) 2600 ml   NET Removed (ml) 2200   Tolerated Treatment Good     3 Hours 45 min completed of 4 hour tx.   Tx. ended 15 minutes early due to venous side of lines attempting to clot.  Blood was able to be returned.  Pressure held to needle access sites for 10 min x 2.  Report provided to Primary RN. Paperwork printed and placed in bin to be scanned in to EMR.

## 2025-03-18 NOTE — PLAN OF CARE
Problem: Chronic Conditions and Co-morbidities  Goal: Patient's chronic conditions and co-morbidity symptoms are monitored and maintained or improved  Outcome: Progressing  Flowsheets (Taken 3/17/2025 2308)  Care Plan - Patient's Chronic Conditions and Co-Morbidity Symptoms are Monitored and Maintained or Improved:   Monitor and assess patient's chronic conditions and comorbid symptoms for stability, deterioration, or improvement   Update acute care plan with appropriate goals if chronic or comorbid symptoms are exacerbated and prevent overall improvement and discharge   Collaborate with multidisciplinary team to address chronic and comorbid conditions and prevent exacerbation or deterioration     Problem: Discharge Planning  Goal: Discharge to home or other facility with appropriate resources  Outcome: Progressing  Flowsheets (Taken 3/17/2025 2308)  Discharge to home or other facility with appropriate resources:   Identify barriers to discharge with patient and caregiver   Arrange for needed discharge resources and transportation as appropriate   Identify discharge learning needs (meds, wound care, etc)   Arrange for interpreters to assist at discharge as needed   Refer to discharge planning if patient needs post-hospital services based on physician order or complex needs related to functional status, cognitive ability or social support system     Problem: ABCDS Injury Assessment  Goal: Absence of physical injury  Outcome: Progressing  Flowsheets (Taken 3/17/2025 2308)  Absence of Physical Injury: Implement safety measures based on patient assessment  Care plan reviewed with patient.  Patient verbalizes understanding of the care plan and contributed to goal setting.

## 2025-03-18 NOTE — FLOWSHEET NOTE
pt moving access arm. Machine alarming high venous pressure. Needle placement checked and confirmed placement, great push and pull-on needle. Re-started TX at lowered BFR. pt c/o pain at needle site. TX stopped. Lines placed in re-circulation. Attempted to re-stick venous needle with no success. Pt refusing another attempt. TX paused and blood returned to arterial needle. Ice applied to venous needle site. Completed 2 hours of 4 hour TX. Dr. Neville notified. Removed 2.3 L of fluid. Pressure held to each needle site x 10 min. Drsg clean, dry, and intact upon leaving unit. TX record printed for scanning into EMR. Report called to primary RN.    03/18/25 0845 03/18/25 1115   Vital Signs   /69 134/73   Temp 97.1 °F (36.2 °C) 97.6 °F (36.4 °C)   Pulse 72 71   Respirations 14 16   SpO2 98 % 97 %   Weight - Scale 114.2 kg (251 lb 12.3 oz) 111.9 kg (246 lb 11.1 oz)   Weight Method Standing scale  --    Percent Weight Change 1.96 -2.01   Pain Assessment   Pain Assessment None - Denies Pain  --    Post-Hemodialysis Assessment   Post-Treatment Procedures  --  Blood returned;Access bleeding time < 10 minutes   Machine Disinfection Process  --  Acid/Vinegar Clean;Heat Disinfect;Exterior Machine Disinfection   Blood Volume Processed (Liters)  --  22.8 L   Dialyzer Clearance  --  Lightly streaked   Duration of Treatment (minutes)  --  120 minutes   Heparin Amount Administered During Treatment (mL)  --  0 mL   Hemodialysis Intake (ml)  --  400 ml   Hemodialysis Output (ml)  --  2701 ml   NET Removed (ml)  --  2301

## 2025-03-18 NOTE — CARE COORDINATION
Case Management Assessment Initial Evaluation    Date/Time of Evaluation: 3/18/2025 8:14 AM  Assessment Completed by: Katarina Ivey RN    If patient is discharged prior to next notation, then this note serves as note for discharge by case management.    Patient Name: Debbie Locke                   YOB: 1975  Diagnosis: End stage renal disease on dialysis (HCC) [N18.6, Z99.2]  ESRD (end stage renal disease) on dialysis (HCC) [N18.6, Z99.2]  Hypervolemia, unspecified hypervolemia type [E87.70]                   Date / Time: 3/17/2025  6:53 AM  Location: [address] [place] [address]     Patient Admission Status: Inpatient   Readmission Risk Low 0-14, Mod 15-19), High > 20: Readmission Risk Score: 25.6    Current PCP: Kory Thomas MD  Health Care Decision Makers:   Primary Decision Maker: Margarita Mustafa  Child - [phone]    Secondary Decision Maker: Manuel Mustafa - Child - [phone]    Additional Case Management Notes: To ED w/ cough and SOB. Recently was traveling to visit family, accidentally left behind bag of medications so she hasn't taken for the last week. Creat 6.7 on arrival. Taken to HD from ED. Hospitalist and Nephrology following. Creat 4.9. K 5.3. Pro-BNP 97604.0. Hgb 8.5. Flu/Covid negative. RA. ECHO pending. IS. Telemetry. Tylenol prn. Norvasc. Eliquis. Asa. Lipitor. Tessalon. Bumex. Mucinex. Doxazosin. Gabapentin. DM management. Toprol XL. Renvela.     Procedures:   3/18 ECHO: pending      Imaging:   3/17 CXR: Negative    Patient Goals/Plan/Treatment Preferences: Met w/ Deepthi. Verified PCP. Her insurance lapsed and she is working on renewing Medicaid. She is independent; her daughter provides all transportation. Has a RW if needed. Current HD at [facility]- plans to transition to PD in the next couple months. Denies discharge needs. Plans to return to her DIL's home w/ her .      03/18/25 1084   Service Assessment   Patient Orientation Alert and Oriented   Cognition Alert

## 2025-03-19 LAB
ANION GAP SERPL CALC-SCNC: 9 MEQ/L (ref 8–16)
BASOPHILS ABSOLUTE: 0 THOU/MM3 (ref 0–0.1)
BASOPHILS NFR BLD AUTO: 0.4 %
BUN SERPL-MCNC: 32 MG/DL (ref 8–23)
CALCIUM SERPL-MCNC: 8.2 MG/DL (ref 8.6–10)
CHLORIDE SERPL-SCNC: 96 MEQ/L (ref 98–111)
CO2 SERPL-SCNC: 29 MEQ/L (ref 22–29)
CREAT SERPL-MCNC: 5.3 MG/DL (ref 0.5–0.9)
DEPRECATED RDW RBC AUTO: 54.9 FL (ref 35–45)
EOSINOPHIL NFR BLD AUTO: 3.1 %
EOSINOPHILS ABSOLUTE: 0.2 THOU/MM3 (ref 0–0.4)
ERYTHROCYTE [DISTWIDTH] IN BLOOD BY AUTOMATED COUNT: 16.3 % (ref 11.5–14.5)
GFR SERPL CREATININE-BSD FRML MDRD: 9 ML/MIN/1.73M2
GLUCOSE BLD STRIP.AUTO-MCNC: 229 MG/DL (ref 70–108)
GLUCOSE BLD STRIP.AUTO-MCNC: 270 MG/DL (ref 70–108)
GLUCOSE BLD STRIP.AUTO-MCNC: 291 MG/DL (ref 70–108)
GLUCOSE SERPL-MCNC: 248 MG/DL (ref 74–109)
HCT VFR BLD AUTO: 28.1 % (ref 37–47)
HGB BLD-MCNC: 8.2 GM/DL (ref 12–16)
IMM GRANULOCYTES # BLD AUTO: 0.02 THOU/MM3 (ref 0–0.07)
IMM GRANULOCYTES NFR BLD AUTO: 0.3 %
LYMPHOCYTES ABSOLUTE: 1.2 THOU/MM3 (ref 1–4.8)
LYMPHOCYTES NFR BLD AUTO: 15.7 %
MAGNESIUM SERPL-MCNC: 2.1 MG/DL (ref 1.6–2.6)
MCH RBC QN AUTO: 27.4 PG (ref 26–33)
MCHC RBC AUTO-ENTMCNC: 29.2 GM/DL (ref 32.2–35.5)
MCV RBC AUTO: 94 FL (ref 81–99)
MONOCYTES ABSOLUTE: 0.4 THOU/MM3 (ref 0.4–1.3)
MONOCYTES NFR BLD AUTO: 5.5 %
NEUTROPHILS ABSOLUTE: 5.6 THOU/MM3 (ref 1.8–7.7)
NEUTROPHILS NFR BLD AUTO: 75 %
NRBC BLD AUTO-RTO: 0 /100 WBC
PHOSPHATE SERPL-MCNC: 4.3 MG/DL (ref 2.5–4.5)
PLATELET # BLD AUTO: 167 THOU/MM3 (ref 130–400)
PMV BLD AUTO: 9.9 FL (ref 9.4–12.4)
POTASSIUM SERPL-SCNC: 5.6 MEQ/L (ref 3.5–5.2)
RBC # BLD AUTO: 2.99 MILL/MM3 (ref 4.2–5.4)
SODIUM SERPL-SCNC: 134 MEQ/L (ref 135–145)
WBC # BLD AUTO: 7.5 THOU/MM3 (ref 4.8–10.8)

## 2025-03-19 PROCEDURE — 6370000000 HC RX 637 (ALT 250 FOR IP): Performed by: STUDENT IN AN ORGANIZED HEALTH CARE EDUCATION/TRAINING PROGRAM

## 2025-03-19 PROCEDURE — 90935 HEMODIALYSIS ONE EVALUATION: CPT | Performed by: INTERNAL MEDICINE

## 2025-03-19 PROCEDURE — 82948 REAGENT STRIP/BLOOD GLUCOSE: CPT

## 2025-03-19 PROCEDURE — 1200000003 HC TELEMETRY R&B

## 2025-03-19 PROCEDURE — 84100 ASSAY OF PHOSPHORUS: CPT

## 2025-03-19 PROCEDURE — 94669 MECHANICAL CHEST WALL OSCILL: CPT

## 2025-03-19 PROCEDURE — 99232 SBSQ HOSP IP/OBS MODERATE 35: CPT | Performed by: STUDENT IN AN ORGANIZED HEALTH CARE EDUCATION/TRAINING PROGRAM

## 2025-03-19 PROCEDURE — 83735 ASSAY OF MAGNESIUM: CPT

## 2025-03-19 PROCEDURE — 80048 BASIC METABOLIC PNL TOTAL CA: CPT

## 2025-03-19 PROCEDURE — 6370000000 HC RX 637 (ALT 250 FOR IP)

## 2025-03-19 PROCEDURE — 36415 COLL VENOUS BLD VENIPUNCTURE: CPT

## 2025-03-19 PROCEDURE — 2500000003 HC RX 250 WO HCPCS: Performed by: STUDENT IN AN ORGANIZED HEALTH CARE EDUCATION/TRAINING PROGRAM

## 2025-03-19 PROCEDURE — 1200000000 HC SEMI PRIVATE

## 2025-03-19 PROCEDURE — 85025 COMPLETE CBC W/AUTO DIFF WBC: CPT

## 2025-03-19 PROCEDURE — 90935 HEMODIALYSIS ONE EVALUATION: CPT

## 2025-03-19 RX ORDER — GLUCAGON 1 MG/ML
1 KIT INJECTION PRN
Status: DISCONTINUED | OUTPATIENT
Start: 2025-03-19 | End: 2025-03-28 | Stop reason: HOSPADM

## 2025-03-19 RX ORDER — DEXTROSE MONOHYDRATE 100 MG/ML
INJECTION, SOLUTION INTRAVENOUS CONTINUOUS PRN
Status: DISCONTINUED | OUTPATIENT
Start: 2025-03-19 | End: 2025-03-28 | Stop reason: HOSPADM

## 2025-03-19 RX ORDER — INSULIN GLARGINE 100 [IU]/ML
35 INJECTION, SOLUTION SUBCUTANEOUS 2 TIMES DAILY
Status: DISCONTINUED | OUTPATIENT
Start: 2025-03-19 | End: 2025-03-20

## 2025-03-19 RX ADMIN — SODIUM CHLORIDE, PRESERVATIVE FREE 10 ML: 5 INJECTION INTRAVENOUS at 07:41

## 2025-03-19 RX ADMIN — GUAIFENESIN, DEXTROMETHORPHAN HBR 1 TABLET: 600; 30 TABLET ORAL at 20:38

## 2025-03-19 RX ADMIN — GABAPENTIN 300 MG: 300 CAPSULE ORAL at 20:38

## 2025-03-19 RX ADMIN — METOPROLOL SUCCINATE 25 MG: 25 TABLET, EXTENDED RELEASE ORAL at 07:40

## 2025-03-19 RX ADMIN — INSULIN GLARGINE 35 UNITS: 100 INJECTION, SOLUTION SUBCUTANEOUS at 20:38

## 2025-03-19 RX ADMIN — METOPROLOL SUCCINATE 25 MG: 25 TABLET, EXTENDED RELEASE ORAL at 20:38

## 2025-03-19 RX ADMIN — INSULIN GLARGINE 25 UNITS: 100 INJECTION, SOLUTION SUBCUTANEOUS at 07:40

## 2025-03-19 RX ADMIN — BUMETANIDE 2 MG: 1 TABLET ORAL at 16:02

## 2025-03-19 RX ADMIN — APIXABAN 2.5 MG: 2.5 TABLET, FILM COATED ORAL at 07:40

## 2025-03-19 RX ADMIN — ATORVASTATIN CALCIUM 40 MG: 40 TABLET, FILM COATED ORAL at 07:40

## 2025-03-19 RX ADMIN — SEVELAMER CARBONATE 1600 MG: 800 TABLET, FILM COATED ORAL at 07:40

## 2025-03-19 RX ADMIN — GABAPENTIN 300 MG: 300 CAPSULE ORAL at 13:22

## 2025-03-19 RX ADMIN — ASPIRIN 81 MG: 81 TABLET, COATED ORAL at 07:39

## 2025-03-19 RX ADMIN — SEVELAMER CARBONATE 1600 MG: 800 TABLET, FILM COATED ORAL at 16:02

## 2025-03-19 RX ADMIN — BUMETANIDE 2 MG: 1 TABLET ORAL at 07:40

## 2025-03-19 RX ADMIN — AMLODIPINE BESYLATE 5 MG: 5 TABLET ORAL at 07:39

## 2025-03-19 RX ADMIN — DOXAZOSIN 4 MG: 4 TABLET ORAL at 20:38

## 2025-03-19 RX ADMIN — APIXABAN 2.5 MG: 2.5 TABLET, FILM COATED ORAL at 20:38

## 2025-03-19 RX ADMIN — GUAIFENESIN, DEXTROMETHORPHAN HBR 1 TABLET: 600; 30 TABLET ORAL at 07:39

## 2025-03-19 RX ADMIN — SEVELAMER CARBONATE 1600 MG: 800 TABLET, FILM COATED ORAL at 13:22

## 2025-03-19 ASSESSMENT — PAIN SCALES - GENERAL: PAINLEVEL_OUTOF10: 0

## 2025-03-19 NOTE — FLOWSHEET NOTE
03/19/25 0815 03/19/25 1245   Vital Signs   BP (!) 144/67 138/72   Temp 98 °F (36.7 °C) 97.8 °F (36.6 °C)   Pulse 72 64   Respirations 18 18   SpO2 95 % 96 %   Weight - Scale 115.3 kg (254 lb 3.1 oz) 111.5 kg (245 lb 13 oz)   Weight Method Stated Standing scale   Percent Weight Change 0 -3.3   Post-Hemodialysis Assessment   Post-Treatment Procedures  --  Blood returned;Access bleeding time < 10 minutes   Machine Disinfection Process  --  Acid/Vinegar Clean;Heat Disinfect;Exterior Machine Disinfection   Blood Volume Processed (Liters)  --  78.9 L   Dialyzer Clearance  --  Lightly streaked   Duration of Treatment (minutes)  --  240 minutes   Heparin Amount Administered During Treatment (mL)  --  0 mL   Hemodialysis Intake (ml)  --  400 ml   Hemodialysis Output (ml)  --  3900 ml   NET Removed (ml)  --  3500   Tolerated Treatment  --  Good     completed 4 hr HD TX and removed 3.5 Liters of fluid. pt tolerated HD TX well. Dialysis TX ended, all blood returned with 400 mls rinse back. removed X2 (15g Needles) applied sterile gauze with light pressure for less than 10 min. pt returned to her room with left, upper arm dressing clean, dry and intact. report given to primary nurse. record completed and printed to be scanned into pt's EMR.

## 2025-03-19 NOTE — PLAN OF CARE
Problem: Chronic Conditions and Co-morbidities  Goal: Patient's chronic conditions and co-morbidity symptoms are monitored and maintained or improved  Outcome: Progressing  Flowsheets (Taken 3/19/2025 1809)  Care Plan - Patient's Chronic Conditions and Co-Morbidity Symptoms are Monitored and Maintained or Improved: Monitor and assess patient's chronic conditions and comorbid symptoms for stability, deterioration, or improvement  Note: Patient is hemodialysis patient, she goes MWF.      Problem: Discharge Planning  Goal: Discharge to home or other facility with appropriate resources  Outcome: Progressing  Flowsheets (Taken 3/19/2025 1809)  Discharge to home or other facility with appropriate resources:   Identify barriers to discharge with patient and caregiver   Identify discharge learning needs (meds, wound care, etc)  Note: Patient plans to be discharged home with daughter-in-law.      Problem: ABCDS Injury Assessment  Goal: Absence of physical injury  Outcome: Progressing  Flowsheets (Taken 3/19/2025 1809)  Absence of Physical Injury: Implement safety measures based on patient assessment  Note: Patient remains free of injury.      Problem: Pain  Goal: Verbalizes/displays adequate comfort level or baseline comfort level  Outcome: Progressing  Flowsheets (Taken 3/19/2025 1809)  Verbalizes/displays adequate comfort level or baseline comfort level:   Encourage patient to monitor pain and request assistance   Assess pain using appropriate pain scale  Note: Patient remains free of pain, PRN analgesics given per MAR.      Problem: Safety - Adult  Goal: Free from fall injury  Outcome: Progressing  Flowsheets (Taken 3/19/2025 1809)  Free From Fall Injury: Instruct family/caregiver on patient safety  Note: Patient remains free of falls.    Care plan reviewed with patient. Patient agreeable with care plan.

## 2025-03-20 PROBLEM — I50.9 CONGESTIVE HEART FAILURE (HCC): Status: ACTIVE | Noted: 2025-03-20

## 2025-03-20 LAB
ANION GAP SERPL CALC-SCNC: 6 MEQ/L (ref 8–16)
BASOPHILS ABSOLUTE: 0 THOU/MM3 (ref 0–0.1)
BASOPHILS NFR BLD AUTO: 0.4 %
BUN SERPL-MCNC: 25 MG/DL (ref 8–23)
CALCIUM SERPL-MCNC: 8.1 MG/DL (ref 8.6–10)
CHLORIDE SERPL-SCNC: 95 MEQ/L (ref 98–111)
CO2 SERPL-SCNC: 32 MEQ/L (ref 22–29)
CREAT SERPL-MCNC: 4.3 MG/DL (ref 0.5–0.9)
DEPRECATED RDW RBC AUTO: 55.4 FL (ref 35–45)
EOSINOPHIL NFR BLD AUTO: 2.9 %
EOSINOPHILS ABSOLUTE: 0.2 THOU/MM3 (ref 0–0.4)
ERYTHROCYTE [DISTWIDTH] IN BLOOD BY AUTOMATED COUNT: 16.3 % (ref 11.5–14.5)
GFR SERPL CREATININE-BSD FRML MDRD: 12 ML/MIN/1.73M2
GLUCOSE BLD STRIP.AUTO-MCNC: 153 MG/DL (ref 70–108)
GLUCOSE BLD STRIP.AUTO-MCNC: 185 MG/DL (ref 70–108)
GLUCOSE BLD STRIP.AUTO-MCNC: 260 MG/DL (ref 70–108)
GLUCOSE SERPL-MCNC: 239 MG/DL (ref 74–109)
HCT VFR BLD AUTO: 27.8 % (ref 37–47)
HGB BLD-MCNC: 8.1 GM/DL (ref 12–16)
IMM GRANULOCYTES # BLD AUTO: 0.03 THOU/MM3 (ref 0–0.07)
IMM GRANULOCYTES NFR BLD AUTO: 0.4 %
LYMPHOCYTES ABSOLUTE: 1 THOU/MM3 (ref 1–4.8)
LYMPHOCYTES NFR BLD AUTO: 13.6 %
MAGNESIUM SERPL-MCNC: 2 MG/DL (ref 1.6–2.6)
MCH RBC QN AUTO: 27.4 PG (ref 26–33)
MCHC RBC AUTO-ENTMCNC: 29.1 GM/DL (ref 32.2–35.5)
MCV RBC AUTO: 93.9 FL (ref 81–99)
MONOCYTES ABSOLUTE: 0.4 THOU/MM3 (ref 0.4–1.3)
MONOCYTES NFR BLD AUTO: 5.6 %
NEUTROPHILS ABSOLUTE: 5.8 THOU/MM3 (ref 1.8–7.7)
NEUTROPHILS NFR BLD AUTO: 77.1 %
NRBC BLD AUTO-RTO: 0 /100 WBC
PHOSPHATE SERPL-MCNC: 3.4 MG/DL (ref 2.5–4.5)
PLATELET # BLD AUTO: 150 THOU/MM3 (ref 130–400)
PMV BLD AUTO: 10.1 FL (ref 9.4–12.4)
POTASSIUM SERPL-SCNC: 6.2 MEQ/L (ref 3.5–5.2)
RBC # BLD AUTO: 2.96 MILL/MM3 (ref 4.2–5.4)
SODIUM SERPL-SCNC: 133 MEQ/L (ref 135–145)
WBC # BLD AUTO: 7.5 THOU/MM3 (ref 4.8–10.8)

## 2025-03-20 PROCEDURE — 6370000000 HC RX 637 (ALT 250 FOR IP): Performed by: STUDENT IN AN ORGANIZED HEALTH CARE EDUCATION/TRAINING PROGRAM

## 2025-03-20 PROCEDURE — 6370000000 HC RX 637 (ALT 250 FOR IP)

## 2025-03-20 PROCEDURE — 83735 ASSAY OF MAGNESIUM: CPT

## 2025-03-20 PROCEDURE — 84100 ASSAY OF PHOSPHORUS: CPT

## 2025-03-20 PROCEDURE — 99232 SBSQ HOSP IP/OBS MODERATE 35: CPT | Performed by: STUDENT IN AN ORGANIZED HEALTH CARE EDUCATION/TRAINING PROGRAM

## 2025-03-20 PROCEDURE — 90935 HEMODIALYSIS ONE EVALUATION: CPT

## 2025-03-20 PROCEDURE — 80048 BASIC METABOLIC PNL TOTAL CA: CPT

## 2025-03-20 PROCEDURE — 1200000000 HC SEMI PRIVATE

## 2025-03-20 PROCEDURE — 99232 SBSQ HOSP IP/OBS MODERATE 35: CPT | Performed by: INTERNAL MEDICINE

## 2025-03-20 PROCEDURE — 36415 COLL VENOUS BLD VENIPUNCTURE: CPT

## 2025-03-20 PROCEDURE — 85025 COMPLETE CBC W/AUTO DIFF WBC: CPT

## 2025-03-20 PROCEDURE — 82948 REAGENT STRIP/BLOOD GLUCOSE: CPT

## 2025-03-20 PROCEDURE — 94640 AIRWAY INHALATION TREATMENT: CPT

## 2025-03-20 RX ORDER — INSULIN GLARGINE 100 [IU]/ML
45 INJECTION, SOLUTION SUBCUTANEOUS 2 TIMES DAILY
Status: DISCONTINUED | OUTPATIENT
Start: 2025-03-20 | End: 2025-03-26

## 2025-03-20 RX ADMIN — DOXAZOSIN 4 MG: 4 TABLET ORAL at 20:04

## 2025-03-20 RX ADMIN — METOPROLOL SUCCINATE 25 MG: 25 TABLET, EXTENDED RELEASE ORAL at 08:46

## 2025-03-20 RX ADMIN — BUMETANIDE 2 MG: 1 TABLET ORAL at 17:18

## 2025-03-20 RX ADMIN — SEVELAMER CARBONATE 1600 MG: 800 TABLET, FILM COATED ORAL at 15:47

## 2025-03-20 RX ADMIN — BENZONATATE 100 MG: 100 CAPSULE ORAL at 00:37

## 2025-03-20 RX ADMIN — GUAIFENESIN, DEXTROMETHORPHAN HBR 1 TABLET: 600; 30 TABLET ORAL at 08:46

## 2025-03-20 RX ADMIN — ATORVASTATIN CALCIUM 40 MG: 40 TABLET, FILM COATED ORAL at 08:46

## 2025-03-20 RX ADMIN — BUMETANIDE 2 MG: 1 TABLET ORAL at 08:46

## 2025-03-20 RX ADMIN — AMLODIPINE BESYLATE 5 MG: 5 TABLET ORAL at 08:46

## 2025-03-20 RX ADMIN — INSULIN GLARGINE 45 UNITS: 100 INJECTION, SOLUTION SUBCUTANEOUS at 21:41

## 2025-03-20 RX ADMIN — ALBUTEROL SULFATE 2 PUFF: 90 AEROSOL, METERED RESPIRATORY (INHALATION) at 03:14

## 2025-03-20 RX ADMIN — GUAIFENESIN, DEXTROMETHORPHAN HBR 1 TABLET: 600; 30 TABLET ORAL at 20:04

## 2025-03-20 RX ADMIN — INSULIN GLARGINE 35 UNITS: 100 INJECTION, SOLUTION SUBCUTANEOUS at 08:52

## 2025-03-20 RX ADMIN — GABAPENTIN 300 MG: 300 CAPSULE ORAL at 08:46

## 2025-03-20 RX ADMIN — APIXABAN 2.5 MG: 2.5 TABLET, FILM COATED ORAL at 08:46

## 2025-03-20 RX ADMIN — SEVELAMER CARBONATE 1600 MG: 800 TABLET, FILM COATED ORAL at 08:46

## 2025-03-20 RX ADMIN — BENZONATATE 100 MG: 100 CAPSULE ORAL at 12:35

## 2025-03-20 RX ADMIN — APIXABAN 2.5 MG: 2.5 TABLET, FILM COATED ORAL at 20:04

## 2025-03-20 RX ADMIN — METOPROLOL SUCCINATE 25 MG: 25 TABLET, EXTENDED RELEASE ORAL at 20:04

## 2025-03-20 RX ADMIN — ASPIRIN 81 MG: 81 TABLET, COATED ORAL at 08:45

## 2025-03-20 RX ADMIN — GABAPENTIN 300 MG: 300 CAPSULE ORAL at 20:04

## 2025-03-20 NOTE — PLAN OF CARE
Problem: Chronic Conditions and Co-morbidities  Goal: Patient's chronic conditions and co-morbidity symptoms are monitored and maintained or improved  3/20/2025 1959 by Lucinda Luna RN  Outcome: Progressing  Flowsheets (Taken 3/20/2025 1949)  Care Plan - Patient's Chronic Conditions and Co-Morbidity Symptoms are Monitored and Maintained or Improved:   Monitor and assess patient's chronic conditions and comorbid symptoms for stability, deterioration, or improvement   Collaborate with multidisciplinary team to address chronic and comorbid conditions and prevent exacerbation or deterioration  3/20/2025 0802 by Mariya Putnam RN  Outcome: Progressing  Flowsheets (Taken 3/19/2025 1809 by Linda Badillo)  Care Plan - Patient's Chronic Conditions and Co-Morbidity Symptoms are Monitored and Maintained or Improved: Monitor and assess patient's chronic conditions and comorbid symptoms for stability, deterioration, or improvement     Problem: Discharge Planning  Goal: Discharge to home or other facility with appropriate resources  3/20/2025 1959 by Lucinda Luna RN  Outcome: Progressing  Flowsheets (Taken 3/20/2025 1949)  Discharge to home or other facility with appropriate resources:   Identify barriers to discharge with patient and caregiver   Arrange for needed discharge resources and transportation as appropriate   Identify discharge learning needs (meds, wound care, etc)  3/20/2025 0802 by Mariya Putnam RN  Outcome: Progressing  Flowsheets (Taken 3/19/2025 1809 by Linda Badillo)  Discharge to home or other facility with appropriate resources:   Identify barriers to discharge with patient and caregiver   Identify discharge learning needs (meds, wound care, etc)     Problem: ABCDS Injury Assessment  Goal: Absence of physical injury  3/20/2025 1959 by Lucinda Luna RN  Outcome: Progressing  3/20/2025 0802 by Mariya Putnam RN  Outcome: Progressing  Flowsheets (Taken 3/19/2025 1809 by Justino

## 2025-03-20 NOTE — FLOWSHEET NOTE
03/20/25 1450   Vital Signs   /63   Temp 98 °F (36.7 °C)   Pulse 78   Respirations 18   Weight - Scale 109.9 kg (242 lb 4.6 oz)   Weight Method Standing scale   Percent Weight Change -3.09   Post-Hemodialysis Assessment   Post-Treatment Procedures Blood returned;Access bleeding time < 10 minutes   Machine Disinfection Process Acid/Vinegar Clean;Heat Disinfect;Exterior Machine Disinfection   Rinseback Volume (ml) 400 ml   Blood Volume Processed (Liters) 78.9 L   Dialyzer Clearance Lightly streaked   Duration of Treatment (minutes) 240 minutes   Heparin Amount Administered During Treatment (mL) 2.2 mL   Hemodialysis Intake (ml) 400 ml   Hemodialysis Output (ml) 3900 ml   NET Removed (ml) 3500     completed 4 hr HD TX and removed 3.5 Liters of fluid. pt tolerated HD TX well. Dialysis TX ended, all blood returned with 400 mls rinse back. removed X2 (16g needles), applied sterile gauze with light pressure for less than 10 min. pt returned to her room with left, upper arm dressing clean, dry and intact. report given to primary nurse. record completed and printed to be scanned into pt's EMR.

## 2025-03-20 NOTE — PLAN OF CARE
Problem: Chronic Conditions and Co-morbidities  Goal: Patient's chronic conditions and co-morbidity symptoms are monitored and maintained or improved  3/20/2025 0802 by Mariya Putnam RN  Outcome: Progressing  Flowsheets (Taken 3/19/2025 1809 by Linda Badillo)  Care Plan - Patient's Chronic Conditions and Co-Morbidity Symptoms are Monitored and Maintained or Improved: Monitor and assess patient's chronic conditions and comorbid symptoms for stability, deterioration, or improvement  3/19/2025 1809 by Linda Badillo  Outcome: Progressing  Flowsheets (Taken 3/19/2025 1809)  Care Plan - Patient's Chronic Conditions and Co-Morbidity Symptoms are Monitored and Maintained or Improved: Monitor and assess patient's chronic conditions and comorbid symptoms for stability, deterioration, or improvement  Note: Patient is hemodialysis patient, she goes MWF.      Problem: Discharge Planning  Goal: Discharge to home or other facility with appropriate resources  3/20/2025 0802 by Mariya Putnam RN  Outcome: Progressing  Flowsheets (Taken 3/19/2025 1809 by Linda Badillo)  Discharge to home or other facility with appropriate resources:   Identify barriers to discharge with patient and caregiver   Identify discharge learning needs (meds, wound care, etc)  3/19/2025 1809 by Linda Badillo  Outcome: Progressing  Flowsheets (Taken 3/19/2025 1809)  Discharge to home or other facility with appropriate resources:   Identify barriers to discharge with patient and caregiver   Identify discharge learning needs (meds, wound care, etc)  Note: Patient plans to be discharged home with daughter-in-law.      Problem: ABCDS Injury Assessment  Goal: Absence of physical injury  3/20/2025 0802 by Mariya Putnam RN  Outcome: Progressing  Flowsheets (Taken 3/19/2025 1809 by Linda Badillo)  Absence of Physical Injury: Implement safety measures based on patient assessment  3/19/2025 1809 by Linda Badillo  Outcome:

## 2025-03-21 ENCOUNTER — APPOINTMENT (OUTPATIENT)
Dept: INTERVENTIONAL RADIOLOGY/VASCULAR | Age: 50
DRG: 291 | End: 2025-03-21
Attending: INTERNAL MEDICINE
Payer: MEDICAID

## 2025-03-21 LAB
ANION GAP SERPL CALC-SCNC: 8 MEQ/L (ref 8–16)
BASOPHILS ABSOLUTE: 0 THOU/MM3 (ref 0–0.1)
BASOPHILS NFR BLD AUTO: 0.4 %
BUN SERPL-MCNC: 25 MG/DL (ref 8–23)
CALCIUM SERPL-MCNC: 8 MG/DL (ref 8.6–10)
CHLORIDE SERPL-SCNC: 95 MEQ/L (ref 98–111)
CO2 SERPL-SCNC: 29 MEQ/L (ref 22–29)
CREAT SERPL-MCNC: 3.7 MG/DL (ref 0.5–0.9)
DEPRECATED RDW RBC AUTO: 54 FL (ref 35–45)
EOSINOPHIL NFR BLD AUTO: 3.2 %
EOSINOPHILS ABSOLUTE: 0.2 THOU/MM3 (ref 0–0.4)
ERYTHROCYTE [DISTWIDTH] IN BLOOD BY AUTOMATED COUNT: 15.9 % (ref 11.5–14.5)
GFR SERPL CREATININE-BSD FRML MDRD: 14 ML/MIN/1.73M2
GLUCOSE BLD STRIP.AUTO-MCNC: 163 MG/DL (ref 70–108)
GLUCOSE BLD STRIP.AUTO-MCNC: 241 MG/DL (ref 70–108)
GLUCOSE SERPL-MCNC: 124 MG/DL (ref 74–109)
HCT VFR BLD AUTO: 27.8 % (ref 37–47)
HGB BLD-MCNC: 8.2 GM/DL (ref 12–16)
IMM GRANULOCYTES # BLD AUTO: 0.04 THOU/MM3 (ref 0–0.07)
IMM GRANULOCYTES NFR BLD AUTO: 0.6 %
LYMPHOCYTES ABSOLUTE: 1.2 THOU/MM3 (ref 1–4.8)
LYMPHOCYTES NFR BLD AUTO: 16.2 %
MAGNESIUM SERPL-MCNC: 2 MG/DL (ref 1.6–2.6)
MCH RBC QN AUTO: 27.3 PG (ref 26–33)
MCHC RBC AUTO-ENTMCNC: 29.5 GM/DL (ref 32.2–35.5)
MCV RBC AUTO: 92.7 FL (ref 81–99)
MONOCYTES ABSOLUTE: 0.4 THOU/MM3 (ref 0.4–1.3)
MONOCYTES NFR BLD AUTO: 6.2 %
NEUTROPHILS ABSOLUTE: 5.2 THOU/MM3 (ref 1.8–7.7)
NEUTROPHILS NFR BLD AUTO: 73.4 %
NRBC BLD AUTO-RTO: 0 /100 WBC
PHOSPHATE SERPL-MCNC: 3.4 MG/DL (ref 2.5–4.5)
PLATELET # BLD AUTO: 136 THOU/MM3 (ref 130–400)
PMV BLD AUTO: 10 FL (ref 9.4–12.4)
POTASSIUM SERPL-SCNC: 5.2 MEQ/L (ref 3.5–5.2)
POTASSIUM SERPL-SCNC: 5.3 MEQ/L (ref 3.5–5.2)
POTASSIUM SERPL-SCNC: 5.7 MEQ/L (ref 3.5–5.2)
RBC # BLD AUTO: 3 MILL/MM3 (ref 4.2–5.4)
SODIUM SERPL-SCNC: 132 MEQ/L (ref 135–145)
WBC # BLD AUTO: 7.1 THOU/MM3 (ref 4.8–10.8)

## 2025-03-21 PROCEDURE — 2709999900 IR FISTULAGRAM

## 2025-03-21 PROCEDURE — 82948 REAGENT STRIP/BLOOD GLUCOSE: CPT

## 2025-03-21 PROCEDURE — B51W1ZZ FLUOROSCOPY OF DIALYSIS SHUNT/FISTULA USING LOW OSMOLAR CONTRAST: ICD-10-PCS | Performed by: RADIOLOGY

## 2025-03-21 PROCEDURE — 83735 ASSAY OF MAGNESIUM: CPT

## 2025-03-21 PROCEDURE — 36901 INTRO CATH DIALYSIS CIRCUIT: CPT

## 2025-03-21 PROCEDURE — 99232 SBSQ HOSP IP/OBS MODERATE 35: CPT | Performed by: INTERNAL MEDICINE

## 2025-03-21 PROCEDURE — 6370000000 HC RX 637 (ALT 250 FOR IP)

## 2025-03-21 PROCEDURE — 80048 BASIC METABOLIC PNL TOTAL CA: CPT

## 2025-03-21 PROCEDURE — 36415 COLL VENOUS BLD VENIPUNCTURE: CPT

## 2025-03-21 PROCEDURE — 6360000004 HC RX CONTRAST MEDICATION: Performed by: RADIOLOGY

## 2025-03-21 PROCEDURE — 6370000000 HC RX 637 (ALT 250 FOR IP): Performed by: STUDENT IN AN ORGANIZED HEALTH CARE EDUCATION/TRAINING PROGRAM

## 2025-03-21 PROCEDURE — 6360000002 HC RX W HCPCS: Performed by: RADIOLOGY

## 2025-03-21 PROCEDURE — 84100 ASSAY OF PHOSPHORUS: CPT

## 2025-03-21 PROCEDURE — 99232 SBSQ HOSP IP/OBS MODERATE 35: CPT | Performed by: STUDENT IN AN ORGANIZED HEALTH CARE EDUCATION/TRAINING PROGRAM

## 2025-03-21 PROCEDURE — 84132 ASSAY OF SERUM POTASSIUM: CPT

## 2025-03-21 PROCEDURE — 85025 COMPLETE CBC W/AUTO DIFF WBC: CPT

## 2025-03-21 PROCEDURE — 1200000000 HC SEMI PRIVATE

## 2025-03-21 RX ORDER — IOPAMIDOL 612 MG/ML
INJECTION, SOLUTION INTRAVASCULAR PRN
Status: COMPLETED | OUTPATIENT
Start: 2025-03-21 | End: 2025-03-21

## 2025-03-21 RX ORDER — LIDOCAINE HYDROCHLORIDE 20 MG/ML
INJECTION, SOLUTION EPIDURAL; INFILTRATION; INTRACAUDAL; PERINEURAL PRN
Status: COMPLETED | OUTPATIENT
Start: 2025-03-21 | End: 2025-03-21

## 2025-03-21 RX ADMIN — INSULIN GLARGINE 45 UNITS: 100 INJECTION, SOLUTION SUBCUTANEOUS at 22:33

## 2025-03-21 RX ADMIN — BENZONATATE 100 MG: 100 CAPSULE ORAL at 08:03

## 2025-03-21 RX ADMIN — SEVELAMER CARBONATE 1600 MG: 800 TABLET, FILM COATED ORAL at 17:01

## 2025-03-21 RX ADMIN — BENZONATATE 100 MG: 100 CAPSULE ORAL at 01:40

## 2025-03-21 RX ADMIN — ASPIRIN 81 MG: 81 TABLET, COATED ORAL at 08:55

## 2025-03-21 RX ADMIN — LIDOCAINE HYDROCHLORIDE 2 ML: 20 INJECTION, SOLUTION EPIDURAL; INFILTRATION; INTRACAUDAL; PERINEURAL at 21:49

## 2025-03-21 RX ADMIN — GUAIFENESIN, DEXTROMETHORPHAN HBR 1 TABLET: 600; 30 TABLET ORAL at 22:33

## 2025-03-21 RX ADMIN — ACETAMINOPHEN 650 MG: 325 TABLET ORAL at 04:25

## 2025-03-21 RX ADMIN — BUMETANIDE 2 MG: 1 TABLET ORAL at 18:46

## 2025-03-21 RX ADMIN — GUAIFENESIN, DEXTROMETHORPHAN HBR 1 TABLET: 600; 30 TABLET ORAL at 13:12

## 2025-03-21 RX ADMIN — ATORVASTATIN CALCIUM 40 MG: 40 TABLET, FILM COATED ORAL at 08:59

## 2025-03-21 RX ADMIN — GABAPENTIN 300 MG: 300 CAPSULE ORAL at 22:33

## 2025-03-21 RX ADMIN — SEVELAMER CARBONATE 1600 MG: 800 TABLET, FILM COATED ORAL at 08:03

## 2025-03-21 RX ADMIN — BENZONATATE 100 MG: 100 CAPSULE ORAL at 18:51

## 2025-03-21 RX ADMIN — METOPROLOL SUCCINATE 25 MG: 25 TABLET, EXTENDED RELEASE ORAL at 22:33

## 2025-03-21 RX ADMIN — Medication 3 MG: at 01:40

## 2025-03-21 RX ADMIN — BUMETANIDE 2 MG: 1 TABLET ORAL at 08:59

## 2025-03-21 RX ADMIN — METOPROLOL SUCCINATE 25 MG: 25 TABLET, EXTENDED RELEASE ORAL at 08:59

## 2025-03-21 RX ADMIN — GABAPENTIN 300 MG: 300 CAPSULE ORAL at 09:00

## 2025-03-21 RX ADMIN — IOPAMIDOL 15 ML: 612 INJECTION, SOLUTION INTRAVENOUS at 21:49

## 2025-03-21 RX ADMIN — DOXAZOSIN 4 MG: 4 TABLET ORAL at 22:33

## 2025-03-21 RX ADMIN — APIXABAN 2.5 MG: 2.5 TABLET, FILM COATED ORAL at 22:33

## 2025-03-21 RX ADMIN — INSULIN GLARGINE 45 UNITS: 100 INJECTION, SOLUTION SUBCUTANEOUS at 08:59

## 2025-03-21 RX ADMIN — AMLODIPINE BESYLATE 5 MG: 5 TABLET ORAL at 08:59

## 2025-03-21 RX ADMIN — APIXABAN 2.5 MG: 2.5 TABLET, FILM COATED ORAL at 08:59

## 2025-03-21 ASSESSMENT — PAIN SCALES - GENERAL
PAINLEVEL_OUTOF10: 9
PAINLEVEL_OUTOF10: 4

## 2025-03-22 LAB
ANION GAP SERPL CALC-SCNC: 11 MEQ/L (ref 8–16)
BASOPHILS ABSOLUTE: 0 THOU/MM3 (ref 0–0.1)
BASOPHILS NFR BLD AUTO: 0.5 %
BUN SERPL-MCNC: 31 MG/DL (ref 8–23)
CALCIUM SERPL-MCNC: 8.1 MG/DL (ref 8.6–10)
CHLORIDE SERPL-SCNC: 96 MEQ/L (ref 98–111)
CO2 SERPL-SCNC: 27 MEQ/L (ref 22–29)
CREAT SERPL-MCNC: 3.9 MG/DL (ref 0.5–0.9)
DEPRECATED RDW RBC AUTO: 53.8 FL (ref 35–45)
EOSINOPHIL NFR BLD AUTO: 3.1 %
EOSINOPHILS ABSOLUTE: 0.2 THOU/MM3 (ref 0–0.4)
ERYTHROCYTE [DISTWIDTH] IN BLOOD BY AUTOMATED COUNT: 15.9 % (ref 11.5–14.5)
GFR SERPL CREATININE-BSD FRML MDRD: 13 ML/MIN/1.73M2
GLUCOSE BLD STRIP.AUTO-MCNC: 120 MG/DL (ref 70–108)
GLUCOSE BLD STRIP.AUTO-MCNC: 230 MG/DL (ref 70–108)
GLUCOSE BLD STRIP.AUTO-MCNC: 265 MG/DL (ref 70–108)
GLUCOSE BLD STRIP.AUTO-MCNC: 271 MG/DL (ref 70–108)
GLUCOSE SERPL-MCNC: 163 MG/DL (ref 74–109)
HCT VFR BLD AUTO: 27 % (ref 37–47)
HGB BLD-MCNC: 8.2 GM/DL (ref 12–16)
IMM GRANULOCYTES # BLD AUTO: 0.03 THOU/MM3 (ref 0–0.07)
IMM GRANULOCYTES NFR BLD AUTO: 0.5 %
LYMPHOCYTES ABSOLUTE: 0.9 THOU/MM3 (ref 1–4.8)
LYMPHOCYTES NFR BLD AUTO: 13.2 %
MAGNESIUM SERPL-MCNC: 2 MG/DL (ref 1.6–2.6)
MCH RBC QN AUTO: 28.1 PG (ref 26–33)
MCHC RBC AUTO-ENTMCNC: 30.4 GM/DL (ref 32.2–35.5)
MCV RBC AUTO: 92.5 FL (ref 81–99)
MONOCYTES ABSOLUTE: 0.4 THOU/MM3 (ref 0.4–1.3)
MONOCYTES NFR BLD AUTO: 5.4 %
NEUTROPHILS ABSOLUTE: 5 THOU/MM3 (ref 1.8–7.7)
NEUTROPHILS NFR BLD AUTO: 77.3 %
NRBC BLD AUTO-RTO: 0 /100 WBC
PHOSPHATE SERPL-MCNC: 3.6 MG/DL (ref 2.5–4.5)
PLATELET # BLD AUTO: 129 THOU/MM3 (ref 130–400)
PMV BLD AUTO: 9.7 FL (ref 9.4–12.4)
POTASSIUM SERPL-SCNC: 5.1 MEQ/L (ref 3.5–5.2)
RBC # BLD AUTO: 2.92 MILL/MM3 (ref 4.2–5.4)
SODIUM SERPL-SCNC: 134 MEQ/L (ref 135–145)
WBC # BLD AUTO: 6.5 THOU/MM3 (ref 4.8–10.8)

## 2025-03-22 PROCEDURE — 90935 HEMODIALYSIS ONE EVALUATION: CPT | Performed by: INTERNAL MEDICINE

## 2025-03-22 PROCEDURE — 1200000000 HC SEMI PRIVATE

## 2025-03-22 PROCEDURE — 99232 SBSQ HOSP IP/OBS MODERATE 35: CPT | Performed by: STUDENT IN AN ORGANIZED HEALTH CARE EDUCATION/TRAINING PROGRAM

## 2025-03-22 PROCEDURE — 6370000000 HC RX 637 (ALT 250 FOR IP)

## 2025-03-22 PROCEDURE — 90935 HEMODIALYSIS ONE EVALUATION: CPT

## 2025-03-22 PROCEDURE — 6370000000 HC RX 637 (ALT 250 FOR IP): Performed by: STUDENT IN AN ORGANIZED HEALTH CARE EDUCATION/TRAINING PROGRAM

## 2025-03-22 PROCEDURE — 82948 REAGENT STRIP/BLOOD GLUCOSE: CPT

## 2025-03-22 PROCEDURE — 36415 COLL VENOUS BLD VENIPUNCTURE: CPT

## 2025-03-22 PROCEDURE — 83735 ASSAY OF MAGNESIUM: CPT

## 2025-03-22 PROCEDURE — 85025 COMPLETE CBC W/AUTO DIFF WBC: CPT

## 2025-03-22 PROCEDURE — 80048 BASIC METABOLIC PNL TOTAL CA: CPT

## 2025-03-22 PROCEDURE — 84100 ASSAY OF PHOSPHORUS: CPT

## 2025-03-22 RX ADMIN — BUMETANIDE 2 MG: 1 TABLET ORAL at 16:47

## 2025-03-22 RX ADMIN — APIXABAN 2.5 MG: 2.5 TABLET, FILM COATED ORAL at 12:25

## 2025-03-22 RX ADMIN — INSULIN GLARGINE 45 UNITS: 100 INJECTION, SOLUTION SUBCUTANEOUS at 21:11

## 2025-03-22 RX ADMIN — DOXAZOSIN 4 MG: 4 TABLET ORAL at 21:11

## 2025-03-22 RX ADMIN — APIXABAN 2.5 MG: 2.5 TABLET, FILM COATED ORAL at 21:11

## 2025-03-22 RX ADMIN — AMLODIPINE BESYLATE 5 MG: 5 TABLET ORAL at 12:25

## 2025-03-22 RX ADMIN — GABAPENTIN 300 MG: 300 CAPSULE ORAL at 12:26

## 2025-03-22 RX ADMIN — ASPIRIN 81 MG: 81 TABLET, COATED ORAL at 12:25

## 2025-03-22 RX ADMIN — GABAPENTIN 300 MG: 300 CAPSULE ORAL at 21:11

## 2025-03-22 RX ADMIN — GUAIFENESIN, DEXTROMETHORPHAN HBR 1 TABLET: 600; 30 TABLET ORAL at 12:26

## 2025-03-22 RX ADMIN — INSULIN GLARGINE 45 UNITS: 100 INJECTION, SOLUTION SUBCUTANEOUS at 12:26

## 2025-03-22 RX ADMIN — ATORVASTATIN CALCIUM 40 MG: 40 TABLET, FILM COATED ORAL at 12:25

## 2025-03-22 RX ADMIN — METOPROLOL SUCCINATE 25 MG: 25 TABLET, EXTENDED RELEASE ORAL at 21:11

## 2025-03-22 RX ADMIN — BUMETANIDE 2 MG: 1 TABLET ORAL at 12:25

## 2025-03-22 RX ADMIN — METOPROLOL SUCCINATE 25 MG: 25 TABLET, EXTENDED RELEASE ORAL at 12:26

## 2025-03-22 RX ADMIN — SEVELAMER CARBONATE 1600 MG: 800 TABLET, FILM COATED ORAL at 12:26

## 2025-03-22 RX ADMIN — SEVELAMER CARBONATE 1600 MG: 800 TABLET, FILM COATED ORAL at 16:47

## 2025-03-22 RX ADMIN — GUAIFENESIN, DEXTROMETHORPHAN HBR 1 TABLET: 600; 30 TABLET ORAL at 21:11

## 2025-03-22 NOTE — H&P
[facility]  Sedation/Analgesia History & Physical    Pt Name: Debbie Locke  MRN: [id number]  YOB: 1975  Provider Performing Procedure: Quang Vieyra MD, MD  Primary Care Physician: Kory Thomas MD    Formulation and discussion of sedation / procedure plans, risks, benefits, side effects and alternatives with patient and/or responsible adult completed.    PRE-PROCEDURE   DNR-CCA/DNR-CC []Yes [x]No  Brief History/Pre-Procedure Diagnosis: Renal failure, malfunctioning fistula          MEDICAL HISTORY  []CAD/Valve  []Liver Disease  []Lung Disease []Diabetes  []Hypertension []Renal Disease  [x]Additional information:       has a past medical history of Chronic kidney disease, FSGS (focal segmental glomerulosclerosis), Hemodialysis patient, Hyperlipidemia, Hypertension, Neuropathy, PVD (peripheral vascular disease), and Type II or unspecified type diabetes mellitus without mention of complication, not stated as uncontrolled.    SURGICAL HISTORY   has a past surgical history that includes  section (2002); CT BIOPSY RENAL (2023); Cholecystectomy (2023); back surgery (N/A, 2024); vascular surgery (Right, 2021); Thyroidectomy, partial; and Dialysis fistula creation (Left, 10/1/2024).  Additional information:       ALLERGIES   Allergies as of 2025 - Fully Reviewed 2025   Allergen Reaction Noted    Insulin lispro Other (See Comments) 2024    Insulins Other (See Comments) 2021    Lisinopril  2022     Additional information:       MEDICATIONS   Coumadin Use Last 5 Days [x]No []Yes  Antiplatelet drug therapy use last 5 days  [x]No []Yes  Other anticoagulant use last 5 days  [x]No []Yes    Current Facility-Administered Medications:     melatonin tablet 3 mg, 3 mg, Oral, Nightly BENSONN, Radha Asencio APRN - CNP, 3 mg at 25 0140    insulin glargine (LANTUS) injection vial 45 Units, 45 Units, SubCUTAneous, BID, Monk, 
      =======================================================================    SUBJECTIVE    Chief Complaint: Shortness of breath and cough X 1 week    History Of Present Illness:  Debbie Locke is a 49 y.o. female with PMHx of end-stage renal disease on hemodialysis since July/2024, diabetes type 2, hypertension, hyperlipidemia, peripheral neuropathy, PAD with total occlusion of distal right SFA to mid right popliteal artery on Eliquis who presents to [facility] with cough and shortness of breath that has been progressively getting worse over the last week.    Patient reported that she was traveling to visit her family stayed at a motel and accidentally her medications bag was left behind and was discarded and she has not been able to take any medications for the last week.  She reports that one of her grandchildren was sick recently and had been in contact with her, she had developed this persistent productive cough and sore throat and tried some over-the-counter cough medication as well as cough drops and it does not seem to help and she had been taking more fluids than what is recommended to keep herself hydrated but noted that she is gaining weight.  She has not missed any dialysis sessions before but she does not seem that she had been getting adequately dialyzed as she usually developed severe cramps.  She thinks she had been slowly gaining weight and her lower extremity edema is getting worse she also feels bloated and feels like her abdomen is bigger.  She had noted that her shortness of breath had been progressively getting worse she is barely able to ambulate to the bathroom without getting short of breath, even sitting now talking she feels it takes her some effort to do that, she denies any chest pain or palpitations.  No fever or chills.  Appetite has not been great and she has not been taking her insulin for the last week.  She still makes decent amount of urine with her Bumex but

## 2025-03-22 NOTE — FLOWSHEET NOTE
03/22/25 0730 03/22/25 1155   Vital Signs   BP (!) 149/74 132/70   Temp 98.5 °F (36.9 °C) 98.5 °F (36.9 °C)   Pulse 77 77   Respirations 18 17   SpO2 98 % 99 %   Weight - Scale 115.3 kg (254 lb 3.1 oz) 111.8 kg (246 lb 7.6 oz)   Weight Method Standing scale Standing scale   Percent Weight Change 0.44 -3.04   Post-Hemodialysis Assessment   Post-Treatment Procedures  --  Blood returned;Access bleeding time < 10 minutes   Machine Disinfection Process  --  Acid/Vinegar Clean;Heat Disinfect;Exterior Machine Disinfection   Blood Volume Processed (Liters)  --  76.1 L   Dialyzer Clearance  --  Lightly streaked   Duration of Treatment (minutes)  --  240 minutes   Hemodialysis Intake (ml)  --  400 ml   Hemodialysis Output (ml)  --  3900 ml   NET Removed (ml)  --  3500   Tolerated Treatment  --  Good     Stable 4hour treatment complete. 3500ml removed and tolerated well. Access held x 10min each and bruit and thrill present after. Access dressing clean, dry and intact. Report called to primary RN. Charting printed and placed in the chart and scanned into EMR.

## 2025-03-22 NOTE — PLAN OF CARE
Problem: Chronic Conditions and Co-morbidities  Goal: Patient's chronic conditions and co-morbidity symptoms are monitored and maintained or improved  Outcome: Progressing  Flowsheets (Taken 3/22/2025 0426)  Care Plan - Patient's Chronic Conditions and Co-Morbidity Symptoms are Monitored and Maintained or Improved:   Monitor and assess patient's chronic conditions and comorbid symptoms for stability, deterioration, or improvement   Collaborate with multidisciplinary team to address chronic and comorbid conditions and prevent exacerbation or deterioration     Problem: Discharge Planning  Goal: Discharge to home or other facility with appropriate resources  Outcome: Progressing  Flowsheets (Taken 3/22/2025 0426)  Discharge to home or other facility with appropriate resources:   Identify barriers to discharge with patient and caregiver   Arrange for needed discharge resources and transportation as appropriate   Identify discharge learning needs (meds, wound care, etc)     Problem: ABCDS Injury Assessment  Goal: Absence of physical injury  Outcome: Progressing  Flowsheets (Taken 3/22/2025 0426)  Absence of Physical Injury: Implement safety measures based on patient assessment     Problem: Pain  Goal: Verbalizes/displays adequate comfort level or baseline comfort level  Outcome: Progressing  Flowsheets (Taken 3/22/2025 0426)  Verbalizes/displays adequate comfort level or baseline comfort level:   Encourage patient to monitor pain and request assistance   Assess pain using appropriate pain scale   Administer analgesics based on type and severity of pain and evaluate response     Problem: Skin/Tissue Integrity  Goal: Skin integrity remains intact  Description: 1.  Monitor for areas of redness and/or skin breakdown  2.  Assess vascular access sites hourly  3.  Every 4-6 hours minimum:  Change oxygen saturation probe site  4.  Every 4-6 hours:  If on nasal continuous positive airway pressure, respiratory therapy assess

## 2025-03-22 NOTE — OP NOTE
Department of Radiology  Post Procedure Progress Note      Pre-Procedure Diagnosis:  Renal failure, malfunctioning fistula    Procedure Performed:  Dialysis fistulagram    Anesthesia: local     Findings: The fistula is widely patent. No stenosis of evidence of clotting    Immediate Complications:  None    Estimated Blood Loss: minimal    SEE DICTATED PROCEDURE NOTE FOR COMPLETE DETAILS.    Electronically signed by Quang Vieyra MD on 3/21/2025 at 9:50 PM

## 2025-03-23 LAB
ANION GAP SERPL CALC-SCNC: 10 MEQ/L (ref 8–16)
BASOPHILS ABSOLUTE: 0 THOU/MM3 (ref 0–0.1)
BASOPHILS NFR BLD AUTO: 0.4 %
BUN SERPL-MCNC: 32 MG/DL (ref 8–23)
CALCIUM SERPL-MCNC: 7.6 MG/DL (ref 8.6–10)
CHLORIDE SERPL-SCNC: 96 MEQ/L (ref 98–111)
CO2 SERPL-SCNC: 29 MEQ/L (ref 22–29)
CREAT SERPL-MCNC: 4.5 MG/DL (ref 0.5–0.9)
DEPRECATED RDW RBC AUTO: 54.2 FL (ref 35–45)
EOSINOPHIL NFR BLD AUTO: 3 %
EOSINOPHILS ABSOLUTE: 0.2 THOU/MM3 (ref 0–0.4)
ERYTHROCYTE [DISTWIDTH] IN BLOOD BY AUTOMATED COUNT: 15.9 % (ref 11.5–14.5)
GFR SERPL CREATININE-BSD FRML MDRD: 11 ML/MIN/1.73M2
GLUCOSE BLD STRIP.AUTO-MCNC: 203 MG/DL (ref 70–108)
GLUCOSE BLD STRIP.AUTO-MCNC: 210 MG/DL (ref 70–108)
GLUCOSE BLD STRIP.AUTO-MCNC: 210 MG/DL (ref 70–108)
GLUCOSE BLD STRIP.AUTO-MCNC: 312 MG/DL (ref 70–108)
GLUCOSE SERPL-MCNC: 235 MG/DL (ref 74–109)
HCT VFR BLD AUTO: 26.8 % (ref 37–47)
HGB BLD-MCNC: 7.8 GM/DL (ref 12–16)
IMM GRANULOCYTES # BLD AUTO: 0.04 THOU/MM3 (ref 0–0.07)
IMM GRANULOCYTES NFR BLD AUTO: 0.6 %
LYMPHOCYTES ABSOLUTE: 0.8 THOU/MM3 (ref 1–4.8)
LYMPHOCYTES NFR BLD AUTO: 12.1 %
MCH RBC QN AUTO: 27.3 PG (ref 26–33)
MCHC RBC AUTO-ENTMCNC: 29.1 GM/DL (ref 32.2–35.5)
MCV RBC AUTO: 93.7 FL (ref 81–99)
MONOCYTES ABSOLUTE: 0.5 THOU/MM3 (ref 0.4–1.3)
MONOCYTES NFR BLD AUTO: 7.7 %
NEUTROPHILS ABSOLUTE: 5.3 THOU/MM3 (ref 1.8–7.7)
NEUTROPHILS NFR BLD AUTO: 76.2 %
NRBC BLD AUTO-RTO: 0 /100 WBC
PLATELET # BLD AUTO: 143 THOU/MM3 (ref 130–400)
PMV BLD AUTO: 9.9 FL (ref 9.4–12.4)
POTASSIUM SERPL-SCNC: 5.5 MEQ/L (ref 3.5–5.2)
RBC # BLD AUTO: 2.86 MILL/MM3 (ref 4.2–5.4)
SODIUM SERPL-SCNC: 135 MEQ/L (ref 135–145)
WBC # BLD AUTO: 7 THOU/MM3 (ref 4.8–10.8)

## 2025-03-23 PROCEDURE — 6370000000 HC RX 637 (ALT 250 FOR IP)

## 2025-03-23 PROCEDURE — 99232 SBSQ HOSP IP/OBS MODERATE 35: CPT | Performed by: STUDENT IN AN ORGANIZED HEALTH CARE EDUCATION/TRAINING PROGRAM

## 2025-03-23 PROCEDURE — 99232 SBSQ HOSP IP/OBS MODERATE 35: CPT | Performed by: INTERNAL MEDICINE

## 2025-03-23 PROCEDURE — 80048 BASIC METABOLIC PNL TOTAL CA: CPT

## 2025-03-23 PROCEDURE — 1200000000 HC SEMI PRIVATE

## 2025-03-23 PROCEDURE — 6370000000 HC RX 637 (ALT 250 FOR IP): Performed by: STUDENT IN AN ORGANIZED HEALTH CARE EDUCATION/TRAINING PROGRAM

## 2025-03-23 PROCEDURE — 36415 COLL VENOUS BLD VENIPUNCTURE: CPT

## 2025-03-23 PROCEDURE — 85025 COMPLETE CBC W/AUTO DIFF WBC: CPT

## 2025-03-23 PROCEDURE — 6370000000 HC RX 637 (ALT 250 FOR IP): Performed by: INTERNAL MEDICINE

## 2025-03-23 PROCEDURE — 82948 REAGENT STRIP/BLOOD GLUCOSE: CPT

## 2025-03-23 RX ADMIN — GABAPENTIN 300 MG: 300 CAPSULE ORAL at 20:37

## 2025-03-23 RX ADMIN — GUAIFENESIN, DEXTROMETHORPHAN HBR 1 TABLET: 600; 30 TABLET ORAL at 20:36

## 2025-03-23 RX ADMIN — METOPROLOL SUCCINATE 25 MG: 25 TABLET, EXTENDED RELEASE ORAL at 20:36

## 2025-03-23 RX ADMIN — Medication 3 MG: at 20:36

## 2025-03-23 RX ADMIN — INSULIN GLARGINE 45 UNITS: 100 INJECTION, SOLUTION SUBCUTANEOUS at 08:38

## 2025-03-23 RX ADMIN — GABAPENTIN 300 MG: 300 CAPSULE ORAL at 08:38

## 2025-03-23 RX ADMIN — METOPROLOL SUCCINATE 25 MG: 25 TABLET, EXTENDED RELEASE ORAL at 08:39

## 2025-03-23 RX ADMIN — SODIUM ZIRCONIUM CYCLOSILICATE 10 G: 10 POWDER, FOR SUSPENSION ORAL at 12:22

## 2025-03-23 RX ADMIN — INSULIN GLARGINE 45 UNITS: 100 INJECTION, SOLUTION SUBCUTANEOUS at 20:37

## 2025-03-23 RX ADMIN — BUMETANIDE 2 MG: 1 TABLET ORAL at 16:25

## 2025-03-23 RX ADMIN — ATORVASTATIN CALCIUM 40 MG: 40 TABLET, FILM COATED ORAL at 08:38

## 2025-03-23 RX ADMIN — GUAIFENESIN, DEXTROMETHORPHAN HBR 1 TABLET: 600; 30 TABLET ORAL at 08:39

## 2025-03-23 RX ADMIN — SEVELAMER CARBONATE 1600 MG: 800 TABLET, FILM COATED ORAL at 11:53

## 2025-03-23 RX ADMIN — SEVELAMER CARBONATE 1600 MG: 800 TABLET, FILM COATED ORAL at 16:25

## 2025-03-23 RX ADMIN — AMLODIPINE BESYLATE 5 MG: 5 TABLET ORAL at 08:38

## 2025-03-23 RX ADMIN — APIXABAN 2.5 MG: 2.5 TABLET, FILM COATED ORAL at 20:37

## 2025-03-23 RX ADMIN — SEVELAMER CARBONATE 1600 MG: 800 TABLET, FILM COATED ORAL at 08:39

## 2025-03-23 RX ADMIN — DOXAZOSIN 4 MG: 4 TABLET ORAL at 20:37

## 2025-03-23 RX ADMIN — BUMETANIDE 2 MG: 1 TABLET ORAL at 08:38

## 2025-03-23 RX ADMIN — BENZONATATE 100 MG: 100 CAPSULE ORAL at 12:24

## 2025-03-23 RX ADMIN — ASPIRIN 81 MG: 81 TABLET, COATED ORAL at 08:38

## 2025-03-23 RX ADMIN — APIXABAN 2.5 MG: 2.5 TABLET, FILM COATED ORAL at 08:38

## 2025-03-23 NOTE — PLAN OF CARE
Problem: Chronic Conditions and Co-morbidities  Goal: Patient's chronic conditions and co-morbidity symptoms are monitored and maintained or improved  Outcome: Progressing  Flowsheets (Taken 3/23/2025 0456)  Care Plan - Patient's Chronic Conditions and Co-Morbidity Symptoms are Monitored and Maintained or Improved:   Monitor and assess patient's chronic conditions and comorbid symptoms for stability, deterioration, or improvement   Collaborate with multidisciplinary team to address chronic and comorbid conditions and prevent exacerbation or deterioration     Problem: Discharge Planning  Goal: Discharge to home or other facility with appropriate resources  Outcome: Progressing  Flowsheets (Taken 3/23/2025 0456)  Discharge to home or other facility with appropriate resources:   Arrange for needed discharge resources and transportation as appropriate   Identify barriers to discharge with patient and caregiver     Problem: ABCDS Injury Assessment  Goal: Absence of physical injury  Outcome: Progressing  Flowsheets (Taken 3/23/2025 0456)  Absence of Physical Injury: Implement safety measures based on patient assessment     Problem: Pain  Goal: Verbalizes/displays adequate comfort level or baseline comfort level  Outcome: Progressing  Flowsheets (Taken 3/23/2025 0456)  Verbalizes/displays adequate comfort level or baseline comfort level:   Encourage patient to monitor pain and request assistance   Assess pain using appropriate pain scale   Administer analgesics based on type and severity of pain and evaluate response     Problem: Skin/Tissue Integrity  Goal: Skin integrity remains intact  Description: 1.  Monitor for areas of redness and/or skin breakdown  2.  Assess vascular access sites hourly  3.  Every 4-6 hours minimum:  Change oxygen saturation probe site  4.  Every 4-6 hours:  If on nasal continuous positive airway pressure, respiratory therapy assess nares and determine need for appliance change or resting

## 2025-03-24 LAB
ANION GAP SERPL CALC-SCNC: 11 MEQ/L (ref 8–16)
BASO STIPL BLD QL SMEAR: ABNORMAL
BASOPHILS ABSOLUTE: 0 THOU/MM3 (ref 0–0.1)
BASOPHILS NFR BLD AUTO: 0.5 %
BUN SERPL-MCNC: 41 MG/DL (ref 8–23)
CALCIUM SERPL-MCNC: 7.9 MG/DL (ref 8.6–10)
CHLORIDE SERPL-SCNC: 96 MEQ/L (ref 98–111)
CO2 SERPL-SCNC: 28 MEQ/L (ref 22–29)
CREAT SERPL-MCNC: 5.5 MG/DL (ref 0.5–0.9)
DEPRECATED RDW RBC AUTO: 53.9 FL (ref 35–45)
EOSINOPHIL NFR BLD AUTO: 3.8 %
EOSINOPHILS ABSOLUTE: 0.2 THOU/MM3 (ref 0–0.4)
ERYTHROCYTE [DISTWIDTH] IN BLOOD BY AUTOMATED COUNT: 15.7 % (ref 11.5–14.5)
GFR SERPL CREATININE-BSD FRML MDRD: 9 ML/MIN/1.73M2
GLUCOSE BLD STRIP.AUTO-MCNC: 107 MG/DL (ref 70–108)
GLUCOSE BLD STRIP.AUTO-MCNC: 181 MG/DL (ref 70–108)
GLUCOSE BLD STRIP.AUTO-MCNC: 246 MG/DL (ref 70–108)
GLUCOSE BLD STRIP.AUTO-MCNC: 96 MG/DL (ref 70–108)
GLUCOSE SERPL-MCNC: 87 MG/DL (ref 74–109)
HCT VFR BLD AUTO: 26.5 % (ref 37–47)
HGB BLD-MCNC: 7.6 GM/DL (ref 12–16)
HYPOCHROMIA BLD QL SMEAR: PRESENT
IMM GRANULOCYTES # BLD AUTO: 0.02 THOU/MM3 (ref 0–0.07)
IMM GRANULOCYTES NFR BLD AUTO: 0.3 %
LYMPHOCYTES ABSOLUTE: 0.9 THOU/MM3 (ref 1–4.8)
LYMPHOCYTES NFR BLD AUTO: 15.4 %
MCH RBC QN AUTO: 27 PG (ref 26–33)
MCHC RBC AUTO-ENTMCNC: 28.7 GM/DL (ref 32.2–35.5)
MCV RBC AUTO: 94.3 FL (ref 81–99)
MONOCYTES ABSOLUTE: 0.5 THOU/MM3 (ref 0.4–1.3)
MONOCYTES NFR BLD AUTO: 9 %
NEUTROPHILS ABSOLUTE: 4.3 THOU/MM3 (ref 1.8–7.7)
NEUTROPHILS NFR BLD AUTO: 71 %
NRBC BLD AUTO-RTO: 0 /100 WBC
PLATELET # BLD AUTO: 156 THOU/MM3 (ref 130–400)
PLATELET BLD QL SMEAR: ADEQUATE
PMV BLD AUTO: 9.8 FL (ref 9.4–12.4)
POLYCHROMASIA BLD QL SMEAR: ABNORMAL
POTASSIUM SERPL-SCNC: 5 MEQ/L (ref 3.5–5.2)
RBC # BLD AUTO: 2.81 MILL/MM3 (ref 4.2–5.4)
SCAN OF BLOOD SMEAR: NORMAL
SODIUM SERPL-SCNC: 135 MEQ/L (ref 135–145)
WBC # BLD AUTO: 6 THOU/MM3 (ref 4.8–10.8)

## 2025-03-24 PROCEDURE — 85025 COMPLETE CBC W/AUTO DIFF WBC: CPT

## 2025-03-24 PROCEDURE — 6370000000 HC RX 637 (ALT 250 FOR IP)

## 2025-03-24 PROCEDURE — 99232 SBSQ HOSP IP/OBS MODERATE 35: CPT | Performed by: INTERNAL MEDICINE

## 2025-03-24 PROCEDURE — 82948 REAGENT STRIP/BLOOD GLUCOSE: CPT

## 2025-03-24 PROCEDURE — 36415 COLL VENOUS BLD VENIPUNCTURE: CPT

## 2025-03-24 PROCEDURE — 1200000000 HC SEMI PRIVATE

## 2025-03-24 PROCEDURE — 6370000000 HC RX 637 (ALT 250 FOR IP): Performed by: STUDENT IN AN ORGANIZED HEALTH CARE EDUCATION/TRAINING PROGRAM

## 2025-03-24 PROCEDURE — 80048 BASIC METABOLIC PNL TOTAL CA: CPT

## 2025-03-24 PROCEDURE — 90935 HEMODIALYSIS ONE EVALUATION: CPT

## 2025-03-24 RX ADMIN — BUMETANIDE 2 MG: 1 TABLET ORAL at 17:08

## 2025-03-24 RX ADMIN — APIXABAN 2.5 MG: 2.5 TABLET, FILM COATED ORAL at 20:39

## 2025-03-24 RX ADMIN — GABAPENTIN 300 MG: 300 CAPSULE ORAL at 20:39

## 2025-03-24 RX ADMIN — AMLODIPINE BESYLATE 5 MG: 5 TABLET ORAL at 12:50

## 2025-03-24 RX ADMIN — METOPROLOL SUCCINATE 25 MG: 25 TABLET, EXTENDED RELEASE ORAL at 20:39

## 2025-03-24 RX ADMIN — INSULIN GLARGINE 45 UNITS: 100 INJECTION, SOLUTION SUBCUTANEOUS at 20:39

## 2025-03-24 RX ADMIN — DOXAZOSIN 4 MG: 4 TABLET ORAL at 20:39

## 2025-03-24 RX ADMIN — INSULIN GLARGINE 45 UNITS: 100 INJECTION, SOLUTION SUBCUTANEOUS at 12:50

## 2025-03-24 RX ADMIN — METOPROLOL SUCCINATE 25 MG: 25 TABLET, EXTENDED RELEASE ORAL at 12:50

## 2025-03-24 RX ADMIN — ASPIRIN 81 MG: 81 TABLET, COATED ORAL at 12:50

## 2025-03-24 RX ADMIN — BUMETANIDE 2 MG: 1 TABLET ORAL at 12:50

## 2025-03-24 RX ADMIN — ATORVASTATIN CALCIUM 40 MG: 40 TABLET, FILM COATED ORAL at 12:50

## 2025-03-24 RX ADMIN — GABAPENTIN 300 MG: 300 CAPSULE ORAL at 12:50

## 2025-03-24 RX ADMIN — GUAIFENESIN, DEXTROMETHORPHAN HBR 1 TABLET: 600; 30 TABLET ORAL at 20:40

## 2025-03-24 RX ADMIN — SEVELAMER CARBONATE 1600 MG: 800 TABLET, FILM COATED ORAL at 17:08

## 2025-03-24 RX ADMIN — GUAIFENESIN, DEXTROMETHORPHAN HBR 1 TABLET: 600; 30 TABLET ORAL at 12:50

## 2025-03-24 RX ADMIN — APIXABAN 2.5 MG: 2.5 TABLET, FILM COATED ORAL at 12:50

## 2025-03-24 RX ADMIN — SEVELAMER CARBONATE 1600 MG: 800 TABLET, FILM COATED ORAL at 12:50

## 2025-03-24 NOTE — FLOWSHEET NOTE
Stable 4 hour TX completed. Removed 3 L of fluid, tolerated well. Pressure held to each needle site x 10 minutes. Drsg clean, dry, and intact upon leaving unit. TX record printed for scanning into EMR. Report called to primary RN.   03/24/25 0716 03/24/25 1200   Vital Signs   /66 132/64   Temp 97.7 °F (36.5 °C) 98 °F (36.7 °C)   Pulse 74 72   Respirations 19 18   SpO2 97 % 97 %   Weight - Scale 116 kg (255 lb 12.8 oz) 113 kg (249 lb 1.9 oz)   Percent Weight Change 0 -2.61   Post-Hemodialysis Assessment   Post-Treatment Procedures  --  Blood returned;Access bleeding time < 10 minutes   Machine Disinfection Process  --  Acid/Vinegar Clean;Heat Disinfect;Exterior Machine Disinfection   Blood Volume Processed (Liters)  --  81.1 L   Dialyzer Clearance  --  Lightly streaked   Duration of Treatment (minutes)  --  240 minutes   Heparin Amount Administered During Treatment (mL)  --  0 mL   Hemodialysis Intake (ml)  --  400 ml   Hemodialysis Output (ml)  --  3400 ml   NET Removed (ml)  --  3000   Tolerated Treatment  --  Good

## 2025-03-24 NOTE — CARE COORDINATION
3/25/25, 2:31 PM EDT    DISCHARGE ON GOING EVALUATION    Debbie BHATT [facility] day: 7  Location: [facility]25/025-A Reason for admit: End stage renal disease on dialysis (HCC) [N18.6, Z99.2]  ESRD (end stage renal disease) on dialysis (HCC) [N18.6, Z99.2]  Hypervolemia, unspecified hypervolemia type [E87.70]     Procedures: 3/21 fistulagram    Imaging since last note: creat 4.1.     Barriers to Discharge: No change in weight despite HD 5 days in a row. PD q2h in addition to HD today.     PCP: Kory Thomas MD  Readmission Risk Score: 27.8    Patient Goals/Plan/Treatment Preferences: Return home with . HD at [facility].

## 2025-03-24 NOTE — CARE COORDINATION
DISCHARGE PLANNING EVALUATION  3/24/25, 1:30 PM EDT    Reason for Referral: requested ECF if possible  Decision Maker: pt  Current Services: [facility]odialysis [facility]  New Services Requested: ECF  Family/ Social/ Home environment: pt and her spouse are currently living with her daughter.  Their previous home that they were renting is being sold by the owners.  Pt stated that they are on the \"low income\" housing list.  Pt states she has transportation to/from Hemodialysis   Payment Source:Medicaid Pending  Transportation at Discharge: family  Post-acute (PAC) provider list was provided to patient. Patient was informed of their freedom to choose PAC provider. Discussed and offered to show the patient the relevant PAC Providers quality and resource use measures on Medicare Compare web site via computer based on patient's goals of care and treatment preferences. Questions regarding selection process were answered.      Teach Back Method used with pt regarding care plan   Patient verbalized understanding of the plan of care and contribute to goal setting.       Patient preferences and discharge plan: SW met with pt.  Discussed ECF and qualifications.  Per pt and NAS[facility] Conrad, pt is independent, does NOT require therapy.  Pt denied any additional needs.     Electronically signed by FORTINO Pinzon on 3/24/2025 at 1:30 PM

## 2025-03-24 NOTE — PLAN OF CARE
Problem: Chronic Conditions and Co-morbidities  Goal: Patient's chronic conditions and co-morbidity symptoms are monitored and maintained or improved  Outcome: Progressing     Problem: Discharge Planning  Goal: Discharge to home or other facility with appropriate resources  Outcome: Progressing     Problem: ABCDS Injury Assessment  Goal: Absence of physical injury  Outcome: Progressing     Problem: Pain  Goal: Verbalizes/displays adequate comfort level or baseline comfort level  Outcome: Progressing     Problem: Safety - Adult  Goal: Free from fall injury  Outcome: Progressing     Problem: Skin/Tissue Integrity  Goal: Skin integrity remains intact  Outcome: Progressing

## 2025-03-25 LAB
ANION GAP SERPL CALC-SCNC: 11 MEQ/L (ref 8–16)
BASOPHILS ABSOLUTE: 0 THOU/MM3 (ref 0–0.1)
BASOPHILS NFR BLD AUTO: 0.3 %
BUN SERPL-MCNC: 26 MG/DL (ref 8–23)
CALCIUM SERPL-MCNC: 8 MG/DL (ref 8.6–10)
CHLORIDE SERPL-SCNC: 95 MEQ/L (ref 98–111)
CO2 SERPL-SCNC: 29 MEQ/L (ref 22–29)
CREAT SERPL-MCNC: 4.1 MG/DL (ref 0.5–0.9)
DEPRECATED RDW RBC AUTO: 52.8 FL (ref 35–45)
EOSINOPHIL NFR BLD AUTO: 3.8 %
EOSINOPHILS ABSOLUTE: 0.2 THOU/MM3 (ref 0–0.4)
ERYTHROCYTE [DISTWIDTH] IN BLOOD BY AUTOMATED COUNT: 15.4 % (ref 11.5–14.5)
GFR SERPL CREATININE-BSD FRML MDRD: 13 ML/MIN/1.73M2
GLUCOSE BLD STRIP.AUTO-MCNC: 105 MG/DL (ref 70–108)
GLUCOSE BLD STRIP.AUTO-MCNC: 179 MG/DL (ref 70–108)
GLUCOSE BLD STRIP.AUTO-MCNC: 229 MG/DL (ref 70–108)
GLUCOSE SERPL-MCNC: 82 MG/DL (ref 74–109)
HCT VFR BLD AUTO: 27.4 % (ref 37–47)
HGB BLD-MCNC: 8.1 GM/DL (ref 12–16)
IMM GRANULOCYTES # BLD AUTO: 0.02 THOU/MM3 (ref 0–0.07)
IMM GRANULOCYTES NFR BLD AUTO: 0.3 %
LYMPHOCYTES ABSOLUTE: 0.8 THOU/MM3 (ref 1–4.8)
LYMPHOCYTES NFR BLD AUTO: 14.6 %
MCH RBC QN AUTO: 27.6 PG (ref 26–33)
MCHC RBC AUTO-ENTMCNC: 29.6 GM/DL (ref 32.2–35.5)
MCV RBC AUTO: 93.2 FL (ref 81–99)
MONOCYTES ABSOLUTE: 0.4 THOU/MM3 (ref 0.4–1.3)
MONOCYTES NFR BLD AUTO: 7.5 %
NEUTROPHILS ABSOLUTE: 4.3 THOU/MM3 (ref 1.8–7.7)
NEUTROPHILS NFR BLD AUTO: 73.5 %
NRBC BLD AUTO-RTO: 0 /100 WBC
PLATELET # BLD AUTO: 173 THOU/MM3 (ref 130–400)
PMV BLD AUTO: 9.4 FL (ref 9.4–12.4)
POTASSIUM SERPL-SCNC: 4.4 MEQ/L (ref 3.5–5.2)
RBC # BLD AUTO: 2.94 MILL/MM3 (ref 4.2–5.4)
SODIUM SERPL-SCNC: 135 MEQ/L (ref 135–145)
WBC # BLD AUTO: 5.8 THOU/MM3 (ref 4.8–10.8)

## 2025-03-25 PROCEDURE — 85025 COMPLETE CBC W/AUTO DIFF WBC: CPT

## 2025-03-25 PROCEDURE — 82948 REAGENT STRIP/BLOOD GLUCOSE: CPT

## 2025-03-25 PROCEDURE — 36415 COLL VENOUS BLD VENIPUNCTURE: CPT

## 2025-03-25 PROCEDURE — 99232 SBSQ HOSP IP/OBS MODERATE 35: CPT | Performed by: STUDENT IN AN ORGANIZED HEALTH CARE EDUCATION/TRAINING PROGRAM

## 2025-03-25 PROCEDURE — 99233 SBSQ HOSP IP/OBS HIGH 50: CPT | Performed by: INTERNAL MEDICINE

## 2025-03-25 PROCEDURE — 6370000000 HC RX 637 (ALT 250 FOR IP)

## 2025-03-25 PROCEDURE — 1200000000 HC SEMI PRIVATE

## 2025-03-25 PROCEDURE — 6370000000 HC RX 637 (ALT 250 FOR IP): Performed by: STUDENT IN AN ORGANIZED HEALTH CARE EDUCATION/TRAINING PROGRAM

## 2025-03-25 PROCEDURE — 1200000003 HC TELEMETRY R&B

## 2025-03-25 PROCEDURE — 80048 BASIC METABOLIC PNL TOTAL CA: CPT

## 2025-03-25 PROCEDURE — 90935 HEMODIALYSIS ONE EVALUATION: CPT

## 2025-03-25 PROCEDURE — 2580000003 HC RX 258: Performed by: INTERNAL MEDICINE

## 2025-03-25 PROCEDURE — 90945 DIALYSIS ONE EVALUATION: CPT

## 2025-03-25 RX ORDER — SODIUM CHLORIDE, SODIUM LACTATE, CALCIUM CHLORIDE, MAGNESIUM CHLORIDE AND DEXTROSE 4.25; 538; 448; 18.3; 5.08 G/100ML; MG/100ML; MG/100ML; MG/100ML; MG/100ML
1500 INJECTION, SOLUTION INTRAPERITONEAL
Status: DISCONTINUED | OUTPATIENT
Start: 2025-03-25 | End: 2025-03-26

## 2025-03-25 RX ADMIN — APIXABAN 2.5 MG: 2.5 TABLET, FILM COATED ORAL at 13:13

## 2025-03-25 RX ADMIN — SODIUM CHLORIDE, SODIUM LACTATE, CALCIUM CHLORIDE, MAGNESIUM CHLORIDE AND DEXTROSE 1500 ML: 4.25; 538; 448; 18.3; 5.08 INJECTION, SOLUTION INTRAPERITONEAL at 13:41

## 2025-03-25 RX ADMIN — SODIUM CHLORIDE, SODIUM LACTATE, CALCIUM CHLORIDE, MAGNESIUM CHLORIDE AND DEXTROSE 1500 ML: 4.25; 538; 448; 18.3; 5.08 INJECTION, SOLUTION INTRAPERITONEAL at 22:07

## 2025-03-25 RX ADMIN — ATORVASTATIN CALCIUM 40 MG: 40 TABLET, FILM COATED ORAL at 13:12

## 2025-03-25 RX ADMIN — METOPROLOL SUCCINATE 25 MG: 25 TABLET, EXTENDED RELEASE ORAL at 13:13

## 2025-03-25 RX ADMIN — GABAPENTIN 300 MG: 300 CAPSULE ORAL at 13:13

## 2025-03-25 RX ADMIN — GUAIFENESIN, DEXTROMETHORPHAN HBR 1 TABLET: 600; 30 TABLET ORAL at 13:12

## 2025-03-25 RX ADMIN — SODIUM CHLORIDE, SODIUM LACTATE, CALCIUM CHLORIDE, MAGNESIUM CHLORIDE AND DEXTROSE 1500 ML: 4.25; 538; 448; 18.3; 5.08 INJECTION, SOLUTION INTRAPERITONEAL at 20:07

## 2025-03-25 RX ADMIN — GUAIFENESIN, DEXTROMETHORPHAN HBR 1 TABLET: 600; 30 TABLET ORAL at 20:09

## 2025-03-25 RX ADMIN — AMLODIPINE BESYLATE 5 MG: 5 TABLET ORAL at 13:13

## 2025-03-25 RX ADMIN — SODIUM CHLORIDE, SODIUM LACTATE, CALCIUM CHLORIDE, MAGNESIUM CHLORIDE AND DEXTROSE 1500 ML: 4.25; 538; 448; 18.3; 5.08 INJECTION, SOLUTION INTRAPERITONEAL at 18:00

## 2025-03-25 RX ADMIN — APIXABAN 2.5 MG: 2.5 TABLET, FILM COATED ORAL at 20:09

## 2025-03-25 RX ADMIN — BUMETANIDE 2 MG: 1 TABLET ORAL at 13:12

## 2025-03-25 RX ADMIN — SEVELAMER CARBONATE 1600 MG: 800 TABLET, FILM COATED ORAL at 16:11

## 2025-03-25 RX ADMIN — METOPROLOL SUCCINATE 25 MG: 25 TABLET, EXTENDED RELEASE ORAL at 20:09

## 2025-03-25 RX ADMIN — BUMETANIDE 2 MG: 1 TABLET ORAL at 16:11

## 2025-03-25 RX ADMIN — ACETAMINOPHEN 650 MG: 325 TABLET ORAL at 18:19

## 2025-03-25 RX ADMIN — SEVELAMER CARBONATE 1600 MG: 800 TABLET, FILM COATED ORAL at 13:13

## 2025-03-25 RX ADMIN — GABAPENTIN 300 MG: 300 CAPSULE ORAL at 20:09

## 2025-03-25 RX ADMIN — DOXAZOSIN 4 MG: 4 TABLET ORAL at 20:09

## 2025-03-25 RX ADMIN — INSULIN GLARGINE 45 UNITS: 100 INJECTION, SOLUTION SUBCUTANEOUS at 20:14

## 2025-03-25 RX ADMIN — SODIUM CHLORIDE, SODIUM LACTATE, CALCIUM CHLORIDE, MAGNESIUM CHLORIDE AND DEXTROSE 1500 ML: 4.25; 538; 448; 18.3; 5.08 INJECTION, SOLUTION INTRAPERITONEAL at 16:00

## 2025-03-25 RX ADMIN — ASPIRIN 81 MG: 81 TABLET, COATED ORAL at 13:12

## 2025-03-25 RX ADMIN — INSULIN GLARGINE 45 UNITS: 100 INJECTION, SOLUTION SUBCUTANEOUS at 13:12

## 2025-03-25 ASSESSMENT — PAIN DESCRIPTION - PAIN TYPE: TYPE: ACUTE PAIN

## 2025-03-25 ASSESSMENT — PAIN DESCRIPTION - ORIENTATION: ORIENTATION: RIGHT;LEFT

## 2025-03-25 ASSESSMENT — PAIN DESCRIPTION - ONSET: ONSET: ON-GOING

## 2025-03-25 ASSESSMENT — PAIN DESCRIPTION - LOCATION: LOCATION: ABDOMEN

## 2025-03-25 ASSESSMENT — PAIN DESCRIPTION - FREQUENCY: FREQUENCY: INTERMITTENT

## 2025-03-25 ASSESSMENT — PAIN - FUNCTIONAL ASSESSMENT: PAIN_FUNCTIONAL_ASSESSMENT: ACTIVITIES ARE NOT PREVENTED

## 2025-03-25 ASSESSMENT — PAIN SCALES - GENERAL: PAINLEVEL_OUTOF10: 6

## 2025-03-25 ASSESSMENT — PAIN DESCRIPTION - DESCRIPTORS: DESCRIPTORS: ACHING

## 2025-03-25 NOTE — FLOWSHEET NOTE
03/25/25 0544 03/25/25 0745 03/25/25 1212   Vital Signs   BP  --  131/61 (!) 129/57   Temp  --  98.1 °F (36.7 °C) 97.9 °F (36.6 °C)   Pulse  --  72 79   Respirations  --  18 19   SpO2  --  97 % 97 %   Weight - Scale 114 kg (251 lb 6.4 oz)  --  110 kg (242 lb 8.1 oz)   Weight Method Standing scale  --  Standing scale   Percent Weight Change 0  --  -3.54   Post-Hemodialysis Assessment   Post-Treatment Procedures  --   --  Blood returned;Access bleeding time < 10 minutes   Machine Disinfection Process  --   --  Acid/Vinegar Clean;Heat Disinfect;Exterior Machine Disinfection   Blood Volume Processed (Liters)  --   --  80.9 L   Dialyzer Clearance  --   --  Lightly streaked   Duration of Treatment (minutes)  --   --  240 minutes   Hemodialysis Intake (ml)  --   --  400 ml   Hemodialysis Output (ml)  --   --  4400 ml   NET Removed (ml)  --   --  4000   Tolerated Treatment  --   --  Good     Stable 4 hour treatment complete. Removed 4 liters of fluid. Tolerated well. Pressure held to needle sites for ten minutes each. Dressing clean, dry and intact. Report given to primary RN. Treatment record printed to be placed into chart.

## 2025-03-25 NOTE — PLAN OF CARE
Problem: Chronic Conditions and Co-morbidities  Goal: Patient's chronic conditions and co-morbidity symptoms are monitored and maintained or improved  Outcome: Progressing     Problem: Discharge Planning  Goal: Discharge to home or other facility with appropriate resources  3/24/2025 2231 by Erwin Gandara RN  Outcome: Progressing     Problem: ABCDS Injury Assessment  Goal: Absence of physical injury  Outcome: Progressing     Problem: Pain  Goal: Verbalizes/displays adequate comfort level or baseline comfort level  Outcome: Progressing     Problem: Safety - Adult  Goal: Free from fall injury  Outcome: Progressing     Problem: Skin/Tissue Integrity  Goal: Skin integrity remains intact  Outcome: Progressing

## 2025-03-26 LAB
ANION GAP SERPL CALC-SCNC: 10 MEQ/L (ref 8–16)
BASOPHILS ABSOLUTE: 0 THOU/MM3 (ref 0–0.1)
BASOPHILS NFR BLD AUTO: 0.5 %
BUN SERPL-MCNC: 20 MG/DL (ref 8–23)
CALCIUM SERPL-MCNC: 8.6 MG/DL (ref 8.6–10)
CHLORIDE SERPL-SCNC: 94 MEQ/L (ref 98–111)
CO2 SERPL-SCNC: 29 MEQ/L (ref 22–29)
CREAT SERPL-MCNC: 3.3 MG/DL (ref 0.5–0.9)
DEPRECATED RDW RBC AUTO: 51.6 FL (ref 35–45)
EOSINOPHIL NFR BLD AUTO: 4.5 %
EOSINOPHILS ABSOLUTE: 0.3 THOU/MM3 (ref 0–0.4)
ERYTHROCYTE [DISTWIDTH] IN BLOOD BY AUTOMATED COUNT: 15.1 % (ref 11.5–14.5)
GFR SERPL CREATININE-BSD FRML MDRD: 16 ML/MIN/1.73M2
GLUCOSE BLD STRIP.AUTO-MCNC: 209 MG/DL (ref 70–108)
GLUCOSE BLD STRIP.AUTO-MCNC: 249 MG/DL (ref 70–108)
GLUCOSE BLD STRIP.AUTO-MCNC: 312 MG/DL (ref 70–108)
GLUCOSE BLD STRIP.AUTO-MCNC: 382 MG/DL (ref 70–108)
GLUCOSE SERPL-MCNC: 297 MG/DL (ref 74–109)
HCT VFR BLD AUTO: 30.2 % (ref 37–47)
HGB BLD-MCNC: 8.9 GM/DL (ref 12–16)
IMM GRANULOCYTES # BLD AUTO: 0.02 THOU/MM3 (ref 0–0.07)
IMM GRANULOCYTES NFR BLD AUTO: 0.3 %
LYMPHOCYTES ABSOLUTE: 0.9 THOU/MM3 (ref 1–4.8)
LYMPHOCYTES NFR BLD AUTO: 15.1 %
MCH RBC QN AUTO: 27.3 PG (ref 26–33)
MCHC RBC AUTO-ENTMCNC: 29.5 GM/DL (ref 32.2–35.5)
MCV RBC AUTO: 92.6 FL (ref 81–99)
MONOCYTES ABSOLUTE: 0.4 THOU/MM3 (ref 0.4–1.3)
MONOCYTES NFR BLD AUTO: 6.8 %
NEUTROPHILS ABSOLUTE: 4.4 THOU/MM3 (ref 1.8–7.7)
NEUTROPHILS NFR BLD AUTO: 72.8 %
NRBC BLD AUTO-RTO: 0 /100 WBC
PLATELET # BLD AUTO: 207 THOU/MM3 (ref 130–400)
PMV BLD AUTO: 9.6 FL (ref 9.4–12.4)
POTASSIUM SERPL-SCNC: 3.7 MEQ/L (ref 3.5–5.2)
RBC # BLD AUTO: 3.26 MILL/MM3 (ref 4.2–5.4)
SODIUM SERPL-SCNC: 133 MEQ/L (ref 135–145)
WBC # BLD AUTO: 6 THOU/MM3 (ref 4.8–10.8)

## 2025-03-26 PROCEDURE — 6370000000 HC RX 637 (ALT 250 FOR IP)

## 2025-03-26 PROCEDURE — 36415 COLL VENOUS BLD VENIPUNCTURE: CPT

## 2025-03-26 PROCEDURE — 1200000000 HC SEMI PRIVATE

## 2025-03-26 PROCEDURE — 6370000000 HC RX 637 (ALT 250 FOR IP): Performed by: STUDENT IN AN ORGANIZED HEALTH CARE EDUCATION/TRAINING PROGRAM

## 2025-03-26 PROCEDURE — 99233 SBSQ HOSP IP/OBS HIGH 50: CPT | Performed by: INTERNAL MEDICINE

## 2025-03-26 PROCEDURE — 99232 SBSQ HOSP IP/OBS MODERATE 35: CPT | Performed by: FAMILY MEDICINE

## 2025-03-26 PROCEDURE — 2580000003 HC RX 258: Performed by: INTERNAL MEDICINE

## 2025-03-26 PROCEDURE — 1200000003 HC TELEMETRY R&B

## 2025-03-26 PROCEDURE — 82948 REAGENT STRIP/BLOOD GLUCOSE: CPT

## 2025-03-26 PROCEDURE — 80048 BASIC METABOLIC PNL TOTAL CA: CPT

## 2025-03-26 PROCEDURE — 90945 DIALYSIS ONE EVALUATION: CPT

## 2025-03-26 PROCEDURE — 85025 COMPLETE CBC W/AUTO DIFF WBC: CPT

## 2025-03-26 RX ORDER — INSULIN GLARGINE 100 [IU]/ML
50 INJECTION, SOLUTION SUBCUTANEOUS EVERY MORNING
Status: DISCONTINUED | OUTPATIENT
Start: 2025-03-27 | End: 2025-03-26

## 2025-03-26 RX ORDER — SODIUM CHLORIDE, SODIUM LACTATE, CALCIUM CHLORIDE, MAGNESIUM CHLORIDE AND DEXTROSE 4.25; 538; 448; 18.3; 5.08 G/100ML; MG/100ML; MG/100ML; MG/100ML; MG/100ML
1500 INJECTION, SOLUTION INTRAPERITONEAL EVERY 4 HOURS
Status: DISCONTINUED | OUTPATIENT
Start: 2025-03-26 | End: 2025-03-26

## 2025-03-26 RX ORDER — INSULIN GLARGINE 100 [IU]/ML
50 INJECTION, SOLUTION SUBCUTANEOUS EVERY MORNING
Status: DISCONTINUED | OUTPATIENT
Start: 2025-03-27 | End: 2025-03-28 | Stop reason: HOSPADM

## 2025-03-26 RX ORDER — INSULIN GLARGINE 100 [IU]/ML
45 INJECTION, SOLUTION SUBCUTANEOUS NIGHTLY
Status: DISCONTINUED | OUTPATIENT
Start: 2025-03-26 | End: 2025-03-28 | Stop reason: HOSPADM

## 2025-03-26 RX ORDER — INSULIN GLARGINE 100 [IU]/ML
5 INJECTION, SOLUTION SUBCUTANEOUS ONCE
Status: DISCONTINUED | OUTPATIENT
Start: 2025-03-26 | End: 2025-03-26

## 2025-03-26 RX ORDER — SODIUM CHLORIDE, SODIUM LACTATE, CALCIUM CHLORIDE, MAGNESIUM CHLORIDE AND DEXTROSE 2.5; 538; 448; 18.3; 5.08 G/100ML; MG/100ML; MG/100ML; MG/100ML; MG/100ML
1500 INJECTION, SOLUTION INTRAPERITONEAL EVERY 4 HOURS
Status: DISCONTINUED | OUTPATIENT
Start: 2025-03-26 | End: 2025-03-27

## 2025-03-26 RX ORDER — INSULIN GLARGINE 100 [IU]/ML
45 INJECTION, SOLUTION SUBCUTANEOUS NIGHTLY
Status: DISCONTINUED | OUTPATIENT
Start: 2025-03-27 | End: 2025-03-26

## 2025-03-26 RX ADMIN — BUMETANIDE 2 MG: 1 TABLET ORAL at 17:06

## 2025-03-26 RX ADMIN — Medication 3 MG: at 21:28

## 2025-03-26 RX ADMIN — GABAPENTIN 300 MG: 300 CAPSULE ORAL at 19:55

## 2025-03-26 RX ADMIN — SODIUM CHLORIDE, SODIUM LACTATE, CALCIUM CHLORIDE, MAGNESIUM CHLORIDE AND DEXTROSE 1500 ML: 4.25; 538; 448; 18.3; 5.08 INJECTION, SOLUTION INTRAPERITONEAL at 00:06

## 2025-03-26 RX ADMIN — ATORVASTATIN CALCIUM 40 MG: 40 TABLET, FILM COATED ORAL at 08:56

## 2025-03-26 RX ADMIN — SODIUM CHLORIDE, SODIUM LACTATE, CALCIUM CHLORIDE, MAGNESIUM CHLORIDE AND DEXTROSE 1500 ML: 4.25; 538; 448; 18.3; 5.08 INJECTION, SOLUTION INTRAPERITONEAL at 06:10

## 2025-03-26 RX ADMIN — SODIUM CHLORIDE, SODIUM LACTATE, CALCIUM CHLORIDE, MAGNESIUM CHLORIDE AND DEXTROSE 1500 ML: 2.5; 538; 448; 18.3; 5.08 INJECTION, SOLUTION INTRAPERITONEAL at 17:02

## 2025-03-26 RX ADMIN — SODIUM CHLORIDE, SODIUM LACTATE, CALCIUM CHLORIDE, MAGNESIUM CHLORIDE AND DEXTROSE 1500 ML: 2.5; 538; 448; 18.3; 5.08 INJECTION, SOLUTION INTRAPERITONEAL at 21:34

## 2025-03-26 RX ADMIN — SEVELAMER CARBONATE 1600 MG: 800 TABLET, FILM COATED ORAL at 11:36

## 2025-03-26 RX ADMIN — SODIUM CHLORIDE, SODIUM LACTATE, CALCIUM CHLORIDE, MAGNESIUM CHLORIDE AND DEXTROSE 1500 ML: 4.25; 538; 448; 18.3; 5.08 INJECTION, SOLUTION INTRAPERITONEAL at 04:05

## 2025-03-26 RX ADMIN — INSULIN GLARGINE 45 UNITS: 100 INJECTION, SOLUTION SUBCUTANEOUS at 21:28

## 2025-03-26 RX ADMIN — INSULIN GLARGINE 45 UNITS: 100 INJECTION, SOLUTION SUBCUTANEOUS at 09:04

## 2025-03-26 RX ADMIN — METOPROLOL SUCCINATE 25 MG: 25 TABLET, EXTENDED RELEASE ORAL at 09:03

## 2025-03-26 RX ADMIN — METOPROLOL SUCCINATE 25 MG: 25 TABLET, EXTENDED RELEASE ORAL at 19:55

## 2025-03-26 RX ADMIN — AMLODIPINE BESYLATE 5 MG: 5 TABLET ORAL at 08:56

## 2025-03-26 RX ADMIN — GABAPENTIN 300 MG: 300 CAPSULE ORAL at 09:03

## 2025-03-26 RX ADMIN — ACETAMINOPHEN 650 MG: 325 TABLET ORAL at 01:06

## 2025-03-26 RX ADMIN — SODIUM CHLORIDE, SODIUM LACTATE, CALCIUM CHLORIDE, MAGNESIUM CHLORIDE AND DEXTROSE 1500 ML: 4.25; 538; 448; 18.3; 5.08 INJECTION, SOLUTION INTRAPERITONEAL at 02:08

## 2025-03-26 RX ADMIN — SEVELAMER CARBONATE 1600 MG: 800 TABLET, FILM COATED ORAL at 08:54

## 2025-03-26 RX ADMIN — ACETAMINOPHEN 650 MG: 325 TABLET ORAL at 09:38

## 2025-03-26 RX ADMIN — SODIUM CHLORIDE, SODIUM LACTATE, CALCIUM CHLORIDE, MAGNESIUM CHLORIDE AND DEXTROSE 1500 ML: 4.25; 538; 448; 18.3; 5.08 INJECTION, SOLUTION INTRAPERITONEAL at 12:00

## 2025-03-26 RX ADMIN — APIXABAN 2.5 MG: 2.5 TABLET, FILM COATED ORAL at 19:55

## 2025-03-26 RX ADMIN — BUMETANIDE 2 MG: 1 TABLET ORAL at 08:57

## 2025-03-26 RX ADMIN — SEVELAMER CARBONATE 1600 MG: 800 TABLET, FILM COATED ORAL at 17:06

## 2025-03-26 RX ADMIN — GUAIFENESIN, DEXTROMETHORPHAN HBR 1 TABLET: 600; 30 TABLET ORAL at 09:04

## 2025-03-26 RX ADMIN — GUAIFENESIN, DEXTROMETHORPHAN HBR 1 TABLET: 600; 30 TABLET ORAL at 19:56

## 2025-03-26 RX ADMIN — SODIUM CHLORIDE, SODIUM LACTATE, CALCIUM CHLORIDE, MAGNESIUM CHLORIDE AND DEXTROSE 1500 ML: 4.25; 538; 448; 18.3; 5.08 INJECTION, SOLUTION INTRAPERITONEAL at 08:00

## 2025-03-26 RX ADMIN — APIXABAN 2.5 MG: 2.5 TABLET, FILM COATED ORAL at 08:57

## 2025-03-26 RX ADMIN — ASPIRIN 81 MG: 81 TABLET, COATED ORAL at 08:56

## 2025-03-26 RX ADMIN — DOXAZOSIN 4 MG: 4 TABLET ORAL at 19:55

## 2025-03-26 RX ADMIN — ONDANSETRON 4 MG: 4 TABLET, ORALLY DISINTEGRATING ORAL at 06:58

## 2025-03-26 ASSESSMENT — PAIN SCALES - GENERAL
PAINLEVEL_OUTOF10: 2
PAINLEVEL_OUTOF10: 7
PAINLEVEL_OUTOF10: 1

## 2025-03-26 ASSESSMENT — PAIN - FUNCTIONAL ASSESSMENT: PAIN_FUNCTIONAL_ASSESSMENT: ACTIVITIES ARE NOT PREVENTED

## 2025-03-26 ASSESSMENT — PAIN DESCRIPTION - PAIN TYPE: TYPE: ACUTE PAIN

## 2025-03-26 ASSESSMENT — PAIN DESCRIPTION - DESCRIPTORS: DESCRIPTORS: ACHING

## 2025-03-26 ASSESSMENT — PAIN DESCRIPTION - FREQUENCY: FREQUENCY: INTERMITTENT

## 2025-03-26 ASSESSMENT — PAIN DESCRIPTION - LOCATION: LOCATION: ABDOMEN;BACK

## 2025-03-26 ASSESSMENT — PAIN DESCRIPTION - ORIENTATION: ORIENTATION: RIGHT;LEFT

## 2025-03-26 ASSESSMENT — PAIN DESCRIPTION - ONSET: ONSET: ON-GOING

## 2025-03-26 NOTE — CARE COORDINATION
3/26/25, 3:22 PM EDT    DISCHARGE ON GOING EVALUATION    Debbie Locke       [facility] day: 9  Location: [facility]25/025-A Reason for admit: End stage renal disease on dialysis (HCC) [N18.6, Z99.2]  ESRD (end stage renal disease) on dialysis (HCC) [N18.6, Z99.2]  Hypervolemia, unspecified hypervolemia type [E87.70]     Procedures:   3/18 ECHO: EF 55-60%.  3/21 Dialysis Fistulagram     Imaging since last note: none    Barriers to Discharge: Hospitalist and Nephrology following. Almost 8L of ultrafiltration in PD with some ultrafiltration with HD yesterday. Down 10kg. PD changed to 4.25% q4h today. PO bumex.     I/O last 3 completed shifts:  In: 44592 [P.O.:1500; Other:01998]  Out: 31300 [Other:09177]  I/O this shift:  In: 2040 [P.O.:540; Other:1500]  Out: 6100 [Other:6100]      PCP: Kory Thomas MD  Readmission Risk Score: 26.5    Patient Goals/Plan/Treatment Preferences: Home w/  and DIL. Current HD at [facility]

## 2025-03-26 NOTE — PLAN OF CARE
Problem: Chronic Conditions and Co-morbidities  Goal: Patient's chronic conditions and co-morbidity symptoms are monitored and maintained or improved  Outcome: Progressing     Problem: Discharge Planning  Goal: Discharge to home or other facility with appropriate resources  Outcome: Progressing     Problem: ABCDS Injury Assessment  Goal: Absence of physical injury  Outcome: Progressing     Problem: Pain  Goal: Verbalizes/displays adequate comfort level or baseline comfort level  Outcome: Progressing     Problem: Safety - Adult  Goal: Free from fall injury  Outcome: Progressing     Problem: Skin/Tissue Integrity  Goal: Skin integrity remains intact  Outcome: Progressing     Problem: Nutrition Deficit:  Goal: Optimize nutritional status  Outcome: Progressing

## 2025-03-27 LAB
ANION GAP SERPL CALC-SCNC: 12 MEQ/L (ref 8–16)
BASOPHILS ABSOLUTE: 0 THOU/MM3 (ref 0–0.1)
BASOPHILS NFR BLD AUTO: 0.4 %
BUN SERPL-MCNC: 38 MG/DL (ref 8–23)
CALCIUM SERPL-MCNC: 8.9 MG/DL (ref 8.6–10)
CHLORIDE SERPL-SCNC: 96 MEQ/L (ref 98–111)
CO2 SERPL-SCNC: 29 MEQ/L (ref 22–29)
CREAT SERPL-MCNC: 4.3 MG/DL (ref 0.5–0.9)
DEPRECATED RDW RBC AUTO: 51.8 FL (ref 35–45)
EOSINOPHIL NFR BLD AUTO: 4.1 %
EOSINOPHILS ABSOLUTE: 0.3 THOU/MM3 (ref 0–0.4)
ERYTHROCYTE [DISTWIDTH] IN BLOOD BY AUTOMATED COUNT: 15.2 % (ref 11.5–14.5)
GFR SERPL CREATININE-BSD FRML MDRD: 12 ML/MIN/1.73M2
GLUCOSE BLD STRIP.AUTO-MCNC: 209 MG/DL (ref 70–108)
GLUCOSE BLD STRIP.AUTO-MCNC: 212 MG/DL (ref 70–108)
GLUCOSE BLD STRIP.AUTO-MCNC: 289 MG/DL (ref 70–108)
GLUCOSE BLD STRIP.AUTO-MCNC: 334 MG/DL (ref 70–108)
GLUCOSE SERPL-MCNC: 246 MG/DL (ref 74–109)
HCT VFR BLD AUTO: 30.2 % (ref 37–47)
HGB BLD-MCNC: 8.7 GM/DL (ref 12–16)
HYPOCHROMIA BLD QL SMEAR: PRESENT
IMM GRANULOCYTES # BLD AUTO: 0.02 THOU/MM3 (ref 0–0.07)
IMM GRANULOCYTES NFR BLD AUTO: 0.3 %
LYMPHOCYTES ABSOLUTE: 1 THOU/MM3 (ref 1–4.8)
LYMPHOCYTES NFR BLD AUTO: 14.6 %
MCH RBC QN AUTO: 26.9 PG (ref 26–33)
MCHC RBC AUTO-ENTMCNC: 28.8 GM/DL (ref 32.2–35.5)
MCV RBC AUTO: 93.2 FL (ref 81–99)
MONOCYTES ABSOLUTE: 0.6 THOU/MM3 (ref 0.4–1.3)
MONOCYTES NFR BLD AUTO: 8.1 %
NEUTROPHILS ABSOLUTE: 4.9 THOU/MM3 (ref 1.8–7.7)
NEUTROPHILS NFR BLD AUTO: 72.5 %
NRBC BLD AUTO-RTO: 0 /100 WBC
PLATELET # BLD AUTO: 238 THOU/MM3 (ref 130–400)
PMV BLD AUTO: 9.9 FL (ref 9.4–12.4)
POTASSIUM SERPL-SCNC: 5 MEQ/L (ref 3.5–5.2)
RBC # BLD AUTO: 3.24 MILL/MM3 (ref 4.2–5.4)
SODIUM SERPL-SCNC: 137 MEQ/L (ref 135–145)
WBC # BLD AUTO: 6.8 THOU/MM3 (ref 4.8–10.8)

## 2025-03-27 PROCEDURE — 6370000000 HC RX 637 (ALT 250 FOR IP): Performed by: STUDENT IN AN ORGANIZED HEALTH CARE EDUCATION/TRAINING PROGRAM

## 2025-03-27 PROCEDURE — 2580000003 HC RX 258: Performed by: INTERNAL MEDICINE

## 2025-03-27 PROCEDURE — 82948 REAGENT STRIP/BLOOD GLUCOSE: CPT

## 2025-03-27 PROCEDURE — 6370000000 HC RX 637 (ALT 250 FOR IP)

## 2025-03-27 PROCEDURE — 99233 SBSQ HOSP IP/OBS HIGH 50: CPT | Performed by: INTERNAL MEDICINE

## 2025-03-27 PROCEDURE — 85025 COMPLETE CBC W/AUTO DIFF WBC: CPT

## 2025-03-27 PROCEDURE — 80048 BASIC METABOLIC PNL TOTAL CA: CPT

## 2025-03-27 PROCEDURE — 90945 DIALYSIS ONE EVALUATION: CPT

## 2025-03-27 PROCEDURE — 36415 COLL VENOUS BLD VENIPUNCTURE: CPT

## 2025-03-27 PROCEDURE — 1200000003 HC TELEMETRY R&B

## 2025-03-27 PROCEDURE — 99232 SBSQ HOSP IP/OBS MODERATE 35: CPT | Performed by: FAMILY MEDICINE

## 2025-03-27 RX ORDER — SODIUM CHLORIDE, SODIUM LACTATE, CALCIUM CHLORIDE, MAGNESIUM CHLORIDE AND DEXTROSE 4.25; 538; 448; 18.3; 5.08 G/100ML; MG/100ML; MG/100ML; MG/100ML; MG/100ML
1500 INJECTION, SOLUTION INTRAPERITONEAL EVERY 4 HOURS
Status: DISCONTINUED | OUTPATIENT
Start: 2025-03-27 | End: 2025-03-28

## 2025-03-27 RX ADMIN — GABAPENTIN 300 MG: 300 CAPSULE ORAL at 20:23

## 2025-03-27 RX ADMIN — SODIUM CHLORIDE, SODIUM LACTATE, CALCIUM CHLORIDE, MAGNESIUM CHLORIDE AND DEXTROSE 1500 ML: 2.5; 538; 448; 18.3; 5.08 INJECTION, SOLUTION INTRAPERITONEAL at 05:25

## 2025-03-27 RX ADMIN — ATORVASTATIN CALCIUM 40 MG: 40 TABLET, FILM COATED ORAL at 09:21

## 2025-03-27 RX ADMIN — METOPROLOL SUCCINATE 25 MG: 25 TABLET, EXTENDED RELEASE ORAL at 09:23

## 2025-03-27 RX ADMIN — BUMETANIDE 2 MG: 1 TABLET ORAL at 17:42

## 2025-03-27 RX ADMIN — APIXABAN 2.5 MG: 2.5 TABLET, FILM COATED ORAL at 20:22

## 2025-03-27 RX ADMIN — ASPIRIN 81 MG: 81 TABLET, COATED ORAL at 09:28

## 2025-03-27 RX ADMIN — GABAPENTIN 300 MG: 300 CAPSULE ORAL at 09:19

## 2025-03-27 RX ADMIN — INSULIN GLARGINE 45 UNITS: 100 INJECTION, SOLUTION SUBCUTANEOUS at 20:29

## 2025-03-27 RX ADMIN — DOXAZOSIN 4 MG: 4 TABLET ORAL at 20:22

## 2025-03-27 RX ADMIN — APIXABAN 2.5 MG: 2.5 TABLET, FILM COATED ORAL at 09:22

## 2025-03-27 RX ADMIN — SODIUM CHLORIDE, SODIUM LACTATE, CALCIUM CHLORIDE, MAGNESIUM CHLORIDE AND DEXTROSE 1500 ML: 2.5; 538; 448; 18.3; 5.08 INJECTION, SOLUTION INTRAPERITONEAL at 01:34

## 2025-03-27 RX ADMIN — ACETAMINOPHEN 650 MG: 325 TABLET ORAL at 11:36

## 2025-03-27 RX ADMIN — GUAIFENESIN, DEXTROMETHORPHAN HBR 1 TABLET: 600; 30 TABLET ORAL at 20:24

## 2025-03-27 RX ADMIN — INSULIN GLARGINE 50 UNITS: 100 INJECTION, SOLUTION SUBCUTANEOUS at 09:27

## 2025-03-27 RX ADMIN — BUMETANIDE 2 MG: 1 TABLET ORAL at 09:20

## 2025-03-27 RX ADMIN — SEVELAMER CARBONATE 1600 MG: 800 TABLET, FILM COATED ORAL at 09:18

## 2025-03-27 RX ADMIN — SODIUM CHLORIDE, SODIUM LACTATE, CALCIUM CHLORIDE, MAGNESIUM CHLORIDE AND DEXTROSE 1500 ML: 4.25; 538; 448; 18.3; 5.08 INJECTION, SOLUTION INTRAPERITONEAL at 10:00

## 2025-03-27 RX ADMIN — AMLODIPINE BESYLATE 5 MG: 5 TABLET ORAL at 09:23

## 2025-03-27 RX ADMIN — SEVELAMER CARBONATE 1600 MG: 800 TABLET, FILM COATED ORAL at 12:10

## 2025-03-27 RX ADMIN — SODIUM CHLORIDE, SODIUM LACTATE, CALCIUM CHLORIDE, MAGNESIUM CHLORIDE AND DEXTROSE 1500 ML: 4.25; 538; 448; 18.3; 5.08 INJECTION, SOLUTION INTRAPERITONEAL at 14:00

## 2025-03-27 RX ADMIN — SEVELAMER CARBONATE 1600 MG: 800 TABLET, FILM COATED ORAL at 17:41

## 2025-03-27 RX ADMIN — GUAIFENESIN, DEXTROMETHORPHAN HBR 1 TABLET: 600; 30 TABLET ORAL at 09:18

## 2025-03-27 RX ADMIN — METOPROLOL SUCCINATE 25 MG: 25 TABLET, EXTENDED RELEASE ORAL at 20:22

## 2025-03-27 RX ADMIN — SODIUM CHLORIDE, SODIUM LACTATE, CALCIUM CHLORIDE, MAGNESIUM CHLORIDE AND DEXTROSE 1500 ML: 4.25; 538; 448; 18.3; 5.08 INJECTION, SOLUTION INTRAPERITONEAL at 21:40

## 2025-03-27 RX ADMIN — SODIUM CHLORIDE, SODIUM LACTATE, CALCIUM CHLORIDE, MAGNESIUM CHLORIDE AND DEXTROSE 1500 ML: 4.25; 538; 448; 18.3; 5.08 INJECTION, SOLUTION INTRAPERITONEAL at 18:00

## 2025-03-27 RX ADMIN — BENZONATATE 100 MG: 100 CAPSULE ORAL at 13:57

## 2025-03-27 ASSESSMENT — PAIN DESCRIPTION - LOCATION
LOCATION: ABDOMEN

## 2025-03-27 ASSESSMENT — PAIN DESCRIPTION - ORIENTATION: ORIENTATION: OTHER (COMMENT)

## 2025-03-27 ASSESSMENT — PAIN SCALES - GENERAL
PAINLEVEL_OUTOF10: 1
PAINLEVEL_OUTOF10: 8
PAINLEVEL_OUTOF10: 1
PAINLEVEL_OUTOF10: 0

## 2025-03-27 ASSESSMENT — PAIN DESCRIPTION - ONSET: ONSET: SUDDEN

## 2025-03-27 ASSESSMENT — PAIN DESCRIPTION - FREQUENCY: FREQUENCY: INTERMITTENT

## 2025-03-27 ASSESSMENT — PAIN - FUNCTIONAL ASSESSMENT: PAIN_FUNCTIONAL_ASSESSMENT: ACTIVITIES ARE NOT PREVENTED

## 2025-03-27 ASSESSMENT — PAIN DESCRIPTION - DIRECTION: RADIATING_TOWARDS: UPWARD

## 2025-03-27 ASSESSMENT — PAIN DESCRIPTION - PAIN TYPE: TYPE: ACUTE PAIN

## 2025-03-27 ASSESSMENT — PAIN DESCRIPTION - DESCRIPTORS: DESCRIPTORS: SHARP

## 2025-03-27 NOTE — PLAN OF CARE
Problem: Chronic Conditions and Co-morbidities  Goal: Patient's chronic conditions and co-morbidity symptoms are monitored and maintained or improved  3/27/2025 0139 by Angelina Guerrero RN  Outcome: Progressing  Flowsheets (Taken 3/27/2025 0139)  Care Plan - Patient's Chronic Conditions and Co-Morbidity Symptoms are Monitored and Maintained or Improved:   Monitor and assess patient's chronic conditions and comorbid symptoms for stability, deterioration, or improvement   Collaborate with multidisciplinary team to address chronic and comorbid conditions and prevent exacerbation or deterioration   Update acute care plan with appropriate goals if chronic or comorbid symptoms are exacerbated and prevent overall improvement and discharge       Problem: Metabolic/Fluid and Electrolytes - Adult  Goal: Glucose maintained within prescribed range  Outcome: Progressing  Flowsheets (Taken 3/27/2025 0141)  Glucose maintained within prescribed range:   Monitor blood glucose as ordered   Administer ordered medications to maintain glucose within target range   Assess for signs and symptoms of hyperglycemia and hypoglycemia       Problem: Metabolic/Fluid and Electrolytes - Adult  Goal: Hemodynamic stability and optimal renal function maintained  Outcome: Progressing  Flowsheets (Taken 3/27/2025 0140)  Hemodynamic stability and optimal renal function maintained:   Monitor labs and assess for signs and symptoms of volume excess or deficit   Monitor intake, output and patient weight   Encourage oral intake as appropriate   Instruct patient on fluid and nutrition restrictions as appropriate       Problem: Metabolic/Fluid and Electrolytes - Adult  Goal: Electrolytes maintained within normal limits  Outcome: Progressing  Flowsheets (Taken 3/27/2025 0140)  Electrolytes maintained within normal limits:   Monitor labs and assess patient for signs and symptoms of electrolyte imbalances   Administer electrolyte replacement as ordered   Monitor

## 2025-03-27 NOTE — FLOWSHEET NOTE
Assessment performed. Patient A&O x4. Speech appropriate and clear. Upper extremities pink, warm, dry. Movement and sensation present with no numbness. Patient reported slight tingling in her fingers. Dialysis fistula assessed in E. Thrill palpated and bruit auscultated. Heart sounds strong and regular. Lung sounds clear throughout, regular rhythm, moderate depth of respirations with symmetric chest movement. Abdomen round, soft, non-tender with palpation. Bowel sounds active in all quadrants. Patient had one unmeasured stool occurrence. Peritoneal dialysis catheter dressing dry and intact. Patient stated last urine output was a couple days ago due to dialysis. Lower extremities have vascular discoloration, warm, dry. Movement and sensation present with bilateral numbness and tingling. Pedal push/pull moderate and equal. Pedal pulses present and weak bilaterally. +3 lower extremity edema. Bilateral pretibial wounds noted on outside of shins. They appear to be approximated and healing. Skin over bony prominences intact. Primary RN notified of all abnormal findings. Patient resting in chair comfortably, brake on, call light/bedside table within reach.

## 2025-03-27 NOTE — CARE COORDINATION
3/27/25, 1:52 PM EDT    Planning discharge tomorrow after HD. Clarified with Dr. Neville that patient is to continuing OP HD at discharge, and will have PD training appt Tuesday April 1. Electronically signed by Wei Shankar RN on 3/27/2025 at 1:54 PM

## 2025-03-27 NOTE — PLAN OF CARE
Problem: Chronic Conditions and Co-morbidities  Goal: Patient's chronic conditions and co-morbidity symptoms are monitored and maintained or improved  3/27/2025 1340 by Susan Sparks RN  Outcome: Progressing  Flowsheets (Taken 3/27/2025 1340)  Care Plan - Patient's Chronic Conditions and Co-Morbidity Symptoms are Monitored and Maintained or Improved:   Monitor and assess patient's chronic conditions and comorbid symptoms for stability, deterioration, or improvement   Collaborate with multidisciplinary team to address chronic and comorbid conditions and prevent exacerbation or deterioration  3/27/2025 0139 by Angelina Guerrero RN  Outcome: Progressing  Flowsheets (Taken 3/27/2025 0139)  Care Plan - Patient's Chronic Conditions and Co-Morbidity Symptoms are Monitored and Maintained or Improved:   Monitor and assess patient's chronic conditions and comorbid symptoms for stability, deterioration, or improvement   Collaborate with multidisciplinary team to address chronic and comorbid conditions and prevent exacerbation or deterioration   Update acute care plan with appropriate goals if chronic or comorbid symptoms are exacerbated and prevent overall improvement and discharge     Problem: Discharge Planning  Goal: Discharge to home or other facility with appropriate resources  3/27/2025 1340 by Susan Sparks RN  Outcome: Progressing  Flowsheets (Taken 3/27/2025 0139 by Angelina Guerrero RN)  Discharge to home or other facility with appropriate resources:   Identify barriers to discharge with patient and caregiver   Arrange for needed discharge resources and transportation as appropriate   Identify discharge learning needs (meds, wound care, etc)  3/27/2025 0139 by Angelina Guerrero RN  Outcome: Progressing  Flowsheets (Taken 3/27/2025 0139)  Discharge to home or other facility with appropriate resources:   Identify barriers to discharge with patient and caregiver   Arrange for needed discharge resources and transportation

## 2025-03-28 VITALS
HEART RATE: 82 BPM | BODY MASS INDEX: 40.61 KG/M2 | SYSTOLIC BLOOD PRESSURE: 135 MMHG | WEIGHT: 237.88 LBS | OXYGEN SATURATION: 98 % | DIASTOLIC BLOOD PRESSURE: 67 MMHG | TEMPERATURE: 98 F | RESPIRATION RATE: 16 BRPM | HEIGHT: 64 IN

## 2025-03-28 PROBLEM — I16.0 HYPERTENSIVE URGENCY: Status: RESOLVED | Noted: 2025-03-18 | Resolved: 2025-03-28

## 2025-03-28 LAB
ANION GAP SERPL CALC-SCNC: 15 MEQ/L (ref 8–16)
BUN SERPL-MCNC: 50 MG/DL (ref 8–23)
CALCIUM SERPL-MCNC: 8.9 MG/DL (ref 8.6–10)
CHLORIDE SERPL-SCNC: 92 MEQ/L (ref 98–111)
CO2 SERPL-SCNC: 26 MEQ/L (ref 22–29)
CREAT SERPL-MCNC: 5.4 MG/DL (ref 0.5–0.9)
GFR SERPL CREATININE-BSD FRML MDRD: 9 ML/MIN/1.73M2
GLUCOSE BLD STRIP.AUTO-MCNC: 262 MG/DL (ref 70–108)
GLUCOSE SERPL-MCNC: 260 MG/DL (ref 74–109)
POTASSIUM SERPL-SCNC: 5 MEQ/L (ref 3.5–5.2)
SODIUM SERPL-SCNC: 133 MEQ/L (ref 135–145)

## 2025-03-28 PROCEDURE — 6370000000 HC RX 637 (ALT 250 FOR IP)

## 2025-03-28 PROCEDURE — 2580000003 HC RX 258: Performed by: INTERNAL MEDICINE

## 2025-03-28 PROCEDURE — 90935 HEMODIALYSIS ONE EVALUATION: CPT

## 2025-03-28 PROCEDURE — 82948 REAGENT STRIP/BLOOD GLUCOSE: CPT

## 2025-03-28 PROCEDURE — 80048 BASIC METABOLIC PNL TOTAL CA: CPT

## 2025-03-28 PROCEDURE — 90945 DIALYSIS ONE EVALUATION: CPT

## 2025-03-28 PROCEDURE — 99239 HOSP IP/OBS DSCHRG MGMT >30: CPT | Performed by: FAMILY MEDICINE

## 2025-03-28 PROCEDURE — 36415 COLL VENOUS BLD VENIPUNCTURE: CPT

## 2025-03-28 PROCEDURE — 6370000000 HC RX 637 (ALT 250 FOR IP): Performed by: STUDENT IN AN ORGANIZED HEALTH CARE EDUCATION/TRAINING PROGRAM

## 2025-03-28 RX ORDER — BLOOD-GLUCOSE METER
1 KIT MISCELLANEOUS DAILY
Qty: 1 KIT | Refills: 0 | Status: SHIPPED | OUTPATIENT
Start: 2025-03-28

## 2025-03-28 RX ORDER — AVOBENZONE, HOMOSALATE, OCTISALATE, OCTOCRYLENE 30; 40; 45; 26 MG/ML; MG/ML; MG/ML; MG/ML
1 CREAM TOPICAL 2 TIMES DAILY
Qty: 300 EACH | Refills: 1 | Status: SHIPPED | OUTPATIENT
Start: 2025-03-28

## 2025-03-28 RX ORDER — GLUCOSAMINE HCL/CHONDROITIN SU 500-400 MG
CAPSULE ORAL
Qty: 500 STRIP | Refills: 0 | Status: SHIPPED | OUTPATIENT
Start: 2025-03-28

## 2025-03-28 RX ORDER — DOXAZOSIN 4 MG/1
4 TABLET ORAL NIGHTLY
Qty: 30 TABLET | Refills: 3 | Status: SHIPPED | OUTPATIENT
Start: 2025-03-28 | End: 2025-07-26

## 2025-03-28 RX ORDER — INSULIN GLARGINE 100 [IU]/ML
INJECTION, SOLUTION SUBCUTANEOUS
Qty: 5 ADJUSTABLE DOSE PRE-FILLED PEN SYRINGE | Refills: 0 | Status: SHIPPED | OUTPATIENT
Start: 2025-03-28

## 2025-03-28 RX ORDER — BENZONATATE 100 MG/1
100 CAPSULE ORAL 3 TIMES DAILY PRN
Qty: 21 CAPSULE | Refills: 0 | Status: SHIPPED | OUTPATIENT
Start: 2025-03-28 | End: 2025-04-04

## 2025-03-28 RX ADMIN — ASPIRIN 81 MG: 81 TABLET, COATED ORAL at 13:35

## 2025-03-28 RX ADMIN — METOPROLOL SUCCINATE 25 MG: 25 TABLET, EXTENDED RELEASE ORAL at 13:35

## 2025-03-28 RX ADMIN — INSULIN GLARGINE 50 UNITS: 100 INJECTION, SOLUTION SUBCUTANEOUS at 06:37

## 2025-03-28 RX ADMIN — BENZONATATE 100 MG: 100 CAPSULE ORAL at 11:35

## 2025-03-28 RX ADMIN — APIXABAN 2.5 MG: 2.5 TABLET, FILM COATED ORAL at 13:36

## 2025-03-28 RX ADMIN — GUAIFENESIN, DEXTROMETHORPHAN HBR 1 TABLET: 600; 30 TABLET ORAL at 13:35

## 2025-03-28 RX ADMIN — SODIUM CHLORIDE, SODIUM LACTATE, CALCIUM CHLORIDE, MAGNESIUM CHLORIDE AND DEXTROSE 1500 ML: 4.25; 538; 448; 18.3; 5.08 INJECTION, SOLUTION INTRAPERITONEAL at 02:19

## 2025-03-28 RX ADMIN — BUMETANIDE 2 MG: 1 TABLET ORAL at 13:35

## 2025-03-28 RX ADMIN — SODIUM CHLORIDE, SODIUM LACTATE, CALCIUM CHLORIDE, MAGNESIUM CHLORIDE AND DEXTROSE 1500 ML: 4.25; 538; 448; 18.3; 5.08 INJECTION, SOLUTION INTRAPERITONEAL at 05:53

## 2025-03-28 RX ADMIN — ATORVASTATIN CALCIUM 40 MG: 40 TABLET, FILM COATED ORAL at 13:35

## 2025-03-28 RX ADMIN — SEVELAMER CARBONATE 1600 MG: 800 TABLET, FILM COATED ORAL at 13:35

## 2025-03-28 RX ADMIN — GABAPENTIN 300 MG: 300 CAPSULE ORAL at 13:36

## 2025-03-28 RX ADMIN — AMLODIPINE BESYLATE 5 MG: 5 TABLET ORAL at 13:35

## 2025-03-28 NOTE — PLAN OF CARE
Problem: Chronic Conditions and Co-morbidities  Goal: Patient's chronic conditions and co-morbidity symptoms are monitored and maintained or improved  3/28/2025 0241 by Angelina Guerrero RN  Outcome: Progressing  Flowsheets (Taken 3/28/2025 0241)  Care Plan - Patient's Chronic Conditions and Co-Morbidity Symptoms are Monitored and Maintained or Improved:   Monitor and assess patient's chronic conditions and comorbid symptoms for stability, deterioration, or improvement   Collaborate with multidisciplinary team to address chronic and comorbid conditions and prevent exacerbation or deterioration   Update acute care plan with appropriate goals if chronic or comorbid symptoms are exacerbated and prevent overall improvement and discharge       Problem: Metabolic/Fluid and Electrolytes - Adult  Goal: Glucose maintained within prescribed range  3/28/2025 0241 by Angelina Guerrero RN  Outcome: Progressing  Flowsheets (Taken 3/28/2025 0241)  Glucose maintained within prescribed range:   Monitor blood glucose as ordered   Assess for signs and symptoms of hyperglycemia and hypoglycemia   Administer ordered medications to maintain glucose within target range       Problem: Metabolic/Fluid and Electrolytes - Adult  Goal: Hemodynamic stability and optimal renal function maintained  3/28/2025 0241 by Angelina Guerrero RN  Outcome: Progressing  Flowsheets (Taken 3/28/2025 0241)  Hemodynamic stability and optimal renal function maintained:   Monitor labs and assess for signs and symptoms of volume excess or deficit   Monitor intake, output and patient weight   Encourage oral intake as appropriate   Instruct patient on fluid and nutrition restrictions as appropriate   Monitor response to interventions for patient's volume status, including labs, urine output, blood pressure (other measures as available)         Problem: Safety - Adult  Goal: Free from fall injury  3/28/2025 0241 by Angelina Guerrero RN  Outcome: Progressing  Note: Patient

## 2025-03-28 NOTE — FLOWSHEET NOTE
Stable 4 hour treatment complete. 1 liters of fluid removed. Tolerated well. Pressure held to needle sites for ten minutes each. Dressing clean, dry and intact. Report given to primary RN. Treatment record printed to be placed into chart.   03/28/25 0815 03/28/25 1240   Vital Signs   BP (!) 148/60 127/65   Temp 98.1 °F (36.7 °C) 98.1 °F (36.7 °C)   Pulse 82 80   Respirations 15 17   SpO2 96 % 96 %   Weight - Scale 109 kg (240 lb 4.8 oz) 107.9 kg (237 lb 14 oz)   Weight Method Standing scale Standing scale   Percent Weight Change 1.22 -1.01   Post-Hemodialysis Assessment   Post-Treatment Procedures  --  Blood returned;Access bleeding time < 10 minutes   Machine Disinfection Process  --  Acid/Vinegar Clean;Heat Disinfect;Exterior Machine Disinfection   Blood Volume Processed (Liters)  --  78.5 L   Dialyzer Clearance  --  Lightly streaked   Duration of Treatment (minutes)  --  240 minutes   Heparin Amount Administered During Treatment (mL)  --  0 mL   Hemodialysis Intake (ml)  --  400 ml   Hemodialysis Output (ml)  --  1400 ml   NET Removed (ml)  --  1000   Tolerated Treatment  --  Good

## 2025-03-28 NOTE — PROGRESS NOTES
PROGRESS NOTE      Patient:  Debbie Locke  Unit/Bed:6K-25/025-A  YOB: 1975  MRN: [id number]   Acct: [id number]    PCP: Kory Thomas MD    Date of Admission: 3/17/2025 LOS: 4    Date of Evaluation:  3/21/2025    Anticipated Discharge: pending clinical course     Assessment/Plan:    ESRD on HD MWF (started July 2024), hx FSGS and diabetic nephropathy,  - Pt reports progressively worsening SOB at rest/exertion and weight gain over the last few weeks. AG WNL. Concern for hypervolemia on admission. Hyperkalemia requiring urgent dialysis out of her normal schedule on 3/18.  Follows with Dr. Neville outpatient.  Nephrology following, appreciate recommendations   Continue HD per nephrology inpatient (MWF + T 3/18)  TH 3/18 urgent dialysis terminated early 2/2 clotted system  HD 3/19 (3.5L removed), 3/20 (3.5L removed). Since admission, 11L has been removed in dialysis.   Given hypervolemia, may require daily dialysis, pending nephrology daily evaluation  Avoid nephrotoxic agents and renally dose when able   Daily I+Os, 2g salt 1500 mL fluid restriction.   Daily BMP monitoring.     Dyspnea --Increased SOB and cough, likely 2/2 to hypervolemia vs recent URI, POA   - Likely related to fluid overload with ESRD requiring additional HD on admission (as above). Most likely related to ESRD, as echocardiogram without systolic/diastolic dysfunction and preserved EF.   Currently on spO2 >97% on baseline room air. Pt afebrile, no leukocytosis.   CXR 3/17: mild stable cardiomegaly with pulmonary vascular congestion   Repeat Echo 3/18: EF 55-60% with normal systolic/diastolic function, mild MR, elevated right atrial pressure with IVC decreasing <50% during inspiration.   Encourage incentive spirometry and supportive care  Continue to monitor respiratory status, goal spO2 >91-92%  Daily weights, I+Os    Hyperkalemia and hyperphosphatemia, likely 2/2 to ESRD, POA - improving with dialysis   - Given electrolyte 
  PROGRESS NOTE      Patient:  Debbie Locke  Unit/Bed:6K-25/025-A  YOB: 1975  MRN: [id number]   Acct: [id number]    PCP: Kory Thomas MD    Date of Admission: 3/17/2025 LOS: 2    Date of Evaluation:  3/19/2025    Anticipated Discharge: pending clinical course     Assessment/Plan:    ESRD on peritoneal HD MWF (started July 2024), hx FSGS and diabetic nephropathy,  - Pt reports progressively worsening SOB at rest/exertion and weight gain over the last few weeks. AG WNL. Concern for hypervolemia on admission. Hyperkalemia requiring urgent dialysis on 3/18.  Follows with Dr. Neville outpatient.  Nephrology following, appreciate recommendations   Continue HD per nephrology inpatient (MW + T 3/18)  TH 3/18 urgent dialysis terminated early 2/2 clotted system  Given hypervolemia, may require daily dialysis, pending nephrology daily evaluation  Avoid nephrotoxic agents and renally dose when able   Daily I+Os, 2g salt 2L fluid restriction.   Daily BMP monitoring.     Dyspnea --Increased SOB and cough, likely 2/2 to hypervolemia vs recent URI, POA   - Likely related to fluid overload with ESRD requiring additional HD on admission (as above) vs underlying CHF, pending repeat echo (ordered 3/18).   Currently on spO2 >95% on baseline room air. Pt afebrile, no leukocytosis.   CXR 3/17: mild stable cardiomegaly with pulmonary vascular congestion   Encourage incentive spirometry and supportive care  Continue to monitor respiratory status, goal spO2 >91-92%  Daily weights, I+Os    Hyperkalemia and hyperphosphatemia, likely 2/2 to ESRD, POA - improving with dialysis   - Given electrolyte abnormalities (Mg 2.3 WNL but K 5.3 and P 5.1), patient required additional HD (TH 3/18) outside normal MWF schedule, with 2K bath. K prior to HD 3/8 5.6, P WNL 4.3.   Nephrology following, appreciate recommendations  Start Renvela 1600 mg TID 3/18  Improving phos 4.3 after HD and resuming Renvela  Daily BMP 
  PROGRESS NOTE      Patient:  Debbie Locke  Unit/Bed:6K-25/025-A  YOB: 1975  MRN: [id number]   Acct: [id number]    PCP: Kory Thomas MD    Date of Admission: 3/17/2025 LOS: 6    Date of Evaluation:  3/23/2025    Anticipated Discharge: pending clinical course     Assessment/Plan:    ESRD on HD MWF (started July 2024), hx FSGS and diabetic nephropathy,  - Pt reports progressively worsening SOB at rest/exertion and weight gain over the last few weeks. AG WNL. Concern for hypervolemia on admission. Hyperkalemia requiring urgent dialysis out of her normal schedule on 3/18.  Follows with Dr. Neville outpatient.  Nephrology following, appreciate recommendations   Continue HD per nephrology inpatient (MWF + T 3/18)  TH 3/18 urgent dialysis terminated early 2/2 clotted system  HD 3/19 (3.5L removed), 3/20 (3.5L removed). Since admission, 11L has been removed in dialysis.   Given hypervolemia, may require daily dialysis tomorrow, pending nephrology daily evaluation  Avoid nephrotoxic agents and renally dose when able   Daily I+Os, 2g salt 1500 mL fluid restriction.   Daily BMP monitoring.     Dyspnea --Increased SOB and cough, likely 2/2 to hypervolemia vs recent URI, POA   - Likely related to fluid overload with ESRD requiring additional HD on admission (as above). Most likely related to ESRD, as echocardiogram without systolic/diastolic dysfunction and preserved EF.   Currently on spO2 >97% on baseline room air. Pt afebrile, no leukocytosis.   CXR 3/17: mild stable cardiomegaly with pulmonary vascular congestion   Repeat Echo 3/18: EF 55-60% with normal systolic/diastolic function, mild MR, elevated right atrial pressure with IVC decreasing <50% during inspiration.   Encourage incentive spirometry and supportive care  Continue to monitor respiratory status, goal spO2 >91-92%  Daily weights, I+Os    Hyperkalemia and hyperphosphatemia, likely 2/2 to ESRD, POA - improving with dialysis   - Given 
  PROGRESS NOTE      Patient:  Debbie Locke  Unit/Bed:6K-25/025-A  YOB: 1975  MRN: [id number]   Acct: [id number]    PCP: Kory Thomas MD    Date of Admission: 3/17/2025 LOS: 7    Date of Evaluation:  3/24/2025    Anticipated Discharge: Pending clinical course     Assessment/Plan:    Significant volume overload. In setting of ESRD on HD. Pt reports progressively worsening SOB at rest/exertion and weight gain over the last few weeks. Concern for hypervolemia due to ESRD vs ?HF exacerbation. BNP largely elevated 83711 on admission. Last echo 7/29/24: low normal systolic function and indeterminate diastolic function with EF 55-60%, normal wall motion. CXR with pulmonary vascular congestion with concern for fluid overload, s/p IV lasix 40 mg in ED. AG WNL. Patient reports not taking all home meds x2 weeks due to traveling. Hyperkalemia requiring urgent dialysis out of her normal schedule on 3/18. Follows with Dr. Neville outpatient Despite HD for 5 days in a row on admission, almost no weight change or improvement in fluid status.   Repeat echo 3/18/25 with EF 55-60% with normal wall motion and systolic/diastolic function, stable from prior study July 2024.   Continue home GDMT Toprol 25 mg BID and Bumex 2mg po BID   Nephrology on board, managing dialysis and fluid status. Has counseled patient extensively on compliance.   HD per nephrology. Since admission 11L fluid have been removed in dialysis.   ESRD on HD (MW). Started July 2024. Hx of FSGS and diabetic nephropathy. Patient with volume overload as above.   Nephrology on board, appreciate recs.   Continue HD per Nephrology (MWF + T 3/18)  Th 3/18 HD terminated early 2/2 clotted system  Avoid nephrotoxic agents   Daily weights, strict I/Os, daily BMP  <2g Na and <2L fluid restriction  Dyspnea, POA. Increased SOB and cough likely 2/2 volume overload status and recent URI. Patient reports hx of recurrent URI -- however patient afebrile, no 
  PROGRESS NOTE      Patient:  Debbie Locke  Unit/Bed:6K-25/025-A  YOB: 1975  MRN: [id number]   Acct: [id number]    PCP: Kory Thomas MD    Date of Admission: 3/17/2025 LOS: 8    Date of Evaluation:  3/25/2025    Anticipated Discharge: Pending clinical course     Assessment/Plan:    Significant volume overload. In setting of ESRD on HD. Pt reports progressively worsening SOB at rest/exertion and weight gain over the last few weeks. Concern for hypervolemia due to ESRD vs ?HF exacerbation. BNP largely elevated 04559 on admission. Last echo 7/29/24: low normal systolic function and indeterminate diastolic function with EF 55-60%, normal wall motion. CXR with pulmonary vascular congestion with concern for fluid overload, s/p IV lasix 40 mg in ED. AG WNL. Patient reports not taking all home meds x2 weeks due to traveling. Hyperkalemia requiring urgent dialysis out of her normal schedule on 3/18. Follows with Dr. Neville outpatient Despite HD for 5 days in a row on admission, almost no weight change or improvement in fluid status.   Repeat echo 3/18/25 with EF 55-60% with normal wall motion and systolic/diastolic function, stable from prior study July 2024.   Continue home GDMT Toprol 25 mg BID and Bumex 2mg po BID   Nephrology on board, managing dialysis and fluid status. Has counseled patient extensively on compliance.   HD per nephrology. Minimal/no progress with dialysis despite 5 days dialysis since admission. Since admission 15L fluid have been removed in dialysis.   ESRD on HD (MWF). Started July 2024. Hx of FSGS and diabetic nephropathy. Patient with volume overload as above.   Nephrology on board, appreciate recs.   Continue HD per Nephrology (MWF + T 3/18)  Th 3/18 HD terminated early 2/2 clotted system  Avoid nephrotoxic agents   Daily weights, strict I/Os, daily BMP  <2g Na and <2L fluid restriction  Dyspnea, POA. Increased SOB and cough likely 2/2 volume overload status and recent 
  PROGRESS NOTE      Patient:  Debbie Locke  Unit/Bed:6K-25/025-A  YOB: 1975  MRN: [id number]   Acct: [id number]    PCP: Kory Thomas MD    Date of Admission: 3/17/2025 LOS: 1    Date of Evaluation:  3/18/2025    Anticipated Discharge: pending clinical course     Assessment/Plan:    ESRD on peritoneal HD MWF (started July 2024), hx FSGS and diabetic nephropathy, -- with concern fluid overload on admission  - Pt reports progressively worsening SOB at rest/exertion and weight gain over the last few weeks. AG WNL. Hyperkalemia requiring urgent dialysis on 3/18.  Follows with Dr. Neville outpatient.  Nephrology following, appreciate recommendations   Continue HD per nephrology inpatient ([facility] + T 3/18)  Avoid nephrotoxic agents and renally dose when able   Daily I+Os, 2g salt 2L fluid restriction.   Daily BMP monitoring.     Dyspnea --Increased SOB and cough, likely 2/2 to hypervolemia vs recent URI, POA   - Likely related to fluid overload with ESRD requiring additional HD on admission (as above) vs underlying CHF, pending repeat echo (ordered 3/18).   Currently on spO2 >98% on baseline room air. Pt afebrile, no leukocytosis.   CXR 3/17: mild stable cardiomegaly with pulmonary vascular congestion   Encourage incentive spirometry and supportive care  Continue to monitor respiratory status, goal spO2 >91-92%  Daily weights, I+Os    Hyperkalemia and hyperphosphatemia, likely 2/2 to ESRD, POA - improving with dialysis   - Given electrolyte abnormalities (Mg 2.3 WNL but K 5.3 and P 5.1), patient required additional HD (TH 3/18) outside normal MWF schedule, with 2K bath.   Nephrology following, appreciate recommendations  Start Renvela 1600 mg TID 3/18  Improving phos 4.3 after HD and resuming Renvela  Daily BMP monitoring    HTN urgency likely 2/2 ESRD - with hypervolemia  - SBP 180s on admission, and pt admits to missing 1-2 weeks of all her home meds while traveling. Home diet resumed this 
  Physician Progress Note      PATIENT:               SIS PENN  [facility] #:                  [id number]  :                       1975  ADMIT DATE:       3/17/2025 6:53 AM  DISCH DATE:  RESPONDING  PROVIDER #:        Hemal Jones MD          QUERY TEXT:    Patient admitted 3/17/25. Per H/P: \"End-stage renal disease on hemodialysis,   Likely acute on chronic diastolic heart failure\"  Per ED provider note: Chest x-ray shows cardiomegaly with pulmonary edema\".    Per 3/18/25 IM progress note: no prior hx/dx of CHF and EF was normal 2024  3/18/25 echo: EF 55-60%, indeterminate diastolic function    If possible, please document in the progress notes if evaluating and or   treating:    The medical record reflects the following:  Risk Factors:  DM, hypertensive urgency, ESRD on HD, Patient reports not   taking all home meds x2 weeks due to traveling. (3/24/25 IM note)  Clinical Indicators:  Per H/P: shortness of breath and cough x 1 week...  +3   pitting edema bilaterally in lower extremity;  Per ED provider note: \"Chest   x-ray shows cardiomegaly with pulmonary edema\" .   Per 3/18/25 IM progress   note: \"Repeat echo (p) but no prior hx/dx of CHF and EF was normal 2024\";    Per 3/24/25 IM progress note: \"Concern for hypervolemia due to ESRD vs ?HF   exacerbation. BNP largely elevated 44099 on admission. Last echo 24: low   normal systolic function and indeterminate diastolic function with EF 55-60%,   normal wall motion. CXR with pulmonary vascul  Treatment:  Lasix 40 mg IV x 1 on 3/17/25, Bumex 2 mg twice daily, Toprol XL,   Cardura, Norvasc, hemodialysis 3/17, 3/18, 3/19, 3/20, 3/22, 3/24  Options provided:  -- Acute on chronic diastolic CHF  -- Acute diastolic CHF  -- Acute pulmonary edema present on admission, CHF ruled out after study  -- Pt has fluid overload. CHF and acute pulmonary edema ruled out after study  -- Other - I will add my own diagnosis  -- Disagree - Not applicable / Not valid  -- 
2055 Patient received in IR for fistulogram.   2115 This procedure has been fully reviewed with the patient and written informed consent has been obtained.  2120 Pt assisted to procedure table and monitor applied. Pt states that she has a hard time breathing when she lays down. Informed patient that her oxygen sat is 96% on room air. Pt is requesting oxygen; 2L per nasal cannula applied per pt request.   2143 Procedure started with Dr. Vieyra.  2144 Access to the vein via sonosite.   2146 Procedure completed; patient tolerated well. Manual pressure held to site.   2156 Manual pressure discontinued; no bleeding noted. Dressing to left arm; no bleeding noted.  2159 Patient on bed; comfort ensured.  1002 Report called to Amanda CAO on 6K.   1005 Patient taken to 6K via Concepcion CAO and Monica RT. Pt alert and oriented x3; follows commands. Skin pink, warm, and dry. Respirations easy, regular, and nonlabored.              
Comprehensive Nutrition Assessment    Type and Reason for Visit:  Initial, LOS    Nutrition Recommendations/Plan:   Consider renal MVI, folbee plus.  Will provide hard boiled eggs BID for extra protein.  Patient declined all ONS-states they all give her diarrhea.  Continue current diet. Encouraged oral intake, good protein sources, and diet/fluid restriction compliance.  Renal, carb controlled diet and fluid restriction reinforced with patient, handouts also provided. She is followed by outpatient dialysis dietitian.      Malnutrition Assessment:  Malnutrition Status:  At risk for malnutrition (03/25/25 1330)    Context:  Acute Illness     Findings of the 6 clinical characteristics of malnutrition:  Energy Intake:  75% or less of estimated energy requirements for 7 or more days (for 1 month prior to admit d/t early satiety? fluid overload, improved since admit)  Weight Loss:  Unable to assess (hard to assess due to large weight fluctuations with hemodialysis/fluid overload)     Body Fat Loss:  No body fat loss     Muscle Mass Loss:  No muscle mass loss    Fluid Accumulation:    Generalized   Strength:  Not Performed    Nutrition Assessment:     Pt. nutritionally compromised AEB reports of poor appetite/intake prior to admit d/t nausea, fullness,? D/t fluid overload.  At risk for further nutrition compromise r/t increased nutrient needs d/t known losses with dialysis, admit significant fluid overload, cough, noncompliance (missed all her medications for ~ 2 weeks d/t traveling/accidentally thrown away), uncontrolled diabetes-HgbA1c 11% on 1/20/25,  and underlying medical condition (hx: DM, neuropathy, HLD, HTN, ESRD-HD (started 7/2024), CAD, PAD s/p RLE stent, DVT, chronic anemia, smoking, morbid obesity).       Nutrition Related Findings:    Pt. Report/Treatments/Miscellaneous: Patient seen in dialysis this morning, reports poor appetite/intake for 1 month  prior to admit, d/t nausea, fullness ? D/t fluid 
Debbie is now enrolled in the [facility] program.    Please send any discharge medications to our outpatient pharmacy for [facility] dispensing.    Thanks!  Susanne Cordova CPhT  Patient  Ext [phone]  Outpatient Pharmacy  3/21/2025,8:06 AM   
Discharge teaching and instructions for diagnosis/procedure of CAPD completed with patient using teachback method. AVS reviewed. Printed prescriptions given to patient. Patient voiced understanding regarding prescriptions, follow up appointments, and care of self at home. Discharged ambulatory and in a wheelchair to  home with support per family.  
[facility]   Nephrology progress note  3/18/2025, 1:53 PM      Pt Name:    Debbie Locke  MRN:     [id number]     YOB: 1975  Admit Date:    3/17/2025  6:53 AM    Chief Complaint: Nephrology following for ESRD on hemodialysis and fluid overload.    Subjective:  Patient seen and examined  No chest pain or shortness of breath  Still having a lot of edema    Objective:  24HR INTAKE/OUTPUT:    Intake/Output Summary (Last 24 hours) at 3/18/2025 1353  Last data filed at 3/18/2025 1115  Gross per 24 hour   Intake 950 ml   Output 5301 ml   Net -4351 ml      Admission weight: 99.8 kg (220 lb)  Wt Readings from Last 3 Encounters:   03/18/25 111.9 kg (246 lb 11.1 oz)   02/01/25 110.4 kg (243 lb 6.2 oz)   01/22/25 104.2 kg (229 lb 11.5 oz)        Vitals :   Vitals:    03/18/25 0454 03/18/25 0845 03/18/25 1115 03/18/25 1145   BP:  139/69 134/73 (!) 154/64   Pulse: 84 72 71 72   Resp: 16 14 16 16   Temp:  97.1 °F (36.2 °C) 97.6 °F (36.4 °C) 97.4 °F (36.3 °C)   TempSrc:    Oral   SpO2: 95% 98% 97% 98%   Weight:  114.2 kg (251 lb 12.3 oz) 111.9 kg (246 lb 11.1 oz)    Height:           Physical examination  General Appearance:  Well developed. No distress  Mouth/Throat:  Oral mucosa moist  Neck:  Supple, no JVD  Lungs:  Breath sounds: clear  Heart::  S1,S2 heard  Abdomen:  Soft, non - tender  Musculoskeletal:  Edema -noted anasarca    Medications:  Infusion:    sodium chloride       Meds:    amLODIPine  5 mg Oral Daily    apixaban  2.5 mg Oral BID    aspirin  81 mg Oral Daily    atorvastatin  40 mg Oral Daily    bumetanide  2 mg Oral BID    doxazosin  4 mg Oral Nightly    gabapentin  300 mg Oral BID    insulin glargine  25 Units SubCUTAneous BID    metoprolol succinate  25 mg Oral BID    dextromethorphan-guaiFENesin  1 tablet Oral BID    sodium chloride flush  5-40 mL IntraVENous 2 times per day    sevelamer  1,600 mg Oral TID WC       Lab Data :  CBC:   Recent Labs     03/17/25  0777 
[facility]   Nephrology progress note  3/19/2025, 9:05 AM      Pt Name:    Debbie Locke  MRN:     [id number]     YOB: 1975  Admit Date:    3/17/2025  6:53 AM    Chief Complaint: Nephrology following for ESRD on hemodialysis and fluid overload.    Subjective:  Patient seen and examined  No chest pain or shortness of breath  Still having a lot of edema    Objective:  24HR INTAKE/OUTPUT:    Intake/Output Summary (Last 24 hours) at 3/19/2025 0905  Last data filed at 3/18/2025 1115  Gross per 24 hour   Intake 400 ml   Output 2701 ml   Net -2301 ml      Admission weight: 99.8 kg (220 lb)  Wt Readings from Last 3 Encounters:   03/19/25 115.3 kg (254 lb 3.1 oz)   02/01/25 110.4 kg (243 lb 6.2 oz)   01/22/25 104.2 kg (229 lb 11.5 oz)        Vitals :   Vitals:    03/18/25 2307 03/19/25 0422 03/19/25 0635 03/19/25 0736   BP: (!) 117/97 138/69  127/65   Pulse: 88 72  78   Resp: 18 18  18   Temp: 98 °F (36.7 °C) 98 °F (36.7 °C)  97.6 °F (36.4 °C)   TempSrc: Oral Oral  Oral   SpO2: 98% 95%  95%   Weight:   115.3 kg (254 lb 3.1 oz)    Height:           Physical examination  General Appearance:  Well developed. No distress  Mouth/Throat:  Oral mucosa moist  Neck:  Supple, no JVD  Lungs:  Breath sounds: clear  Heart::  S1,S2 heard  Abdomen:  Soft, non - tender  Musculoskeletal:  Edema -noted anasarca    Medications:  Infusion:    sodium chloride       Meds:    amLODIPine  5 mg Oral Daily    apixaban  2.5 mg Oral BID    aspirin  81 mg Oral Daily    atorvastatin  40 mg Oral Daily    bumetanide  2 mg Oral BID    doxazosin  4 mg Oral Nightly    gabapentin  300 mg Oral BID    insulin glargine  25 Units SubCUTAneous BID    metoprolol succinate  25 mg Oral BID    dextromethorphan-guaiFENesin  1 tablet Oral BID    sodium chloride flush  5-40 mL IntraVENous 2 times per day    sevelamer  1,600 mg Oral TID WC       Lab Data :  CBC:   Recent Labs     03/17/25  1208 03/18/25  0610 03/19/25  0606   WBC 9.2 
[facility]   Nephrology progress note  3/20/2025, 10:23 AM      Pt Name:    Debbie Locke  MRN:     [id number]     YOB: 1975  Admit Date:    3/17/2025  6:53 AM    Chief Complaint: Nephrology following for ESRD on hemodialysis and fluid overload.    Subjective:  Patient seen and examined  No chest pain or shortness of breath  Still having a lot of edema  Appears noncompliant with the fluid intake  Her weight postdialysis yesterday was a 111 this morning he is already up to 113    Objective:  24HR INTAKE/OUTPUT:    Intake/Output Summary (Last 24 hours) at 3/20/2025 1023  Last data filed at 3/19/2025 2142  Gross per 24 hour   Intake 840 ml   Output 3900 ml   Net -3060 ml      Admission weight: 99.8 kg (220 lb)  Wt Readings from Last 3 Encounters:   03/20/25 113.4 kg (250 lb 1.6 oz)   02/01/25 110.4 kg (243 lb 6.2 oz)   01/22/25 104.2 kg (229 lb 11.5 oz)        Vitals :   Vitals:    03/20/25 0049 03/20/25 0409 03/20/25 0412 03/20/25 0830   BP: 136/61 (!) 130/59  130/63   Pulse: 72 85  80   Resp: 18 18  18   Temp: 98.2 °F (36.8 °C) 97.9 °F (36.6 °C)  98.3 °F (36.8 °C)   TempSrc: Oral Oral  Oral   SpO2: 95% 97%  97%   Weight:   113.4 kg (250 lb 1.6 oz)    Height:           Physical examination  General Appearance:  Well developed. No distress  Mouth/Throat:  Oral mucosa moist  Neck:  Supple, no JVD  Lungs:  Breath sounds: clear  Heart::  S1,S2 heard  Abdomen:  Soft, non - tender  Musculoskeletal:  Edema -noted anasarca not really gaining any improvement    Medications:  Infusion:    dextrose      sodium chloride       Meds:    insulin glargine  35 Units SubCUTAneous BID    amLODIPine  5 mg Oral Daily    apixaban  2.5 mg Oral BID    aspirin  81 mg Oral Daily    atorvastatin  40 mg Oral Daily    bumetanide  2 mg Oral BID    doxazosin  4 mg Oral Nightly    gabapentin  300 mg Oral BID    metoprolol succinate  25 mg Oral BID    dextromethorphan-guaiFENesin  1 tablet Oral BID    sodium 
[facility]   Nephrology progress note  3/21/2025, 10:58 AM      Pt Name:    Debbie Locke  MRN:     [id number]     YOB: 1975  Admit Date:    3/17/2025  6:53 AM    Chief Complaint: Nephrology following for ESRD on hemodialysis and fluid overload.    Subjective:  Patient seen and examined  No chest pain or shortness of breath  Still having a lot of edema  Appears noncompliant with the fluid intake  As per documentation she is -11 L but weight has not significantly changed    Objective:  24HR INTAKE/OUTPUT:    Intake/Output Summary (Last 24 hours) at 3/21/2025 1058  Last data filed at 3/20/2025 1450  Gross per 24 hour   Intake 402.2 ml   Output 3900 ml   Net -3497.8 ml      Admission weight: 99.8 kg (220 lb)  Wt Readings from Last 3 Encounters:   03/21/25 112.3 kg (247 lb 9.6 oz)   02/01/25 110.4 kg (243 lb 6.2 oz)   01/22/25 104.2 kg (229 lb 11.5 oz)        Vitals :   Vitals:    03/20/25 1949 03/21/25 0011 03/21/25 0416 03/21/25 0800   BP: 128/63 (!) 130/57 (!) 135/58 115/79   Pulse: 71 82 76 73   Resp: 16 16 16 20   Temp: 97.8 °F (36.6 °C) 98.1 °F (36.7 °C) 98.1 °F (36.7 °C) 97.9 °F (36.6 °C)   TempSrc: Oral Oral Oral Oral   SpO2: 100% 100% 95% 95%   Weight:   112.3 kg (247 lb 9.6 oz)    Height:           Physical examination  General Appearance:  Well developed. No distress  Mouth/Throat:  Oral mucosa moist  Neck:  Supple, no JVD  Lungs:  Breath sounds: clear  Heart::  S1,S2 heard  Abdomen:  Soft, non - tender  Musculoskeletal:  Edema -noted anasarca not really much improvement    Medications:  Infusion:    dextrose      sodium chloride       Meds:    insulin glargine  45 Units SubCUTAneous BID    amLODIPine  5 mg Oral Daily    apixaban  2.5 mg Oral BID    aspirin  81 mg Oral Daily    atorvastatin  40 mg Oral Daily    bumetanide  2 mg Oral BID    doxazosin  4 mg Oral Nightly    gabapentin  300 mg Oral BID    metoprolol succinate  25 mg Oral BID    dextromethorphan-guaiFENesin  1 
[facility]   Nephrology progress note  3/23/2025, 11:49 AM      Pt Name:    Debbie Locke  MRN:     [id number]     YOB: 1975  Admit Date:    3/17/2025  6:53 AM    Chief Complaint: Nephrology following for ESRD on hemodialysis and fluid overload.    Subjective:  Patient seen and examined  Resting comfortably no distress  Weight not improvement much    Objective:  24HR INTAKE/OUTPUT:    Intake/Output Summary (Last 24 hours) at 3/23/2025 1149  Last data filed at 3/22/2025 1409  Gross per 24 hour   Intake 640 ml   Output 3900 ml   Net -3260 ml      Admission weight: 99.8 kg (220 lb)  Wt Readings from Last 3 Encounters:   03/23/25 114.1 kg (251 lb 8.7 oz)   02/01/25 110.4 kg (243 lb 6.2 oz)   01/22/25 104.2 kg (229 lb 11.5 oz)        Vitals :   Vitals:    03/23/25 0329 03/23/25 0336 03/23/25 0833 03/23/25 1123   BP: (!) 121/54  (!) 122/54 132/64   Pulse: 87  80 82   Resp: 16  16 16   Temp: 98.6 °F (37 °C)  98.1 °F (36.7 °C) 98.5 °F (36.9 °C)   TempSrc: Oral  Oral Oral   SpO2: 93%  97% 96%   Weight:  114.1 kg (251 lb 8.7 oz)     Height:           Physical examination  General Appearance:  Well developed. No distress  Mouth/Throat:  Oral mucosa moist  Neck:  Supple, no JVD  Lungs:  Breath sounds: clear  Heart::  S1,S2 heard  Abdomen:  Soft, non - tender  Musculoskeletal:  Edema -noted anasarca not really much improvement    Medications:  Infusion:    dextrose      sodium chloride       Meds:    insulin glargine  45 Units SubCUTAneous BID    amLODIPine  5 mg Oral Daily    apixaban  2.5 mg Oral BID    aspirin  81 mg Oral Daily    atorvastatin  40 mg Oral Daily    bumetanide  2 mg Oral BID    doxazosin  4 mg Oral Nightly    gabapentin  300 mg Oral BID    metoprolol succinate  25 mg Oral BID    dextromethorphan-guaiFENesin  1 tablet Oral BID    sodium chloride flush  5-40 mL IntraVENous 2 times per day    sevelamer  1,600 mg Oral TID WC       Lab Data :  CBC:   Recent Labs     
[facility]   Nephrology progress note  3/24/2025, 9:21 AM      Pt Name:    Debbie Locke  MRN:     [id number]     YOB: 1975  Admit Date:    3/17/2025  6:53 AM    Chief Complaint: Nephrology following for ESRD on hemodialysis and fluid overload.    Subjective:  Patient seen and examined  No complaints.  Some access issues    Objective:  24HR INTAKE/OUTPUT:    Intake/Output Summary (Last 24 hours) at 3/24/2025 0921  Last data filed at 3/24/2025 0110  Gross per 24 hour   Intake 450 ml   Output --   Net 450 ml      Admission weight: 99.8 kg (220 lb)  Wt Readings from Last 3 Encounters:   03/24/25 116 kg (255 lb 12.8 oz)   02/01/25 110.4 kg (243 lb 6.2 oz)   01/22/25 104.2 kg (229 lb 11.5 oz)        Vitals :   Vitals:    03/23/25 2342 03/24/25 0343 03/24/25 0530 03/24/25 0716   BP: 132/63 104/83  128/66   Pulse: 76 68  74   Resp: 16 16  19   Temp: 97.6 °F (36.4 °C) 97.8 °F (36.6 °C)  97.7 °F (36.5 °C)   TempSrc: Oral Oral     SpO2: 98% 96%  97%   Weight:   116 kg (255 lb 12.8 oz) 116 kg (255 lb 12.8 oz)   Height:           Physical examination  General Appearance:  Well developed. No distress  Mouth/Throat:  Oral mucosa moist  Neck:  Supple, no JVD  Lungs:  Breath sounds: clear  Heart::  S1,S2 heard  Abdomen:  Soft, non - tender  Musculoskeletal:  Edema - noted anasarca not really much improvement    Medications:  Infusion:    dextrose      sodium chloride       Meds:    insulin glargine  45 Units SubCUTAneous BID    amLODIPine  5 mg Oral Daily    apixaban  2.5 mg Oral BID    aspirin  81 mg Oral Daily    atorvastatin  40 mg Oral Daily    bumetanide  2 mg Oral BID    doxazosin  4 mg Oral Nightly    gabapentin  300 mg Oral BID    metoprolol succinate  25 mg Oral BID    dextromethorphan-guaiFENesin  1 tablet Oral BID    sodium chloride flush  5-40 mL IntraVENous 2 times per day    sevelamer  1,600 mg Oral TID WC       Lab Data :  CBC:   Recent Labs     03/22/25  0822 03/23/25  0903 
[facility]   Nephrology progress note  3/25/2025, 11:08 AM      Pt Name:    Debbie Locke  MRN:     [id number]     YOB: 1975  Admit Date:    3/17/2025  6:53 AM    Chief Complaint: Nephrology following for ESRD on hemodialysis and fluid overload.    Subjective:  Patient seen and examined  No complaints.  Weight still the same    Objective:  24HR INTAKE/OUTPUT:    Intake/Output Summary (Last 24 hours) at 3/25/2025 1108  Last data filed at 3/24/2025 1200  Gross per 24 hour   Intake 400 ml   Output 3400 ml   Net -3000 ml      Admission weight: 99.8 kg (220 lb)  Wt Readings from Last 3 Encounters:   03/25/25 114 kg (251 lb 6.4 oz)   02/01/25 110.4 kg (243 lb 6.2 oz)   01/22/25 104.2 kg (229 lb 11.5 oz)        Vitals :   Vitals:    03/24/25 2337 03/25/25 0439 03/25/25 0544 03/25/25 0745   BP: (!) 159/68 (!) 149/67  131/61   Pulse: 74 82  72   Resp: 18 17  18   Temp: 97.9 °F (36.6 °C) 97.7 °F (36.5 °C)  98.1 °F (36.7 °C)   TempSrc: Oral Oral     SpO2: 98% 96%  97%   Weight:   114 kg (251 lb 6.4 oz)    Height:           Physical examination  General Appearance:  Well developed. No distress  Mouth/Throat:  Oral mucosa moist  Neck:  Supple, no JVD  Lungs:  Breath sounds: clear  Heart::  S1,S2 heard  Abdomen:  Soft, non - tender  Musculoskeletal:  Edema - noted anasarca not really much improvement    Medications:  Infusion:    dextrose      sodium chloride       Meds:    insulin glargine  45 Units SubCUTAneous BID    amLODIPine  5 mg Oral Daily    apixaban  2.5 mg Oral BID    aspirin  81 mg Oral Daily    atorvastatin  40 mg Oral Daily    bumetanide  2 mg Oral BID    doxazosin  4 mg Oral Nightly    gabapentin  300 mg Oral BID    metoprolol succinate  25 mg Oral BID    dextromethorphan-guaiFENesin  1 tablet Oral BID    sodium chloride flush  5-40 mL IntraVENous 2 times per day    sevelamer  1,600 mg Oral TID WC       Lab Data :  CBC:   Recent Labs     03/23/25  0903 03/24/25  0600 
[facility]   Nephrology progress note  3/26/2025, 8:51 AM      Pt Name:    Debbie Locke  MRN:     [id number]     YOB: 1975  Admit Date:    3/17/2025  6:53 AM    Chief Complaint: Nephrology following for ESRD on hemodialysis and fluid overload.    Subjective:  Patient seen and examined  No complaints.  Mild dizziness/cramping  Made excellent output with the PD yesterday  Had almost close to 8 L of ultrafiltration yesterday plus some ultrafiltration with hemodialysis    Objective:  24HR INTAKE/OUTPUT:    Intake/Output Summary (Last 24 hours) at 3/26/2025 0851  Last data filed at 3/26/2025 0653  Gross per 24 hour   Intake 31751 ml   Output 90331 ml   Net -75970 ml      Admission weight: 99.8 kg (220 lb)  Wt Readings from Last 3 Encounters:   03/26/25 107 kg (235 lb 12.8 oz)   02/01/25 110.4 kg (243 lb 6.2 oz)   01/22/25 104.2 kg (229 lb 11.5 oz)        Vitals :   Vitals:    03/26/25 0042 03/26/25 0350 03/26/25 0641 03/26/25 0800   BP:  138/65  113/65   Pulse:  78  81   Resp:  18  18   Temp:  98.2 °F (36.8 °C)  97.6 °F (36.4 °C)   TempSrc:  Oral  Oral   SpO2:  91%  91%   Weight: 108.8 kg (239 lb 12.8 oz)  107 kg (235 lb 12.8 oz)    Height:           Physical examination  General Appearance:  Well developed. No distress  Mouth/Throat:  Oral mucosa moist  Neck:  Supple, no JVD  Lungs:  Breath sounds: clear  Heart::  S1,S2 heard  Abdomen:  Soft, non - tender  Musculoskeletal:  Edema - noted anasarca he appears to be improved    Medications:  Infusion:    dextrose      sodium chloride       Meds:    dianeal lo-raisa 4.25%  1,500 mL IntraPERitoneal Q2H    insulin glargine  45 Units SubCUTAneous BID    amLODIPine  5 mg Oral Daily    apixaban  2.5 mg Oral BID    aspirin  81 mg Oral Daily    atorvastatin  40 mg Oral Daily    bumetanide  2 mg Oral BID    doxazosin  4 mg Oral Nightly    gabapentin  300 mg Oral BID    metoprolol succinate  25 mg Oral BID    dextromethorphan-guaiFENesin  1 tablet 
[facility]   Nephrology progress note  3/27/2025, 9:31 AM      Pt Name:    Debbie Locke  MRN:     [id number]     YOB: 1975  Admit Date:    3/17/2025  6:53 AM    Chief Complaint: Nephrology following for ESRD on hemodialysis and fluid overload.    Subjective:  Patient seen and examined  No complaints.  Mild dizziness/cramping which is better  PD output is less yesterday with 2.5%  Weight slightly went up    Objective:  24HR INTAKE/OUTPUT:    Intake/Output Summary (Last 24 hours) at 3/27/2025 0931  Last data filed at 3/27/2025 0616  Gross per 24 hour   Intake 6990 ml   Output 63653 ml   Net -3310 ml      Admission weight: 99.8 kg (220 lb)  Wt Readings from Last 3 Encounters:   03/27/25 108 kg (238 lb 3.2 oz)   02/01/25 110.4 kg (243 lb 6.2 oz)   01/22/25 104.2 kg (229 lb 11.5 oz)        Vitals :   Vitals:    03/27/25 0300 03/27/25 0558 03/27/25 0815 03/27/25 0920   BP: (!) 110/49  (!) 120/56 (!) 120/56   Pulse: 77  77 77   Resp: 18  16    Temp: 98.3 °F (36.8 °C)  98.2 °F (36.8 °C)    TempSrc: Oral  Oral    SpO2: 94%  95%    Weight:  108 kg (238 lb 3.2 oz)     Height:           Physical examination  General Appearance:  Well developed. No distress  Mouth/Throat:  Oral mucosa moist  Neck:  Supple, no JVD  Lungs:  Breath sounds: clear  Heart::  S1,S2 heard  Abdomen:  Soft, non - tender  Musculoskeletal:  Edema - noted anasarca he appears to be improved    Medications:  Infusion:    dextrose      sodium chloride       Meds:    dianeal lo-raisa 4.25%  1,500 mL IntraPERitoneal Q4H    insulin glargine  45 Units SubCUTAneous Nightly    And    insulin glargine  50 Units SubCUTAneous QAM    amLODIPine  5 mg Oral Daily    apixaban  2.5 mg Oral BID    aspirin  81 mg Oral Daily    atorvastatin  40 mg Oral Daily    bumetanide  2 mg Oral BID    doxazosin  4 mg Oral Nightly    gabapentin  300 mg Oral BID    metoprolol succinate  25 mg Oral BID    dextromethorphan-guaiFENesin  1 tablet Oral BID    
[facility]   Nephrology progress note  3/28/2025, 10:58 AM      Pt Name:    Debbie Locke  MRN:     [id number]     YOB: 1975  Admit Date:    3/17/2025  6:53 AM    Chief Complaint: Nephrology following for ESRD on hemodialysis and fluid overload.    Subjective:  Patient seen and examined  No complaints.  Doing well  Reviewed the PD data    Objective:  24HR INTAKE/OUTPUT:    Intake/Output Summary (Last 24 hours) at 3/28/2025 1058  Last data filed at 3/28/2025 0637  Gross per 24 hour   Intake 8640 ml   Output 78941 ml   Net -2160 ml      Admission weight: 99.8 kg (220 lb)  Wt Readings from Last 3 Encounters:   03/28/25 109 kg (240 lb 4.8 oz)   02/01/25 110.4 kg (243 lb 6.2 oz)   01/22/25 104.2 kg (229 lb 11.5 oz)        Vitals :   Vitals:    03/27/25 2019 03/27/25 2322 03/28/25 0259 03/28/25 0815   BP: (!) 108/53 (!) 126/55 114/64 (!) 148/60   Pulse: 75 74 78 82   Resp: 18 16 18 15   Temp: 98.4 °F (36.9 °C) 97.7 °F (36.5 °C) 98.6 °F (37 °C) 98.1 °F (36.7 °C)   TempSrc: Oral Oral Oral    SpO2: 98% 97% 97% 96%   Weight:   107.7 kg (237 lb 6.4 oz) 109 kg (240 lb 4.8 oz)   Height:           Physical examination  General Appearance:  Well developed. No distress  Mouth/Throat:  Oral mucosa moist  Neck:  Supple, no JVD  Lungs:  Breath sounds: clear  Heart::  S1,S2 heard  Abdomen:  Soft, non - tender  Musculoskeletal:  Edema - noted anasarca he appears to be improved    Medications:  Infusion:    dextrose      sodium chloride       Meds:    dianeal lo-raisa 4.25%  1,500 mL IntraPERitoneal Q4H    insulin glargine  45 Units SubCUTAneous Nightly    And    insulin glargine  50 Units SubCUTAneous QAM    amLODIPine  5 mg Oral Daily    apixaban  2.5 mg Oral BID    aspirin  81 mg Oral Daily    atorvastatin  40 mg Oral Daily    bumetanide  2 mg Oral BID    doxazosin  4 mg Oral Nightly    gabapentin  300 mg Oral BID    metoprolol succinate  25 mg Oral BID    dextromethorphan-guaiFENesin  1 tablet Oral 
Patient educated on how to use incentive spirometer. Patient verbalized understanding and demonstrated proper use. Emphasized importance and usage of device, with coughing and deep breathing every 4 hours while awake.         
Patient seen and examined on dialysis  Ultrafiltration goal 3 kg  Clinically fluid overloaded  Labs noted  Potassium 5.4  Using 2K bath  Vitals noted  Will plan to repeat dialysis tomorrow depending on labs and clinical status  
Pt was sitting and alone when her daughter walked in. She was dealing with end stage renal disease on dialysis. She was encouraged and blessed.    03/18/25 1500   Encounter Summary   Encounter Overview/Reason Spiritual/Emotional Needs   Service Provided For Patient and family together   Referral/Consult From [facility] [place]   Support System Children   Last Encounter  03/18/25   Complexity of Encounter Low   Begin Time 1330   End Time  1036   Total Time Calculated 1266 min   Spiritual/Emotional needs   Type Spiritual Support   Assessment/Intervention/Outcome   Assessment Hopeful   Intervention Empowerment   Outcome Encouraged;Engaged in conversation       
This RN made aware of critical Potassium level of 6.2, notified Resident  face to face. Patient to go to dialysis later in the morning per Nephrology.   
03/21/25  0713   WBC 7.5 7.1   HGB 8.1* 8.2*   HCT 27.8* 27.8*    136     CMP:  Recent Labs     03/20/25  0628 03/21/25  0713 03/21/25  1937 03/21/25  2239   * 132*  --   --    K 6.2* 5.7* 5.3* 5.2   CL 95* 95*  --   --    CO2 32* 29  --   --    BUN 25* 25*  --   --    CREATININE 4.3* 3.7*  --   --    GLUCOSE 239* 124*  --   --    CALCIUM 8.1* 8.0*  --   --    MG 2.0 2.0  --   --    PHOS 3.4 3.4  --   --      Hepatic: No results for input(s): \"LABALBU\", \"AST\", \"ALT\", \"BILITOT\", \"ALKPHOS\" in the last 72 hours.    Invalid input(s): \"ALB\"    Assessment and Plan:  Renal -ESRD on hemodialysis, clinically very fluid overloaded  Seen during dialysis tolerating procedure well blood pressure 139/72 UF 3900 mL blood flow rate of 350 venous pressure of 130  Unable to make any progress with her overall weight/fluid status  Her weight apparently this morning was 115 kg no change despite dialysis for almost 4 days    Hyperkalemia HD on a 2K bath   Anemia of renal dysfunction stable  Fluid overload plan as mentioned above not making much progress due to patient's noncompliance  Diabetes mellitus  Essential hypertension  Meds reviewed     Torin Neville MD  [facility]    This report has been created using voice recognition software. It may contain minor errors which are inherent in voice recognition technology  
chloride flush, sodium chloride, ondansetron **OR** ondansetron, polyethylene glycol, acetaminophen **OR** acetaminophen, senna, aluminum & magnesium hydroxide-simethicone, albuterol **AND** ipratropium, benzonatate      Intake/Output Summary (Last 24 hours) at 3/26/2025 0920  Last data filed at 3/26/2025 0653  Gross per 24 hour   Intake 19353 ml   Output 92353 ml   Net -65681 ml       Exam:  /65   Pulse 81   Temp 97.6 °F (36.4 °C) (Oral)   Resp 18   Ht 1.626 m (5' 4\")   Wt 107 kg (235 lb 12.8 oz)   SpO2 91%   BMI 40.47 kg/m²     Physical Exam  Vitals reviewed.   Constitutional:       Appearance: She is obese.      Comments: Chronically ill-appearing, sitting upright in bedside chair    HENT:      Head: Normocephalic.   Eyes:      Extraocular Movements: Extraocular movements intact.   Cardiovascular:      Rate and Rhythm: Normal rate and regular rhythm.      Pulses: Normal pulses.      Heart sounds: Normal heart sounds. No murmur heard.     No gallop.      Comments: LUE HD fistula   Pulmonary:      Effort: Pulmonary effort is normal.      Breath sounds: Normal breath sounds. No wheezing, rhonchi or rales.   Abdominal:      General: Bowel sounds are normal. There is no distension.      Tenderness: There is no abdominal tenderness.      Comments: PD catheter. Improved anasarca   Musculoskeletal:         General: Normal range of motion.      Cervical back: Normal range of motion.      Right lower leg: Edema present.      Left lower leg: Edema present.   Skin:     General: Skin is warm.   Neurological:      General: No focal deficit present.      Mental Status: She is alert.   Psychiatric:         Mood and Affect: Mood normal.         Behavior: Behavior is cooperative.        All labs reviewed and interpreted by me:  Labs:   Recent Labs     03/24/25  0600 03/25/25  0531 03/26/25  0535   WBC 6.0 5.8 6.0   HGB 7.6* 8.1* 8.9*   HCT 26.5* 27.4* 30.2*    173 207     Recent Labs     03/24/25  0600 
glucose, dextrose bolus **OR** dextrose bolus, glucagon (rDNA), dextrose, albuterol sulfate HFA, sodium chloride flush, sodium chloride, ondansetron **OR** ondansetron, polyethylene glycol, acetaminophen **OR** acetaminophen, senna, aluminum & magnesium hydroxide-simethicone, albuterol **AND** ipratropium, benzonatate      Intake/Output Summary (Last 24 hours) at 3/27/2025 0903  Last data filed at 3/27/2025 0616  Gross per 24 hour   Intake 6990 ml   Output 80397 ml   Net -3310 ml       Exam:  BP (!) 120/56 Comment: RN notified  Pulse 77   Temp 98.2 °F (36.8 °C) (Oral)   Resp 16   Ht 1.626 m (5' 4\")   Wt 108 kg (238 lb 3.2 oz)   SpO2 95%   BMI 40.89 kg/m²     Physical Exam  Vitals reviewed.   Constitutional:       Appearance: She is obese.      Comments: Chronically ill-appearing, sitting upright in bedside chair    HENT:      Head: Normocephalic.   Eyes:      Extraocular Movements: Extraocular movements intact.   Cardiovascular:      Rate and Rhythm: Normal rate and regular rhythm.      Pulses: Normal pulses.      Heart sounds: Normal heart sounds. No murmur heard.     No gallop.      Comments: LUE HD fistula   Pulmonary:      Effort: Pulmonary effort is normal.      Breath sounds: Normal breath sounds. No wheezing, rhonchi or rales.   Abdominal:      General: Bowel sounds are normal. There is no distension.      Tenderness: There is no abdominal tenderness.      Comments: PD catheter. Improved anasarca   Musculoskeletal:         General: Normal range of motion.      Cervical back: Normal range of motion.      Right lower leg: Edema present.      Left lower leg: Edema present.   Skin:     General: Skin is warm.   Neurological:      General: No focal deficit present.      Mental Status: She is alert.   Psychiatric:         Mood and Affect: Mood normal.         Behavior: Behavior is cooperative.        All labs reviewed and interpreted by me:  Labs:   Recent Labs     03/25/25  0531 03/26/25  0535 03/27/25  0655 
warm and dry.      Capillary Refill: Capillary refill takes less than 2 seconds.      Coloration: Skin is not pale.      Findings: No erythema.   Neurological:      Mental Status: She is alert and oriented to person, place, and time.   Psychiatric:         Mood and Affect: Mood normal.         Behavior: Behavior normal.        All labs reviewed and interpreted by me:  Labs:   Recent Labs     03/18/25  0610 03/19/25  0606 03/20/25  0628   WBC 7.2 7.5 7.5   HGB 8.5* 8.2* 8.1*   HCT 27.9* 28.1* 27.8*    167 150     Recent Labs     03/18/25  0610 03/19/25  0606 03/20/25  0628    134* 133*   K 5.3* 5.6* 6.2*   CL 98 96* 95*   CO2 31* 29 32*   BUN 27* 32* 25*   CREATININE 4.9* 5.3* 4.3*   CALCIUM 8.6 8.2* 8.1*   PHOS 4.3 4.3 3.4     No results for input(s): \"AST\", \"ALT\", \"BILIDIR\", \"BILITOT\", \"ALKPHOS\" in the last 72 hours.  No results for input(s): \"INR\" in the last 72 hours.  No results for input(s): \"CKTOTAL\", \"TROPONINT\" in the last 72 hours.    Urinalysis:      Lab Results   Component Value Date/Time    NITRU NEGATIVE 07/13/2024 04:30 PM    WBCUA 5-9 07/13/2024 04:30 PM    BACTERIA NONE SEEN 07/13/2024 04:30 PM    RBCUA 3-5 07/13/2024 04:30 PM    BLOODU SMALL 07/13/2024 04:30 PM    GLUCOSEU >= 1000 07/13/2024 04:30 PM       All radiology images and reports reviewed and interpreted by me:  Radiology:  XR CHEST PORTABLE   Final Result   1. Mild stable cardiomegaly with pulmonary vascular congestion suspicious for   congestive heart failure.   2. Minimal bilateral lower lung atelectasis/infiltrate.               **This report has been created using voice recognition software. It may contain   minor errors which are inherent in voice recognition technology.**            Electronically signed by Dr. Kory Ivey          Diet: ADULT DIET; Regular; 3 carb choices (45 gm/meal); Low Sodium (2 gm); 2000 ml    Microbiology: no  Antibiotics: no    Steroids: no    Telemetry: []Yes / [x]No  Telemetry Review of 
with pulmonary vascular congestion suspicious for   congestive heart failure.   2. Minimal bilateral lower lung atelectasis/infiltrate.               **This report has been created using voice recognition software. It may contain   minor errors which are inherent in voice recognition technology.**            Electronically signed by Dr. Kory Ivey          Diet: ADULT DIET; Regular; 3 carb choices (45 gm/meal); Low Sodium (2 gm); 2000 ml    Microbiology: no  Antibiotics: no    Steroids: no    Telemetry: [x]Yes / []No  Telemetry Review of past 24 hours:  no acute overnight events    LDA: PIV x2, peritoneal HD catheter (mid/lower abd), L AV fistula (using for current HD)     Labs (still needed?): [x]Yes / []No  IVF (still needed?): []Yes / [x]No    Level of care: []Step Down / [x]Med-Surg  Bed Status: [x]Inpatient / []Observation    DVT Prophylaxis: [] Lovenox / [] Heparin / [] SCDs / [x] Already on Systemic Anticoagulation / [] None     PT/OT: []Yes / [x]No    Disposition:    [x] Home       [] TCU       [] Rehab       [] Psych       [] SNF       [] Long Term Care Facility       [] Other-    Code Status: Full Code      An electronic signature was used to authenticate this note  - Hemal Jones MD PGY-1 on 3/22/2025 at 6:56 PM

## 2025-03-28 NOTE — DISCHARGE SUMMARY
DISCHARGE SUMMARY      Patient Identification:   Debbie Locke   : 1975  MRN: [id number]   Account: [id number]      Patient's PCP: Kory Thomas MD    Admit Date: 3/17/2025     Discharge Date:   3/28/2025     Admitting Physician: Evonne Cardona MD     Discharge Physician: Nafisa Magallanes DO     Discharge Diagnoses:    Significant anasarca, improved  Acute on chronic diastolic CHF, improved   ESRD on HD, now PD  Dyspnea, improved  Uncontrolled IDDM 2 with severe hyperglycemia  Hyperkalemia and hyperphosphatemia 2/2 ESRD, improved  Hypertensive urgency secondary to ESRD with hypervolemia, improved  Elevated troponin  Chronic normocytic anemia, stable  Mild hyponatremia, improved  Nonobstructive CAD  Hyperlipidemia   PAD status post RLE stent in   History of DVT, FMHx of Factor V Leiden  Peripheral neuropathy  Morbid obesity    The patient was seen and examined on day of discharge and this discharge summary is in conjunction with any daily progress note from day of discharge.    Hospital Course:   Debbie Locke is a 49 y.o. female admitted to [facility] on 3/17/2025 for significant anasarca.  Patient has a history of ESRD on hemodialysis (MWF) and presenting with worsening cough, SOB, and weight gain after apparently losing her medications for 2 weeks while traveling.  Patient had minimal response to multiple hemodialysis sessions managed by nephrology while inpatient.  Received almost 7 days of hemodialysis in a row with very little response to weight and anasarca.  Peritoneal dialysis was initiated on 3/25 with significant improvement in overall anasarca and weight.  Nephrology managed fluid status and will follow with patient on discharge.  Plan to continue hemodialysis outpatient until patient can attend peritoneal dialysis education on Tuesday in [place].  Otherwise patient is much improved and stable for discharge home.       Assessment/Plan:     Significant anasarca,

## 2025-03-28 NOTE — CARE COORDINATION
3/28/25, 1:08 PM EDT      Discharge to home with spouse. Resume OP HD until PD training is completed. Denies needs.   Patient goals/plan/ treatment preferences discussed by  and .  Patient goals/plan/ treatment preferences reviewed with patient/ family.  Patient/ family verbalize understanding of discharge plan and are in agreement with goal/plan/treatment preferences.  Understanding was demonstrated using the teach back method.  AVS provided by RN at time of discharge, which includes all necessary medical information pertaining to the patients current course of illness, treatment, post-discharge goals of care, and treatment preferences.     Services At/After Discharge: None

## 2025-04-22 RX ORDER — INSULIN GLARGINE 100 [IU]/ML
50 INJECTION, SOLUTION SUBCUTANEOUS NIGHTLY
OUTPATIENT
Start: 2025-04-22

## 2025-06-06 ENCOUNTER — TELEPHONE (OUTPATIENT)
Dept: WOUND CARE | Age: 50
End: 2025-06-06

## 2025-06-09 ENCOUNTER — TELEPHONE (OUTPATIENT)
Dept: WOUND CARE | Age: 50
End: 2025-06-09

## 2025-06-11 ENCOUNTER — TELEPHONE (OUTPATIENT)
Dept: WOUND CARE | Age: 50
End: 2025-06-11

## 2025-06-11 NOTE — TELEPHONE ENCOUNTER
Received call from Mecca at Dr Thomas's office regarding to see if we can see patient sooner then Monday. No openings but will place patient on a cancellation list. Updated Mecca with this information.

## 2025-06-16 ENCOUNTER — HOSPITAL ENCOUNTER (OUTPATIENT)
Dept: WOUND CARE | Age: 50
Discharge: HOME | End: 2025-06-16
Attending: NURSE PRACTITIONER
Payer: COMMERCIAL

## 2025-06-16 ENCOUNTER — TELEPHONE (OUTPATIENT)
Dept: WOUND CARE | Age: 50
End: 2025-06-16

## 2025-06-16 VITALS
OXYGEN SATURATION: 95 % | HEART RATE: 85 BPM | SYSTOLIC BLOOD PRESSURE: 171 MMHG | RESPIRATION RATE: 18 BRPM | DIASTOLIC BLOOD PRESSURE: 79 MMHG | TEMPERATURE: 97.8 F

## 2025-06-16 DIAGNOSIS — I87.2 CHRONIC VENOUS STASIS DERMATITIS OF BOTH LOWER EXTREMITIES: ICD-10-CM

## 2025-06-16 DIAGNOSIS — R68.89 SUSPECTED DEEP TISSUE INJURY OF UNKNOWN DEPTH OF HEEL: ICD-10-CM

## 2025-06-16 DIAGNOSIS — L97.411 NEUROPATHIC ULCER OF RIGHT HEEL, LIMITED TO BREAKDOWN OF SKIN (HCC): ICD-10-CM

## 2025-06-16 DIAGNOSIS — I73.9 CLAUDICATION IN PERIPHERAL VASCULAR DISEASE: ICD-10-CM

## 2025-06-16 DIAGNOSIS — Z79.4 TYPE 2 DIABETES MELLITUS WITH HYPERGLYCEMIA, WITH LONG-TERM CURRENT USE OF INSULIN (HCC): ICD-10-CM

## 2025-06-16 DIAGNOSIS — L97.919 VENOUS STASIS ULCER OF RIGHT LOWER LEG WITH EDEMA OF RIGHT LOWER LEG (HCC): Primary | ICD-10-CM

## 2025-06-16 DIAGNOSIS — R60.0 VENOUS STASIS ULCER OF RIGHT LOWER LEG WITH EDEMA OF RIGHT LOWER LEG (HCC): Primary | ICD-10-CM

## 2025-06-16 DIAGNOSIS — I73.9 PAD (PERIPHERAL ARTERY DISEASE): ICD-10-CM

## 2025-06-16 DIAGNOSIS — I83.019 VENOUS STASIS ULCER OF RIGHT LOWER LEG WITH EDEMA OF RIGHT LOWER LEG (HCC): Primary | ICD-10-CM

## 2025-06-16 DIAGNOSIS — I83.891 VENOUS STASIS ULCER OF RIGHT LOWER LEG WITH EDEMA OF RIGHT LOWER LEG (HCC): Primary | ICD-10-CM

## 2025-06-16 DIAGNOSIS — F17.210 CIGARETTE SMOKER: ICD-10-CM

## 2025-06-16 DIAGNOSIS — E11.65 TYPE 2 DIABETES MELLITUS WITH HYPERGLYCEMIA, WITH LONG-TERM CURRENT USE OF INSULIN (HCC): ICD-10-CM

## 2025-06-16 PROBLEM — T82.898A: Status: RESOLVED | Noted: 2025-01-31 | Resolved: 2025-06-16

## 2025-06-16 PROBLEM — E11.9 TYPE 2 DIABETES MELLITUS WITH INSULIN THERAPY (HCC): Status: RESOLVED | Noted: 2024-02-13 | Resolved: 2025-06-16

## 2025-06-16 PROBLEM — N18.6 ESRD ON HEMODIALYSIS (HCC): Status: RESOLVED | Noted: 2025-01-22 | Resolved: 2025-06-16

## 2025-06-16 PROBLEM — E87.70 VOLUME OVERLOAD: Status: RESOLVED | Noted: 2024-07-30 | Resolved: 2025-06-16

## 2025-06-16 PROBLEM — M75.00 FROZEN SHOULDER SYNDROME: Status: RESOLVED | Noted: 2018-02-01 | Resolved: 2025-06-16

## 2025-06-16 PROBLEM — J81.0 ACUTE PULMONARY EDEMA (HCC): Status: RESOLVED | Noted: 2024-07-26 | Resolved: 2025-06-16

## 2025-06-16 PROBLEM — N18.5 CKD (CHRONIC KIDNEY DISEASE), STAGE V (HCC): Status: RESOLVED | Noted: 2024-07-16 | Resolved: 2025-06-16

## 2025-06-16 PROBLEM — R53.1 WEAKNESS: Status: RESOLVED | Noted: 2019-06-26 | Resolved: 2025-06-16

## 2025-06-16 PROBLEM — Z99.2 ESRD ON HEMODIALYSIS (HCC): Status: RESOLVED | Noted: 2025-01-22 | Resolved: 2025-06-16

## 2025-06-16 PROBLEM — I50.31 ACUTE HEART FAILURE WITH PRESERVED EJECTION FRACTION (HCC): Status: RESOLVED | Noted: 2025-01-20 | Resolved: 2025-06-16

## 2025-06-16 PROBLEM — N18.6 ESRD (END STAGE RENAL DISEASE) ON DIALYSIS (HCC): Status: RESOLVED | Noted: 2025-03-17 | Resolved: 2025-06-16

## 2025-06-16 PROBLEM — L02.91 SOFT TISSUE ABSCESS: Status: RESOLVED | Noted: 2024-02-13 | Resolved: 2025-06-16

## 2025-06-16 PROBLEM — N17.9 ACUTE KIDNEY INJURY: Status: RESOLVED | Noted: 2024-02-06 | Resolved: 2025-06-16

## 2025-06-16 PROBLEM — M25.511 RIGHT SHOULDER PAIN: Status: RESOLVED | Noted: 2018-02-01 | Resolved: 2025-06-16

## 2025-06-16 PROBLEM — N18.9 CHRONIC KIDNEY DISEASE: Status: RESOLVED | Noted: 2024-02-06 | Resolved: 2025-06-16

## 2025-06-16 PROBLEM — N18.6 ESRD (END STAGE RENAL DISEASE) (HCC): Status: RESOLVED | Noted: 2024-07-17 | Resolved: 2025-06-16

## 2025-06-16 PROBLEM — J96.01 ACUTE RESPIRATORY FAILURE WITH HYPOXIA (HCC): Status: RESOLVED | Noted: 2024-07-27 | Resolved: 2025-06-16

## 2025-06-16 PROBLEM — D72.829 LEUKOCYTOSIS: Status: RESOLVED | Noted: 2024-02-21 | Resolved: 2025-06-16

## 2025-06-16 PROBLEM — E11.51 TYPE 2 DIABETES MELLITUS WITH PERIPHERAL ANGIOPATHY (HCC): Status: RESOLVED | Noted: 2018-01-24 | Resolved: 2025-06-16

## 2025-06-16 PROBLEM — E87.20 METABOLIC ACIDOSIS: Status: RESOLVED | Noted: 2024-02-21 | Resolved: 2025-06-16

## 2025-06-16 PROBLEM — Z99.2 ESRD (END STAGE RENAL DISEASE) ON DIALYSIS (HCC): Status: RESOLVED | Noted: 2025-03-17 | Resolved: 2025-06-16

## 2025-06-16 PROBLEM — R06.00 DYSPNEA: Status: RESOLVED | Noted: 2024-07-25 | Resolved: 2025-06-16

## 2025-06-16 PROBLEM — I82.409 DVT (DEEP VENOUS THROMBOSIS) (HCC): Status: RESOLVED | Noted: 2019-08-06 | Resolved: 2025-06-16

## 2025-06-16 PROBLEM — R00.0 SINUS TACHYCARDIA: Status: RESOLVED | Noted: 2024-02-21 | Resolved: 2025-06-16

## 2025-06-16 PROBLEM — R11.11 VOMITING WITHOUT NAUSEA: Status: RESOLVED | Noted: 2024-07-27 | Resolved: 2025-06-16

## 2025-06-16 PROBLEM — L02.212 BACK ABSCESS: Status: RESOLVED | Noted: 2024-02-12 | Resolved: 2025-06-16

## 2025-06-16 PROCEDURE — 99204 OFFICE O/P NEW MOD 45 MIN: CPT | Performed by: NURSE PRACTITIONER

## 2025-06-16 RX ORDER — CLOBETASOL PROPIONATE 0.5 MG/G
OINTMENT TOPICAL PRN
Status: CANCELLED | OUTPATIENT
Start: 2025-06-16

## 2025-06-16 RX ORDER — GENTAMICIN SULFATE 1 MG/G
OINTMENT TOPICAL PRN
Status: CANCELLED | OUTPATIENT
Start: 2025-06-16

## 2025-06-16 RX ORDER — LIDOCAINE 50 MG/G
OINTMENT TOPICAL PRN
OUTPATIENT
Start: 2025-06-16

## 2025-06-16 RX ORDER — GINSENG 100 MG
CAPSULE ORAL PRN
Status: CANCELLED | OUTPATIENT
Start: 2025-06-16

## 2025-06-16 RX ORDER — AMMONIUM LACTATE 12 G/100G
LOTION TOPICAL
Qty: 1 EACH | Refills: 1 | Status: SHIPPED | OUTPATIENT
Start: 2025-06-16

## 2025-06-16 RX ORDER — LIDOCAINE HYDROCHLORIDE 20 MG/ML
JELLY TOPICAL PRN
Status: CANCELLED | OUTPATIENT
Start: 2025-06-16

## 2025-06-16 RX ORDER — GENTAMICIN SULFATE 1 MG/G
OINTMENT TOPICAL PRN
OUTPATIENT
Start: 2025-06-16

## 2025-06-16 RX ORDER — GENTAMICIN SULFATE 1 MG/G
CREAM TOPICAL
COMMUNITY

## 2025-06-16 RX ORDER — LIDOCAINE HYDROCHLORIDE 20 MG/ML
JELLY TOPICAL PRN
OUTPATIENT
Start: 2025-06-16

## 2025-06-16 RX ORDER — SODIUM CHLOR/HYPOCHLOROUS ACID 0.033 %
SOLUTION, IRRIGATION IRRIGATION PRN
Status: CANCELLED | OUTPATIENT
Start: 2025-06-16

## 2025-06-16 RX ORDER — LIDOCAINE 50 MG/G
OINTMENT TOPICAL PRN
Status: CANCELLED | OUTPATIENT
Start: 2025-06-16

## 2025-06-16 RX ORDER — BETAMETHASONE DIPROPIONATE 0.5 MG/G
CREAM TOPICAL PRN
Status: CANCELLED | OUTPATIENT
Start: 2025-06-16

## 2025-06-16 RX ORDER — SODIUM CHLOR/HYPOCHLOROUS ACID 0.033 %
SOLUTION, IRRIGATION IRRIGATION PRN
OUTPATIENT
Start: 2025-06-16

## 2025-06-16 RX ORDER — BACITRACIN ZINC AND POLYMYXIN B SULFATE 500; 1000 [USP'U]/G; [USP'U]/G
OINTMENT TOPICAL PRN
Status: CANCELLED | OUTPATIENT
Start: 2025-06-16

## 2025-06-16 RX ORDER — PEN NEEDLE, DIABETIC 31 GX5/16"
NEEDLE, DISPOSABLE MISCELLANEOUS
COMMUNITY
Start: 2025-05-30

## 2025-06-16 RX ORDER — LIDOCAINE HYDROCHLORIDE 40 MG/ML
SOLUTION TOPICAL PRN
Status: CANCELLED | OUTPATIENT
Start: 2025-06-16

## 2025-06-16 RX ORDER — TRIAMCINOLONE ACETONIDE 1 MG/G
OINTMENT TOPICAL PRN
OUTPATIENT
Start: 2025-06-16

## 2025-06-16 RX ORDER — MUPIROCIN 2 %
OINTMENT (GRAM) TOPICAL PRN
OUTPATIENT
Start: 2025-06-16

## 2025-06-16 RX ORDER — CALCITRIOL 0.25 UG/1
CAPSULE, LIQUID FILLED ORAL DAILY
COMMUNITY
Start: 2025-06-04

## 2025-06-16 RX ORDER — LIDOCAINE HYDROCHLORIDE 40 MG/ML
SOLUTION TOPICAL PRN
OUTPATIENT
Start: 2025-06-16

## 2025-06-16 RX ORDER — CLOBETASOL PROPIONATE 0.5 MG/G
OINTMENT TOPICAL PRN
OUTPATIENT
Start: 2025-06-16

## 2025-06-16 RX ORDER — MUPIROCIN 2 %
OINTMENT (GRAM) TOPICAL PRN
Status: CANCELLED | OUTPATIENT
Start: 2025-06-16

## 2025-06-16 RX ORDER — LIDOCAINE 40 MG/G
CREAM TOPICAL PRN
OUTPATIENT
Start: 2025-06-16

## 2025-06-16 RX ORDER — SILVER SULFADIAZINE 10 MG/G
CREAM TOPICAL PRN
Status: CANCELLED | OUTPATIENT
Start: 2025-06-16

## 2025-06-16 RX ORDER — BACITRACIN ZINC AND POLYMYXIN B SULFATE 500; 1000 [USP'U]/G; [USP'U]/G
OINTMENT TOPICAL PRN
OUTPATIENT
Start: 2025-06-16

## 2025-06-16 RX ORDER — NEOMYCIN/BACITRACIN/POLYMYXINB 3.5-400-5K
OINTMENT (GRAM) TOPICAL PRN
OUTPATIENT
Start: 2025-06-16

## 2025-06-16 RX ORDER — LIDOCAINE 40 MG/G
CREAM TOPICAL PRN
Status: CANCELLED | OUTPATIENT
Start: 2025-06-16

## 2025-06-16 RX ORDER — CALCIUM ACETATE 667 MG/1
2001 CAPSULE ORAL 3 TIMES DAILY
COMMUNITY
Start: 2025-05-21

## 2025-06-16 RX ORDER — TRIAMCINOLONE ACETONIDE 1 MG/G
OINTMENT TOPICAL PRN
Status: CANCELLED | OUTPATIENT
Start: 2025-06-16

## 2025-06-16 RX ORDER — NEOMYCIN/BACITRACIN/POLYMYXINB 3.5-400-5K
OINTMENT (GRAM) TOPICAL PRN
Status: CANCELLED | OUTPATIENT
Start: 2025-06-16

## 2025-06-16 RX ORDER — GINSENG 100 MG
CAPSULE ORAL PRN
OUTPATIENT
Start: 2025-06-16

## 2025-06-16 RX ORDER — BETAMETHASONE DIPROPIONATE 0.5 MG/G
CREAM TOPICAL PRN
OUTPATIENT
Start: 2025-06-16

## 2025-06-16 RX ORDER — SILVER SULFADIAZINE 10 MG/G
CREAM TOPICAL PRN
OUTPATIENT
Start: 2025-06-16

## 2025-06-16 NOTE — PROGRESS NOTES
Wound Care Supplies      Supply Company:     [employer] [address] [place] [address] f: [phone] f: [phone] p: [phone] [email or web address]      Ordering Center:   [facility]  [address]  [place] [address]  [phone]  WOUND CARE Dept: [phone]   FAX NUMBER [phone]    Patient Information:      Debbie Locke  [address]  [place] [address]   [phone]   : 1975  AGE: 49 y.o.     GENDER: female   EPISODE DATE: 2025    Insurance:      PRIMARY INSURANCE:  Plan: [employer]  Coverage: [employer]AN  Effective Date: 2025  Group Number: [unfilled]  Subscriber Number: [id number] - (Medicaid Managed)    Payer/Plan Subscr  Sex Relation Sub. Ins. ID Effective Group Num   1. PENDING MEDIC* DEBBIE LOCKE 1975 Female Self ACUTE 3/17/25                                       2. [employer]* DEBBIE LOCKE 1975 Female Self [id number]                                    [address]       Patient Wound Information:      Problem List Items Addressed This Visit          Circulatory    PAD (peripheral artery disease)    Relevant Orders    External Referral To Vascular Surgery       Endocrine    Type 2 diabetes mellitus with hyperglycemia, with long-term current use of insulin (HCC)       Musculoskeletal and Integument    Venous stasis ulcer of right lower leg with edema of right lower leg (HCC) - Primary    Relevant Orders    Initiate Outpatient Wound Care Protocol    External Referral To Home Health       Other    Claudication in peripheral vascular disease    Relevant Orders    External Referral To Vascular Surgery    Cigarette smoker    Suspected deep tissue injury of unknown depth of heel    Relevant Orders    External Referral To Home Health     Other Visit Diagnoses         Neuropathic ulcer of right heel, limited to breakdown of skin (HCC)        Relevant Orders

## 2025-06-16 NOTE — PATIENT INSTRUCTIONS
Visit Discharge/Physician Orders:  - recommend following up with Dr. Peguero  - Mechanical Debridement was completed today by using a saline and gauze.   - offloading shoe given today  - offloading boot given today, wear while in bed or chair, DO NOT WEAR WHILE WALKING  - will send prescription for lac-hydrin lotion to help with itching, only apply to intact skin, avoid wounds  - will order wound care supplies through [employer] Phone # [phone] call if you do not receive    Home Care: will send referral     Wound Location: Right leg, Right heel    Dressing orders:     1) Gather wound care supplies and arrange on clean table.     2) Wash your hands with soap and water or use alcohol based hand  for 20 seconds (sing \"Happy Birthday\" twice).    3) Cleanse wounds with normal saline or wound cleanser and gauze. Pat dry with clean gauze.    4) Right leg and heel - Apply alginate to wounds(cut to size needed). Cover with ABD pad. Secure with roll gauze then ACE wrap from base of toes to 1-2 inches below the knee. Change three times weekly.         Keep all dressings clean & dry.    Follow up visit:  3  Weeks on July 11th at 10AM    Supplies:    Duration of dressings: 30 days    We have sent your supply order to the following company:  [employer] Phone # [phone]   If you don't receive the items you were expecting or don't know what the items are that you received, call the company where the order was sent.   If you are unable to obtain wound supplies, continue to use the supplies you have available until you are able to reach us. It is most important to keep the wound covered at all times.  It is YOUR responsibility to make sure that supplies are re-ordered before you run out. Re-order telephone numbers are included in each package.     Keep next scheduled appointment. Please give 24 hour notice if unable to keep appointment. [phone]    If you experience any of the following, please call the Wound Care

## 2025-06-16 NOTE — PROGRESS NOTES
[facility]  [address]   [place], [place] [address]  Telephone: [phone]     FAX [phone]    Home Health Agency: [facility] Fax# [phone]      Patient Instructions   Visit Discharge/Physician Orders:  - recommend following up with Dr. Peguero  - Mechanical Debridement was completed today by using a saline and gauze.   - offloading shoe given today  - offloading boot given today, wear while in bed or chair, DO NOT WEAR WHILE WALKING  - will send prescription for lac-hydrin lotion to help with itching, only apply to intact skin, avoid wounds  - will order wound care supplies through [employer] Phone # [phone] call if you do not receive    Home Care: will send referral     Wound Location: Right leg, Right heel    Dressing orders:     1) Gather wound care supplies and arrange on clean table.     2) Wash your hands with soap and water or use alcohol based hand  for 20 seconds (sing \"Happy Birthday\" twice).    3) Cleanse wounds with normal saline or wound cleanser and gauze. Pat dry with clean gauze.    4) Right leg and heel - Apply alginate to wounds(cut to size needed). Cover with ABD pad. Secure with roll gauze then ACE wrap from base of toes to 1-2 inches below the knee. Change three times weekly.         Keep all dressings clean & dry.    Follow up visit:  3  Weeks on July 11th at 10AM    Supplies:    Duration of dressings: 30 days    We have sent your supply order to the following company:  [employer] Phone # [phone]   If you don't receive the items you were expecting or don't know what the items are that you received, call the company where the order was sent.   If you are unable to obtain wound supplies, continue to use the supplies you have available until you are able to reach us. It is most important to keep the wound covered at all times.  It is YOUR responsibility to make sure that supplies are re-ordered before you run out. Re-order telephone

## 2025-06-16 NOTE — PLAN OF CARE
Problem: Wound:  Goal: Will show signs of wound healing; wound closure and no evidence of infection  Description: Will show signs of wound healing; wound closure and no evidence of infection  Outcome: Progressing   Patient seen in clinic today for right leg and heel wounds. No s/s of infection. See AVS. Follow up in 3 week/s. Care plan reviewed with patient. Patient verbalizes understanding of the plan of care and contributes to goal setting.

## 2025-06-16 NOTE — PROGRESS NOTES
Patient Information:  Sis Locke  [address] [address]  [phone]  : 1975  AGE: 49 y.o.  GENDER: female  TODAYS DATE: 2025    Insurance:  Active Insurance as of 2025       Primary Coverage       Payor Plan Insurance Group Employer/Plan Group    [facility]        Payor Plan Address Payor Plan Phone Number Payor Plan Fax Number Effective Dates    [address] [phone]  2025 - None Entered    [place] [address]         Subscriber Name Subscriber Birth Date Member ID       ISAMARSIS BHATT 1975 [id number]

## 2025-06-16 NOTE — PROGRESS NOTES
[facility]  Consult and Procedure Note      Debbie Locke  MEDICAL RECORD NUMBER:  [id number]  AGE: 49 y.o.   GENDER: female  : 1975  EPISODE DATE:  2025  Referring Provider: Torin Neville MD   Reason for Referral: right lower extremity wounds    SUBJECTIVE:     Chief Complaint   Patient presents with    Wound Check         HISTORY OF PRESENT ILLNESS      Debbie Locke is a 49 y.o. female who presents today for wound/ulcer evaluation. Patient is new to the wound center. Wound duration: unknown .    Patient presents today for evaluation of RLE wounds.  Patient states that she has had difficulty with wounds intermittently for some time secondary to uncontrolled edema.  She states that wound to heal began after her leg wound drained onto and saturated her socks and shoes.  Patient was in ER at [facility] 25 for wound with plans to transfer to [facility] for care.  Patient left AMA prior to transfer occurring.  Since that time she has completed antibiotics and drainage has decreased.  Patient has history of CKD, is on dialysis per Dr. Neville.  Is on transplant list at [facility].  She has history of PAD, last testing 29 showed \"The common iliacs and external iliac arteries bilaterally are widely patent.  The common femoral and femoral arteries bilaterally are widely patent.  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  The occlusion extends over the length of the popliteal artery, with reconstitution just below the knee joint.  In the right calf there is 3-vessel runoff.  The left calf demonstrates 3-vessel runoff.  The popliteal artery is widely patent.  The femoral artery is widely patent.  There are mild lumbar degenerative changes.\" She previously followed with Dr. Peguero in [place] for this problem. She states that he attempted stenting that failed.  She states that he wanted to complete additional interventions at that time but that her blood sugars were too high to

## 2025-06-16 NOTE — TELEPHONE ENCOUNTER
Referral sent to [facility]. Called and spoke to intake they do service [facility] and accept patients insurance.

## 2025-06-16 NOTE — TELEPHONE ENCOUNTER
----- Message from Angelina PIEDRA sent at 6/16/2025  2:21 PM EDT -----  ROSARIO @ [facility] SAYS THEY HAVE TO DECLINE THE REFERRAL, THEY DO NOT EXCEPT [facility]

## 2025-06-18 ENCOUNTER — TELEPHONE (OUTPATIENT)
Dept: WOUND CARE | Age: 50
End: 2025-06-18

## 2025-06-18 NOTE — TELEPHONE ENCOUNTER
Called [facility] to check status of supplies, supplies have not been sent because they need a copy of her insurance card. [facility] also had her date of birth wrong. Returned call to Jennifer with [facility] to update her with status of supplies, she already saw patient today and brought supplies with her for today just in case. Called patient to let her know [facility]awanda will be contacting her to get insurance card info

## 2025-06-18 NOTE — TELEPHONE ENCOUNTER
----- Message from Angelina PIEDRA sent at 6/18/2025  8:50 AM EDT -----   [phone] JANAK @ [facility] SAYS THAT SHE IS TO SEE PT TODAY, PT SAYS SHE HAS NOT RECEIVED ANY SUPPLIES YET. JANAK CALLED [employer], BUT OFFICE NOT OPEN YET. ASKING TO SEE IF SUPPLIES WERE ORDERED?

## 2025-07-11 ENCOUNTER — HOSPITAL ENCOUNTER (OUTPATIENT)
Dept: WOUND CARE | Age: 50
Discharge: HOME | End: 2025-07-11
Attending: NURSE PRACTITIONER
Payer: COMMERCIAL

## 2025-07-11 VITALS
RESPIRATION RATE: 18 BRPM | DIASTOLIC BLOOD PRESSURE: 77 MMHG | HEART RATE: 85 BPM | OXYGEN SATURATION: 97 % | TEMPERATURE: 97.7 F | SYSTOLIC BLOOD PRESSURE: 184 MMHG

## 2025-07-11 DIAGNOSIS — L97.411 DIABETIC ULCER OF RIGHT HEEL ASSOCIATED WITH TYPE 2 DIABETES MELLITUS, LIMITED TO BREAKDOWN OF SKIN (HCC): ICD-10-CM

## 2025-07-11 DIAGNOSIS — I83.019 VENOUS STASIS ULCER OF RIGHT LOWER LEG WITH EDEMA OF RIGHT LOWER LEG (HCC): ICD-10-CM

## 2025-07-11 DIAGNOSIS — E08.42 DIABETIC POLYNEUROPATHY ASSOCIATED WITH DIABETES MELLITUS DUE TO UNDERLYING CONDITION (HCC): Primary | ICD-10-CM

## 2025-07-11 DIAGNOSIS — I83.891 VENOUS STASIS ULCER OF RIGHT LOWER LEG WITH EDEMA OF RIGHT LOWER LEG (HCC): ICD-10-CM

## 2025-07-11 DIAGNOSIS — E11.65 TYPE 2 DIABETES MELLITUS WITH HYPERGLYCEMIA, WITH LONG-TERM CURRENT USE OF INSULIN (HCC): ICD-10-CM

## 2025-07-11 DIAGNOSIS — R68.89 SUSPECTED DEEP TISSUE INJURY OF UNKNOWN DEPTH OF HEEL: ICD-10-CM

## 2025-07-11 DIAGNOSIS — E11.621 DIABETIC ULCER OF RIGHT HEEL ASSOCIATED WITH TYPE 2 DIABETES MELLITUS, LIMITED TO BREAKDOWN OF SKIN (HCC): ICD-10-CM

## 2025-07-11 DIAGNOSIS — R60.0 VENOUS STASIS ULCER OF RIGHT LOWER LEG WITH EDEMA OF RIGHT LOWER LEG (HCC): ICD-10-CM

## 2025-07-11 DIAGNOSIS — L97.919 VENOUS STASIS ULCER OF RIGHT LOWER LEG WITH EDEMA OF RIGHT LOWER LEG (HCC): ICD-10-CM

## 2025-07-11 DIAGNOSIS — Z79.4 TYPE 2 DIABETES MELLITUS WITH HYPERGLYCEMIA, WITH LONG-TERM CURRENT USE OF INSULIN (HCC): ICD-10-CM

## 2025-07-11 PROBLEM — S91.301A OPEN WOUND OF RIGHT HEEL: Status: ACTIVE | Noted: 2025-07-11

## 2025-07-11 PROBLEM — L97.419 DIABETIC ULCER OF RIGHT HEEL (HCC): Status: ACTIVE | Noted: 2025-07-11

## 2025-07-11 PROCEDURE — 99213 OFFICE O/P EST LOW 20 MIN: CPT

## 2025-07-11 PROCEDURE — 99213 OFFICE O/P EST LOW 20 MIN: CPT | Performed by: NURSE PRACTITIONER

## 2025-07-11 RX ORDER — CLOBETASOL PROPIONATE 0.5 MG/G
OINTMENT TOPICAL PRN
Status: CANCELLED | OUTPATIENT
Start: 2025-07-11

## 2025-07-11 RX ORDER — LIDOCAINE HYDROCHLORIDE 20 MG/ML
JELLY TOPICAL PRN
Status: CANCELLED | OUTPATIENT
Start: 2025-07-11

## 2025-07-11 RX ORDER — TRIAMCINOLONE ACETONIDE 1 MG/G
OINTMENT TOPICAL PRN
Status: CANCELLED | OUTPATIENT
Start: 2025-07-11

## 2025-07-11 RX ORDER — NEOMYCIN/BACITRACIN/POLYMYXINB 3.5-400-5K
OINTMENT (GRAM) TOPICAL PRN
Status: CANCELLED | OUTPATIENT
Start: 2025-07-11

## 2025-07-11 RX ORDER — MUPIROCIN 2 %
OINTMENT (GRAM) TOPICAL PRN
Status: CANCELLED | OUTPATIENT
Start: 2025-07-11

## 2025-07-11 RX ORDER — SODIUM CHLOR/HYPOCHLOROUS ACID 0.033 %
SOLUTION, IRRIGATION IRRIGATION PRN
Status: CANCELLED | OUTPATIENT
Start: 2025-07-11

## 2025-07-11 RX ORDER — LIDOCAINE HYDROCHLORIDE 40 MG/ML
SOLUTION TOPICAL PRN
Status: CANCELLED | OUTPATIENT
Start: 2025-07-11

## 2025-07-11 RX ORDER — BETAMETHASONE DIPROPIONATE 0.5 MG/G
CREAM TOPICAL PRN
Status: CANCELLED | OUTPATIENT
Start: 2025-07-11

## 2025-07-11 RX ORDER — FOLIC ACID/VIT B COMPLEX AND C 0.8 MG
TABLET ORAL
COMMUNITY
Start: 2025-07-03

## 2025-07-11 RX ORDER — GINSENG 100 MG
CAPSULE ORAL PRN
Status: CANCELLED | OUTPATIENT
Start: 2025-07-11

## 2025-07-11 RX ORDER — SILVER SULFADIAZINE 10 MG/G
CREAM TOPICAL PRN
Status: CANCELLED | OUTPATIENT
Start: 2025-07-11

## 2025-07-11 RX ORDER — BACITRACIN ZINC AND POLYMYXIN B SULFATE 500; 1000 [USP'U]/G; [USP'U]/G
OINTMENT TOPICAL PRN
Status: CANCELLED | OUTPATIENT
Start: 2025-07-11

## 2025-07-11 RX ORDER — LIDOCAINE 50 MG/G
OINTMENT TOPICAL PRN
Status: CANCELLED | OUTPATIENT
Start: 2025-07-11

## 2025-07-11 RX ORDER — GENTAMICIN SULFATE 1 MG/G
OINTMENT TOPICAL PRN
Status: CANCELLED | OUTPATIENT
Start: 2025-07-11

## 2025-07-11 RX ORDER — LIDOCAINE 40 MG/G
CREAM TOPICAL PRN
Status: CANCELLED | OUTPATIENT
Start: 2025-07-11

## 2025-07-11 NOTE — PLAN OF CARE
Problem: Wound:  Goal: Will show signs of wound healing; wound closure and no evidence of infection  Description: Will show signs of wound healing; wound closure and no evidence of infection  Outcome: Progressing   Patient seen in clinic today for right leg and right heel wound. No s/s of infection. See AVS. Follow up as needed. Care plan reviewed with patient. Patient verbalizes understanding of the plan of care and contributes to goal setting.

## 2025-07-11 NOTE — PATIENT INSTRUCTIONS
Visit Discharge/Physician Orders:  - recommend seeing podiatry for wound on heel, will send referral today to [facility] Address: [address], [place] [address]  Phone: [phone]  - check feet every day for new wounds  - Mechanical Debridement was completed today by using a saline and gauze.   - offloading shoe given today  - offloading boot given today, wear while in bed or chair, DO NOT WEAR WHILE WALKING  - will send prescription for lac-hydrin lotion to help with itching, only apply to intact skin, avoid wounds  - will order wound care supplies through [employer] Phone # [phone] call if you do not receive    Home Care: [facility] Fax# [phone]     Wound Location: Right leg - healed, Right heel    Dressing orders:     1) Gather wound care supplies and arrange on clean table.     2) Wash your hands with soap and water or use alcohol based hand  for 20 seconds (sing \"Happy Birthday\" twice).    3) Cleanse wounds with normal saline or wound cleanser and gauze. Pat dry with clean gauze.    4) Right heel - Apply alginate to wounds(cut to size needed). Cover with ABD pad. Secure with roll gauze then ACE wrap from base of toes to 1-2 inches below the knee. Change three times weekly.         Keep all dressings clean & dry.    Follow up visit:  as needed until established with podiatry     Supplies:    Duration of dressings: 30 days    We have sent your supply order to the following company:  [employer] Phone # [phone]   If you don't receive the items you were expecting or don't know what the items are that you received, call the company where the order was sent.   If you are unable to obtain wound supplies, continue to use the supplies you have available until you are able to reach us. It is most important to keep the wound covered at all times.  It is YOUR responsibility to make sure that supplies are re-ordered before you run out. Re-order telephone numbers are included in each

## 2025-07-11 NOTE — PROGRESS NOTES
[facility]  Consult and Procedure Note      Debbie Locke  MEDICAL RECORD NUMBER:  [id number]  AGE: 49 y.o.   GENDER: female  : 1975  EPISODE DATE:  2025  Referring Provider: Torin Neville MD   Reason for Referral: right lower extremity wounds    SUBJECTIVE:     Chief Complaint   Patient presents with    Wound Check         HISTORY OF PRESENT ILLNESS      Debbie Locke is a 49 y.o. female who presents today for wound/ulcer evaluation. Patient is established. Wound duration: unknown .    Patient presents today for evaluation of RLE wounds.  Patient states that she has had difficulty with wounds intermittently for some time secondary to uncontrolled edema.  She states that wound to heel began after her leg wound drained onto and saturated her socks and shoes.  Patient was in ER at [facility] 25 for wound with plans to transfer to [facility] for care.  Patient left AMA prior to transfer occurring.  Since that time she completed antibiotics and drainage has decreased.  Patient has history of CKD, is on dialysis per Dr. Neville.  Is on transplant list at [facility].  She has history of PAD, last testing 29 showed \"The common iliacs and external iliac arteries bilaterally are widely patent.  The common femoral and femoral arteries bilaterally are widely patent.  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  The occlusion extends over the length of the popliteal artery, with reconstitution just below the knee joint.  In the right calf there is 3-vessel runoff.  The left calf demonstrates 3-vessel runoff.  The popliteal artery is widely patent.  The femoral artery is widely patent.  There are mild lumbar degenerative changes.\" She previously followed with Dr. Peguero in [place] for this problem. She states that he attempted stenting that failed.  She states that he wanted to complete additional interventions at that time but that her blood sugars were too high to complete 
  Odor None 07/11/25 0958   Rika-wound Assessment Hyperkeratosis (callous);Dry/flaky;Maceration 07/11/25 0958   Margins Attached edges 07/11/25 0958   Wound Thickness Description not for Pressure Injury Full thickness 07/11/25 0958   Number of days: 25       Patient seen and treated on 7/11/2025    By PROVIDER'S NAME: Jihan Rodríguez CNP   NPI: [id number]

## 2025-07-14 ENCOUNTER — TELEPHONE (OUTPATIENT)
Dept: WOUND CARE | Age: 50
End: 2025-07-14

## 2025-07-14 NOTE — TELEPHONE ENCOUNTER
----- Message from Angelina PIEDRA sent at 7/14/2025  2:09 PM EDT -----   [phone] MALIHA @ [facility] ASKS FOR ANY NEW WOUND CARE ORDERS. PT WAS SEEN ON FRI 7/11/25

## 2025-07-25 ENCOUNTER — TELEPHONE (OUTPATIENT)
Dept: WOUND CARE | Age: 50
End: 2025-07-25

## 2025-07-25 NOTE — TELEPHONE ENCOUNTER
Jennifer Anne at [facility] called and said that patients wound on right leg re-opened and patient needs an appointment. At last appointment at wound clinic patient was referred to [facility] in [place] for a heel wound. Jennifer states patient can't get in there until August but this wound is on the leg so she wants to knee if Jihan wants to see her here at wound clinic. Will update provider.

## 2025-07-28 ENCOUNTER — TELEPHONE (OUTPATIENT)
Dept: WOUND CARE | Age: 50
End: 2025-07-28

## 2025-07-28 NOTE — TELEPHONE ENCOUNTER
Updated Victorina ASTORGA regarding previous message. She would like patient to be evaluated at [facility] for both wounds, patient is already scheduled there. Maintain current orders until start of care with podiatry in August as scheduled. Called and updated Jennifer Anne at [facility] of this and verbalized understanding.

## 2025-08-04 ENCOUNTER — TELEPHONE (OUTPATIENT)
Dept: WOUND CARE | Age: 50
End: 2025-08-04

## (undated) DEVICE — PENCIL SMK EVAC ALL IN 1 DSGN ENH VISIBILITY IMPROVED AIR

## (undated) DEVICE — SUTURE VCRL + SZ 4-0 L27IN ABSRB WHT FS-2 3/8 CIR REV CUT VCP422H

## (undated) DEVICE — BREAST HERNIA: Brand: MEDLINE INDUSTRIES, INC.

## (undated) DEVICE — GLOVE SURG SZ 7.5 L11.73IN FNGR THK9.8MIL STRW LTX POLYMER